# Patient Record
Sex: MALE | Race: WHITE | Employment: OTHER | URBAN - METROPOLITAN AREA
[De-identification: names, ages, dates, MRNs, and addresses within clinical notes are randomized per-mention and may not be internally consistent; named-entity substitution may affect disease eponyms.]

---

## 2017-10-12 ENCOUNTER — OP HISTORICAL/CONVERTED ENCOUNTER (OUTPATIENT)
Dept: OTHER | Age: 82
End: 2017-10-12

## 2017-10-19 ENCOUNTER — OP HISTORICAL/CONVERTED ENCOUNTER (OUTPATIENT)
Dept: OTHER | Age: 82
End: 2017-10-19

## 2017-11-27 ENCOUNTER — IP HISTORICAL/CONVERTED ENCOUNTER (OUTPATIENT)
Dept: OTHER | Age: 82
End: 2017-11-27

## 2018-07-10 ENCOUNTER — OFFICE VISIT (OUTPATIENT)
Dept: RHEUMATOLOGY | Age: 83
End: 2018-07-10

## 2018-07-10 VITALS
TEMPERATURE: 97.9 F | DIASTOLIC BLOOD PRESSURE: 58 MMHG | BODY MASS INDEX: 26.77 KG/M2 | RESPIRATION RATE: 18 BRPM | HEART RATE: 67 BPM | HEIGHT: 70 IN | WEIGHT: 187 LBS | SYSTOLIC BLOOD PRESSURE: 121 MMHG

## 2018-07-10 DIAGNOSIS — M35.3 PMR (POLYMYALGIA RHEUMATICA) (HCC): ICD-10-CM

## 2018-07-10 DIAGNOSIS — M31.5 GIANT CELL ARTERITIS WITH POLYMYALGIA RHEUMATICA (HCC): Primary | ICD-10-CM

## 2018-07-10 RX ORDER — PRAVASTATIN SODIUM 40 MG/1
40 TABLET ORAL
COMMUNITY

## 2018-07-10 RX ORDER — TAMSULOSIN HYDROCHLORIDE 0.4 MG/1
0.4 CAPSULE ORAL DAILY
COMMUNITY

## 2018-07-10 RX ORDER — LEVOTHYROXINE SODIUM 100 UG/1
100 TABLET ORAL
COMMUNITY
End: 2021-04-22

## 2018-07-10 RX ORDER — GABAPENTIN 300 MG/1
300 CAPSULE ORAL 3 TIMES DAILY
COMMUNITY
End: 2018-08-14

## 2018-07-10 RX ORDER — ROPINIROLE 0.25 MG/1
0.25 TABLET, FILM COATED ORAL
COMMUNITY

## 2018-07-10 RX ORDER — BISMUTH SUBSALICYLATE 262 MG
1 TABLET,CHEWABLE ORAL DAILY
COMMUNITY

## 2018-07-10 RX ORDER — FERROUS SULFATE 325(65) MG
325 TABLET, DELAYED RELEASE (ENTERIC COATED) ORAL DAILY
COMMUNITY
End: 2019-10-04

## 2018-07-10 RX ORDER — ASPIRIN 81 MG/1
TABLET ORAL DAILY
COMMUNITY
End: 2019-10-04

## 2018-07-10 RX ORDER — FISH OIL/DHA/EPA 1200-144MG
1 CAPSULE ORAL DAILY
Status: ON HOLD | COMMUNITY
End: 2020-12-01

## 2018-07-10 RX ORDER — PREDNISONE 20 MG/1
40 TABLET ORAL
Qty: 60 TAB | Refills: 0 | Status: SHIPPED | OUTPATIENT
Start: 2018-07-10 | End: 2018-08-14 | Stop reason: DRUGHIGH

## 2018-07-10 RX ORDER — LISINOPRIL 10 MG/1
10 TABLET ORAL DAILY
COMMUNITY
End: 2020-11-19

## 2018-07-10 RX ORDER — METOPROLOL TARTRATE 25 MG/1
12.5 TABLET, FILM COATED ORAL DAILY
Status: ON HOLD | COMMUNITY
End: 2020-12-02

## 2018-07-10 NOTE — PROGRESS NOTES
REASON FOR VISIT    This is the initial evaluation for Mr. Creola Ormond a 80 y.o.  male for question of an inflammatory arthritis. The patient is referred to the [facility] and [address] [place] [address] at the request of Dr. Dg Oropeza. HISTORY OF PRESENT ILLNESS      I have reviewed and summarized old records from none. In 3/01/2018, he developed poserior neck pain, tightness, and stiffness. He also had pain shoulder pain. He also developed head throbbing headaches with tenderness and had unintentional weight loss. He denies diplopia or jaw claudication. He saw neck surgeon who injected him with some relief. She was then tried on prednisone with some relief. Labs not available to me showed elevated ESR, per Dr. Dg Oropeza who discussed the case with me last week. Today, he feels better than March. His headaches are intermittent, which are random. He has neck pain and stiffness lasting hours but denies shoulder pain. He needs to hold his head when he tried to lay down. Therapy History includes:    Current DMARD therapy includes: none  Prior DMARD therapy includes: none  The following DMARDs have been ineffective: none  The following DMARDs were stopped because of side effects: none    REVIEW OF SYSTEMS    A 15 point review of systems was performed and summarized below. The questionnaire was reviewed with the patient and scanned into the patient's medical record.     General: endorses recent 15 lb weight loss, fatigue, weakness, denies recent weight gain, fever, night sweats  Musculoskeletal: denies joint pain, joint swelling, morning stiffness, muscle weakness  Ears: endorses loss of hearing, denies ringing in ears, deafness  Eyes: denies pain, redness, loss of vision, double vision, blurred vision, dryness, foreign body sensation  Mouth: denies sore tongue, oral ulcers, bleeding gums, loss of taste, dryness, increased dental caries  Nose: denies nosebleeds, loss of smell, nasal ulcers  Throat: denies frequent sore throats, hoarseness, difficulty in swallowing, pain in jaw while chewing  Neck: denies swollen glands, tender glands  Cardiopulmonary: denies pain in chest, irregular heart beat, sudden changes in heart beat, shortness of breath, difficulty breathing at night, swollen legs or feet, cough, coughing of blood, wheezing  Gastrointestinal: denies nausea, heartburn, stomach pain relieved by food, vomiting of blood/\"coffee grounds\", jaundice, increasing constipation, persistent diarrhea, blood in stools, black stools  Genitourinary: denies nocturia, difficult urination, pain or burning on urination, blood in urine, cloudy urine, pus in urine, genital discharge, frequent urination, vaginal dryness, rash/ulcers, sexual difficulties   Hematologic: endorses anemia, denies bleeding tendency, blood clots  Skin: endorses easy bruising, rash, denies redness, hives, sun sensitive, skin tightness, nodules/bumps, hair loss, color changes of hands or feet in the cold (Raynaud's)  Neurologic: endorses headaches, dizziness, numbness or tingling in hands/feet, memory loss, muscle weakness, denies fainting or loss of consciousness  Psychiatric: denies depression, excessive worries  Sleep: endorses poor sleep (6 hours), daytime somnolence, denies snoring, apnea, difficulty falling asleep, difficulty staying asleep     PAST MEDICAL HISTORY    He has a past medical history of Hard of hearing; Headache; PMR (polymyalgia rheumatica) (HCC); and Skin cancer. FAMILY HISTORY    His family history includes Arthritis-rheumatoid in his mother; Gout in his son; No Known Problems in his father. SOCIAL HISTORY    He reports that he has never smoked. He has never used smokeless tobacco. He reports that he does not drink alcohol or use illicit drugs. HEALTH MAINTENANCE    Immunizations    There is no immunization history on file for this patient.     Age Appropriate Cancer Screening    Colonoscopy: 2013    MEDICATIONS    Current Outpatient Prescriptions   Medication Sig Dispense Refill    rOPINIRole (REQUIP) 0.25 mg tablet Take  by mouth three (3) times daily.  tamsulosin (FLOMAX) 0.4 mg capsule Take 0.4 mg by mouth daily.  pravastatin (PRAVACHOL) 40 mg tablet Take 40 mg by mouth nightly.  levothyroxine (SYNTHROID) 100 mcg tablet Take  by mouth Daily (before breakfast).  ferrous sulfate (IRON) 325 mg (65 mg iron) EC tablet Take 325 mg by mouth two (2) times a day.  gabapentin (NEURONTIN) 300 mg capsule Take 300 mg by mouth three (3) times daily.  metoprolol tartrate (LOPRESSOR) 25 mg tablet Take 12.5 mg by mouth daily.  lisinopril (PRINIVIL, ZESTRIL) 10 mg tablet Take  by mouth daily.  fish oil-dha-epa 1,200-144-216 mg cap Take  by mouth.  multivitamin (ONE A DAY) tablet Take 1 Tab by mouth daily.  aspirin delayed-release 81 mg tablet Take  by mouth daily.  tocilizumab (ACTEMRA) 162 mg/0.9 mL syrg 162 mg by SubCUTAneous route every seven (7) days. Indications: GIANT CELL ARTERITIS 12 Syringe 11    predniSONE (DELTASONE) 20 mg tablet Take 2 Tabs by mouth daily (with breakfast) for 40 days. 60 Tab 0      ALLERGIES    Allergies   Allergen Reactions    Pcn [Penicillins] Swelling       PHYSICAL EXAMINATION    Visit Vitals    /58    Pulse 67    Temp 97.9 °F (36.6 °C)    Resp 18    Ht 5' 10\" (1.778 m)    Wt 187 lb (84.8 kg)    BMI 26.83 kg/m2     Body mass index is 26.83 kg/(m^2). General: Patient is alert, oriented x 3, not in acute distress, son at bedside    HEENT:   Conjunctiva are not injected and appear moist, oral mucous membranes are moist, there are no ulcers present, there is no alopecia, neck is supple, there is no lymphadenopathy. Salivary glands are normal    Cardiovascular:  Heart is regular rate and rhythm, no murmurs. Temporal arteries are not tortuous   Temporal scalp tenderness    Chest:  Lungs are clear to auscultation bilaterally.     Extremities:  Free of clubbing, cyanosis, edema, extremities well perfused. Neurological exam:  Muscle strength is full in upper and lower extremities. Skin exam:  There are no rashes, no tophi, no psoriasis, no active Raynaud's, no livedo reticularis, no periungual erythema. Musculoskeletal exam:  A comprehensive musculoskeletal exam was performed for all joints of each upper and lower extremity and assessed for swelling, tenderness and range of motion. Pertinent results are documented as below:    Decreased ROM of neck  Decreased ROM of shoulders (right worse than left - rotator cuff)  No synovitis    DATA REVIEW    Prior medical records were reviewed and are summarized as below:    Laboratory data: none    Imaging: none     ASSESSMENT AND PLAN    1) Giant Cell Arteritis. (temporal scalp tenderness, new headaches, elevated ESR, polymyalgia rheumatica, unintentional weight loss) I referred him to vascular surgery for bilateral temporal artery biopsies. I ordered CTA chest and neck to evaluate for large vessel vasculitis. I ordered labs. I started prednisone 20 mg twice daily. I discussed start Actemra. He denies a history of diverticulitis. An order was submitted to [facility] for 162 mg weekly. Follow up in 4 weeks    2) Polymyalgia Rheumatica. See #1. Today, I personally spent 30 minutes in direct face to face time with the patient, of which greater than 50% of the time was spent in patient education, counseling, and coordination of care as described above. All questions asked and answered. The patient voiced understanding of the aforementioned assessment and plan. Summary of plan was provided in the After Visit Summary patient instructions. I also provided education about MyChart setup and utility.     TODAY'S ORDERS    Orders Placed This Encounter    QUANTIFERON TB GOLD    CTA CHEST W OR W WO CONT    CTA NECK    CYCLIC CITRUL PEPTIDE AB, IGG    CBC WITH AUTOMATED DIFF    CHRONIC HEPATITIS PANEL    METABOLIC PANEL, COMPREHENSIVE    C REACTIVE PROTEIN, QT    SED RATE (ESR)    RHEUMATOID FACTOR, QL    PROTEIN ELECTROPHORESIS W/ REFLX ADRIA    URIC ACID    VITAMIN D, 25 HYDROXY    ANTI-NEUTROPHIL CYTOPLASMIC AB    [facility]    REFERRAL TO VASCULAR SURGERY    tocilizumab (ACTEMRA) 162 mg/0.9 mL syrg    predniSONE (DELTASONE) 20 mg tablet     Future Appointments  Date Time Provider Len Brittany   8/14/2018 3:40 PM MD Brendon Jackman MD, [address] [place] [address]    Adult Rheumatology   Musculoskeletal Ultrasound Certified  [address] [place] [address], [address] [place] [address] Road   Phone [phone]  Fax [phone]

## 2018-07-10 NOTE — PATIENT INSTRUCTIONS
Please call the Patient Care Scheduling Team [phone] to schedule your test (CTA neck and chest). Call and schedule with either Dr. Chilo German or Dr. Volodymyr Estrada (whom ever can see you sooner) for temporal artery biopsies of both sides    Take prednisone 20 mg in the AM and 20 mg in the PM      Tocilizumab (By injection)   Tocilizumab (toe-si-LUCÍA-[id number]-mab)  Treats rheumatoid arthritis, giant cell arteritis, polyarticular juvenile idiopathic arthritis, systemic juvenile idiopathic arthritis, and cytokine release syndrome. Brand Name(s): Actemra   There may be other brand names for this medicine. When This Medicine Should Not Be Used: This medicine is not right for everyone. Do not use it if you had an allergic reaction to tocilizumab. How to Use This Medicine:   Injectable  · Your doctor will prescribe your dose and schedule. This medicine is given through a needle placed into a vein or as a shot under your skin. · If tocilizumab is injected into a vein, it must be given slowly, so the needle will have to stay in place for at least 1 hour. · A nurse or other health provider will give you this medicine. · You may be taught how to give your medicine at home. Make sure you understand all instructions before giving yourself an injection. Do not use more medicine or use it more often than your doctor tells you to. · Allow the medicine to warm to room temperature for 30 minutes before using it. · Check the liquid in the prefilled syringe. It should be clear and colorless or slightly yellow. Do not use this medicine if it is cloudy, discolored, or if you see particles in it. · You will be shown the body areas where this shot can be given. Use a different body area each time you give yourself a shot. Keep track of where you give each shot to make sure you rotate body areas. Do not inject into skin areas that are red, bruised, tender, hard, or not intact.   · Use a new needle and syringe each time you inject your medicine. · This medicine should come with a Medication Guide. Ask your pharmacist for a copy if you do not have one. · Missed dose: Take a dose as soon as you remember. If it is almost time for your next dose, wait until then and take a regular dose. Do not take extra medicine to make up for a missed dose. · If you store this medicine at home, keep it in the refrigerator. Do not freeze. Protect the medicine from direct light. Keep it in its original package until you are ready to use it. Drugs and Foods to Avoid:   Ask your doctor or pharmacist before using any other medicine, including over-the-counter medicines, vitamins, and herbal products. · Some medicines can affect how tocilizumab works. Tell your doctor if you are using any of the following:  ¨ Abatacept, adalimumab, anakinra, certolizumab, cyclosporine, etanercept, golimumab, infliximab, methotrexate, omeprazole, rituximab, theophylline  ¨ Birth control pills  ¨ Blood thinner (including warfarin)  ¨ Medicine that weakens the immune system (including a cancer or steroid medicine)  ¨ Medicine to lower cholesterol (including atorvastatin, lovastatin, simvastatin)  ¨ NSAID pain or arthritis medicine (including aspirin, diclofenac, ibuprofen, naproxen)  · This medicine may interfere with vaccines. Ask your doctor before you get a flu shot or any other vaccines. Warnings While Using This Medicine:   · Tell your doctor if you are pregnant or breastfeeding, or if you have liver disease, diabetes, high cholesterol, multiple sclerosis, stomach or bowel disease (including diverticulitis, ulcers), or a weak immune system (including HIV, cancer). Tell your doctor if you have any type of infection (including hepatitis B or tuberculosis) or an infection that keeps coming back.   · This medicine may cause the following problems:  ¨ Increased risk for serious infections  ¨ Stomach or bowel problems  ¨ Increased risk of cancer  ¨ Serious skin reactions  ¨ High cholesterol in the blood  · You will need to have a skin test for tuberculosis (TB) before you start using this medicine. Tell your doctor if you or anyone in your home has ever had a positive TB skin test or has been exposed to TB. · This medicine may make you bleed, bruise, or get infections more easily. Take precautions to prevent illness and injury. Wash your hands often. · Your doctor will do lab tests at regular visits to check on the effects of this medicine. Keep all appointments. · Throw away used needles in a hard, closed container that the needles cannot poke through. Keep this container away from children and pets. · Keep all medicine out of the reach of children. Never share your medicine with anyone. Possible Side Effects While Using This Medicine:   Call your doctor right away if you notice any of these side effects:  · Allergic reaction: Itching or hives, swelling in your face or hands, swelling or tingling in your mouth or throat, chest tightness, trouble breathing  · Blistering, peeling, red skin rash  · Bloody, black, or tarry stools, severe stomach pain, diarrhea  · Change in how much or how often you urinate  · Chest pain, trouble breathing  · Dark urine or pale stools, nausea, vomiting, loss of appetite, stomach pain, yellow skin or eyes  · Fast, slow, or pounding heartbeat  · Fever, chills, cough, stuffy or runny nose, sore throat, body aches  · Unusual bleeding, bruising, weakness  · Vomiting of material that looks like coffee grounds  If you notice these less serious side effects, talk with your doctor:   · Headache  · Pain, itching, burning, swelling, bleeding, or a lump under your skin where the needle was placed or the shot was given  If you notice other side effects that you think are caused by this medicine, tell your doctor. Call your doctor for medical advice about side effects.  You may report side effects to FDA at 5-617-DII-1521  © 2017 [employer] is for End User's use only and may not be sold, redistributed or otherwise used for commercial purposes. The above information is an  only. It is not intended as medical advice for individual conditions or treatments. Talk to your doctor, nurse or pharmacist before following any medical regimen to see if it is safe and effective for you.

## 2018-07-10 NOTE — MR AVS SNAPSHOT
[address] [facility] [address] [place] [address] 
[phone] Patient: Cristobal Cruz MRN: [id number] UVF:[birth year] Visit Information Date & Time Provider Department Dept. Phone Encounter #  
 7/10/2018  1:00 PM Genesis DeanDeb [id number] Follow-up Instructions Return in about 4 weeks (around 8/7/2018). Upcoming Health Maintenance Date Due DTaP/Tdap/Td series (1 - Tdap) 2/18/1950 ZOSTER VACCINE AGE 60> 12/18/1988 GLAUCOMA SCREENING Q2Y 2/18/1994 Pneumococcal 65+ Low/Medium Risk (1 of 2 - PCV13) 2/18/1994 MEDICARE YEARLY EXAM 7/9/2018 Influenza Age 5 to Adult 8/1/2018 Allergies as of 7/10/2018  Review Complete On: 7/10/2018 By: Genesis Dean MD  
  
 Severity Noted Reaction Type Reactions Pcn [Penicillins]  07/10/2018    Swelling Current Immunizations  Never Reviewed No immunizations on file. Not reviewed this visit You Were Diagnosed With   
  
 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  
 26.83 kg/m 2 2.05 m 2 Preferred Pharmacy Pharmacy Name Phone [address] 7177 [facility], [address] [facility] [address] [phone] Your Updated Medication List  
  
   
This list is accurate as of 7/10/18  1:47 PM.  Always use your most recent med list.  
  
  
  
  
 aspirin delayed-release 81 mg tablet Take  by mouth daily. ferrous sulfate 325 mg (65 mg iron) EC tablet Commonly known as:  IRON Take 325 mg by mouth two (2) times a day. fish oil-dha-epa 1,200-144-216 mg Cap Take  by mouth.  
  
 gabapentin 300 mg capsule Commonly known as:  NEURONTIN Take 300 mg by mouth three (3) times daily. levothyroxine 100 mcg tablet Commonly known as:  SYNTHROID Take  by mouth Daily (before breakfast). lisinopril 10 mg tablet Commonly known as:  Martha Pares Take  by mouth daily. metoprolol tartrate 25 mg tablet Commonly known as:  LOPRESSOR Take 12.5 mg by mouth daily. multivitamin tablet Commonly known as:  ONE A DAY Take 1 Tab by mouth daily. pravastatin 40 mg tablet Commonly known as:  PRAVACHOL Take 40 mg by mouth nightly. predniSONE 20 mg tablet Commonly known as:  Conner Ford Take 2 Tabs by mouth daily (with breakfast) for 40 days. rOPINIRole 0.25 mg tablet Commonly known as:  Heidy Marisol Take  by mouth three (3) times daily. tamsulosin 0.4 mg capsule Commonly known as:  FLOMAX Take 0.4 mg by mouth daily. tocilizumab 162 mg/0.9 mL Syrg Commonly known as:  ACTEMRA 162 mg by SubCUTAneous route every seven (7) days. Indications: GIANT CELL ARTERITIS Prescriptions Sent to Pharmacy Refills  
 tocilizumab (ACTEMRA) 162 mg/0.9 mL syrg 11 Si mg by SubCUTAneous route every seven (7) days. Indications: GIANT CELL ARTERITIS Class: Normal  
 Pharmacy: [address] [place] [address], [address] [place] [address] Ph #: [phone] Route: SubCUTAneous  
 predniSONE (DELTASONE) 20 mg tablet 0 Sig: Take 2 Tabs by mouth daily (with breakfast) for 40 days. Class: Normal  
 Pharmacy: [facility] DR TERESA GALARZA [address] [facility], [address] Ph #: [phone] Route: Oral  
  
We Performed the Following ANTI-NEUTROPHIL CYTOPLASMIC AB E825138 CPT(R)] C REACTIVE PROTEIN, QT [28410 CPT(R)] CBC WITH AUTOMATED DIFF [46904 CPT(R)] CHRONIC HEPATITIS PANEL [[id number] Custom] [address], IGG [id number] CPT(R)] METABOLIC PANEL, COMPREHENSIVE [04071 CPT(R)] PROTEIN ELECTROPHORESIS W/ REFLX ADRIA [[id number] Custom] QUANTIFERON TB GOLD [[id number] Custom] REFERRAL TO VASCULAR SURGERY [ATQ555 Custom] Comments:  
 Bilateral temporal artery biopsies REFERRAL TO VASCULAR SURGERY [[id number] Custom] Comments:  
 Bilateral temporal artery biopsies RHEUMATOID FACTOR, QL [id number] CPT(R)] SED RATE (ESR) V7029836 CPT(R)] URIC ACID [id number] CPT(R)] VITAMIN D, 25 HYDROXY [id number] CPT(R)] Follow-up Instructions Return in about 4 weeks (around 8/7/2018). To-Do List   
 07/10/2018 Imaging:  CTA CHEST W OR W WO CONT   
  
 07/10/2018 Imaging:  CTA NECK Referral Information Referral ID Referred By Referred To  
  
 [id number] Pearl Bush MD   
   [address] [place] [address] Phone: [phone] Fax: [phone] Visits Status Start Date End Date 1 New Request 7/10/18 7/10/19 If your referral has a status of pending review or denied, additional information will be sent to support the outcome of this decision. Referral ID Referred By Referred To  
 [id number] [facility] Merissa Carpenter[address], [address] Visits Status Start Date End Date 1 New Request 7/10/18 7/10/19 If your referral has a status of pending review or denied, additional information will be sent to support the outcome of this decision. Patient Instructions Please call the Patient Care Scheduling Team [phone] to schedule your test (CTA neck and chest). Call and schedule with either Dr. Eva Cedillo or Dr. Sudarshan Hatch (whom ever can see you sooner) for temporal artery biopsies of both sides Take prednisone 20 mg in the AM and 20 mg in the PM 
 
 
 Tocilizumab (By injection) Tocilizumab (toe-si-LUCÍA-oo-mab) Treats rheumatoid arthritis, giant cell arteritis, polyarticular juvenile idiopathic arthritis, systemic juvenile idiopathic arthritis, and cytokine release syndrome. Brand Name(s): Actemra There may be other brand names for this medicine. When This Medicine Should Not Be Used: This medicine is not right for everyone. Do not use it if you had an allergic reaction to tocilizumab. How to Use This Medicine:  
Injectable · Your doctor will prescribe your dose and schedule. This medicine is given through a needle placed into a vein or as a shot under your skin. · If tocilizumab is injected into a vein, it must be given slowly, so the needle will have to stay in place for at least 1 hour. · A nurse or other health provider will give you this medicine. · You may be taught how to give your medicine at home. Make sure you understand all instructions before giving yourself an injection. Do not use more medicine or use it more often than your doctor tells you to. · Allow the medicine to warm to room temperature for 30 minutes before using it. · Check the liquid in the prefilled syringe. It should be clear and colorless or slightly yellow. Do not use this medicine if it is cloudy, discolored, or if you see particles in it. · You will be shown the body areas where this shot can be given. Use a different body area each time you give yourself a shot. Keep track of where you give each shot to make sure you rotate body areas. Do not inject into skin areas that are red, bruised, tender, hard, or not intact. · Use a new needle and syringe each time you inject your medicine. · This medicine should come with a Medication Guide. Ask your pharmacist for a copy if you do not have one. · Missed dose: Take a dose as soon as you remember. If it is almost time for your next dose, wait until then and take a regular dose.  Do not take extra medicine to make up for a missed dose. · If you store this medicine at home, keep it in the refrigerator. Do not freeze. Protect the medicine from direct light. Keep it in its original package until you are ready to use it. Drugs and Foods to Avoid: Ask your doctor or pharmacist before using any other medicine, including over-the-counter medicines, vitamins, and herbal products. · Some medicines can affect how tocilizumab works. Tell your doctor if you are using any of the following: ¨ Abatacept, adalimumab, anakinra, certolizumab, cyclosporine, etanercept, golimumab, infliximab, methotrexate, omeprazole, rituximab, theophylline ¨ Birth control pills ¨ Blood thinner (including warfarin) ¨ Medicine that weakens the immune system (including a cancer or steroid medicine) ¨ Medicine to lower cholesterol (including atorvastatin, lovastatin, simvastatin) ¨ NSAID pain or arthritis medicine (including aspirin, diclofenac, ibuprofen, naproxen) · This medicine may interfere with vaccines. Ask your doctor before you get a flu shot or any other vaccines. Warnings While Using This Medicine: · Tell your doctor if you are pregnant or breastfeeding, or if you have liver disease, diabetes, high cholesterol, multiple sclerosis, stomach or bowel disease (including diverticulitis, ulcers), or a weak immune system (including HIV, cancer). Tell your doctor if you have any type of infection (including hepatitis B or tuberculosis) or an infection that keeps coming back. · This medicine may cause the following problems: 
¨ Increased risk for serious infections ¨ Stomach or bowel problems ¨ Increased risk of cancer ¨ Serious skin reactions ¨ High cholesterol in the blood · You will need to have a skin test for tuberculosis (TB) before you start using this medicine. Tell your doctor if you or anyone in your home has ever had a positive TB skin test or has been exposed to TB. · This medicine may make you bleed, bruise, or get infections more easily. Take precautions to prevent illness and injury. Wash your hands often. · Your doctor will do lab tests at regular visits to check on the effects of this medicine. Keep all appointments. · Throw away used needles in a hard, closed container that the needles cannot poke through. Keep this container away from children and pets. · Keep all medicine out of the reach of children. Never share your medicine with anyone. Possible Side Effects While Using This Medicine:  
Call your doctor right away if you notice any of these side effects: · Allergic reaction: Itching or hives, swelling in your face or hands, swelling or tingling in your mouth or throat, chest tightness, trouble breathing · Blistering, peeling, red skin rash · Bloody, black, or tarry stools, severe stomach pain, diarrhea · Change in how much or how often you urinate · Chest pain, trouble breathing · Dark urine or pale stools, nausea, vomiting, loss of appetite, stomach pain, yellow skin or eyes · Fast, slow, or pounding heartbeat · Fever, chills, cough, stuffy or runny nose, sore throat, body aches · Unusual bleeding, bruising, weakness · Vomiting of material that looks like coffee grounds If you notice these less serious side effects, talk with your doctor:  
· Headache · Pain, itching, burning, swelling, bleeding, or a lump under your skin where the needle was placed or the shot was given If you notice other side effects that you think are caused by this medicine, tell your doctor. Call your doctor for medical advice about side effects. You may report side effects to FDA at 9-378-FDA-8732 © 2017 [facility] Information is for End User's use only and may not be sold, redistributed or otherwise used for commercial purposes. The above information is an  only.  It is not intended as medical advice for individual conditions or treatments. Talk to your doctor, nurse or pharmacist before following any medical regimen to see if it is safe and effective for you. Introducing [facility]! [facility] introduces LIA patient portal. Now you can access parts of your medical record, email your doctor's office, and request medication refills online. 1. In your internet browser, go to [email or web address] 2. Click on the First Time User? Click Here link in the Sign In box. You will see the New Member Sign Up page. 3. Enter your LIA Access Code exactly as it appears below. You will not need to use this code after youve completed the sign-up process. If you do not sign up before the expiration date, you must request a new code. · LIA Access Code: [email or web address] Expires: 10/8/2018 12:49 PM 
 
4. Enter the last four digits of your Social Security Number (xxxx) and Date of Birth (mm/dd/yyyy) as indicated and click Submit. You will be taken to the next sign-up page. 5. Create a LIA ID. This will be your LIA login ID and cannot be changed, so think of one that is secure and easy to remember. 6. Create a LIA password. You can change your password at any time. 7. Enter your Password Reset Question and Answer. This can be used at a later time if you forget your password. 8. Enter your e-mail address. You will receive e-mail notification when new information is available in [address]. 9. Click Sign Up. You can now view and download portions of your medical record. 10. Click the Download Summary menu link to download a portable copy of your medical information. If you have questions, please visit the Frequently Asked Questions section of the LIA website. Remember, LIA is NOT to be used for urgent needs. For medical emergencies, dial 911. Now available from your iPhone and Android! Please provide this summary of care documentation to your next provider. Your primary care clinician is listed as Fernando Padron. If you have any questions after today's visit, please call [phone].

## 2018-07-11 ENCOUNTER — TELEPHONE (OUTPATIENT)
Dept: RHEUMATOLOGY | Age: 83
End: 2018-07-11

## 2018-07-11 LAB
25(OH)D3+25(OH)D2 SERPL-MCNC: 48.7 NG/ML (ref 30–100)
ALBUMIN SERPL ELPH-MCNC: 2.7 G/DL (ref 2.9–4.4)
ALBUMIN SERPL-MCNC: 3.4 G/DL (ref 3.5–4.7)
ALBUMIN/GLOB SERPL: 0.6 {RATIO} (ref 0.7–1.7)
ALBUMIN/GLOB SERPL: 0.9 {RATIO} (ref 1.2–2.2)
ALP SERPL-CCNC: 61 IU/L (ref 39–117)
ALPHA1 GLOB SERPL ELPH-MCNC: 0.5 G/DL (ref 0–0.4)
ALPHA2 GLOB SERPL ELPH-MCNC: 1.3 G/DL (ref 0.4–1)
ALT SERPL-CCNC: 16 IU/L (ref 0–44)
AST SERPL-CCNC: 18 IU/L (ref 0–40)
B-GLOBULIN SERPL ELPH-MCNC: 1.1 G/DL (ref 0.7–1.3)
BASOPHILS # BLD AUTO: 0 X10E3/UL (ref 0–0.2)
BASOPHILS NFR BLD AUTO: 0 %
BILIRUB SERPL-MCNC: 0.2 MG/DL (ref 0–1.2)
BUN SERPL-MCNC: 26 MG/DL (ref 8–27)
BUN/CREAT SERPL: 31 (ref 10–24)
C-ANCA TITR SER IF: NORMAL TITER
CALCIUM SERPL-MCNC: 9.4 MG/DL (ref 8.6–10.2)
CCP IGA+IGG SERPL IA-ACNC: 12 UNITS (ref 0–19)
CHLORIDE SERPL-SCNC: 96 MMOL/L (ref 96–106)
CO2 SERPL-SCNC: 23 MMOL/L (ref 20–29)
COMMENT, 144067: NORMAL
CREAT SERPL-MCNC: 0.84 MG/DL (ref 0.76–1.27)
CRP SERPL-MCNC: 107.6 MG/L (ref 0–4.9)
EOSINOPHIL # BLD AUTO: 0.3 X10E3/UL (ref 0–0.4)
EOSINOPHIL NFR BLD AUTO: 3 %
ERYTHROCYTE [DISTWIDTH] IN BLOOD BY AUTOMATED COUNT: 15.6 % (ref 12.3–15.4)
ERYTHROCYTE [SEDIMENTATION RATE] IN BLOOD BY WESTERGREN METHOD: 42 MM/HR (ref 0–30)
GAMMA GLOB SERPL ELPH-MCNC: 1.7 G/DL (ref 0.4–1.8)
GLOBULIN SER CALC-MCNC: 3.9 G/DL (ref 1.5–4.5)
GLOBULIN SER CALC-MCNC: 4.6 G/DL (ref 2.2–3.9)
GLUCOSE SERPL-MCNC: 96 MG/DL (ref 65–99)
HBV CORE AB SERPL QL IA: NEGATIVE
HBV CORE IGM SERPL QL IA: NEGATIVE
HBV E AB SERPL QL IA: NEGATIVE
HBV E AG SERPL QL IA: NEGATIVE
HBV SURFACE AB SER QL: NON REACTIVE
HBV SURFACE AG SERPL QL IA: NEGATIVE
HCT VFR BLD AUTO: 30.1 % (ref 37.5–51)
HCV AB S/CO SERPL IA: <0.1 S/CO RATIO (ref 0–0.9)
HGB BLD-MCNC: 9.3 G/DL (ref 13–17.7)
IMM GRANULOCYTES # BLD: 0 X10E3/UL (ref 0–0.1)
IMM GRANULOCYTES NFR BLD: 0 %
LYMPHOCYTES # BLD AUTO: 1.4 X10E3/UL (ref 0.7–3.1)
LYMPHOCYTES NFR BLD AUTO: 14 %
M PROTEIN SERPL ELPH-MCNC: ABNORMAL G/DL
MCH RBC QN AUTO: 25.9 PG (ref 26.6–33)
MCHC RBC AUTO-ENTMCNC: 30.9 G/DL (ref 31.5–35.7)
MCV RBC AUTO: 84 FL (ref 79–97)
MONOCYTES # BLD AUTO: 0.9 X10E3/UL (ref 0.1–0.9)
MONOCYTES NFR BLD AUTO: 9 %
NEUTROPHILS # BLD AUTO: 7.9 X10E3/UL (ref 1.4–7)
NEUTROPHILS NFR BLD AUTO: 74 %
P-ANCA ATYPICAL TITR SER IF: NORMAL TITER
P-ANCA TITR SER IF: NORMAL TITER
PLATELET # BLD AUTO: 506 X10E3/UL (ref 150–379)
PLEASE NOTE, 011150: ABNORMAL
POTASSIUM SERPL-SCNC: 5.3 MMOL/L (ref 3.5–5.2)
PROT PATTERN SERPL ELPH-IMP: ABNORMAL
PROT SERPL-MCNC: 7.3 G/DL (ref 6–8.5)
RBC # BLD AUTO: 3.59 X10E6/UL (ref 4.14–5.8)
RHEUMATOID FACT SERPL-ACNC: 15.2 IU/ML (ref 0–13.9)
SODIUM SERPL-SCNC: 136 MMOL/L (ref 134–144)
URATE SERPL-MCNC: 5.9 MG/DL (ref 3.7–8.6)
WBC # BLD AUTO: 10.5 X10E3/UL (ref 3.4–10.8)

## 2018-07-11 NOTE — TELEPHONE ENCOUNTER
Spoke with pts insurance company and they stated that no authorization was needed for CTA of chest or neck. I called Lisa Bergeron back at [employer] and let her know that no auths were needed. She stated an understanding.

## 2018-07-11 NOTE — TELEPHONE ENCOUNTER
[facility] (need prior auth) Please call Glenna Cortez pt came in for a procedure (cta?)  Only one can be done today.

## 2018-07-13 ENCOUNTER — TELEPHONE (OUTPATIENT)
Dept: RHEUMATOLOGY | Age: 83
End: 2018-07-13

## 2018-07-13 LAB
ANNOTATION COMMENT IMP: NORMAL
GAMMA INTERFERON BACKGROUND BLD IA-ACNC: 0.08 IU/ML
M TB IFN-G BLD-IMP: NEGATIVE
M TB IFN-G CD4+ BCKGRND COR BLD-ACNC: 0 IU/ML
M TB IFN-G CD4+ T-CELLS BLD-ACNC: 0.08 IU/ML
MITOGEN IGNF BLD-ACNC: 6.96 IU/ML
QUANTIFERON INCUBATION: NORMAL
SERVICE CMNT-IMP: NORMAL

## 2018-07-13 NOTE — TELEPHONE ENCOUNTER
Martha from [facility] [address] [place] [address] called because she needs a prior authorization for Actemra for the patient. The call back number for the prior auth department is [phone] and the case number is [id number].

## 2018-07-13 NOTE — TELEPHONE ENCOUNTER
Received faxed prior auth form for Actemra syringe from [employer]. Form filled out, and faxed back to [employer]. Also received prior auth request for Prednisone. Went on Cover My Meds for Prednisone request for prior auth. Both medications are pending.

## 2018-07-15 NOTE — PROGRESS NOTES
The results were reviewed and a letter was sent. Labs show anemia and elevated inflammatory markers (ESR, CRP, platelets, SPEP/ADRIA). Low positive rheumatoid factor. All remaining labs are normal/negative.

## 2018-07-16 ENCOUNTER — DOCUMENTATION ONLY (OUTPATIENT)
Dept: RHEUMATOLOGY | Age: 83
End: 2018-07-16

## 2018-07-16 ENCOUNTER — HOSPITAL ENCOUNTER (OUTPATIENT)
Dept: NON INVASIVE DIAGNOSTICS | Age: 83
Discharge: HOME | End: 2018-07-16
Payer: MEDICARE

## 2018-07-16 ENCOUNTER — OFFICE VISIT (OUTPATIENT)
Dept: SURGERY | Age: 83
End: 2018-07-16

## 2018-07-16 VITALS
OXYGEN SATURATION: 98 % | DIASTOLIC BLOOD PRESSURE: 67 MMHG | HEART RATE: 53 BPM | SYSTOLIC BLOOD PRESSURE: 143 MMHG | WEIGHT: 192.6 LBS | TEMPERATURE: 97.4 F | RESPIRATION RATE: 20 BRPM | HEIGHT: 70 IN | BODY MASS INDEX: 27.57 KG/M2

## 2018-07-16 DIAGNOSIS — M31.6 GCA (GIANT CELL ARTERITIS) (HCC): ICD-10-CM

## 2018-07-16 DIAGNOSIS — Z01.818 PREOP TESTING: Primary | ICD-10-CM

## 2018-07-16 DIAGNOSIS — Z01.818 PREOP TESTING: ICD-10-CM

## 2018-07-16 LAB
ATRIAL RATE: 46 BPM
CALCULATED P AXIS, ECG09: 47 DEGREES
CALCULATED R AXIS, ECG10: -3 DEGREES
CALCULATED T AXIS, ECG11: 59 DEGREES
DIAGNOSIS, 93000: NORMAL
P-R INTERVAL, ECG05: 248 MS
Q-T INTERVAL, ECG07: 464 MS
QRS DURATION, ECG06: 78 MS
QTC CALCULATION (BEZET), ECG08: 406 MS
VENTRICULAR RATE, ECG03: 46 BPM

## 2018-07-16 PROCEDURE — 93005 ELECTROCARDIOGRAM TRACING: CPT

## 2018-07-16 NOTE — PROGRESS NOTES
Chief Complaint   Patient presents with   [address]     new patient evaluation bilateral temporal artery     1. Have you been to the ER, urgent care clinic since your last visit? No   Hospitalized since your last visit? No     2. Have you seen or consulted any other health care providers outside of the [address] [place] [address] since your last visit?    Dr. Chencho [facility]

## 2018-07-16 NOTE — MR AVS SNAPSHOT
[address], [address] 
[phone] Patient: Lulu Camara MRN: [id number] HZD:[birth year] Visit Information Date & Time Provider Department Dept. Phone Encounter #  
 7/16/2018 10:00 AM Jj Cifuentes MD [facility] [id number] Your Appointments 8/14/2018  3:40 PM  
ESTABLISHED PATIENT with Hayley Wong MD  
[address] ([address]) Appt Note: 4 week f/up  
 Yosvany Moise [facility] [address]  
[phone]  
  
   
 [facility] [address] Upcoming Health Maintenance Date Due DTaP/Tdap/Td series (1 - Tdap) 2/18/1950 ZOSTER VACCINE AGE 60> 12/18/1988 GLAUCOMA SCREENING Q2Y 2/18/1994 Pneumococcal 65+ Low/Medium Risk (1 of 2 - PCV13) 2/18/1994 MEDICARE YEARLY EXAM 7/9/2018 Influenza Age 5 to Adult 8/1/2018 Allergies as of 7/16/2018  Review Complete On: 7/16/2018 By: Mendel Scotland, LPN Severity Noted Reaction Type Reactions Pcn [Penicillins]  07/10/2018    Swelling Current Immunizations  Never Reviewed No immunizations on file. Not reviewed this visit You Were Diagnosed With   
  
 Codes Comments Preop testing    -  Primary ICD-10-CM: T22.133 ICD-9-CM: V72.84   
 GCA (giant cell arteritis) (HCC)     ICD-10-CM: M31.6 ICD-9-CM: 446. 5 Vitals BP Pulse Temp Resp Height(growth percentile) Weight(growth percentile) 143/67 (BP 1 Location: Right arm, BP Patient Position: Sitting) (!) 53 97.4 °F (36.3 °C) (Oral) 20 5' 10\" (1.778 m) 192 lb 9.6 oz (87.4 kg) SpO2 BMI Smoking Status 98% 27.64 kg/m2 Never Smoker BMI and BSA Data Body Mass Index Body Surface Area  
 27.64 kg/m 2 2.08 m 2 Preferred Pharmacy Pharmacy Name Phone [address] [facility], [address] [phone] Your Updated Medication List  
  
   
This list is accurate as of 7/16/18 10:37 AM.  Always use your most recent med list.  
  
  
  
  
 aspirin delayed-release 81 mg tablet Take  by mouth daily. ferrous sulfate 325 mg (65 mg iron) EC tablet Commonly known as:  IRON Take 325 mg by mouth two (2) times a day. fish oil-dha-epa 1,200-144-216 mg Cap Take  by mouth.  
  
 gabapentin 300 mg capsule Commonly known as:  NEURONTIN Take 300 mg by mouth three (3) times daily. levothyroxine 100 mcg tablet Commonly known as:  SYNTHROID Take  by mouth Daily (before breakfast). lisinopril 10 mg tablet Commonly known as:  Michaelyn [place] Take  by mouth daily. metoprolol tartrate 25 mg tablet Commonly known as:  LOPRESSOR Take 12.5 mg by mouth daily. multivitamin tablet Commonly known as:  ONE A DAY Take 1 Tab by mouth daily. pravastatin 40 mg tablet Commonly known as:  PRAVACHOL Take 40 mg by mouth nightly. predniSONE 20 mg tablet Commonly known as:  Kirsten Aver Take 2 Tabs by mouth daily (with breakfast) for 40 days. rOPINIRole 0.25 mg tablet Commonly known as:  Kimberlyuellen Mould Take  by mouth three (3) times daily. tamsulosin 0.4 mg capsule Commonly known as:  FLOMAX Take 0.4 mg by mouth daily. tocilizumab 162 mg/0.9 mL Syrg Commonly known as:  ACTEMRA 162 mg by SubCUTAneous route every seven (7) days. Indications: GIANT CELL ARTERITIS To-Do List   
 07/16/2018 ECG:  EKG, 12 LEAD, INITIAL Introducing [facility]! Genna Bledsoe introduces [employer] patient portal. Now you can access parts of your medical record, email your doctor's office, and request medication refills online. 1. In your internet browser, go to [email or web address] 2. Click on the First Time User? Click Here link in the Sign In box.  You will see the New Member Sign Up page. 3. Enter your [employer] Access Code exactly as it appears below. You will not need to use this code after youve completed the sign-up process. If you do not sign up before the expiration date, you must request a new code. · [employer] Access Code: [email or web address] Expires: 10/8/2018 12:49 PM 
 
4. Enter the last four digits of your Social Security Number (xxxx) and Date of Birth (mm/dd/yyyy) as indicated and click Submit. You will be taken to the next sign-up page. 5. Create a [employer] ID. This will be your [employer] login ID and cannot be changed, so think of one that is secure and easy to remember. 6. Create a [employer] password. You can change your password at any time. 7. Enter your Password Reset Question and Answer. This can be used at a later time if you forget your password. 8. Enter your e-mail address. You will receive e-mail notification when new information is available in [address] 9. Click Sign Up. You can now view and download portions of your medical record. 10. Click the Download Summary menu link to download a portable copy of your medical information. If you have questions, please visit the Frequently Asked Questions section of the [employer] website. Remember, [employer] is NOT to be used for urgent needs. For medical emergencies, dial 911. Now available from your iPhone and Android! Please provide this summary of care documentation to your next provider. Your primary care clinician is listed as Otis Wolff. If you have any questions after today's visit, please call [phone].

## 2018-07-16 NOTE — PROGRESS NOTES
CT Imaging    CTA Neck with contrast 07/12/20: A standard arch is present. No significant stenosis of the origin of the great vessels evident. Right carotid: No stenoses of the right common carotid artery evident. On the previous examination, there was a high-grade cyst of the right internal carotid artery just above the bifurcation. On this current examination, there is no significant stenosis of the right carotid artery. Left carotid artery: No stenoses of the left common carotid artery evident with calcified and noncalcified plaque at the bifurcation. There is approximately 50% stenosis. This is unchanged from previous examination. Vertebral artery are codominant without focal stenosis.

## 2018-07-17 ENCOUNTER — DOCUMENTATION ONLY (OUTPATIENT)
Dept: RHEUMATOLOGY | Age: 83
End: 2018-07-17

## 2018-07-17 NOTE — PROGRESS NOTES
CT Imaging    CTA Chest 7/13/2018: There is mild aneurysmal dilatation of the stent aorta, measures 3.7 x 3.7 cm in greatest diameter with no significant ankle arthrosis plaque within the lumen of the ascending thoracic aorta. There is mild aneurysmal dilatation of the descending aorta which measures 3.5 x 3.2 cm maximum diameter and there is a mild diffuse calcified and noncalcified luminal plaque throughout the descending thoracic aorta. There is no evidence of aortic dissection or Bandar. There is mild calcific atherosclerotic disease and mild luminal narrowing of the origin of the left subclavian artery by NASCET criteria. The left common carotid artery and right brachiocephalic artery are patent without focal stenosis or luminal plaque. There is moderate calcific and noncalcific atherosclerotic plaque infrarenal portion of the abdominal aorta mild luminal stenosis NASCET criteria. The suprarenal portion of the abdominal aorta is normal in caliber with mild atherosclerotic disease. No acute airspace consolidation or pneumonic infiltrate or suspicious pulmonary nodules or pulmonary infarctions. No sizable pleural effusions. No pneumothorax. No mediastinal or hilar adenopathy by size criteria. There are multiple small subcentimeter calcified and noncalcified shotty lymph nodes in the mediastinum. The airways patent. No subtle pericardial effusions. There is mild cardiomegaly with no signs of acute left heart failure. No acute displaced fractures or suspicious lytic or blastic lesions of the bony thorax. Pulmonary arteries are normal in caliber and grossly patent with no gross pulmonary emboli proximally. There is mild diffuse dilatation of esophagus. There is mild focal calcifications of the visualized proximal branches of the left coronary artery. There is a small hiatal hernia. There is a 2.5 cm cyst in the left lobe of liver. .  There is diverticular disease throughout the visualized colon acute diverticulitis.

## 2018-07-18 ENCOUNTER — TELEPHONE (OUTPATIENT)
Dept: RHEUMATOLOGY | Age: 83
End: 2018-07-18

## 2018-07-18 NOTE — TELEPHONE ENCOUNTER
Spoke with patient's son Gerardo Steinberg on HIPPA, and informed him the Taryn Hare has been approved by [employer], Case # [id number], good 6/13/2018-7/13/2019.

## 2018-07-18 NOTE — PERIOP NOTES
[facility]  Preoperative Instructions        Surgery Date 07/20/2018          Time of Arrival 0915 AM Contact # coy Burns (NO [phone]    1. On the day of your surgery, please report to the Surgical Services Registration Desk and sign in at your designated time. The Surgery Center is located to the right of the Emergency Room. 2. You must have someone with you to drive you home. You should not drive a car for 24 hours following surgery. Please make arrangements for a friend or family member to stay with you for the first 24 hours after your surgery. 3. Do not have anything to eat or drink (including water, gum, mints, coffee, juice) after midnight ?? .? This may not apply to medications prescribed by your physician. ?(Please note below the special instructions with medications to take the morning of your procedure.)    4. We recommend you do not drink any alcoholic beverages for 24 hours before and after your surgery. 5. Contact your surgeons office for instructions on the following medications: non-steroidal anti-inflammatory drugs (i.e. Advil, Aleve), vitamins, and supplements. (Some surgeons will want you to stop these medications prior to surgery and others may allow you to take them)  **If you are currently taking Plavix, Coumadin, Aspirin and/or other blood-thinning agents, contact your surgeon for instructions. ** Your surgeon will partner with the physician prescribing these medications to determine if it is safe to stop or if you need to continue taking. Please do not stop taking these medications without instructions from your surgeon    6. Wear comfortable clothes. Wear glasses instead of contacts. Do not bring any money or jewelry. Please bring picture ID, insurance card, and any prearranged co-payment or hospital payment. Do not wear make-up, particularly mascara the morning of your surgery.   Do not wear nail polish, particularly if you are having foot /hand surgery. Wear your hair loose or down, no ponytails, buns, dina pins or clips. All body piercings must be removed. Please shower with antibacterial soap for three consecutive days before and on the morning of surgery, but do not apply any lotions, powders or deodorants after the shower on the day of surgery. Please use a fresh towels after each shower. Please sleep in clean clothes and change bed linens the night before surgery. Please do not shave for 48 hours prior to surgery. Shaving of the face is acceptable. 7. You should understand that if you do not follow these instructions your surgery may be cancelled. If your physical condition changes (I.e. fever, cold or flu) please contact your surgeon as soon as possible. 8. It is important that you be on time. If a situation occurs where you may be late, please call [phone] (OR Holding Area). 9. If you have any questions and or problems, please call [phone] (Pre-admission Testing). 10. Your surgery time may be subject to change. You will receive a phone call the evening prior if your time changes. 11.  If having outpatient surgery, you must have someone to drive you here, stay with you during the duration of your stay, and to drive you home at time of discharge. 12.   In an effort to improve the efficiency, privacy, and safety for all of our Pre-op patients visitors are not allowed in the Holding area. Once you arrive and are registered your family/visitors will be asked to remain in the waiting room. The Pre-op staff will get you from the Surgical Waiting Area and will explain to you and your family/visitors that the Pre-op phase is beginning. The staff will answer any questions and provide instructions for tracking of the patient, by use of the existing tracking number and color-coded status board in the waiting room.   At this time the staff will also ask for your designated spokesperson information in the event that the physician or staff need to provide an update or obtain any pertinent information. The designated spokesperson will be notified if the physician needs to speak to family during the pre-operative phase. If at any time your family/visitors has questions or concerns they may approach the volunteer desk in the waiting area for assistance. Special Instructions:    MEDICATIONS TO TAKE THE MORNING OF SURGERY WITH A SIP OF WATER:gabapentin, synthroid, metoprolol, prednisone, flomax      I understand a pre-operative phone call will be made to verify my surgery time. In the event that I am not available, I give permission for a message to be left on my answering service and/or with another person?   yes         ___________________      __________   _________    (Signature of Patient)             (Witness)                (Date and Time)

## 2018-07-20 ENCOUNTER — ANESTHESIA EVENT (OUTPATIENT)
Dept: SURGERY | Age: 83
End: 2018-07-20
Payer: MEDICARE

## 2018-07-20 ENCOUNTER — ANESTHESIA (OUTPATIENT)
Dept: SURGERY | Age: 83
End: 2018-07-20
Payer: MEDICARE

## 2018-07-20 ENCOUNTER — HOSPITAL ENCOUNTER (OUTPATIENT)
Age: 83
Setting detail: OUTPATIENT SURGERY
Discharge: HOME | End: 2018-07-20
Attending: SURGERY | Admitting: SURGERY
Payer: MEDICARE

## 2018-07-20 VITALS
TEMPERATURE: 98.2 F | HEART RATE: 60 BPM | BODY MASS INDEX: 27.93 KG/M2 | DIASTOLIC BLOOD PRESSURE: 53 MMHG | WEIGHT: 195.11 LBS | SYSTOLIC BLOOD PRESSURE: 157 MMHG | OXYGEN SATURATION: 96 % | RESPIRATION RATE: 18 BRPM | HEIGHT: 70 IN

## 2018-07-20 DIAGNOSIS — Z41.9 SURGERY, ELECTIVE: Primary | ICD-10-CM

## 2018-07-20 PROBLEM — G89.29 CHRONIC HEADACHES: Status: ACTIVE | Noted: 2018-07-20

## 2018-07-20 PROBLEM — R51.9 CHRONIC HEADACHES: Status: ACTIVE | Noted: 2018-07-20

## 2018-07-20 PROCEDURE — 74011250636 HC RX REV CODE- 250/636

## 2018-07-20 PROCEDURE — 77030010938 HC CLP LIG TELE -A: Performed by: SURGERY

## 2018-07-20 PROCEDURE — 77030003028 HC SUT VCRL J&J -A: Performed by: SURGERY

## 2018-07-20 PROCEDURE — 77030020782 HC GWN BAIR PAWS FLX 3M -B

## 2018-07-20 PROCEDURE — 74011000250 HC RX REV CODE- 250: Performed by: SURGERY

## 2018-07-20 PROCEDURE — 76210000020 HC REC RM PH II FIRST 0.5 HR: Performed by: SURGERY

## 2018-07-20 PROCEDURE — 74011250636 HC RX REV CODE- 250/636: Performed by: SURGERY

## 2018-07-20 PROCEDURE — 74011250636 HC RX REV CODE- 250/636: Performed by: ANESTHESIOLOGY

## 2018-07-20 PROCEDURE — 77030010509 HC AIRWY LMA MSK TELE -A: Performed by: ANESTHESIOLOGY

## 2018-07-20 PROCEDURE — 88305 TISSUE EXAM BY PATHOLOGIST: CPT | Performed by: SURGERY

## 2018-07-20 PROCEDURE — 76010000153 HC OR TIME 1.5 TO 2 HR: Performed by: SURGERY

## 2018-07-20 PROCEDURE — 76060000034 HC ANESTHESIA 1.5 TO 2 HR: Performed by: SURGERY

## 2018-07-20 PROCEDURE — 76210000016 HC OR PH I REC 1 TO 1.5 HR: Performed by: SURGERY

## 2018-07-20 PROCEDURE — 77030011267 HC ELECTRD BLD COVD -A: Performed by: SURGERY

## 2018-07-20 PROCEDURE — 77030011640 HC PAD GRND REM COVD -A: Performed by: SURGERY

## 2018-07-20 PROCEDURE — 77030031139 HC SUT VCRL2 J&J -A: Performed by: SURGERY

## 2018-07-20 PROCEDURE — 77010033678 HC OXYGEN DAILY

## 2018-07-20 PROCEDURE — 77030039266 HC ADH SKN EXOFIN S2SG -A: Performed by: SURGERY

## 2018-07-20 PROCEDURE — 77030020153 HC PRB DOPLR DISP MIZU -C: Performed by: SURGERY

## 2018-07-20 RX ORDER — LIDOCAINE HYDROCHLORIDE 10 MG/ML
0.1 INJECTION, SOLUTION EPIDURAL; INFILTRATION; INTRACAUDAL; PERINEURAL AS NEEDED
Status: DISCONTINUED | OUTPATIENT
Start: 2018-07-20 | End: 2018-07-20 | Stop reason: HOSPADM

## 2018-07-20 RX ORDER — ONDANSETRON 2 MG/ML
4 INJECTION INTRAMUSCULAR; INTRAVENOUS AS NEEDED
Status: DISCONTINUED | OUTPATIENT
Start: 2018-07-20 | End: 2018-07-20 | Stop reason: HOSPADM

## 2018-07-20 RX ORDER — SODIUM CHLORIDE 0.9 % (FLUSH) 0.9 %
5-10 SYRINGE (ML) INJECTION AS NEEDED
Status: DISCONTINUED | OUTPATIENT
Start: 2018-07-20 | End: 2018-07-20 | Stop reason: HOSPADM

## 2018-07-20 RX ORDER — FENTANYL CITRATE 50 UG/ML
50 INJECTION, SOLUTION INTRAMUSCULAR; INTRAVENOUS AS NEEDED
Status: DISCONTINUED | OUTPATIENT
Start: 2018-07-20 | End: 2018-07-20 | Stop reason: HOSPADM

## 2018-07-20 RX ORDER — VANCOMYCIN/0.9 % SOD CHLORIDE 1.5G/250ML
1500 PLASTIC BAG, INJECTION (ML) INTRAVENOUS ONCE
Status: COMPLETED | OUTPATIENT
Start: 2018-07-20 | End: 2018-07-20

## 2018-07-20 RX ORDER — MORPHINE SULFATE 10 MG/ML
2 INJECTION, SOLUTION INTRAMUSCULAR; INTRAVENOUS
Status: DISCONTINUED | OUTPATIENT
Start: 2018-07-20 | End: 2018-07-20 | Stop reason: HOSPADM

## 2018-07-20 RX ORDER — DEXAMETHASONE SODIUM PHOSPHATE 4 MG/ML
INJECTION, SOLUTION INTRA-ARTICULAR; INTRALESIONAL; INTRAMUSCULAR; INTRAVENOUS; SOFT TISSUE AS NEEDED
Status: DISCONTINUED | OUTPATIENT
Start: 2018-07-20 | End: 2018-07-20 | Stop reason: HOSPADM

## 2018-07-20 RX ORDER — PROPOFOL 10 MG/ML
INJECTION, EMULSION INTRAVENOUS AS NEEDED
Status: DISCONTINUED | OUTPATIENT
Start: 2018-07-20 | End: 2018-07-20 | Stop reason: HOSPADM

## 2018-07-20 RX ORDER — SODIUM CHLORIDE, SODIUM LACTATE, POTASSIUM CHLORIDE, CALCIUM CHLORIDE 600; 310; 30; 20 MG/100ML; MG/100ML; MG/100ML; MG/100ML
25 INJECTION, SOLUTION INTRAVENOUS CONTINUOUS
Status: DISCONTINUED | OUTPATIENT
Start: 2018-07-20 | End: 2018-07-20 | Stop reason: HOSPADM

## 2018-07-20 RX ORDER — ONDANSETRON 2 MG/ML
INJECTION INTRAMUSCULAR; INTRAVENOUS AS NEEDED
Status: DISCONTINUED | OUTPATIENT
Start: 2018-07-20 | End: 2018-07-20 | Stop reason: HOSPADM

## 2018-07-20 RX ORDER — MIDAZOLAM HYDROCHLORIDE 1 MG/ML
0.5 INJECTION, SOLUTION INTRAMUSCULAR; INTRAVENOUS
Status: DISCONTINUED | OUTPATIENT
Start: 2018-07-20 | End: 2018-07-20 | Stop reason: HOSPADM

## 2018-07-20 RX ORDER — HYDROMORPHONE HYDROCHLORIDE 1 MG/ML
.2-.5 INJECTION, SOLUTION INTRAMUSCULAR; INTRAVENOUS; SUBCUTANEOUS
Status: DISCONTINUED | OUTPATIENT
Start: 2018-07-20 | End: 2018-07-20 | Stop reason: HOSPADM

## 2018-07-20 RX ORDER — FENTANYL CITRATE 50 UG/ML
25 INJECTION, SOLUTION INTRAMUSCULAR; INTRAVENOUS
Status: DISCONTINUED | OUTPATIENT
Start: 2018-07-20 | End: 2018-07-20 | Stop reason: HOSPADM

## 2018-07-20 RX ORDER — HYDROCODONE BITARTRATE AND ACETAMINOPHEN 5; 325 MG/1; MG/1
1-2 TABLET ORAL
Qty: 25 TAB | Refills: 0 | Status: SHIPPED | OUTPATIENT
Start: 2018-07-20 | End: 2018-08-14

## 2018-07-20 RX ORDER — DIPHENHYDRAMINE HYDROCHLORIDE 50 MG/ML
12.5 INJECTION, SOLUTION INTRAMUSCULAR; INTRAVENOUS AS NEEDED
Status: DISCONTINUED | OUTPATIENT
Start: 2018-07-20 | End: 2018-07-20 | Stop reason: HOSPADM

## 2018-07-20 RX ORDER — FENTANYL CITRATE 50 UG/ML
INJECTION, SOLUTION INTRAMUSCULAR; INTRAVENOUS AS NEEDED
Status: DISCONTINUED | OUTPATIENT
Start: 2018-07-20 | End: 2018-07-20 | Stop reason: HOSPADM

## 2018-07-20 RX ADMIN — FENTANYL CITRATE 50 MCG: 50 INJECTION, SOLUTION INTRAMUSCULAR; INTRAVENOUS at 11:32

## 2018-07-20 RX ADMIN — ONDANSETRON 4 MG: 2 INJECTION INTRAMUSCULAR; INTRAVENOUS at 12:00

## 2018-07-20 RX ADMIN — FENTANYL CITRATE 25 MCG: 50 INJECTION, SOLUTION INTRAMUSCULAR; INTRAVENOUS at 11:54

## 2018-07-20 RX ADMIN — PROPOFOL 160 MG: 10 INJECTION, EMULSION INTRAVENOUS at 11:32

## 2018-07-20 RX ADMIN — FENTANYL CITRATE 25 MCG: 50 INJECTION, SOLUTION INTRAMUSCULAR; INTRAVENOUS at 12:15

## 2018-07-20 RX ADMIN — DEXAMETHASONE SODIUM PHOSPHATE 8 MG: 4 INJECTION, SOLUTION INTRA-ARTICULAR; INTRALESIONAL; INTRAMUSCULAR; INTRAVENOUS; SOFT TISSUE at 12:00

## 2018-07-20 RX ADMIN — VANCOMYCIN HYDROCHLORIDE 1500 MG: 10 INJECTION, POWDER, LYOPHILIZED, FOR SOLUTION INTRAVENOUS at 10:00

## 2018-07-20 RX ADMIN — SODIUM CHLORIDE, SODIUM LACTATE, POTASSIUM CHLORIDE, AND CALCIUM CHLORIDE 25 ML/HR: 600; 310; 30; 20 INJECTION, SOLUTION INTRAVENOUS at 09:57

## 2018-07-20 NOTE — PERIOP NOTES
Case running late, notified patient Dr Angelia Verma for son to come back to room to visit with pt, son in room now

## 2018-07-20 NOTE — PERIOP NOTES
Handoff Report from Operating Room to PACU    Report received from Krishna Serrano RN and Juan Jose Taylor CRNA regarding Jelly Colin. Surgeon(s):  Magali Jarvis MD  And Procedure(s) (LRB):  BILATERAL TEMPORAL ARTERY BIOPSY (Bilateral)  confirmed   With allergies and dressings discussed. Anesthesia type, drugs, patient history, complications, estimated blood loss, vital signs, intake and output, and last pain medication, lines, reversal medications and temperature were reviewed.

## 2018-07-20 NOTE — ANESTHESIA POSTPROCEDURE EVALUATION
Post-Anesthesia Evaluation and Assessment    Patient: Kinsey Knight MRN: [id number]  SSN: [id number]    YOB: 1929  Age: 80 y.o. Sex: male       Cardiovascular Function/Vital Signs  Visit Vitals    /60 (BP 1 Location: Left arm, BP Patient Position: At rest)    Pulse 61    Temp 36.7 °C (98.1 °F)    Resp 15    Ht 5' 10\" (1.778 m)    Wt 88.5 kg (195 lb 1.7 oz)    SpO2 98%    BMI 27.99 kg/m2       Patient is status post MAC anesthesia for Procedure(s):  BILATERAL TEMPORAL ARTERY BIOPSY. Nausea/Vomiting: None    Postoperative hydration reviewed and adequate. Pain:  Pain Scale 1: Numeric (0 - 10) (07/20/18 1340)  Pain Intensity 1: 0 (07/20/18 1340)   Managed    Neurological Status:   Neuro (WDL): Within Defined Limits (07/20/18 1257)  Neuro  Neurologic State: Alert;Drowsy (07/20/18 1257)  Orientation Level: Oriented to person;Oriented to place;Oriented to situation (07/20/18 1257)  Cognition: Follows commands (07/20/18 1257)  Speech: Clear (07/20/18 1257)  LUE Motor Response: Purposeful (07/20/18 1257)  LLE Motor Response: Purposeful (07/20/18 1257)  RUE Motor Response: Purposeful (07/20/18 1257)  RLE Motor Response: Purposeful (07/20/18 1257)   At baseline    Mental Status and Level of Consciousness: Arousable    Pulmonary Status:   O2 Device: Nasal cannula (07/20/18 1340)   Adequate oxygenation and airway patent    Complications related to anesthesia: None    Post-anesthesia assessment completed.  No concerns    Signed By: Harley Torres MD     July 20, 2018

## 2018-07-20 NOTE — ANESTHESIA PREPROCEDURE EVALUATION
Anesthetic History   No history of anesthetic complications            Review of Systems / Medical History  Patient summary reviewed, nursing notes reviewed and pertinent labs reviewed    Pulmonary        Sleep apnea: No treatment           Neuro/Psych         Headaches     Cardiovascular              CAD    Exercise tolerance: <4 METS  Comments: EKG: Marked sinus bradycardia with 1st degree AV block   GI/Hepatic/Renal  Within defined limits              Endo/Other        Arthritis and anemia     Other Findings   Comments: GIANT CELL ARTERITIS   polymyalgia rheumatica  Hard of hearing         Physical Exam    Airway  Mallampati: III    Neck ROM: normal range of motion   Mouth opening: Normal     Cardiovascular  Regular rate and rhythm,  S1 and S2 normal,  no murmur, click, rub, or gallop             Dental    Dentition: Bridges     Pulmonary  Breath sounds clear to auscultation               Abdominal  GI exam deferred       Other Findings            Anesthetic Plan    ASA: 3  Anesthesia type: general    Monitoring Plan: BIS      Induction: Intravenous  Anesthetic plan and risks discussed with: Patient

## 2018-07-20 NOTE — PERIOP NOTES
Pt. For d/c today. Vss,pt. Denies pain and nausea. Incisions on both temples D/C/I, covered with derma bond. Ice pack x2 to temples. Reviewed d/c instructions, Rx., & F/U care with pt. . Pt. Ambulated to bathroom x 2 and voided. Tolerated activity well. IV d/c'd and pt. Discharged home with his son.

## 2018-07-20 NOTE — DISCHARGE INSTRUCTIONS
Discharge Instructions:  Dr. Jay Halon    Call on next business day to arrange appointment for follow up in 2 week(s) -- [phone]. Activity:  Walk regularly starting immediately. No lifting more than 10 pounds for 2 week(s). Diet:  You may resume normal diet. Wound Care:  Dermabond glue dressing will fall off on its own. If you experience a lot of drainage, develop redness around the wound, or a fever over 101 F occurs please call the office. You may shower. No baths or swimming for 2 weeks. Medications:  Resume home medications as indicated on the Medical Reconciliation form. Do not use blood thinners (such as Aspirin, Coumadin, or Plavix) until 3 days after surgery. Pain medications:  Non steroidal antiinflammatories (ibuprofen -- Advil or Motrin) seem to work best for post surgical pain. Try these first as prescribed. A narcotic prescription will also be given for breakthrough pain. PUT ICE ON YOUR INCISIONS 20 MINUTES EVERY HOUR WHILE THEY REMAIN SORE. PLACE A CLOTH BETWEEN YOUR SKIN THE THE ICE BAG. Colace or Miralax should be used twice daily to prevent constipation while on narcotics. If you are still having trouble having a BM after 1-2 days, try milk of magnesia. If this does not work within 24 hours, try a bottle of magnesium citrate. Narcotics and anesthesia sometimes cause nausea and vomiting. If persistent please call the office. Do not hesitate to call with questions or concerns.     Nuris Birch MD  Tel [phone]  Fax [phone]  DISCHARGE SUMMARY from Nurse    PATIENT INSTRUCTIONS:    After general anesthesia or intravenous sedation, for 24 hours or while taking prescription Narcotics:  · Limit your activities  · Do not drive and operate hazardous machinery  · Do not make important personal or business decisions  · Do  not drink alcoholic beverages  · If you have not urinated within 8 hours after discharge, please contact your surgeon on call.    Report the following to your surgeon:  · Excessive pain, swelling, redness or odor of or around the surgical area  · Temperature over 100.5  · Nausea and vomiting lasting longer than 4 hours or if unable to take medications  · Any signs of decreased circulation or nerve impairment to extremity: change in color, persistent  numbness, tingling, coldness or increase pain  · Any questions    What to do at Home:            How to Care for Your Wound After Its Treated With  DERMABOND* Topical Skin Adhesive  DERMABOND* Topical Skin Adhesive (2-octyl cyanoacrylate) is a sterile, liquid skin adhesive  that holds wound edges together. The film will usually remain in place for 5 to 10 days, then  naturally fall off your skin. The following will answer some of your questions and provide instructions for proper care for your  wound while it is healing:    CHECK WOUND APPEARANCE   Some swelling, redness, and pain are common with all wounds and normally will go away as the  wound heals. If swelling, redness, or pain increases or if the wound feels warm to the touch,  contact a doctor. Also contact a doctor if the wound edges reopen or separate. REPLACE BANDAGES   If your wound is bandaged, keep the bandage dry.  Replace the dressing daily until the adhesive film has fallen off or if the  bandage should become wet, unless otherwise instructed by your  physician.  When changing the dressing, do not place tape directly over the  DERMABOND adhesive film, because removing the tape later may also  remove the film. AVOID TOPICAL MEDICATIONS   Do not apply liquid or ointment medications or any other product to your wound while the  DERMABOND adhesive film is in place. These may loosen the film before your wound is healed. KEEP WOUND DRY AND PROTECTED   You may occasionally and briefly wet your wound in the shower or bath.  Do not soak or scrub  your wound, do not swim, and avoid periods of heavy perspiration until the DERMABOND  adhesive has naturally fallen off. After showering or bathing, gently blot your wound dry with a  soft towel. If a protective dressing is being used, apply a fresh, dry bandage, being sure to keep  the tape off the DERMABOND adhesive film.  Apply a clean, dry bandage over the wound if necessary to protect it.  Protect your wound from injury until the skin has had sufficient time to heal.   Do not scratch, rub, or pick at the DERMABOND adhesive film. This may loosen the film before  your wound is healed.  Protect the wound from prolonged exposure to sunlight or tanning lamps while the film is in  place. If you have any questions or concerns about this product, please consult your doctor. *Trademark ©[employer], [employer]an Syed common side effect of anesthesia following surgery is nausea and/or vomiting. In order to decrease symptoms, it is wise to avoid foods that are high in fat, greasy foods, milk products, and spicy foods for the first 24 hours. Acceptable foods for the first 24 hours following surgery include but are not limited to:     soup   broth    toast    crackers    applesauce    bananas    mashed potatoes,   soft or scrambled eggs   oatmeal    jello    It is important to eat when taking your pain medication. This will help to prevent nausea. If possible, please try to time your meals with your medications. It is very important to stay hydrated following surgery. Sip fluids frequently while awake. Avoid acidic drinks such as citrus juices and soda for 24 hours. Carbonated beverages may cause bloating and gas. Acceptable fluids include:    - water (flavor packets may add variety)  - coffee or tea (in moderation)  - Gatorade  - Luis-aid  - apple juice  - cranberry juice    You are encouraged to cough and deep breathe every hour when awake. This will help to prevent respiratory complications following anesthesia.  You may want to hug a pillow when coughing and sneezing to add additional support to the surgical area and to decrease discomfort if you had abdominal or chest surgery. If you are discharged home with support stockings, you may remove them after 24 hours. Support stockings are used to help prevent blood clots in the legs following surgery. Please take time to review all of your Home Care Instructions and Medication Information sheets provided in your discharge packet. If you have any questions, please contact your surgeons office. Thank you. Narcotic-Analgesic/Acetaminophen (Percocet, Norco, Lorcet HD, Lortab 10/325) - (By mouth)   Why this medicine is used:   Relieves pain. Contact a nurse or doctor right away if you have:  · Extreme weakness, shallow breathing, slow heartbeat  · Severe confusion, lightheadedness, dizziness, fainting  · Yellow skin or eyes, dark urine or pale stools  · Severe constipation, severe stomach pain, nausea, vomiting, loss of appetite  · Sweating or cold, clammy skin     Common side effects:  · Mild constipation, nausea, vomiting  · Sleepiness, tiredness  · Itching, rash  © 2017 [facility] Information is for End User's use only and may not be sold, redistributed or otherwise used for commercial purposes. Please carry medication information at all times in case of emergency situations. These are general instructions for a healthy lifestyle:    No smoking/ No tobacco products/ Avoid exposure to second hand smoke  Surgeon General's Warning:  Quitting smoking now greatly reduces serious risk to your health.     Obesity, smoking, and sedentary lifestyle greatly increases your risk for illness    A healthy diet, regular physical exercise & weight monitoring are important for maintaining a healthy lifestyle    You may be retaining fluid if you have a history of heart failure or if you experience any of the following symptoms:  Weight gain of 3 pounds or more overnight or 5 pounds in a week, increased swelling in our hands or feet or shortness of breath while lying flat in bed. Please call your doctor as soon as you notice any of these symptoms; do not wait until your next office visit. Recognize signs and symptoms of STROKE:    F-face looks uneven    A-arms unable to move or move unevenly    S-speech slurred or non-existent    T-time-call 911 as soon as signs and symptoms begin-DO NOT go       Back to bed or wait to see if you get better-TIME IS BRAIN. Warning Signs of HEART ATTACK     Call 911 if you have these symptoms:   Chest discomfort. Most heart attacks involve discomfort in the center of the chest that lasts more than a few minutes, or that goes away and comes back. It can feel like uncomfortable pressure, squeezing, fullness, or pain.  Discomfort in other areas of the upper body. Symptoms can include pain or discomfort in one or both arms, the back, neck, jaw, or stomach.  Shortness of breath with or without chest discomfort.  Other signs may include breaking out in a cold sweat, nausea, or lightheadedness. Don't wait more than five minutes to call 911 - MINUTES MATTER! Fast action can save your life. Calling 911 is almost always the fastest way to get lifesaving treatment. Emergency Medical Services staff can begin treatment when they arrive -- up to an hour sooner than if someone gets to the hospital by car. The discharge information has been reviewed with the patient and caregiver. The patient and caregiver verbalized understanding. Discharge medications reviewed with the patient and caregiver and appropriate educational materials and side effects teaching were provided.   ___________________________________________________________________________________________________________________________________

## 2018-07-20 NOTE — H&P
Day of Surgery H&P    Assessment:   Possible GIANT CELL ARTERITIS     Body mass index is 27.99 kg/(m^2). Plan:   BILATERAL TEMPORAL ARTERY BIOPSY (Bilateral ) Discussed the risk of surgery including bleeding, infection, injury to adjacent structures, and the risks of general anesthetic. The patient understands the risks; any and all questions were answered to the patient's satisfaction. Subjective:      Salma Bill is a 80 y.o. male who presents for above procedure. Objective:   Blood pressure 155/59, pulse 61, temperature 97.5 °F (36.4 °C), resp. rate 18, height 5' 10\" (1.778 m), weight 88.5 kg (195 lb 1.7 oz), SpO2 98 %.   Temp (24hrs), Av.5 °F (36.4 °C), Min:97.5 °F (36.4 °C), Max:97.5 °F (36.4 °C)      Physical Exam:  PHYSICAL EXAM:    Chest:   [x]   CTA bialterally, no wheezing/rhonchi/rales/crackles    []   wheezing     []   rhonchi     []   crackles     []   use of accessory muscles    Heart:  [x]   regular rate and rhythm     [x]   No murmurs/rubs/gallops    []   irregular rhythm     []   Murmur     []   Rubs     []   Gallops         Past Medical History:   Diagnosis Date    Arthritis     CAD (coronary artery disease)     Hard of hearing     Headache     PMR (polymyalgia rheumatica) (HCC)     Skin cancer     Sleep apnea     no longer uses cpap     Past Surgical History:   Procedure Laterality Date    CARDIAC SURG PROCEDURE UNLIST      cardiac stent    HX CAROTID STENT      VASCULAR SURGERY PROCEDURE UNLIST      carotid endartectomy      Family History   Problem Relation Age of Onset   Jc Parminder Arthritis-rheumatoid Mother     No Known Problems Father     Gout Son      Social History     Social History    Marital status:      Spouse name: N/A    Number of children: N/A    Years of education: N/A     Social History Main Topics    Smoking status: Never Smoker    Smokeless tobacco: Never Used    Alcohol use No    Drug use: No    Sexual activity: Not Asked     Other Topics Concern    None     Social History Narrative      Prior to Admission medications    Medication Sig Start Date End Date Taking? Authorizing Provider   rOPINIRole (REQUIP) 0.25 mg tablet Take 0.25 mg by mouth nightly. Yes Historical Provider   tamsulosin (FLOMAX) 0.4 mg capsule Take 0.4 mg by mouth daily. Yes Historical Provider   pravastatin (PRAVACHOL) 40 mg tablet Take 40 mg by mouth nightly. Yes Historical Provider   levothyroxine (SYNTHROID) 100 mcg tablet Take 100 mcg by mouth Daily (before breakfast). Yes Historical Provider   ferrous sulfate (IRON) 325 mg (65 mg iron) EC tablet Take 325 mg by mouth two (2) times a day. Yes Historical Provider   gabapentin (NEURONTIN) 300 mg capsule Take 300 mg by mouth three (3) times daily. Yes Historical Provider   metoprolol tartrate (LOPRESSOR) 25 mg tablet Take 12.5 mg by mouth daily. Yes Historical Provider   lisinopril (PRINIVIL, ZESTRIL) 10 mg tablet Take 10 mg by mouth daily. Yes Historical Provider   fish oil-dha-epa 1,200-144-216 mg cap Take 1 Tab by mouth daily. Yes Historical Provider   multivitamin (ONE A DAY) tablet Take 1 Tab by mouth daily. Yes Historical Provider   aspirin delayed-release 81 mg tablet Take  by mouth daily. Yes Historical Provider   predniSONE (DELTASONE) 20 mg tablet Take 2 Tabs by mouth daily (with breakfast) for 40 days. 7/10/18 8/19/18 Yes Ray Ron MD   tocilizumab (ACTEMRA) 162 mg/0.9 mL syrg 162 mg by SubCUTAneous route every seven (7) days. Indications: GIANT CELL ARTERITIS 7/10/18   Ray Ron MD     ALLERGIES:    Allergies   Allergen Reactions    Pcn [Penicillins] Swelling       Review of Systems:    See prior consult, office note or scanned list in media section. 10 systems negative except as specified.                 Signed By: Mary Mcclendon MD     July 20, 2018

## 2018-07-20 NOTE — BRIEF OP NOTE
BRIEF OPERATIVE NOTE    Date of Procedure: 7/20/2018   Preoperative Diagnosis: possible GIANT CELL ARTERITIS   Postoperative Diagnosis: same    Procedure(s):  BILATERAL TEMPORAL ARTERY BIOPSY  Surgeon(s) and Role:     * Malcom Guillen MD - Primary         Surgical Assistant: none    Surgical Staff:  Circ-1: Bonifacio Kapadia  Circ-Relief: Rufino Woo  Scrub Tech-1: Vania Lapping  Scrub RN-Relief: Anahi Crews RN  Event Time In   Incision Start 1145   Incision Close 1249     Anesthesia: MAC   Estimated Blood Loss: 25mL  Specimens:   ID Type Source Tests Collected by Time Destination   1 : RIGHT TEMPORAL ARTERY BIOPSY Preservative Artery  Malcom Guillen MD 7/20/2018 1204 Pathology   2 : LEFT TEMPORAL ARTERY BIOPSY Preservative Artery  Malcom Guillen MD 7/20/2018 1205 Pathology      Findings: tortuous arteries.    Complications: none immediate  Implants: * No implants in log *    [id number]

## 2018-07-20 NOTE — IP AVS SNAPSHOT
[address] [facility] 
[phone] Patient: Willy Dumont MRN: [id number] About your hospitalization You were admitted on:  July 20, 2018 You last received care in the:  [facility] PACU You were discharged on:  July 20, 2018 Why you were hospitalized Your primary diagnosis was:  Chronic Headaches Follow-up Information Follow up With Details Comments Contact Info Gretchen Glaser MD   [address] [place] [address] 
[phone] Michelle Draper MD Follow up in 2 week(s)  [address] [facility] [address] [facility] 
[phone] Your Scheduled Appointments Tuesday August 14, 2018  3:40 PM EDT  
ESTABLISHED PATIENT with Lee Hernandez MD  
[address] ([facility]) [address]ada[address]  
[phone] Discharge Orders None A check jamila indicates which time of day the medication should be taken. My Medications START taking these medications Instructions Each Dose to Equal  
 Morning Noon Evening Bedtime HYDROcodone-acetaminophen 5-325 mg per tablet Commonly known as:  Renée Wong Your last dose was: Your next dose is: Take 1-2 Tabs by mouth every six (6) hours as needed for Pain. Max Daily Amount: 8 Tabs. 1-2 Tab CONTINUE taking these medications Instructions Each Dose to Equal  
 Morning Noon Evening Bedtime  
 aspirin delayed-release 81 mg tablet Your last dose was: Your next dose is: Take  by mouth daily. ferrous sulfate 325 mg (65 mg iron) EC tablet Commonly known as:  IRON Your last dose was: Your next dose is: Take 325 mg by mouth two (2) times a day.   
 325 mg  
    
   
   
   
  
 fish oil-dha-epa 1,200-144-216 mg Cap Your last dose was: Your next dose is: Take 1 Tab by mouth daily. 1 Tab  
    
   
   
   
  
 gabapentin 300 mg capsule Commonly known as:  NEURONTIN Your last dose was: Your next dose is: Take 300 mg by mouth three (3) times daily. 300 mg  
    
   
   
   
  
 levothyroxine 100 mcg tablet Commonly known as:  SYNTHROID Your last dose was: Your next dose is: Take 100 mcg by mouth Daily (before breakfast). 100 mcg  
    
   
   
   
  
 lisinopril 10 mg tablet Commonly known as:  Robertha Roup Your last dose was: Your next dose is: Take 10 mg by mouth daily. 10 mg  
    
   
   
   
  
 metoprolol tartrate 25 mg tablet Commonly known as:  LOPRESSOR Your last dose was: Your next dose is: Take 12.5 mg by mouth daily. 12.5 mg  
    
   
   
   
  
 multivitamin tablet Commonly known as:  ONE A DAY Your last dose was: Your next dose is: Take 1 Tab by mouth daily. 1 Tab  
    
   
   
   
  
 pravastatin 40 mg tablet Commonly known as:  PRAVACHOL Your last dose was: Your next dose is: Take 40 mg by mouth nightly. 40 mg  
    
   
   
   
  
 predniSONE 20 mg tablet Commonly known as:  Therman Rail Your last dose was: Your next dose is: Take 2 Tabs by mouth daily (with breakfast) for 40 days. 40 mg  
    
   
   
   
  
 rOPINIRole 0.25 mg tablet Commonly known as:  Isabel Middleton Your last dose was: Your next dose is: Take 0.25 mg by mouth nightly. 0.25 mg  
    
   
   
   
  
 tamsulosin 0.4 mg capsule Commonly known as:  FLOMAX Your last dose was: Your next dose is: Take 0.4 mg by mouth daily. 0.4 mg  
    
   
   
   
  
 tocilizumab 162 mg/0.9 mL Syrg Commonly known as:  ACTEMRA Your last dose was: Your next dose is:    
   
   
 162 mg by SubCUTAneous route every seven (7) days. Indications: GIANT CELL ARTERITIS  
 162 mg Where to Get Your Medications Information on where to get these meds will be given to you by the nurse or doctor. ! Ask your nurse or doctor about these medications HYDROcodone-acetaminophen 5-325 mg per tablet Opioid Education Prescription Opioids: What You Need to Know: 
 
Prescription opioids can be used to help relieve moderate-to-severe pain and are often prescribed following a surgery or injury, or for certain health conditions. These medications can be an important part of treatment but also come with serious risks. Opioids are strong pain medicines. Examples include hydrocodone, oxycodone, fentanyl, and morphine. Heroin is an example of an illegal opioid. It is important to work with your health care provider to make sure you are getting the safest, most effective care. WHAT ARE THE RISKS AND SIDE EFFECTS OF OPIOID USE? Prescription opioids carry serious risks of addiction and overdose, especially with prolonged use. An opioid overdose, often marked by slow breathing, can cause sudden death. The use of prescription opioids can have a number of side effects as well, even when taken as directed. · Tolerance-meaning you might need to take more of a medication for the same pain relief · Physical dependence-meaning you have symptoms of withdrawal when the medication is stopped. Withdrawal symptoms can include nausea, sweating, chills, diarrhea, stomach cramps, and muscle aches. Withdrawal can last up to several weeks, depending on which drug you took and how long you took it. · Increased sensitivity to pain · Constipation · Nausea, vomiting, and dry mouth · Sleepiness and dizziness · Confusion · Depression · Low levels of testosterone that can result in lower sex drive, energy, and strength · Itching and sweating RISKS ARE GREATER WITH:      
· History of drug misuse, substance use disorder, or overdose · Mental health conditions (such as depression or anxiety) · Sleep apnea · Older age (72 years or older) · Pregnancy Avoid alcohol while taking prescription opioids. Also, unless specifically advised by your health care provider, medications to avoid include: · Benzodiazepines (such as Xanax or Valium) · Muscle relaxants (such as Soma or Flexeril) · Hypnotics (such as Ambien or Lunesta) · Other prescription opioids KNOW YOUR OPTIONS Talk to your health care provider about ways to manage your pain that don't involve prescription opioids. Some of these options may actually work better and have fewer risks and side effects. Options may include: 
· Pain relievers such as acetaminophen, ibuprofen, and naproxen · Some medications that are also used for depression or seizures · Physical therapy and exercise · Counseling to help patients learn how to cope better with triggers of pain and stress. · Application of heat or cold compress · Massage therapy · Relaxation techniques Be Informed Make sure you know the name of your medication, how much and how often to take it, and its potential risks & side effects. IF YOU ARE PRESCRIBED OPIOIDS FOR PAIN: 
· Never take opioids in greater amounts or more often than prescribed. Remember the goal is not to be pain-free but to manage your pain at a tolerable level. · Follow up with your primary care provider to: · Work together to create a plan on how to manage your pain. · Talk about ways to help manage your pain that don't involve prescription opioids. · Talk about any and all concerns and side effects. · Help prevent misuse and abuse. · Never sell or share prescription opioids · Help prevent misuse and abuse. · Store prescription opioids in a secure place and out of reach of others (this may include visitors, children, friends, and family). · Safely dispose of unused/unwanted prescription opioids: Find your community drug take-back program or your pharmacy mail-back program, or flush them down the toilet, following guidance from the Food and Drug Administration (www.fda.gov/Drugs/ResourcesForYou). · Visit www.cdc.gov/drugoverdose to learn about the risks of opioid abuse and overdose. · If you believe you may be struggling with addiction, tell your health care provider and ask for guidance or call [employer] at 3-822-578-RSRM. Discharge Instructions Discharge Instructions:  Dr. SHIRLEY GUZMAN [facility] Call on next business day to arrange appointment for follow up in 2 week(s) -- [phone]. Activity: 
Walk regularly starting immediately. No lifting more than 10 pounds for 2 week(s). Diet: 
You may resume normal diet. Wound Care: 
Dermabond glue dressing will fall off on its own. If you experience a lot of drainage, develop redness around the wound, or a fever over 101 F occurs please call the office. You may shower. No baths or swimming for 2 weeks. Medications: 
Resume home medications as indicated on the Medical Reconciliation form. Do not use blood thinners (such as Aspirin, Coumadin, or Plavix) until 3 days after surgery. Pain medications:  Non steroidal antiinflammatories (ibuprofen -- Advil or Motrin) seem to work best for post surgical pain. Try these first as prescribed. A narcotic prescription will also be given for breakthrough pain. PUT ICE ON YOUR INCISIONS 20 MINUTES EVERY HOUR WHILE THEY REMAIN SORE. PLACE A CLOTH BETWEEN YOUR SKIN THE THE ICE BAG.  
 
Colace or Miralax should be used twice daily to prevent constipation while on narcotics. If you are still having trouble having a BM after 1-2 days, try milk of magnesia. If this does not work within 24 hours, try a bottle of magnesium citrate. Narcotics and anesthesia sometimes cause nausea and vomiting. If persistent please call the office. Do not hesitate to call with questions or concerns. Mary Mcclendon MD 
Tel [phone] Fax [phone] DISCHARGE SUMMARY from Nurse PATIENT INSTRUCTIONS: 
 
After general anesthesia or intravenous sedation, for 24 hours or while taking prescription Narcotics: · Limit your activities · Do not drive and operate hazardous machinery · Do not make important personal or business decisions · Do  not drink alcoholic beverages · If you have not urinated within 8 hours after discharge, please contact your surgeon on call. Report the following to your surgeon: 
· Excessive pain, swelling, redness or odor of or around the surgical area · Temperature over 100.5 · Nausea and vomiting lasting longer than 4 hours or if unable to take medications · Any signs of decreased circulation or nerve impairment to extremity: change in color, persistent  numbness, tingling, coldness or increase pain · Any questions What to do at Home: How to Care for Your Wound After Its Treated With DERMABOND* Topical Skin Adhesive DERMABOND* Topical Skin Adhesive (2-octyl cyanoacrylate) is a sterile, liquid skin adhesive 
that holds wound edges together. The film will usually remain in place for 5 to 10 days, then 
naturally fall off your skin. The following will answer some of your questions and provide instructions for proper care for your 
wound while it is healing: CHECK WOUND APPEARANCE 
 Some swelling, redness, and pain are common with all wounds and normally will go away as the 
wound heals.  If swelling, redness, or pain increases or if the wound feels warm to the touch, 
 contact a doctor. Also contact a doctor if the wound edges reopen or separate. REPLACE BANDAGES 
 If your wound is bandaged, keep the bandage dry.  Replace the dressing daily until the adhesive film has fallen off or if the 
bandage should become wet, unless otherwise instructed by your 
physician.  When changing the dressing, do not place tape directly over the DERMABOND adhesive film, because removing the tape later may also 
remove the film. AVOID TOPICAL MEDICATIONS  Do not apply liquid or ointment medications or any other product to your wound while the DERMABOND adhesive film is in place. These may loosen the film before your wound is healed. KEEP WOUND DRY AND PROTECTED  You may occasionally and briefly wet your wound in the shower or bath. Do not soak or scrub 
your wound, do not swim, and avoid periods of heavy perspiration until the DERMABOND 
adhesive has naturally fallen off. After showering or bathing, gently blot your wound dry with a 
soft towel. If a protective dressing is being used, apply a fresh, dry bandage, being sure to keep 
the tape off the DERMABOND adhesive film.  Apply a clean, dry bandage over the wound if necessary to protect it.  Protect your wound from injury until the skin has had sufficient time to heal. 
 Do not scratch, rub, or pick at the DERMABOND adhesive film. This may loosen the film before 
your wound is healed.  Protect the wound from prolonged exposure to sunlight or tanning lamps while the film is in 
place. If you have any questions or concerns about this product, please consult your doctor. *Trademark ©[employer]. Praveena Johnston common side effect of anesthesia following surgery is nausea and/or vomiting. In order to decrease symptoms, it is wise to avoid foods that are high in fat, greasy foods, milk products, and spicy foods for the first 24 hours. Acceptable foods for the first 24 hours following surgery include but are not limited to: 
 
? soup ? broth 
?  toast  
? crackers ? applesauce 
? bananas  
? mashed potatoes, 
? soft or scrambled eggs 
? oatmeal 
?  jello It is important to eat when taking your pain medication. This will help to prevent nausea. If possible, please try to time your meals with your medications. It is very important to stay hydrated following surgery. Sip fluids frequently while awake. Avoid acidic drinks such as citrus juices and soda for 24 hours. Carbonated beverages may cause bloating and gas. Acceptable fluids include: 
 
? water (flavor packets may add variety) ? coffee or tea (in moderation) ? Gatorade ? Tremayne Listen ? apple juice 
? cranberry juice You are encouraged to cough and deep breathe every hour when awake. This will help to prevent respiratory complications following anesthesia. You may want to hug a pillow when coughing and sneezing to add additional support to the surgical area and to decrease discomfort if you had abdominal or chest surgery. If you are discharged home with support stockings, you may remove them after 24 hours. Support stockings are used to help prevent blood clots in the legs following surgery. Please take time to review all of your Home Care Instructions and Medication Information sheets provided in your discharge packet. If you have any questions, please contact your surgeons office. Thank you. Narcotic-Analgesic/Acetaminophen (Percocet, Norco, Lorcet HD, Lortab 10/325) - (By mouth) Why this medicine is used:  
Relieves pain. Contact a nurse or doctor right away if you have: 
· Extreme weakness, shallow breathing, slow heartbeat · Severe confusion, lightheadedness, dizziness, fainting · Yellow skin or eyes, dark urine or pale stools · Severe constipation, severe stomach pain, nausea, vomiting, loss of appetite · Sweating or cold, clammy skin Common side effects: · Mild constipation, nausea, vomiting · Sleepiness, tiredness · Itching, rash © 2017 [facility] Information is for End User's use only and may not be sold, redistributed or otherwise used for commercial purposes. Please carry medication information at all times in case of emergency situations. These are general instructions for a healthy lifestyle: No smoking/ No tobacco products/ Avoid exposure to second hand smoke Surgeon General's Warning:  Quitting smoking now greatly reduces serious risk to your health. Obesity, smoking, and sedentary lifestyle greatly increases your risk for illness A healthy diet, regular physical exercise & weight monitoring are important for maintaining a healthy lifestyle You may be retaining fluid if you have a history of heart failure or if you experience any of the following symptoms:  Weight gain of 3 pounds or more overnight or 5 pounds in a week, increased swelling in our hands or feet or shortness of breath while lying flat in bed. Please call your doctor as soon as you notice any of these symptoms; do not wait until your next office visit. Recognize signs and symptoms of STROKE: 
 
F-face looks uneven A-arms unable to move or move unevenly S-speech slurred or non-existent T-time-call 911 as soon as signs and symptoms begin-DO NOT go Back to bed or wait to see if you get better-TIME IS BRAIN. Warning Signs of HEART ATTACK Call 911 if you have these symptoms: 
? Chest discomfort. Most heart attacks involve discomfort in the center of the chest that lasts more than a few minutes, or that goes away and comes back. It can feel like uncomfortable pressure, squeezing, fullness, or pain. ? Discomfort in other areas of the upper body. Symptoms can include pain or discomfort in one or both arms, the back, neck, jaw, or stomach. ? Shortness of breath with or without chest discomfort. ? Other signs may include breaking out in a cold sweat, nausea, or lightheadedness. Don't wait more than five minutes to call [address]! Fast action can save your life. Calling 911 is almost always the fastest way to get lifesaving treatment. Emergency Medical Services staff can begin treatment when they arrive  up to an hour sooner than if someone gets to the hospital by car. The discharge information has been reviewed with the patient and caregiver. The patient and caregiver verbalized understanding. Discharge medications reviewed with the patient and caregiver and appropriate educational materials and side effects teaching were provided. ___________________________________________________________________________________________________________________________________ Introducing [facility]! [employer] introduces Empower2adapt patient portal. Now you can access parts of your medical record, email your doctor's office, and request medication refills online. 1. In your internet browser, go to [email or web address] 2. Click on the First Time User? Click Here link in the Sign In box. You will see the New Member Sign Up page. 3. Enter your Empower2adapt Access Code exactly as it appears below. You will not need to use this code after youve completed the sign-up process. If you do not sign up before the expiration date, you must request a new code. · Empower2adapt Access Code: [email or web address] Expires: 10/8/2018 12:49 PM 
 
4. Enter the last four digits of your Social Security Number (xxxx) and Date of Birth (mm/dd/yyyy) as indicated and click Submit. You will be taken to the next sign-up page. 5. Create a Fermentalgt ID. This will be your Empower2adapt login ID and cannot be changed, so think of one that is secure and easy to remember. 6. Create a MyChart password. You can change your password at any time. 7. Enter your Password Reset Question and Answer. This can be used at a later time if you forget your password. 8. Enter your e-mail address. You will receive e-mail notification when new information is available in [address] 9. Click Sign Up. You can now view and download portions of your medical record. 10. Click the Download Summary menu link to download a portable copy of your medical information. If you have questions, please visit the Frequently Asked Questions section of the [employer] website. Remember, ClarityAd is NOT to be used for urgent needs. For medical emergencies, dial 911. Now available from your iPhone and Android! Introducing Andrez Venegas As a [facility] patient, I wanted to make you aware of our electronic visit tool called Andrez Venegas. [employer] 24/Wochit allows you to connect within minutes with a medical provider 24 hours a day, seven days a week via a mobile device or tablet or logging into a secure website from your computer. You can access Andrez Venegas from anywhere in the [place]. A virtual visit might be right for you when you have a simple condition and feel like you just dont want to get out of bed, or cant get away from work for an appointment, when your regular [facility] provider is not available (evenings, weekends or holidays), or when youre out of town and need minor care. Electronic visits cost only $49 and if the [employer]/Wochit provider determines a prescription is needed to treat your condition, one can be electronically transmitted to a nearby pharmacy*. Please take a moment to enroll today if you have not already done so. The enrollment process is free and takes just a few minutes. To enroll, please download the Syntarga/Wochit wilberto to your tablet or phone, or visit [email or web address] to enroll on your computer.    
And, as an [address] [place] [address] patient with a [facility] account, the results of your visits will be scanned into your electronic medical record and your primary care provider will be able to view the scanned results. We urge you to continue to see your regular [facility] provider for your ongoing medical care. And while your primary care provider may not be the one available when you seek a [facility] virtual visit, the peace of mind you get from getting a real diagnosis real time can be priceless. For more information on [employer], view our Frequently Asked Questions (FAQs) at [email or web address]. Sincerely, 
 
Venus Rosenthal MD 
Chief Medical Officer [address] *:  certain medications cannot be prescribed via [facility] Providers Seen During Your Hospitalization Provider Specialty Primary office phone Lisa Gonzalez MD General Surgery [phone] Your Primary Care Physician (PCP) Primary Care Physician Office Phone Office Fax [place] [phone] You are allergic to the following Allergen Reactions Pcn (Penicillins) Swelling Recent Documentation Height Weight BMI Smoking Status 1.778 m 88.5 kg 27.99 kg/m2 Never Smoker Emergency Contacts Name Discharge Info Relation Home Work Mobile Harjinder Cortes DISCHARGE CAREGIVER [3] Child [2] [phone] Patient Belongings The following personal items are in your possession at time of discharge: 
  Dental Appliances: None  Visual Aid: Glasses   Hearing Aids/Status: Does not own  Home Medications: None   Jewelry: None  Clothing: Other (comment), Belt, Socks, Undergarments (street clotehs and shoes, suspenders)    Other Valuables: None  Personal Items Sent to Safe: declined Please provide this summary of care documentation to your next provider. Signatures-by signing, you are acknowledging that this After Visit Summary has been reviewed with you and you have received a copy. Patient Signature:  ____________________________________________________________ Date:  ____________________________________________________________  
  
Lisa  Provider Signature:  ____________________________________________________________ Date:  ____________________________________________________________

## 2018-07-20 NOTE — PERIOP NOTES
Called and updated son in waiting room. Allowed time for questions and answers    1400 TRANSFER - OUT REPORT:    Verbal report given to Coleman Graves RN(name) on Claudia Watson  being transferred to Phase II(unit) for routine post - op       Report consisted of patients Situation, Background, Assessment and   Recommendations(SBAR). Information from the following report(s) SBAR, Kardex, ED Summary, OR Summary, Procedure Summary, Intake/Output, MAR, Accordion, Recent Results, Med Rec Status, Cardiac Rhythm NSR/Sinus Arrythmia and Alarm Parameters  was reviewed with the receiving nurse. Opportunity for questions and clarification was provided.       Patient transported with:   Registered Nurse

## 2018-07-21 NOTE — OP NOTES
[facility]  OPERATIVE REPORT    Mychal Ruiz  MR#: [id number]  : 1929  ACCOUNT #: [de-identified]   DATE OF SERVICE: 2018    PREOPERATIVE DIAGNOSIS:  Possible giant cell arteritis. POSTOPERATIVE DIAGNOSIS:  Possible giant cell arteritis. OPERATIVE PROCEDURE:  Bilateral temporal artery biopsies. SURGEON:  Clark Gomes MD    ASSISTANT:  None. ANESTHESIA:  LMA. ESTIMATED BLOOD LOSS:  25 mL. SPECIMENS REMOVED:  Bilateral temporal artery segments. FINDINGS:  Tortuous arteries bilaterally. COMPLICATIONS:  None immediate. IMPLANTS:  None. INDICATIONS:  The patient is an 70-year-old male who has been having severe headaches. He was referred by his rheumatologist, Dr. Abdirizak Zurita. DESCRIPTION OF THE PROCEDURE:  After obtaining informed consent, the patient was taken to the operating room and placed supine on the operating table. An operative timeout was performed and general anesthesia with LMA was achieved. The area was prepped and draped bilaterally and we addressed the left side first.  An incision was made over the palpable artery and this was carried down with electrocautery and the artery was identified. It was dissected inferiorly and then branches extending medially and cephalad were dissected out. The artery was divided between clips with 3 clips on the proximal side. The specimen was passed off. The right side was then addressed in exactly the same manner. Both incisions were closed with 3-0 Vicryl and then 4-0 Vicryl. The incisions were dressed with Dermabond and the patient was recovered and taken to the recovery area in satisfactory condition. All instrument, sponge, and needle counts were reported as correct.       MD BRONSON Rush / Marlyn Avila  D: 2018 16:10     T: 2018 18:27  JOB #: [id number]  CC: Maximiliano Rajput MD

## 2018-08-14 ENCOUNTER — OFFICE VISIT (OUTPATIENT)
Dept: RHEUMATOLOGY | Age: 83
End: 2018-08-14

## 2018-08-14 VITALS
BODY MASS INDEX: 28.77 KG/M2 | DIASTOLIC BLOOD PRESSURE: 69 MMHG | HEIGHT: 70 IN | RESPIRATION RATE: 18 BRPM | SYSTOLIC BLOOD PRESSURE: 118 MMHG | HEART RATE: 71 BPM | TEMPERATURE: 97.7 F | WEIGHT: 201 LBS

## 2018-08-14 DIAGNOSIS — M31.6 GIANT CELL ARTERITIS (HCC): Primary | ICD-10-CM

## 2018-08-14 DIAGNOSIS — Z79.60 LONG-TERM USE OF IMMUNOSUPPRESSANT MEDICATION: ICD-10-CM

## 2018-08-14 DIAGNOSIS — Z79.52 LONG TERM (CURRENT) USE OF SYSTEMIC STEROIDS: ICD-10-CM

## 2018-08-14 DIAGNOSIS — M35.3 PMR (POLYMYALGIA RHEUMATICA) (HCC): ICD-10-CM

## 2018-08-14 RX ORDER — PREDNISONE 10 MG/1
TABLET ORAL
Qty: 80 TAB | Refills: 0 | Status: SHIPPED | OUTPATIENT
Start: 2018-08-14 | End: 2019-10-04

## 2018-08-14 NOTE — MR AVS SNAPSHOT
[address] 
[phone] Patient: Jay Kaur MRN: [id number] GIE:[birth year] Visit Information Date & Time Provider Department Dept. Phone Encounter #  
 8/14/2018  3:40 PM [facility], [address] [place] [address] [id number] Follow-up Instructions Return in about 4 weeks (around 9/11/2018). Upcoming Health Maintenance Date Due DTaP/Tdap/Td series (1 - Tdap) 2/18/1950 ZOSTER VACCINE AGE 60> 12/18/1988 GLAUCOMA SCREENING Q2Y 2/18/1994 Pneumococcal 65+ Low/Medium Risk (1 of 2 - PCV13) 2/18/1994 MEDICARE YEARLY EXAM 7/9/2018 Influenza Age 5 to Adult 8/1/2018 Allergies as of 8/14/2018  Review Complete On: 8/14/2018 By: Ziggy Armijo RN Severity Noted Reaction Type Reactions Pcn [Penicillins]  07/10/2018    Swelling Current Immunizations  Never Reviewed No immunizations on file. Not reviewed this visit You Were Diagnosed With   
  
 Codes Comments Giant cell arteritis (HCC)    -  Primary ICD-10-CM: M31.6 ICD-9-CM: 446.5 Long term (current) use of systemic steroids     ICD-10-CM: Z79.52 
ICD-9-CM: V58.65 Long-term use of immunosuppressant medication     ICD-10-CM: Z79.899 ICD-9-CM: V58.69 Vitals BP Pulse Temp Resp Height(growth percentile) Weight(growth percentile)  
 118/69 71 97.7 °F (36.5 °C) 18 5' 10\" (1.778 m) 201 lb (91.2 kg) BMI Smoking Status 28.84 kg/m2 Never Smoker BMI and BSA Data Body Mass Index Body Surface Area  
 28.84 kg/m 2 2.12 m 2 Preferred Pharmacy Pharmacy Name Phone [address] [facility], [address] [phone] Your Updated Medication List  
  
   
This list is accurate as of 8/14/18  3:52 PM.  Always use your most recent med list.  
  
  
  
  
 aspirin delayed-release 81 mg tablet Take  by mouth daily. ferrous sulfate 325 mg (65 mg iron) EC tablet Commonly known as:  IRON Take 325 mg by mouth daily. fish oil-dha-epa 1,200-144-216 mg Cap Take 1 Tab by mouth daily. levothyroxine 100 mcg tablet Commonly known as:  SYNTHROID Take 100 mcg by mouth Daily (before breakfast). lisinopril 10 mg tablet Commonly known as:  Steinberg Divya Take 10 mg by mouth daily. metoprolol tartrate 25 mg tablet Commonly known as:  LOPRESSOR Take 12.5 mg by mouth daily. multivitamin tablet Commonly known as:  ONE A DAY Take 1 Tab by mouth daily. pravastatin 40 mg tablet Commonly known as:  PRAVACHOL Take 40 mg by mouth nightly. predniSONE 10 mg tablet Commonly known as:  DELTASONE  
2 tabs daily for 3 weeks, 1.5 tabs daily for 4 weeks, 1 tab daily for 4 weeks  
  
 rOPINIRole 0.25 mg tablet Commonly known as:  Willena Wolf Take 0.25 mg by mouth nightly. tamsulosin 0.4 mg capsule Commonly known as:  FLOMAX Take 0.4 mg by mouth daily. tocilizumab 162 mg/0.9 mL Syrg Commonly known as:  ACTEMRA 162 mg by SubCUTAneous route every seven (7) days. Indications: GIANT CELL ARTERITIS Prescriptions Sent to Pharmacy Refills  
 predniSONE (DELTASONE) 10 mg tablet 0 Si tabs daily for 3 weeks, 1.5 tabs daily for 4 weeks, 1 tab daily for 4 weeks Class: Normal  
 Pharmacy: [facility] DR TEREAS GALARZA [address] [facility], [address] Ph #: [phone] Follow-up Instructions Return in about 4 weeks (around 2018). Patient Instructions I prescribed you prednisone 10 mg tablets. Continue prednisone 20 mg (two tablets of 10 mg) daily for 3 weeks and then 15 mg (1.5 tablets of 10 mg) daily for 30 days and then 10 mg daily for 30 days and then 7.5 mg daily for 30 days and then 5 mg daily for 30 days and then Introducing [facility]! [employer] introduces [employer] patient portal. Now you can access parts of your medical record, email your doctor's office, and request medication refills online. 1. In your internet browser, go to [email or web address] 2. Click on the First Time User? Click Here link in the Sign In box. You will see the New Member Sign Up page. 3. Enter your [employer]oad Access Code exactly as it appears below. You will not need to use this code after youve completed the sign-up process. If you do not sign up before the expiration date, you must request a new code. · [employer] Access Code: [email or web address] Expires: 10/8/2018 12:49 PM 
 
4. Enter the last four digits of your Social Security Number (xxxx) and Date of Birth (mm/dd/yyyy) as indicated and click Submit. You will be taken to the next sign-up page. 5. Create a Haload ID. This will be your Haload login ID and cannot be changed, so think of one that is secure and easy to remember. 6. Create a Haload password. You can change your password at any time. 7. Enter your Password Reset Question and Answer. This can be used at a later time if you forget your password. 8. Enter your e-mail address. You will receive e-mail notification when new information is available in [address] 9. Click Sign Up. You can now view and download portions of your medical record. 10. Click the Download Summary menu link to download a portable copy of your medical information. If you have questions, please visit the Frequently Asked Questions section of the Haload website. Remember, Haload is NOT to be used for urgent needs. For medical emergencies, dial 911. Now available from your iPhone and Android! Please provide this summary of care documentation to your next provider. Your primary care clinician is listed as Irma Riley. If you have any questions after today's visit, please call [phone].

## 2018-08-14 NOTE — PROGRESS NOTES
REASON FOR VISIT    This is a follow-up visit for Mr. Georgina Carlisle for Giant Cell Arteritis. Immunosuppression Screening (7/10/2018): Quantiferon TB: negative  PPD:  Not performed  Hepatitis B: negative  Hepatitis C: negative    Therapy History includes:    Current DMARD therapy include: Actemra 162 mg subcutaneously weekly (7/2018 to present)  Prior DMARD therapy include: none  Discontinued DMARDs because of inefficacy: None  Discontinued DMARDs because of side effects: None    Active problems include:    Patient Active Problem List   Diagnosis Code    Chronic headaches R51    Giant cell arteritis ([facility] [address] [address] M31.6    Long term (current) use of systemic steroids Z79.52    Long-term use of immunosuppressant medication Z79.899    PMR (polymyalgia rheumatica) ([facility] [address] M35.3       HISTORY OF PRESENT ILLNESS    Mr. Georgina Carlisle returns for a follow-up visit. On his last visit, I started prednisone 20 mg twice daily and Actemra 162 mg weekly, which he started in 7/2018. I also ordered a CTA Neck and Chest which did not reveal arteritis. Bilateral temporal artery biopsies showed marked fibrohyaline intimal thickening. He is on prednisone 20 mg daily decreased by his son due to lack of supply without recurrence. He reports after starting prednisone, he had resolution of all his symptoms (temporal scalp tenderness, new headaches, elevated ESR, polymyalgia rheumatica). He has received 3 doses of Actemra 162 mg with good tolerance. Most recent inflammatory markers from 7/10/2018 revealed a ESR 42 mm/hr (previously N/A mm/hr) and .6 mg/L (previously N/A mg/L). Last toxicity monitoring by blood work was done on 7/10/2018 and did not reveal any significant adverse effects. The patient has not had any interval hospital admissions, infections, or surgeries.     REVIEW OF SYSTEMS    A comprehensive review of systems was performed and pertinent results are documented in the HPI, review of systems is otherwise non-contributory. PAST MEDICAL HISTORY    He has a past medical history of Arthritis; CAD (coronary artery disease); Hard of hearing; Headache; PMR (polymyalgia rheumatica) ([address] Skin cancer; and Sleep apnea. FAMILY HISTORY    His family history includes Arthritis-rheumatoid in his mother; Gout in his son; No Known Problems in his father. SOCIAL HISTORY    He reports that he has never smoked. He has never used smokeless tobacco. He reports that he does not drink alcohol or use illicit drugs. IMMUNIZATIONS    There is no immunization history on file for this patient. MEDICATIONS    Current Outpatient Prescriptions   Medication Sig Dispense Refill    predniSONE (DELTASONE) 10 mg tablet 2 tabs daily for 3 weeks, 1.5 tabs daily for 4 weeks, 1 tab daily for 4 weeks 80 Tab 0    rOPINIRole (REQUIP) 0.25 mg tablet Take 0.25 mg by mouth nightly.  tamsulosin (FLOMAX) 0.4 mg capsule Take 0.4 mg by mouth daily.  pravastatin (PRAVACHOL) 40 mg tablet Take 40 mg by mouth nightly.  levothyroxine (SYNTHROID) 100 mcg tablet Take 100 mcg by mouth Daily (before breakfast).  ferrous sulfate (IRON) 325 mg (65 mg iron) EC tablet Take 325 mg by mouth daily.  metoprolol tartrate (LOPRESSOR) 25 mg tablet Take 12.5 mg by mouth daily.  lisinopril (PRINIVIL, ZESTRIL) 10 mg tablet Take 10 mg by mouth daily.  fish oil-dha-epa 1,200-144-216 mg cap Take 1 Tab by mouth daily.  multivitamin (ONE A DAY) tablet Take 1 Tab by mouth daily.  aspirin delayed-release 81 mg tablet Take  by mouth daily.  tocilizumab (ACTEMRA) 162 mg/0.9 mL syrg 162 mg by SubCUTAneous route every seven (7) days.  Indications: GIANT CELL ARTERITIS 12 Syringe 11        ALLERGIES    Allergies   Allergen Reactions    Pcn [Penicillins] Swelling       PHYSICAL EXAMINATION    Visit Vitals    /69    Pulse 71    Temp 97.7 °F (36.5 °C)    Resp 18    Ht 5' 10\" (1.778 m)    Wt 201 lb (91.2 kg)    BMI 28.84 Diabetes    HTN (hypertension) kg/m2     Body mass index is 28.84 kg/(m^2). General: Patient is alert, oriented x 3, not in acute distress, son at bedside    HEENT:   Sclerae are not injected and appear moist.  Oral mucous membranes are moist, there are no ulcers present. There is no alopecia. Neck is supple, there is no lymphadenopathy. Cardiovascular:  Heart is regular rate and rhythm, no murmurs. Bilateral temporal artery incisions    Chest:  Lungs are clear to auscultation bilaterally. No rhonchi, wheezes, or crackles. Extremities:  Free of clubbing, cyanosis, edema    Neurological exam:  No focal sensory deficits, muscle strength is full in upper and lower extremities     Skin exam:  There are no rashes, no alopecia, no discoid lesions, no active Raynaud's, no livedo reticularis, no periungual erythema, no scalp tenderness. Musculoskeletal exam:  A comprehensive musculoskeletal exam was performed for all joints of each upper and lower extremity and assessed for swelling, tenderness and range of motion. Positive results are documented as below:     Decreased ROM of neck  Decreased ROM of shoulders (right worse than left - rotator cuff)  No synovitis    DATA REVIEW    Laboratory     Recent laboratory results were reviewed, summarized, and discussed with the patient. Imaging    Musculoskeletal Ultrasound    None    Radiographs    None    CT Imaging    CTA Chest 7/13/2018: There is mild aneurysmal dilatation of the stent aorta, measures 3.7 x 3.7 cm in greatest diameter with no significant ankle arthrosis plaque within the lumen of the ascending thoracic aorta. There is mild aneurysmal dilatation of the descending aorta which measures 3.5 x 3.2 cm maximum diameter and there is a mild diffuse calcified and noncalcified luminal plaque throughout the descending thoracic aorta. There is no evidence of aortic dissection or Bandar.   There is mild calcific atherosclerotic disease and mild luminal narrowing of the origin of the left subclavian artery by NASCET criteria. The left common carotid artery and right brachiocephalic artery are patent without focal stenosis or luminal plaque. There is moderate calcific and noncalcific atherosclerotic plaque infrarenal portion of the abdominal aorta mild luminal stenosis NASCET criteria. The suprarenal portion of the abdominal aorta is normal in caliber with mild atherosclerotic disease. No acute airspace consolidation or pneumonic infiltrate or suspicious pulmonary nodules or pulmonary infarctions. No sizable pleural effusions. No pneumothorax. No mediastinal or hilar adenopathy by size criteria. There are multiple small subcentimeter calcified and noncalcified shotty lymph nodes in the mediastinum. The airways patent. No subtle pericardial effusions. There is mild cardiomegaly with no signs of acute left heart failure. No acute displaced fractures or suspicious lytic or blastic lesions of the bony thorax. Pulmonary arteries are normal in caliber and grossly patent with no gross pulmonary emboli proximally. There is mild diffuse dilatation of esophagus. There is mild focal calcifications of the visualized proximal branches of the left coronary artery. There is a small hiatal hernia. There is a 2.5 cm cyst in the left lobe of liver. . There is diverticular disease throughout the visualized colon acute diverticulitis. CTA Neck with contrast 07/12/20: A standard arch is present. No significant stenosis of the origin of the great vessels evident. Right carotid: No stenoses of the right common carotid artery evident. On the previous examination, there was a high-grade cyst of the right internal carotid artery just above the bifurcation. On this current examination, there is no significant stenosis of the right carotid artery. Left carotid artery: No stenoses of the left common carotid artery evident with calcified and noncalcified plaque at the bifurcation.   There is approximately 50% stenosis. This is unchanged from previous examination. Vertebral artery are codominant without focal stenosis. MR Imaging    None    DXA     None    PATHOLOGY    Right temporal artery, biopsy 7/20/2018: No evidence of arteritis. Marked fibrohyaline intimal thickening. Right temporal artery biopsy, consists of a 2.6 x 0.2 cm bifurcated tan smooth tubular segment of vascular tissue with adherent clotted blood     Left temporal artery, biopsy 7/20/2018:  No evidence of arteritis Marked fibrohyaline intimal thickening. Left temporal artery biopsy, consists of a 3.0 x 0.2 cm bifurcated tan smooth tubular segment of vascular tissue with adherent clotted blood     ASSESSMENT AND PLAN    This is a follow-up visit for Mr. Kirk Garvin. 1) Giant Cell Arteritis. (temporal scalp tenderness, new headaches, elevated ESR, polymyalgia rheumatica, unintentional weight loss) temporal artery showed intimal thickening, suggestive of GCA. He has rapid improvement with prednisone 40 mg daily and is tolerating Actemra 162 mg weekly. He has had 3 doses to date. His prednisone dose is 20 mg dropped by his son due lack of supply. He has not had recurrence of his symptoms. I will continue treatment and will taper his prednisone by 5 mg increments every 4 weeks until he is at 10 mg and then by 2.5 mg increments. 2) Polymyalgia Rheumatica. See #1. 3) Long Term Use of Immunosuppressants. The patient remains on immunomodulatory medications (Actemra) and requires frequent toxicity monitoring by blood work. 4) Long Term Use of Systemic Steroids (Prednisone). He has been on prednisone 40 mg since his last visit and is now on 20 mg daily. See #1. The patient voiced understanding of the aforementioned assessment and plan. Summary of plan was provided in the After Visit Summary patient instructions.      TODAY'S ORDERS    Orders Placed This Encounter    predniSONE (DELTASONE) 10 mg tablet     Future Appointments  Date Time Provider Department Center   9/11/2018 4:20 PM MD Humberto Camacho MD, [address] [facility]    Adult Rheumatology   Rheumatology Ultrasound Certified  [facility], [address] [facility], [address]   Phone [phone]  Fax [phone]

## 2018-08-14 NOTE — PATIENT INSTRUCTIONS
I prescribed you prednisone 10 mg tablets.     Continue prednisone 20 mg (two tablets of 10 mg) daily for 3 weeks and then 15 mg (1.5 tablets of 10 mg) daily for 30 days and then 10 mg daily for 30 days and then 7.5 mg daily for 30 days and then 5 mg daily for 30 days and then

## 2018-08-15 ENCOUNTER — TELEPHONE (OUTPATIENT)
Dept: RHEUMATOLOGY | Age: 83
End: 2018-08-15

## 2018-08-15 NOTE — TELEPHONE ENCOUNTER
Spoke with pharmacist at [facility] and clarified the quantity of the prednisone to be 112 tabs and not 80. She stated an understanding.

## 2018-08-15 NOTE — TELEPHONE ENCOUNTER
A pharmacist from [address] [place] [address] called because they need a new prescription for Prednisone, the dosage is incorrect. Call back number is [phone].

## 2018-08-16 ENCOUNTER — DOCUMENTATION ONLY (OUTPATIENT)
Dept: RHEUMATOLOGY | Age: 83
End: 2018-08-16

## 2018-08-20 ENCOUNTER — TELEPHONE (OUTPATIENT)
Dept: RHEUMATOLOGY | Age: 83
End: 2018-08-20

## 2018-08-20 NOTE — TELEPHONE ENCOUNTER
----- Message from Suhail Boyd sent at 8/18/2018  7:00 AM EDT -----  Regarding:  / Phong Crew from [employer] was calling to confirm that the appeal for Mr. Gaurav Henao is correct and needs to be withdraw because they do not cover him for the medication requested. [facility] would [facility] the correct [facility]. Best contact number for [phone].

## 2018-08-22 ENCOUNTER — TELEPHONE (OUTPATIENT)
Dept: RHEUMATOLOGY | Age: 83
End: 2018-08-22

## 2018-08-22 NOTE — TELEPHONE ENCOUNTER
Attempted to call pharmacy several times and line was busy. Call place to son, states his father was unable to get deltamethasone medication. Inform son that we had tried to contact pharmacy several times and line had been busy. Son states he will go by the pharmacy and have them to call our office.

## 2018-08-22 NOTE — TELEPHONE ENCOUNTER
----- Message from Janine Almaguer sent at 8/21/2018  2:52 PM EDT -----  Regarding: Dr. Gutierrez Mac / Telephone  Contact: [phone]  Pt's son, Rachel Mclean stated pharmacy is requesting a call  regarding Rx Deltasone 10 mgs. [facility] in [place] number is  [phone].

## 2018-08-22 NOTE — TELEPHONE ENCOUNTER
Spoke to Sylwia at [employer], and Prednisone has been approved, Case # [id number], good 7/23/2018- 8/21/2021. Called [facility], and spoke with Terri Spurling, and she ran the claim for Prednisone, and it is covered with $6.00 copay. Left message for patient's son (on HIPPA) regarding approval, and medication at [facility].

## 2018-09-11 ENCOUNTER — OFFICE VISIT (OUTPATIENT)
Dept: RHEUMATOLOGY | Age: 83
End: 2018-09-11

## 2018-09-11 VITALS
TEMPERATURE: 97.5 F | WEIGHT: 205 LBS | HEIGHT: 70 IN | DIASTOLIC BLOOD PRESSURE: 68 MMHG | SYSTOLIC BLOOD PRESSURE: 131 MMHG | RESPIRATION RATE: 18 BRPM | HEART RATE: 61 BPM | BODY MASS INDEX: 29.35 KG/M2

## 2018-09-11 DIAGNOSIS — Z79.60 LONG-TERM USE OF IMMUNOSUPPRESSANT MEDICATION: ICD-10-CM

## 2018-09-11 DIAGNOSIS — Z79.52 LONG TERM (CURRENT) USE OF SYSTEMIC STEROIDS: ICD-10-CM

## 2018-09-11 DIAGNOSIS — M35.3 PMR (POLYMYALGIA RHEUMATICA) (HCC): ICD-10-CM

## 2018-09-11 DIAGNOSIS — M31.6 GIANT CELL ARTERITIS (HCC): Primary | ICD-10-CM

## 2018-09-11 RX ORDER — PREDNISONE 5 MG/1
TABLET ORAL
Qty: 100 TAB | Refills: 0 | Status: SHIPPED | OUTPATIENT
Start: 2018-09-11 | End: 2019-10-04

## 2018-09-11 NOTE — PROGRESS NOTES
REASON FOR VISIT    This is a follow-up visit for Mr. Ying Bowling for Giant Cell Arteritis. Immunosuppression Screening (7/10/2018): Quantiferon TB: negative  PPD:  Not performed  Hepatitis B: negative  Hepatitis C: negative    Therapy History includes:    Current DMARD therapy include: Actemra 162 mg subcutaneously every Monday (7/2018 to present)  Prior DMARD therapy include: none  Discontinued DMARDs because of inefficacy: None  Discontinued DMARDs because of side effects: None    Active problems include:    Patient Active Problem List   Diagnosis Code    Chronic headaches R51    Giant cell arteritis ([facility] M31.6    Long term (current) use of systemic steroids Z79.52    Long-term use of immunosuppressant medication Z79.899    PMR (polymyalgia rheumatica) ([facility] M35.3       HISTORY OF PRESENT ILLNESS    Mr. Ying Bowling returns for a follow-up visit. On his last visit, I continued Actemra 162 mg every Monday, and taper prednisone from 20 mg by 5 mg every 4 weeks. He is on prednisone 15 mg. He denies temporal scalp tenderness, new headaches, polymyalgia rheumatica, unintentional weight loss, jaw claudication, diplopia, since his last visit. He denies fever, weight loss, blurred vision, vision loss, oral ulcers, ankle swelling, dry cough, dyspnea, nausea, vomiting, dysphagia, abdominal pain, black or bloody stool, fall since last visit, rash, easy bruising and increased thirst.    Most recent inflammatory markers from 7/10/2018 revealed a ESR 42 mm/hr (previously N/A mm/hr) and .6 mg/L (previously N/A mg/L). Last toxicity monitoring by blood work was done on 7/10/2018 and did not reveal any significant adverse effects. The patient has not had any interval hospital admissions, infections, or surgeries. REVIEW OF SYSTEMS    A comprehensive review of systems was performed and pertinent results are documented in the HPI, review of systems is otherwise non-contributory.     PAST MEDICAL HISTORY    He has a past medical history of Arthritis; CAD (coronary artery disease); Hard of hearing; Headache; PMR (polymyalgia rheumatica) ([address] Skin cancer; and Sleep apnea. FAMILY HISTORY    His family history includes Arthritis-rheumatoid in his mother; Gout in his son; No Known Problems in his father. SOCIAL HISTORY    He reports that he has never smoked. He has never used smokeless tobacco. He reports that he does not drink alcohol or use illicit drugs. IMMUNIZATIONS    There is no immunization history on file for this patient. MEDICATIONS    Current Outpatient Prescriptions   Medication Sig Dispense Refill    predniSONE (DELTASONE) 5 mg tablet Take 1.5 daily for 30 days then 1 daily for 30 days then 2.5 mg daily for 30 days and then stop 100 Tab 0    predniSONE (DELTASONE) 10 mg tablet 2 tabs daily for 3 weeks, 1.5 tabs daily for 4 weeks, 1 tab daily for 4 weeks 80 Tab 0    rOPINIRole (REQUIP) 0.25 mg tablet Take 0.25 mg by mouth nightly.  tamsulosin (FLOMAX) 0.4 mg capsule Take 0.4 mg by mouth daily.  pravastatin (PRAVACHOL) 40 mg tablet Take 40 mg by mouth nightly.  levothyroxine (SYNTHROID) 100 mcg tablet Take 100 mcg by mouth Daily (before breakfast).  ferrous sulfate (IRON) 325 mg (65 mg iron) EC tablet Take 325 mg by mouth daily.  metoprolol tartrate (LOPRESSOR) 25 mg tablet Take 12.5 mg by mouth daily.  lisinopril (PRINIVIL, ZESTRIL) 10 mg tablet Take 10 mg by mouth daily.  fish oil-dha-epa 1,200-144-216 mg cap Take 1 Tab by mouth daily.  multivitamin (ONE A DAY) tablet Take 1 Tab by mouth daily.  aspirin delayed-release 81 mg tablet Take  by mouth daily.  tocilizumab (ACTEMRA) 162 mg/0.9 mL syrg 162 mg by SubCUTAneous route every seven (7) days.  Indications: GIANT CELL ARTERITIS 12 Syringe 11        ALLERGIES    Allergies   Allergen Reactions    Pcn [Penicillins] Swelling       PHYSICAL EXAMINATION    Visit Vitals    /68    Pulse 61    Temp 97.5 °F (36.4 °C)    Resp 18    Ht 5' 10\" (1.778 m)    Wt 205 lb (93 kg)    BMI 29.41 kg/m2     Body mass index is 29.41 kg/(m^2). General: Patient is alert, oriented x 3, not in acute distress, son at bedside    HEENT:   Sclerae are not injected and appear moist.  Oral mucous membranes are moist, there are no ulcers present. There is no alopecia. Neck is supple. Cardiovascular:  Heart is regular rate and rhythm, no murmurs. No temporal tenderness    Chest:  Lungs are clear to auscultation bilaterally. No rhonchi, wheezes, or crackles. Extremities:  Free of clubbing, cyanosis, edema    Neurological exam:  No focal sensory deficits, muscle strength is full in upper and lower extremities     Skin exam:  There are no rashes, no alopecia, no discoid lesions, no active Raynaud's, no livedo reticularis, no periungual erythema, no scalp tenderness. Bilateral temporal artery incision scars    Musculoskeletal exam:  A comprehensive musculoskeletal exam was performed for all joints of each upper and lower extremity and assessed for swelling, tenderness and range of motion. Positive results are documented as below:     Decreased ROM of neck  Decreased ROM of shoulders (right worse than left - rotator cuff)  No synovitis    DATA REVIEW    Laboratory     Recent laboratory results were reviewed, summarized, and discussed with the patient. Imaging    Musculoskeletal Ultrasound    None    Radiographs    None    CT Imaging    CTA Chest 7/13/2018: There is mild aneurysmal dilatation of the stent aorta, measures 3.7 x 3.7 cm in greatest diameter with no significant ankle arthrosis plaque within the lumen of the ascending thoracic aorta. There is mild aneurysmal dilatation of the descending aorta which measures 3.5 x 3.2 cm maximum diameter and there is a mild diffuse calcified and noncalcified luminal plaque throughout the descending thoracic aorta.   There is no evidence of aortic dissection or Bandar. There is mild calcific atherosclerotic disease and mild luminal narrowing of the origin of the left subclavian artery by NASCET criteria. The left common carotid artery and right brachiocephalic artery are patent without focal stenosis or luminal plaque. There is moderate calcific and noncalcific atherosclerotic plaque infrarenal portion of the abdominal aorta mild luminal stenosis NASCET criteria. The suprarenal portion of the abdominal aorta is normal in caliber with mild atherosclerotic disease. No acute airspace consolidation or pneumonic infiltrate or suspicious pulmonary nodules or pulmonary infarctions. No sizable pleural effusions. No pneumothorax. No mediastinal or hilar adenopathy by size criteria. There are multiple small subcentimeter calcified and noncalcified shotty lymph nodes in the mediastinum. The airways patent. No subtle pericardial effusions. There is mild cardiomegaly with no signs of acute left heart failure. No acute displaced fractures or suspicious lytic or blastic lesions of the bony thorax. Pulmonary arteries are normal in caliber and grossly patent with no gross pulmonary emboli proximally. There is mild diffuse dilatation of esophagus. There is mild focal calcifications of the visualized proximal branches of the left coronary artery. There is a small hiatal hernia. There is a 2.5 cm cyst in the left lobe of liver. . There is diverticular disease throughout the visualized colon acute diverticulitis. CTA Neck with contrast 07/12/20: A standard arch is present. No significant stenosis of the origin of the great vessels evident. Right carotid: No stenoses of the right common carotid artery evident. On the previous examination, there was a high-grade cyst of the right internal carotid artery just above the bifurcation. On this current examination, there is no significant stenosis of the right carotid artery.   Left carotid artery: No stenoses of the left common carotid artery evident with calcified and noncalcified plaque at the bifurcation. There is approximately 50% stenosis. This is unchanged from previous examination. Vertebral artery are codominant without focal stenosis. MR Imaging    None    DXA     None    PATHOLOGY    Right temporal artery, biopsy 7/20/2018: No evidence of arteritis. Marked fibrohyaline intimal thickening. Right temporal artery biopsy, consists of a 2.6 x 0.2 cm bifurcated tan smooth tubular segment of vascular tissue with adherent clotted blood     Left temporal artery, biopsy 7/20/2018:  No evidence of arteritis Marked fibrohyaline intimal thickening. Left temporal artery biopsy, consists of a 3.0 x 0.2 cm bifurcated tan smooth tubular segment of vascular tissue with adherent clotted blood     ASSESSMENT AND PLAN    This is a follow-up visit for Mr. Marleni Prescott. 1) Giant Cell Arteritis. (temporal scalp tenderness, new headaches, elevated ESR, polymyalgia rheumatica, unintentional weight loss) Temporal artery showed intimal thickening, suggestive of GCA. He has rapid improvement with prednisone 40 mg daily and is tolerating Actemra 162 mg weekly. He is currently on 15 mg daily of prednisone and will taper down to 10 mg daily for 30 days, 7.5 mg daily for 30 days, 5 mg daily for 30 days and then I may discontinue or decrease to 2.5 mg daily for 30 days. 2) Polymyalgia Rheumatica. See #1. 3) Long Term Use of Immunosuppressants. The patient remains on immunomodulatory medications (Actemra) and requires frequent toxicity monitoring by blood work. 4) Long Term Use of Systemic Steroids (Prednisone). He is on prednisone 15 mg and tapering. See #1. The patient voiced understanding of the aforementioned assessment and plan. Summary of plan was provided in the After Visit Summary patient instructions.      TODAY'S ORDERS    Orders Placed This Encounter    predniSONE (DELTASONE) 5 mg tablet     Future Appointments  Date Time Provider Department Center   11/12/2018 8:40 AM MD Humberto Hickman MD, [address] [facility]    Adult Rheumatology   Rheumatology Ultrasound Certified  [facility]  A Part of [facility], [address]   Phone [phone]  Fax [phone]

## 2018-09-11 NOTE — MR AVS SNAPSHOT
[address] [facility] [id number] [phone] Patient: Aminata Blevins MRN: [id number] ZOH:[birth year] Visit Information Date & Time Provider Department Dept. Phone Encounter #  
 9/11/2018  4:20 PM Art Lai, [address] [id number] Follow-up Instructions Return in about 2 months (around 11/11/2018). Upcoming Health Maintenance Date Due DTaP/Tdap/Td series (1 - Tdap) 2/18/1950 ZOSTER VACCINE AGE 60> 12/18/1988 GLAUCOMA SCREENING Q2Y 2/18/1994 Pneumococcal 65+ Low/Medium Risk (1 of 2 - PCV13) 2/18/1994 MEDICARE YEARLY EXAM 7/9/2018 Influenza Age 5 to Adult 8/1/2018 Allergies as of 9/11/2018  Review Complete On: 9/11/2018 By: Brian Brown RN Severity Noted Reaction Type Reactions Pcn [Penicillins]  07/10/2018    Swelling Current Immunizations  Never Reviewed No immunizations on file. Not reviewed this visit Vitals BP Pulse Temp Resp Height(growth percentile) Weight(growth percentile) 131/68 61 97.5 °F (36.4 °C) 18 5' 10\" (1.778 m) 205 lb (93 kg) BMI Smoking Status 29.41 kg/m2 Never Smoker BMI and BSA Data Body Mass Index Body Surface Area  
 29.41 kg/m 2 2.14 m 2 Preferred Pharmacy Pharmacy Name Phone [address] [facility] [address] [facility], [address] [facility] [phone] Your Updated Medication List  
  
   
This list is accurate as of 9/11/18  4:47 PM.  Always use your most recent med list.  
  
  
  
  
 aspirin delayed-release 81 mg tablet Take  by mouth daily. ferrous sulfate 325 mg (65 mg iron) EC tablet Commonly known as:  IRON Take 325 mg by mouth daily. fish oil-dha-epa 1,200-144-216 mg Cap Take 1 Tab by mouth daily. levothyroxine 100 mcg tablet Commonly known as:  SYNTHROID Take 100 mcg by mouth Daily (before breakfast). lisinopril 10 mg tablet Commonly known as:  [place] Shows Take 10 mg by mouth daily. metoprolol tartrate 25 mg tablet Commonly known as:  LOPRESSOR Take 12.5 mg by mouth daily. multivitamin tablet Commonly known as:  ONE A DAY Take 1 Tab by mouth daily. pravastatin 40 mg tablet Commonly known as:  PRAVACHOL Take 40 mg by mouth nightly. * predniSONE 10 mg tablet Commonly known as:  DELTASONE  
2 tabs daily for 3 weeks, 1.5 tabs daily for 4 weeks, 1 tab daily for 4 weeks * predniSONE 5 mg tablet Commonly known as:  Hollace Payam Take 1.5 daily for 30 days then 1 daily for 30 days then 2.5 mg daily for 30 days and then stop  
  
 rOPINIRole 0.25 mg tablet Commonly known as:  Alpa El Take 0.25 mg by mouth nightly. tamsulosin 0.4 mg capsule Commonly known as:  FLOMAX Take 0.4 mg by mouth daily. tocilizumab 162 mg/0.9 mL Syrg Commonly known as:  ACTEMRA 162 mg by SubCUTAneous route every seven (7) days. Indications: GIANT CELL ARTERITIS * Notice: This list has 2 medication(s) that are the same as other medications prescribed for you. Read the directions carefully, and ask your doctor or other care provider to review them with you. Prescriptions Sent to Pharmacy Refills  
 predniSONE (DELTASONE) 5 mg tablet 0 Sig: Take 1.5 daily for 30 days then 1 daily for 30 days then 2.5 mg daily for 30 days and then stop Class: Normal  
 Pharmacy: [address] [facility], [address] Ph #: [phone] Follow-up Instructions Return in about 2 months (around 11/11/2018). Patient Instructions Decrease prednisone to 10 mg daily for 30 days and then with 5 mg tablets I just sent, decrease to 7.5 mg daily for 30 days and then 5 mg daily for 30 days and then 2.5 mg daily for 30 days and then stop Introducing [facility]!    
 [facility] introduces [employer] patient portal. Now you can access parts of your medical record, email your doctor's office, and request medication refills online. 1. In your internet browser, go to [email or web address] 2. Click on the First Time User? Click Here link in the Sign In box. You will see the New Member Sign Up page. 3. Enter your [employer] Access Code exactly as it appears below. You will not need to use this code after youve completed the sign-up process. If you do not sign up before the expiration date, you must request a new code. · [employer] Access Code: [email or web address] Expires: 10/8/2018 12:49 PM 
 
4. Enter the last four digits of your Social Security Number (xxxx) and Date of Birth (mm/dd/yyyy) as indicated and click Submit. You will be taken to the next sign-up page. 5. Create a [facility] [employer] ID. This will be your [employer] login ID and cannot be changed, so think of one that is secure and easy to remember. 6. Create a [employer] password. You can change your password at any time. 7. Enter your Password Reset Question and Answer. This can be used at a later time if you forget your password. 8. Enter your e-mail address. You will receive e-mail notification when new information is available in [address] 9. Click Sign Up. You can now view and download portions of your medical record. 10. Click the Download Summary menu link to download a portable copy of your medical information. If you have questions, please visit the Frequently Asked Questions section of the [employer] website. Remember, [facility] [employer] is NOT to be used for urgent needs. For medical emergencies, dial 911. Now available from your iPhone and Android! Please provide this summary of care documentation to your next provider. Your primary care clinician is listed as Lashawn Putnam. If you have any questions after today's visit, please call [phone].

## 2018-09-11 NOTE — PATIENT INSTRUCTIONS
Decrease prednisone to 10 mg daily for 30 days and then with 5 mg tablets I just sent, decrease to 7.5 mg daily for 30 days and then 5 mg daily for 30 days and then 2.5 mg daily for 30 days and then stop

## 2018-09-20 DIAGNOSIS — M31.5 GIANT CELL ARTERITIS WITH POLYMYALGIA RHEUMATICA (HCC): ICD-10-CM

## 2019-03-26 ENCOUNTER — OP HISTORICAL/CONVERTED ENCOUNTER (OUTPATIENT)
Dept: OTHER | Age: 84
End: 2019-03-26

## 2019-09-24 DIAGNOSIS — M31.5 GIANT CELL ARTERITIS WITH POLYMYALGIA RHEUMATICA (HCC): ICD-10-CM

## 2019-10-01 DIAGNOSIS — M31.5 GIANT CELL ARTERITIS WITH POLYMYALGIA RHEUMATICA (HCC): ICD-10-CM

## 2019-10-04 ENCOUNTER — OFFICE VISIT (OUTPATIENT)
Dept: RHEUMATOLOGY | Age: 84
End: 2019-10-04

## 2019-10-04 VITALS
RESPIRATION RATE: 18 BRPM | WEIGHT: 210 LBS | HEIGHT: 70 IN | HEART RATE: 63 BPM | DIASTOLIC BLOOD PRESSURE: 68 MMHG | BODY MASS INDEX: 30.06 KG/M2 | SYSTOLIC BLOOD PRESSURE: 138 MMHG | TEMPERATURE: 97.2 F

## 2019-10-04 DIAGNOSIS — Z79.60 LONG-TERM USE OF IMMUNOSUPPRESSANT MEDICATION: ICD-10-CM

## 2019-10-04 DIAGNOSIS — M31.5 GIANT CELL ARTERITIS WITH POLYMYALGIA RHEUMATICA (HCC): Primary | ICD-10-CM

## 2019-10-04 NOTE — PROGRESS NOTES
Chief Complaint   Patient presents with    Other     GCA     1. Have you been to the ER, urgent care clinic since your last visit? Hospitalized since your last visit? No    2. Have you seen or consulted any other health care providers outside of the [address] [place] [address] since your last visit? Include any pap smears or colon screening. Yes Reason for visit: Dr. Gianni Ridley for check up .

## 2019-10-04 NOTE — LETTER
10/4/19 Patient: Aysha Calvo YOB: 1929 Date of Visit: 10/4/2019 Josep Osborn MD 
[address] [place] [address],[address] [id number] VIA Facsimile: [phone] Dear Josep Osborn MD, Thank you for referring Mr. Dominique Corrales to [facility] for evaluation. My notes for this consultation are attached. If you have questions, please do not hesitate to call me. I look forward to following your patient along with you.  
 
 
Sincerely, 
 
Lin Ye MD

## 2019-10-04 NOTE — PROGRESS NOTES
REASON FOR VISIT    This is a follow-up visit for Mr. Ambar Mahan for     ICD-10-CM   1. Giant cell arteritis with polymyalgia rheumatica ([facility] M31.5     Immunosuppression Screening (7/10/2018): Quantiferon TB: negative  PPD:  Not performed  Hepatitis B: negative  Hepatitis C: negative    Therapy History includes:  Current DMARD therapy include: Actemra 162 mg subcutaneously every Monday (7/2018 to 9/23/2019)  Prior DMARD therapy include: none  Discontinued DMARDs because of inefficacy: None  Discontinued DMARDs because of side effects: None    Active problems include:    Patient Active Problem List   Diagnosis Code    Chronic headaches R51    Giant cell arteritis with polymyalgia rheumatica (HCC) M31.5    Long term (current) use of systemic steroids Z79.52    Long-term use of immunosuppressant medication Z79.899    PMR (polymyalgia rheumatica) (HCC) M35.3     HISTORY OF PRESENT ILLNESS    Mr. Ambar Mahan returns for a follow-up visit. On his last visit on 9/11/2018, I continued Actemra 162 mg every Monday, and tapered his prednisone to 10 mg daily for 30 days, 7.5 mg daily for 30 days, 5 mg daily for 30 days and then I may discontinue or decrease to 2.5 mg daily for 30 days. Today, he feels well. He is no longer on prednisone. He missed his most recent dose on Monday, and has not had recurrence. He denies temporal scalp tenderness, new headaches, polymyalgia rheumatica, unintentional weight loss, jaw claudication, diplopia, since his last visit. He denies fever, weight loss, blurred vision, vision loss, oral ulcers, ankle swelling, dry cough, dyspnea, nausea, vomiting, dysphagia, abdominal pain, black or bloody stool, fall since last visit, rash, easy bruising and increased thirst.    Most recent inflammatory markers from 7/10/2018 revealed a ESR 42 mm/hr (previously N/A mm/hr) and .6 mg/L (previously N/A mg/L).     Last toxicity monitoring by blood work was done on 7/10/2018 and did not reveal any significant adverse effects. The patient has not had any interval hospital admissions, infections, or surgeries. REVIEW OF SYSTEMS    A comprehensive review of systems was performed and pertinent results are documented in the HPI, review of systems is otherwise non-contributory. PAST MEDICAL HISTORY    He has a past medical history of Arthritis, CAD (coronary artery disease), Hard of hearing, Headache, PMR (polymyalgia rheumatica) ([address] Skin cancer, and Sleep apnea. FAMILY HISTORY    His family history includes Arthritis-rheumatoid in his mother; Gout in his son; No Known Problems in his father. SOCIAL HISTORY    He reports that he has never smoked. He has never used smokeless tobacco. He reports that he does not drink alcohol or use drugs. IMMUNIZATIONS    There is no immunization history on file for this patient. MEDICATIONS    Current Outpatient Medications   Medication Sig Dispense Refill    [START ON 10/7/2019] tocilizumab (ACTEMRA) 162 mg/0.9 mL syringe 0.9 mL by SubCUTAneous route every Monday. Indications: Inflammation of the Artery in the [facility] 4 Syringe 11    rOPINIRole (REQUIP) 0.25 mg tablet Take 0.25 mg by mouth nightly.  tamsulosin (FLOMAX) 0.4 mg capsule Take 0.4 mg by mouth daily.  pravastatin (PRAVACHOL) 40 mg tablet Take 40 mg by mouth nightly.  levothyroxine (SYNTHROID) 100 mcg tablet Take 100 mcg by mouth Daily (before breakfast).  metoprolol tartrate (LOPRESSOR) 25 mg tablet Take 12.5 mg by mouth daily.  lisinopril (PRINIVIL, ZESTRIL) 10 mg tablet Take 10 mg by mouth daily.  fish oil-dha-epa 1,200-144-216 mg cap Take 1 Tab by mouth daily.  multivitamin (ONE A DAY) tablet Take 1 Tab by mouth daily.           ALLERGIES    Allergies   Allergen Reactions    Pcn [Penicillins] Swelling       PHYSICAL EXAMINATION    Visit Vitals  /68   Pulse 63   Temp 97.2 °F (36.2 °C)   Resp 18   Ht 5' 10\" (1.778 m)   Wt 210 lb (95.3 kg)   BMI 30.13 kg/m²     Body mass index is 30.13 kg/m². General: Patient is alert, oriented x 3, not in acute distress, son at bedside    HEENT:   Sclerae are not injected and appear moist.  There is no alopecia. Neck is supple. Cardiovascular:  Heart is regular rate and rhythm, no murmurs. No temporal tenderness    Chest:  Lungs are clear to auscultation bilaterally. No rhonchi, wheezes, or crackles. Extremities:  Free of clubbing, cyanosis, edema    Neurological exam:  No focal sensory deficits, muscle strength is full in upper and lower extremities     Skin exam:  There are no rashes, no alopecia, no discoid lesions, no active Raynaud's, no livedo reticularis, no periungual erythema, no scalp tenderness. Bilateral temporal artery incision scars    Musculoskeletal exam:  A comprehensive musculoskeletal exam was performed for all joints of each upper and lower extremity and assessed for swelling, tenderness and range of motion. Positive results are documented as below:     Decreased ROM of neck  Decreased ROM of shoulders (right worse than left - rotator cuff)  No synovitis    DATA REVIEW    Laboratory     Recent laboratory results were reviewed, summarized, and discussed with the patient. Imaging    Musculoskeletal Ultrasound    None    Radiographs    None    CT Imaging    CTA Chest 7/13/2018: There is mild aneurysmal dilatation of the stent aorta, measures 3.7 x 3.7 cm in greatest diameter with no significant ankle arthrosis plaque within the lumen of the ascending thoracic aorta. There is mild aneurysmal dilatation of the descending aorta which measures 3.5 x 3.2 cm maximum diameter and there is a mild diffuse calcified and noncalcified luminal plaque throughout the descending thoracic aorta. There is no evidence of aortic dissection or Bandar. There is mild calcific atherosclerotic disease and mild luminal narrowing of the origin of the left subclavian artery by NASCET criteria.   The left common carotid artery and right brachiocephalic artery are patent without focal stenosis or luminal plaque. There is moderate calcific and noncalcific atherosclerotic plaque infrarenal portion of the abdominal aorta mild luminal stenosis NASCET criteria. The suprarenal portion of the abdominal aorta is normal in caliber with mild atherosclerotic disease. No acute airspace consolidation or pneumonic infiltrate or suspicious pulmonary nodules or pulmonary infarctions. No sizable pleural effusions. No pneumothorax. No mediastinal or hilar adenopathy by size criteria. There are multiple small subcentimeter calcified and noncalcified shotty lymph nodes in the mediastinum. The airways patent. No subtle pericardial effusions. There is mild cardiomegaly with no signs of acute left heart failure. No acute displaced fractures or suspicious lytic or blastic lesions of the bony thorax. Pulmonary arteries are normal in caliber and grossly patent with no gross pulmonary emboli proximally. There is mild diffuse dilatation of esophagus. There is mild focal calcifications of the visualized proximal branches of the left coronary artery. There is a small hiatal hernia. There is a 2.5 cm cyst in the left lobe of liver. . There is diverticular disease throughout the visualized colon acute diverticulitis. CTA Neck with contrast 07/12/20: A standard arch is present. No significant stenosis of the origin of the great vessels evident. Right carotid: No stenoses of the right common carotid artery evident. On the previous examination, there was a high-grade cyst of the right internal carotid artery just above the bifurcation. On this current examination, there is no significant stenosis of the right carotid artery. Left carotid artery: No stenoses of the left common carotid artery evident with calcified and noncalcified plaque at the bifurcation. There is approximately 50% stenosis. This is unchanged from previous examination. Vertebral artery are codominant without focal stenosis. MR Imaging    None    DXA     None    PATHOLOGY    Right temporal artery, biopsy 7/20/2018: No evidence of arteritis. Marked fibrohyaline intimal thickening. Right temporal artery biopsy, consists of a 2.6 x 0.2 cm bifurcated tan smooth tubular segment of vascular tissue with adherent clotted blood     Left temporal artery, biopsy 7/20/2018:  No evidence of arteritis Marked fibrohyaline intimal thickening. Left temporal artery biopsy, consists of a 3.0 x 0.2 cm bifurcated tan smooth tubular segment of vascular tissue with adherent clotted blood     ASSESSMENT AND PLAN    This is a follow-up visit for Mr. Sylwia Kang. 1) Giant Cell Arteritis. (temporal scalp tenderness, new headaches, elevated ESR, polymyalgia rheumatica, unintentional weight loss) Temporal artery showed intimal thickening, suggestive of GCA. He has been on Actemra 162 mg weekly since 7/2018 with good tolerance and resolution of his symptoms. He is no longer on prednisone. His last does was 9/23/2019 without recurrence. I discussed spacing out his dosing to every 14 days for 3 months and then reducing his dose to once monthly as long as he does not have recurrence. He and his son understood. 2) Polymyalgia Rheumatica. See #1. 3) Long Term Use of Immunosuppressants. The patient remains on immunomodulatory medications (Actemra) and requires frequent toxicity monitoring by blood work. Respective labs ordered today. 4) Long Term Use of Systemic Steroids (Prednisone). He is no longer on prednisone. The patient voiced understanding of the aforementioned assessment and plan. Summary of plan was provided in the After Visit Summary patient instructions.      TODAY'S ORDERS    Orders Placed This Encounter    QUANTIFERON-TB GOLD PLUS    CHRONIC HEPATITIS PANEL    CBC WITH AUTOMATED DIFF    METABOLIC PANEL, COMPREHENSIVE    C REACTIVE PROTEIN, QT    SED RATE (ESR)    tocilizumab (ACTEMRA) 162 mg/0.9 mL syringe     Future Appointments   Date Time Provider Department Center   4/6/2020 10:20 AM MD Humberto Ingram MD, [address] [place] [address]    Adult Rheumatology   Rheumatology Ultrasound Certified  [facility]  A Part of [address]   Phone [phone]  Fax [phone]

## 2019-10-04 NOTE — PATIENT INSTRUCTIONS
Take one injection of Actemra every 14 days for 3 months and then take one injection of Actemra every 4 weeks for 3 months and then follow up with me in 6 months

## 2019-10-08 LAB
ALBUMIN SERPL-MCNC: 4.3 G/DL (ref 3.2–4.6)
ALBUMIN/GLOB SERPL: 2 {RATIO} (ref 1.2–2.2)
ALP SERPL-CCNC: 53 IU/L (ref 39–117)
ALT SERPL-CCNC: 36 IU/L (ref 0–44)
AST SERPL-CCNC: 27 IU/L (ref 0–40)
BASOPHILS # BLD AUTO: 0.1 X10E3/UL (ref 0–0.2)
BASOPHILS NFR BLD AUTO: 1 %
BILIRUB SERPL-MCNC: 0.7 MG/DL (ref 0–1.2)
BUN SERPL-MCNC: 16 MG/DL (ref 10–36)
BUN/CREAT SERPL: 15 (ref 10–24)
CALCIUM SERPL-MCNC: 9.1 MG/DL (ref 8.6–10.2)
CHLORIDE SERPL-SCNC: 105 MMOL/L (ref 96–106)
CO2 SERPL-SCNC: 26 MMOL/L (ref 20–29)
COMMENT, 144067: NORMAL
CREAT SERPL-MCNC: 1.08 MG/DL (ref 0.76–1.27)
CRP SERPL-MCNC: <1 MG/L (ref 0–10)
EOSINOPHIL # BLD AUTO: 0.4 X10E3/UL (ref 0–0.4)
EOSINOPHIL NFR BLD AUTO: 5 %
ERYTHROCYTE [DISTWIDTH] IN BLOOD BY AUTOMATED COUNT: 12.4 % (ref 12.3–15.4)
ERYTHROCYTE [SEDIMENTATION RATE] IN BLOOD BY WESTERGREN METHOD: 2 MM/HR (ref 0–30)
GAMMA INTERFERON BACKGROUND BLD IA-ACNC: 0.03 IU/ML
GLOBULIN SER CALC-MCNC: 2.1 G/DL (ref 1.5–4.5)
GLUCOSE SERPL-MCNC: 73 MG/DL (ref 65–99)
HBV CORE AB SERPL QL IA: NEGATIVE
HBV CORE IGM SERPL QL IA: NEGATIVE
HBV E AB SERPL QL IA: NEGATIVE
HBV E AG SERPL QL IA: NEGATIVE
HBV SURFACE AB SER QL: NON REACTIVE
HBV SURFACE AG SERPL QL IA: NEGATIVE
HCT VFR BLD AUTO: 47.3 % (ref 37.5–51)
HCV AB S/CO SERPL IA: <0.1 S/CO RATIO (ref 0–0.9)
HGB BLD-MCNC: 16.4 G/DL (ref 13–17.7)
IMM GRANULOCYTES # BLD AUTO: 0 X10E3/UL (ref 0–0.1)
IMM GRANULOCYTES NFR BLD AUTO: 1 %
LYMPHOCYTES # BLD AUTO: 1.7 X10E3/UL (ref 0.7–3.1)
LYMPHOCYTES NFR BLD AUTO: 23 %
M TB IFN-G BLD-IMP: NEGATIVE
M TB IFN-G CD4+ BCKGRND COR BLD-ACNC: 0.05 IU/ML
MCH RBC QN AUTO: 32.2 PG (ref 26.6–33)
MCHC RBC AUTO-ENTMCNC: 34.7 G/DL (ref 31.5–35.7)
MCV RBC AUTO: 93 FL (ref 79–97)
MITOGEN IGNF BLD-ACNC: >10 IU/ML
MONOCYTES # BLD AUTO: 0.7 X10E3/UL (ref 0.1–0.9)
MONOCYTES NFR BLD AUTO: 10 %
NEUTROPHILS # BLD AUTO: 4.4 X10E3/UL (ref 1.4–7)
NEUTROPHILS NFR BLD AUTO: 60 %
PLATELET # BLD AUTO: 173 X10E3/UL (ref 150–450)
POTASSIUM SERPL-SCNC: 4.6 MMOL/L (ref 3.5–5.2)
PROT SERPL-MCNC: 6.4 G/DL (ref 6–8.5)
QUANTIFERON INCUBATION, QF1T: NORMAL
QUANTIFERON TB2 AG: 0.06 IU/ML
RBC # BLD AUTO: 5.09 X10E6/UL (ref 4.14–5.8)
SERVICE CMNT-IMP: NORMAL
SODIUM SERPL-SCNC: 145 MMOL/L (ref 134–144)
WBC # BLD AUTO: 7.2 X10E3/UL (ref 3.4–10.8)

## 2019-10-11 ENCOUNTER — TELEPHONE (OUTPATIENT)
Dept: RHEUMATOLOGY | Age: 84
End: 2019-10-11

## 2019-10-11 NOTE — TELEPHONE ENCOUNTER
Patient's son is calling to check on the status of his Dad's Actemra through [employer]. His phone is [phone].

## 2019-10-14 NOTE — TELEPHONE ENCOUNTER
Spoke with pt's son regarding Actemra and let him know that it was sent to [email or web address] and an auth prob needs to be completed before it gets sent to [employer]. He wants to check on the status of the PA since I did not see any communication regarding that in the chart. Please call pt's son to update.

## 2019-10-17 NOTE — TELEPHONE ENCOUNTER
Returned call to patient's Cristian Mccullough who is on the emergency contact, and informed [facility].

## 2019-10-18 ENCOUNTER — TELEPHONE (OUTPATIENT)
Dept: RHEUMATOLOGY | Age: 84
End: 2019-10-18

## 2020-11-14 ENCOUNTER — APPOINTMENT (OUTPATIENT)
Dept: GENERAL RADIOLOGY | Age: 85
End: 2020-11-14
Attending: EMERGENCY MEDICINE
Payer: MEDICARE

## 2020-11-14 ENCOUNTER — HOSPITAL ENCOUNTER (EMERGENCY)
Dept: CT IMAGING | Age: 85
Discharge: HOME | DRG: 066 | End: 2020-11-14
Attending: EMERGENCY MEDICINE
Payer: MEDICARE

## 2020-11-14 ENCOUNTER — HOSPITAL ENCOUNTER (EMERGENCY)
Age: 85
Discharge: ANOTHER HOSPITAL | End: 2020-11-14
Attending: EMERGENCY MEDICINE
Payer: MEDICARE

## 2020-11-14 ENCOUNTER — APPOINTMENT (OUTPATIENT)
Dept: CT IMAGING | Age: 85
DRG: 066 | End: 2020-11-14
Attending: EMERGENCY MEDICINE
Payer: MEDICARE

## 2020-11-14 ENCOUNTER — HOSPITAL ENCOUNTER (INPATIENT)
Age: 85
LOS: 4 days | Discharge: NURSING, REHAB OR LONG-TERM CARE | DRG: 066 | End: 2020-11-19
Attending: EMERGENCY MEDICINE | Admitting: INTERNAL MEDICINE
Payer: MEDICARE

## 2020-11-14 ENCOUNTER — APPOINTMENT (OUTPATIENT)
Dept: CT IMAGING | Age: 85
End: 2020-11-14
Attending: EMERGENCY MEDICINE
Payer: MEDICARE

## 2020-11-14 VITALS
WEIGHT: 212 LBS | HEIGHT: 70 IN | SYSTOLIC BLOOD PRESSURE: 176 MMHG | TEMPERATURE: 97.4 F | OXYGEN SATURATION: 97 % | BODY MASS INDEX: 30.35 KG/M2 | HEART RATE: 67 BPM | RESPIRATION RATE: 16 BRPM | DIASTOLIC BLOOD PRESSURE: 82 MMHG

## 2020-11-14 DIAGNOSIS — G45.9 TIA (TRANSIENT ISCHEMIC ATTACK): ICD-10-CM

## 2020-11-14 DIAGNOSIS — R29.6 FREQUENT FALLS: Primary | ICD-10-CM

## 2020-11-14 DIAGNOSIS — R41.0 MENTAL CONFUSION: ICD-10-CM

## 2020-11-14 DIAGNOSIS — R29.6 RECURRENT FALLS: Primary | ICD-10-CM

## 2020-11-14 DIAGNOSIS — R47.9 SPEECH DISTURBANCE, UNSPECIFIED TYPE: ICD-10-CM

## 2020-11-14 LAB
ALBUMIN SERPL-MCNC: 3.5 G/DL (ref 3.5–5)
ALBUMIN/GLOB SERPL: 0.9 {RATIO} (ref 1.1–2.2)
ALP SERPL-CCNC: 54 U/L (ref 45–117)
ALT SERPL-CCNC: 46 U/L (ref 12–78)
ANION GAP SERPL CALC-SCNC: 6 MMOL/L (ref 5–15)
APPEARANCE UR: CLEAR
AST SERPL W P-5'-P-CCNC: 45 U/L (ref 15–37)
BACTERIA URNS QL MICRO: NEGATIVE /HPF
BASOPHILS # BLD: 0 K/UL (ref 0–0.2)
BASOPHILS NFR BLD: 0 % (ref 0–2.5)
BILIRUB SERPL-MCNC: 0.8 MG/DL (ref 0.2–1)
BILIRUB UR QL: NEGATIVE
BUN SERPL-MCNC: 13 MG/DL (ref 6–20)
BUN/CREAT SERPL: 12 (ref 12–20)
CA-I BLD-MCNC: 9 MG/DL (ref 8.5–10.1)
CHLORIDE SERPL-SCNC: 100 MMOL/L (ref 97–108)
CO2 SERPL-SCNC: 30 MMOL/L (ref 21–32)
COLOR UR: ABNORMAL
COMMENT, HOLDF: NORMAL
CREAT SERPL-MCNC: 1.08 MG/DL (ref 0.7–1.3)
EOSINOPHIL # BLD: 0.2 K/UL (ref 0–0.7)
EOSINOPHIL NFR BLD: 3 % (ref 0.9–2.9)
EPITH CASTS URNS QL MICRO: ABNORMAL /LPF
ERYTHROCYTE [DISTWIDTH] IN BLOOD BY AUTOMATED COUNT: 13.8 % (ref 11.5–14.5)
GLOBULIN SER CALC-MCNC: 3.9 G/DL (ref 2–4)
GLUCOSE SERPL-MCNC: 95 MG/DL (ref 65–100)
GLUCOSE UR STRIP.AUTO-MCNC: NEGATIVE MG/DL
HCT VFR BLD AUTO: 43.1 % (ref 41–53)
HGB BLD-MCNC: 14.6 G/DL (ref 13.5–17.5)
HGB UR QL STRIP: ABNORMAL
HYALINE CASTS URNS QL MICRO: ABNORMAL /LPF (ref 0–5)
KETONES UR QL STRIP.AUTO: NEGATIVE MG/DL
LEUKOCYTE ESTERASE UR QL STRIP.AUTO: ABNORMAL
LYMPHOCYTES # BLD: 1.3 K/UL (ref 1–4.8)
LYMPHOCYTES NFR BLD: 15 % (ref 20.5–51.1)
MCH RBC QN AUTO: 30.9 PG (ref 31–34)
MCHC RBC AUTO-ENTMCNC: 33.8 G/DL (ref 31–36)
MCV RBC AUTO: 91.4 FL (ref 80–100)
MONOCYTES # BLD: 0.9 K/UL (ref 0.2–2.4)
MONOCYTES NFR BLD: 11 % (ref 1.7–9.3)
NEUTS SEG # BLD: 5.8 K/UL (ref 1.8–7.7)
NEUTS SEG NFR BLD: 71 % (ref 42–75)
NITRITE UR QL STRIP.AUTO: NEGATIVE
NRBC # BLD: 0 K/UL
NRBC BLD-RTO: 0 PER 100 WBC
PH UR STRIP: 7 [PH] (ref 5–8)
PLATELET # BLD AUTO: 223 K/UL
PMV BLD AUTO: 8.7 FL (ref 6.5–11.5)
POTASSIUM SERPL-SCNC: 4.6 MMOL/L (ref 3.5–5.1)
PROT SERPL-MCNC: 7.4 G/DL (ref 6.4–8.2)
PROT UR STRIP-MCNC: ABNORMAL MG/DL
RBC # BLD AUTO: 4.71 M/UL (ref 4.5–5.9)
RBC #/AREA URNS HPF: ABNORMAL /HPF (ref 0–5)
SAMPLES BEING HELD,HOLD: NORMAL
SODIUM SERPL-SCNC: 136 MMOL/L (ref 136–145)
SP GR UR REFRACTOMETRY: 1.02 (ref 1–1.03)
TROPONIN I SERPL-MCNC: <0.05 NG/ML
UR CULT HOLD, URHOLD: NORMAL
UROBILINOGEN UR QL STRIP.AUTO: 0.2 EU/DL (ref 0.2–1)
WBC # BLD AUTO: 8.3 K/UL (ref 4.4–11.3)
WBC URNS QL MICRO: ABNORMAL /HPF (ref 0–4)

## 2020-11-14 PROCEDURE — 93005 ELECTROCARDIOGRAM TRACING: CPT

## 2020-11-14 PROCEDURE — 70450 CT HEAD/BRAIN W/O DYE: CPT

## 2020-11-14 PROCEDURE — 74011250637 HC RX REV CODE- 250/637: Performed by: EMERGENCY MEDICINE

## 2020-11-14 PROCEDURE — 36415 COLL VENOUS BLD VENIPUNCTURE: CPT

## 2020-11-14 PROCEDURE — 70496 CT ANGIOGRAPHY HEAD: CPT

## 2020-11-14 PROCEDURE — 70498 CT ANGIOGRAPHY NECK: CPT

## 2020-11-14 PROCEDURE — 0042T CT PERF W CBF: CPT

## 2020-11-14 PROCEDURE — 74011000636 HC RX REV CODE- 636: Performed by: RADIOLOGY

## 2020-11-14 PROCEDURE — 74011000258 HC RX REV CODE- 258: Performed by: RADIOLOGY

## 2020-11-14 PROCEDURE — 81001 URINALYSIS AUTO W/SCOPE: CPT

## 2020-11-14 PROCEDURE — 85025 COMPLETE CBC W/AUTO DIFF WBC: CPT

## 2020-11-14 PROCEDURE — 71045 X-RAY EXAM CHEST 1 VIEW: CPT

## 2020-11-14 PROCEDURE — 99285 EMERGENCY DEPT VISIT HI MDM: CPT

## 2020-11-14 PROCEDURE — 84484 ASSAY OF TROPONIN QUANT: CPT

## 2020-11-14 PROCEDURE — 80053 COMPREHEN METABOLIC PANEL: CPT

## 2020-11-14 RX ORDER — SODIUM CHLORIDE 0.9 % (FLUSH) 0.9 %
10 SYRINGE (ML) INJECTION
Status: COMPLETED | OUTPATIENT
Start: 2020-11-14 | End: 2020-11-14

## 2020-11-14 RX ORDER — GUAIFENESIN 100 MG/5ML
162 LIQUID (ML) ORAL
Status: COMPLETED | OUTPATIENT
Start: 2020-11-14 | End: 2020-11-14

## 2020-11-14 RX ADMIN — SODIUM CHLORIDE 100 ML: 900 INJECTION, SOLUTION INTRAVENOUS at 22:56

## 2020-11-14 RX ADMIN — ASPIRIN 162 MG: 81 TABLET, CHEWABLE ORAL at 17:13

## 2020-11-14 RX ADMIN — IOPAMIDOL 20 ML: 755 INJECTION, SOLUTION INTRAVENOUS at 22:57

## 2020-11-14 RX ADMIN — IOPAMIDOL 100 ML: 755 INJECTION, SOLUTION INTRAVENOUS at 22:57

## 2020-11-14 RX ADMIN — Medication 10 ML: at 22:57

## 2020-11-14 NOTE — ED PROVIDER NOTES
EMERGENCY DEPARTMENT HISTORY AND PHYSICAL EXAM      Date: 11/14/2020  Patient Name: Enoc Edwards Sr.      History of Presenting Illness     Chief Complaint   Patient presents with    Fall     pt c/o multiple falls recently; pt reports dizziness before fall last night; pt denies injury; AOx4; pt reports pain 3/10 to R shoulder    Dizziness       History Provided By: Patient    HPI: Enoc Edwards Sr., 80 y.o. male with a past medical history significant Coronary artery disease status post cardiac stents and polymyalgia rheumatica presents to the ED with cc of spells of unsteadiness and falls over the last 2 days. Patient also notes that episode of confusion occurring last night lasting between 1 to 2 hours when he imagined someone else in the house it was not there was not sure where he was. Mental confusion clear gradually over proximately an hour. Patient denies chest pain focal neurologic symptoms vertigo nausea vomiting fever cough or recent illness. There are no other complaints, changes, or physical findings at this time. PCP: Nisa Craven MD    Current Facility-Administered Medications   Medication Dose Route Frequency Provider Last Rate Last Dose    aspirin chewable tablet 162 mg  162 mg Oral NOW Alf Devine MD         Current Outpatient Medications   Medication Sig Dispense Refill    tocilizumab (ACTEMRA) 162 mg/0.9 mL syringe 0.9 mL by SubCUTAneous route every Monday. Indications: Inflammation of the Artery in the [facility] 4 Syringe 11    rOPINIRole (REQUIP) 0.25 mg tablet Take 0.25 mg by mouth nightly.  tamsulosin (FLOMAX) 0.4 mg capsule Take 0.4 mg by mouth daily.  pravastatin (PRAVACHOL) 40 mg tablet Take 40 mg by mouth nightly.  levothyroxine (SYNTHROID) 100 mcg tablet Take 100 mcg by mouth Daily (before breakfast).  metoprolol tartrate (LOPRESSOR) 25 mg tablet Take 12.5 mg by mouth daily.       lisinopril (PRINIVIL, ZESTRIL) 10 mg tablet Take 10 mg by mouth daily.  fish oil-dha-epa 1,200-144-216 mg cap Take 1 Tab by mouth daily.  multivitamin (ONE A DAY) tablet Take 1 Tab by mouth daily. Past History     Past Medical History:  Past Medical History:   Diagnosis Date    Arthritis     CAD (coronary artery disease)     Hard of hearing     Headache     [facility]     Skin cancer     Sleep apnea     no longer uses cpap       Past Surgical History:  Past Surgical History:   Procedure Laterality Date    CARDIAC SURG PROCEDURE UNLIST      cardiac stent    HX CAROTID STENT      VASCULAR SURGERY PROCEDURE UNLIST      carotid endartectomy       Family History:  Family History   Problem Relation Age of Onset   [address] Arthritis-rheumatoid Mother     No Known Problems Father     Gout Son        Social History:  Social History     Tobacco Use    Smoking status: Never Smoker    Smokeless tobacco: Never Used   Substance Use Topics    Alcohol use: No    Drug use: No       Allergies: Allergies   Allergen Reactions    Pcn [Penicillins] Swelling         Review of Systems   Review of all other systems is negative  Review of Systems    Physical Exam   Elderly white male no immediate distress well-hydrated nontoxic  Physical Exam    Lab and Diagnostic Study Results     Labs -     Recent Results (from the past 12 hour(s))   CBC WITH AUTOMATED DIFF    Collection Time: 11/14/20  2:28 PM   Result Value Ref Range    WBC 8.3 4.4 - 11.3 K/uL    RBC 4.71 4.50 - 5.90 M/uL    HGB 14.6 13.5 - 17.5 g/dL    HCT 43.1 41 - 53 %    MCV 91.4 80 - 100 FL    MCH 30.9 (L) 31 - 34 PG    MCHC 33.8 31.0 - 36.0 g/dL    RDW 13.8 11.5 - 14.5 %    PLATELET 608 K/uL    MPV 8.7 6.5 - 11.5 FL    NRBC 0.0  WBC    ABSOLUTE NRBC 0.00 K/uL    NEUTROPHILS 71 42 - 75 %    LYMPHOCYTES 15 (L) 20.5 - 51.1 %    MONOCYTES 11 (H) 1.7 - 9.3 %    EOSINOPHILS 3 (H) 0.9 - 2.9 %    BASOPHILS 0 0.0 - 2.5 %    ABS. NEUTROPHILS 5.8 1.8 - 7.7 K/UL    ABS.  LYMPHOCYTES 1.3 1.0 - 4.8 K/UL    ABS. MONOCYTES 0.9 0.2 - 2.4 K/UL    ABS. EOSINOPHILS 0.2 0.0 - 0.7 K/UL    ABS. BASOPHILS 0.0 0.0 - 0.2 K/UL   METABOLIC PANEL, COMPREHENSIVE    Collection Time: 11/14/20  2:28 PM   Result Value Ref Range    Sodium 136 136 - 145 mmol/L    Potassium 4.6 3.5 - 5.1 mmol/L    Chloride 100 97 - 108 mmol/L    CO2 30 21 - 32 mmol/L    Anion gap 6 5 - 15 mmol/L    Glucose 95 65 - 100 mg/dL    BUN 13 6 - 20 mg/dL    Creatinine 1.08 0.70 - 1.30 mg/dL    BUN/Creatinine ratio 12 12 - 20      GFR est AA >60 >60 ml/min/1.73m2    GFR est non-AA >60 >60 ml/min/1.73m2    Calcium 9.0 8.5 - 10.1 mg/dL    Bilirubin, total 0.8 0.2 - 1.0 mg/dL    AST (SGOT) 45 (H) 15 - 37 U/L    ALT (SGPT) 46 12 - 78 U/L    Alk. phosphatase 54 45 - 117 U/L    Protein, total 7.4 6.4 - 8.2 g/dL    Albumin 3.5 3.5 - 5.0 g/dL    Globulin 3.9 2.0 - 4.0 g/dL    A-G Ratio 0.9 (L) 1.1 - 2.2         Radiologic Studies -   [unfilled]  CT Results  (Last 48 hours)               11/14/20 1416  CT HEAD WO CONT Final result    Impression:  Impression:       1. No acute intracranial finding. 2. Remote lacunar infarct left caudate head. 3. Moderate changes of suspected small vessel ischemic disease with diffuse   cerebral atrophy. Narrative: Indication: Dizzy spells. Comparison: Head CT without contrast 2/20/2019. Technique: Contiguous axial images of the brain were obtained from the base of   skull to the vertex without intravenous contrast.       Dose Reduction:        All CT scans at this facility are performed using dose reduction optimization   techniques as appropriate to a performed exam including the following: Automated   exposure control, adjustments of the mA and/or kV according to patient size, or   use of iterative reconstruction technique. Findings: Remote lacunar infarct left caudate head.  There are moderate changes   of ill-defined periventricular low attenuation which are nonspecific but likely related to small vessel ischemic disease. There is no evidence of hemorrhage,   mass effect, or midline shift. Mild diffuse cerebral atrophy. The calvarium is   intact. Mastoid air cells and paranasal sinuses are clear. CXR Results  (Last 48 hours)               11/14/20 1417  XR CHEST PORT Final result    Impression:  IMPRESSION:  No evidence of an acute cardiopulmonary process. Narrative:  XR CHEST PORT       Comparison:  Noncontrast CT scan of the chest dated 2/20/2019. Single view: The lungs are clear. No pneumothorax or pleural effusion apparent. The heart size is magnified by AP technique. There is calcified plaque in the   thoracic aorta. There is no evidence of acute cardiac decompensation. Degenerative changes involve the right glenohumeral joint. Medical Decision Making and ED Course   - I am the first and primary provider for this patient. - I reviewed the vital signs, available nursing notes, past medical history, past surgical history, family history and social history. - Initial assessment performed. The patients presenting problems have been discussed, and the staff are in agreement with the care plan formulated and outlined with them. I have encouraged them to ask questions as they arise throughout their visit. Vital Signs-Reviewed the patient's vital signs. Patient Vitals for the past 12 hrs:   Temp Pulse Resp BP SpO2   11/14/20 1334 -- -- -- (!) 133/51 --   11/14/20 1331 97.4 °F (36.3 °C) (!) 57 16 -- 96 %       EKG interpretation: (Preliminary): Performed at 1352, and read at 1355  Rhythm: normal sinus rhythm and PVC's; and regular .  Rate (approx.): 55; Axis: normal; AZ interval: prolonged; QRS interval: prolonged; ST/T wave: normal; Other findings: LVH with repolarization single unifocal PVC prolonged AZ interval.      Records Reviewed: Nursing Notes and Old Medical Records    The patient presents with episodic unsteadiness and frequent falls with. Of mental confusion with a differential diagnosis of TIA CVA dementia progressive age decline. ED Course:              Provider Notes (Medical Decision Making):   28-year-old male who lives alone and cares for himself presents to the ED with 2 days of episodic periods of unsteadiness has fallen 2-3 times with no injury. Last night he experienced a period of mental confusion lasting 1 to 2 hours which cleared spontaneously. Symptoms were progressive concern for him today presents for evaluation. CT of the head shows an old lacunar infarct no acute findings there is age-related changes laboratory evaluation is unremarkable and EKG shows a bradycardia with a first-degree AV block Case discussed with hospitalist Yoli Perez who recommends transfer to facility with neurology and an MRI/CTA of the head neck capability. MDM           Consultations:       Consultations: -  Hospitalist Consultant: Dr. Yoli Perez: We have asked for emergent assistance with regard to this patient. We have discussed the patients HPI, ROS, PE and results this far. They will come and evaluate the patient for admission. Procedures and Critical Care       Performed by: Ankita Winkler MD  PROCEDURES: None  Procedures               CRITICAL CARE NOTE :  4:06 PM  Amount of Critical Care Time: 30(minutes)    IMPENDING DETERIORATION -neurologic  ASSOCIATED RISK FACTORS - Hypotension, Trauma and CNS Decompensation  MANAGEMENT- Bedside Assessment  INTERPRETATION -  CT Scan and ECG  INTERVENTIONS - vascular control and Neurologic interventions   CASE REVIEW - Hospitalist  TREATMENT RESPONSE -Stable  PERFORMED BY - Self    NOTES   :  I have spent critical care time involved in lab review, consultations with specialist, family decision- making, bedside attention and documentation. This time excludes time spent in any separate billed procedures. During this entire length of time I was immediately available to the patient .     Nereyda Pardo Ellen Head MD        Disposition     Disposition: Transferred to [facility] patient verbally agreed to transfer and understand the risks involved as outlined in the EMTALA form. Remove if not discharged  DISCHARGE PLAN:  1. Current Discharge Medication List      CONTINUE these medications which have NOT CHANGED    Details   tocilizumab (ACTEMRA) 162 mg/0.9 mL syringe 0.9 mL by SubCUTAneous route every Monday. Indications: Inflammation of the Artery in the [facility] SERVICES: 4 Syringe, Refills: 11    Associated Diagnoses: Giant cell arteritis with polymyalgia rheumatica (HCC)      rOPINIRole (REQUIP) 0.25 mg tablet Take 0.25 mg by mouth nightly. tamsulosin (FLOMAX) 0.4 mg capsule Take 0.4 mg by mouth daily. pravastatin (PRAVACHOL) 40 mg tablet Take 40 mg by mouth nightly. levothyroxine (SYNTHROID) 100 mcg tablet Take 100 mcg by mouth Daily (before breakfast). metoprolol tartrate (LOPRESSOR) 25 mg tablet Take 12.5 mg by mouth daily. lisinopril (PRINIVIL, ZESTRIL) 10 mg tablet Take 10 mg by mouth daily. fish oil-dha-epa 1,200-144-216 mg cap Take 1 Tab by mouth daily. multivitamin (ONE A DAY) tablet Take 1 Tab by mouth daily. 2.   Follow-up Information    None       3. Return to ED if worse   4. Current Discharge Medication List          Diagnosis     Clinical Impression: Episodic dizziness frequent falls. Of mental confusion . Probable TIAs attestations:    Dolores Freeman MD    Please note that this dictation was completed with [employer], the computer voice recognition software. Quite often unanticipated grammatical, syntax, homophones, and other interpretive errors are inadvertently transcribed by the computer software. Please disregard these errors. Please excuse any errors that have escaped final proofreading. Thank you.

## 2020-11-15 ENCOUNTER — APPOINTMENT (OUTPATIENT)
Dept: MRI IMAGING | Age: 85
DRG: 066 | End: 2020-11-15
Attending: INTERNAL MEDICINE
Payer: MEDICARE

## 2020-11-15 ENCOUNTER — APPOINTMENT (OUTPATIENT)
Dept: CT IMAGING | Age: 85
DRG: 066 | End: 2020-11-15
Attending: INTERNAL MEDICINE
Payer: MEDICARE

## 2020-11-15 ENCOUNTER — APPOINTMENT (OUTPATIENT)
Dept: GENERAL RADIOLOGY | Age: 85
DRG: 066 | End: 2020-11-15
Attending: INTERNAL MEDICINE
Payer: MEDICARE

## 2020-11-15 PROBLEM — I63.9 ACUTE CVA (CEREBROVASCULAR ACCIDENT) (HCC): Status: ACTIVE | Noted: 2020-11-15

## 2020-11-15 LAB
AMMONIA PLAS-SCNC: <10 UMOL/L
ASPIRIN TEST, ASPIRN: 394 ARU
ATRIAL RATE: 70 BPM
CALCULATED P AXIS, ECG09: 82 DEGREES
CALCULATED R AXIS, ECG10: -27 DEGREES
CALCULATED T AXIS, ECG11: 91 DEGREES
CHOLEST SERPL-MCNC: 142 MG/DL
CRP SERPL-MCNC: 3.78 MG/DL (ref 0–0.6)
DIAGNOSIS, 93000: NORMAL
EST. AVERAGE GLUCOSE BLD GHB EST-MCNC: 100 MG/DL
FOLATE SERPL-MCNC: 48 NG/ML (ref 5–21)
HBA1C MFR BLD: 5.1 % (ref 4–5.6)
HDLC SERPL-MCNC: 39 MG/DL
HDLC SERPL: 3.6 {RATIO} (ref 0–5)
LDLC SERPL CALC-MCNC: 82.6 MG/DL (ref 0–100)
LIPID PROFILE,FLP: NORMAL
MAGNESIUM SERPL-MCNC: 2.3 MG/DL (ref 1.6–2.4)
P-R INTERVAL, ECG05: 362 MS
PHOSPHATE SERPL-MCNC: 3.2 MG/DL (ref 2.6–4.7)
Q-T INTERVAL, ECG07: 456 MS
QRS DURATION, ECG06: 88 MS
QTC CALCULATION (BEZET), ECG08: 492 MS
TRIGL SERPL-MCNC: 102 MG/DL (ref ?–150)
TROPONIN I SERPL-MCNC: <0.05 NG/ML
TROPONIN I SERPL-MCNC: <0.05 NG/ML
TSH SERPL DL<=0.05 MIU/L-ACNC: 17 UIU/ML (ref 0.36–3.74)
VENTRICULAR RATE, ECG03: 70 BPM
VIT B12 SERPL-MCNC: 573 PG/ML (ref 193–986)
VLDLC SERPL CALC-MCNC: 20.4 MG/DL

## 2020-11-15 PROCEDURE — 36415 COLL VENOUS BLD VENIPUNCTURE: CPT

## 2020-11-15 PROCEDURE — 70551 MRI BRAIN STEM W/O DYE: CPT

## 2020-11-15 PROCEDURE — 82607 VITAMIN B-12: CPT

## 2020-11-15 PROCEDURE — 84443 ASSAY THYROID STIM HORMONE: CPT

## 2020-11-15 PROCEDURE — 82140 ASSAY OF AMMONIA: CPT

## 2020-11-15 PROCEDURE — 73030 X-RAY EXAM OF SHOULDER: CPT

## 2020-11-15 PROCEDURE — 80061 LIPID PANEL: CPT

## 2020-11-15 PROCEDURE — 74011250637 HC RX REV CODE- 250/637: Performed by: INTERNAL MEDICINE

## 2020-11-15 PROCEDURE — 85576 BLOOD PLATELET AGGREGATION: CPT

## 2020-11-15 PROCEDURE — 74011250636 HC RX REV CODE- 250/636

## 2020-11-15 PROCEDURE — 84484 ASSAY OF TROPONIN QUANT: CPT

## 2020-11-15 PROCEDURE — 83735 ASSAY OF MAGNESIUM: CPT

## 2020-11-15 PROCEDURE — 82746 ASSAY OF FOLIC ACID SERUM: CPT

## 2020-11-15 PROCEDURE — 99223 1ST HOSP IP/OBS HIGH 75: CPT | Performed by: PSYCHIATRY & NEUROLOGY

## 2020-11-15 PROCEDURE — 86140 C-REACTIVE PROTEIN: CPT

## 2020-11-15 PROCEDURE — 84100 ASSAY OF PHOSPHORUS: CPT

## 2020-11-15 PROCEDURE — 83036 HEMOGLOBIN GLYCOSYLATED A1C: CPT

## 2020-11-15 PROCEDURE — 74011250636 HC RX REV CODE- 250/636: Performed by: INTERNAL MEDICINE

## 2020-11-15 PROCEDURE — 72192 CT PELVIS W/O DYE: CPT

## 2020-11-15 PROCEDURE — 74011250636 HC RX REV CODE- 250/636: Performed by: HOSPITALIST

## 2020-11-15 PROCEDURE — 65660000000 HC RM CCU STEPDOWN

## 2020-11-15 PROCEDURE — 94760 N-INVAS EAR/PLS OXIMETRY 1: CPT

## 2020-11-15 PROCEDURE — 74011000250 HC RX REV CODE- 250: Performed by: HOSPITALIST

## 2020-11-15 RX ORDER — HALOPERIDOL 5 MG/ML
INJECTION INTRAMUSCULAR
Status: COMPLETED
Start: 2020-11-15 | End: 2020-11-15

## 2020-11-15 RX ORDER — BISACODYL 5 MG
5 TABLET, DELAYED RELEASE (ENTERIC COATED) ORAL DAILY PRN
Status: DISCONTINUED | OUTPATIENT
Start: 2020-11-15 | End: 2020-11-19 | Stop reason: HOSPADM

## 2020-11-15 RX ORDER — HALOPERIDOL 5 MG/ML
1 INJECTION INTRAMUSCULAR
Status: DISCONTINUED | OUTPATIENT
Start: 2020-11-15 | End: 2020-11-15

## 2020-11-15 RX ORDER — LEVOTHYROXINE SODIUM 100 UG/1
100 TABLET ORAL
Status: DISCONTINUED | OUTPATIENT
Start: 2020-11-15 | End: 2020-11-19 | Stop reason: HOSPADM

## 2020-11-15 RX ORDER — HALOPERIDOL 5 MG/ML
1 INJECTION INTRAMUSCULAR
Status: DISCONTINUED | OUTPATIENT
Start: 2020-11-15 | End: 2020-11-19 | Stop reason: HOSPADM

## 2020-11-15 RX ORDER — ROPINIROLE 0.25 MG/1
0.25 TABLET, FILM COATED ORAL
Status: DISCONTINUED | OUTPATIENT
Start: 2020-11-15 | End: 2020-11-19 | Stop reason: HOSPADM

## 2020-11-15 RX ORDER — TAMSULOSIN HYDROCHLORIDE 0.4 MG/1
0.4 CAPSULE ORAL DAILY
Status: DISCONTINUED | OUTPATIENT
Start: 2020-11-15 | End: 2020-11-19 | Stop reason: HOSPADM

## 2020-11-15 RX ORDER — METOPROLOL TARTRATE 25 MG/1
12.5 TABLET, FILM COATED ORAL DAILY
Status: DISCONTINUED | OUTPATIENT
Start: 2020-11-15 | End: 2020-11-19 | Stop reason: HOSPADM

## 2020-11-15 RX ORDER — ONDANSETRON 2 MG/ML
4 INJECTION INTRAMUSCULAR; INTRAVENOUS
Status: DISCONTINUED | OUTPATIENT
Start: 2020-11-15 | End: 2020-11-19 | Stop reason: HOSPADM

## 2020-11-15 RX ORDER — HEPARIN SODIUM 5000 [USP'U]/ML
5000 INJECTION, SOLUTION INTRAVENOUS; SUBCUTANEOUS EVERY 8 HOURS
Status: DISCONTINUED | OUTPATIENT
Start: 2020-11-15 | End: 2020-11-19 | Stop reason: HOSPADM

## 2020-11-15 RX ORDER — LORAZEPAM 2 MG/ML
1 INJECTION INTRAMUSCULAR ONCE
Status: COMPLETED | OUTPATIENT
Start: 2020-11-15 | End: 2020-11-15

## 2020-11-15 RX ORDER — ACETAMINOPHEN 325 MG/1
650 TABLET ORAL
Status: DISCONTINUED | OUTPATIENT
Start: 2020-11-15 | End: 2020-11-19 | Stop reason: HOSPADM

## 2020-11-15 RX ORDER — PRAVASTATIN SODIUM 40 MG/1
40 TABLET ORAL
Status: DISCONTINUED | OUTPATIENT
Start: 2020-11-15 | End: 2020-11-16

## 2020-11-15 RX ORDER — LIDOCAINE 4 G/100G
1 PATCH TOPICAL EVERY 24 HOURS
Status: DISCONTINUED | OUTPATIENT
Start: 2020-11-15 | End: 2020-11-19 | Stop reason: HOSPADM

## 2020-11-15 RX ORDER — ACETAMINOPHEN 650 MG/1
650 SUPPOSITORY RECTAL
Status: DISCONTINUED | OUTPATIENT
Start: 2020-11-15 | End: 2020-11-19 | Stop reason: HOSPADM

## 2020-11-15 RX ORDER — GUAIFENESIN 100 MG/5ML
81 LIQUID (ML) ORAL DAILY
Status: DISCONTINUED | OUTPATIENT
Start: 2020-11-15 | End: 2020-11-19 | Stop reason: HOSPADM

## 2020-11-15 RX ORDER — SODIUM CHLORIDE, SODIUM LACTATE, POTASSIUM CHLORIDE, CALCIUM CHLORIDE 600; 310; 30; 20 MG/100ML; MG/100ML; MG/100ML; MG/100ML
75 INJECTION, SOLUTION INTRAVENOUS CONTINUOUS
Status: DISCONTINUED | OUTPATIENT
Start: 2020-11-15 | End: 2020-11-19

## 2020-11-15 RX ORDER — LISINOPRIL 10 MG/1
10 TABLET ORAL DAILY
Status: DISCONTINUED | OUTPATIENT
Start: 2020-11-15 | End: 2020-11-19 | Stop reason: HOSPADM

## 2020-11-15 RX ADMIN — HEPARIN SODIUM 5000 UNITS: 5000 INJECTION INTRAVENOUS; SUBCUTANEOUS at 18:35

## 2020-11-15 RX ADMIN — METOPROLOL TARTRATE 12.5 MG: 25 TABLET, FILM COATED ORAL at 08:02

## 2020-11-15 RX ADMIN — LORAZEPAM 1 MG: 2 INJECTION INTRAMUSCULAR; INTRAVENOUS at 11:12

## 2020-11-15 RX ADMIN — TAMSULOSIN HYDROCHLORIDE 0.4 MG: 0.4 CAPSULE ORAL at 08:02

## 2020-11-15 RX ADMIN — Medication 1 CAPSULE: at 08:46

## 2020-11-15 RX ADMIN — ROPINIROLE HYDROCHLORIDE 0.25 MG: 0.25 TABLET, FILM COATED ORAL at 23:06

## 2020-11-15 RX ADMIN — HALOPERIDOL LACTATE 1 MG: 5 INJECTION, SOLUTION INTRAMUSCULAR at 10:40

## 2020-11-15 RX ADMIN — LISINOPRIL 10 MG: 10 TABLET ORAL at 08:45

## 2020-11-15 RX ADMIN — HEPARIN SODIUM 5000 UNITS: 5000 INJECTION INTRAVENOUS; SUBCUTANEOUS at 04:38

## 2020-11-15 RX ADMIN — HALOPERIDOL 1 MG: 5 INJECTION INTRAMUSCULAR at 10:40

## 2020-11-15 RX ADMIN — SODIUM CHLORIDE, SODIUM LACTATE, POTASSIUM CHLORIDE, AND CALCIUM CHLORIDE 75 ML/HR: 600; 310; 30; 20 INJECTION, SOLUTION INTRAVENOUS at 04:38

## 2020-11-15 RX ADMIN — HEPARIN SODIUM 5000 UNITS: 5000 INJECTION INTRAVENOUS; SUBCUTANEOUS at 13:00

## 2020-11-15 RX ADMIN — PRAVASTATIN SODIUM 40 MG: 40 TABLET ORAL at 23:06

## 2020-11-15 RX ADMIN — ASPIRIN 81 MG: 81 TABLET, CHEWABLE ORAL at 08:02

## 2020-11-15 RX ADMIN — LEVOTHYROXINE SODIUM 100 MCG: 0.1 TABLET ORAL at 08:02

## 2020-11-15 NOTE — ED NOTES
Patient extremely agitated. He pulled out his IV and keeps attempting to get out of bed. He is also yelling that we need to let him leave.

## 2020-11-15 NOTE — ED NOTES
Patient requiring constant supervision. He is confused to situation and very fidgety. Resettled in bed.

## 2020-11-15 NOTE — PROGRESS NOTES
11/15/20 1625   NIH Stroke Scale   Interval Other (comment)  (admission to [facility])   LOC 1   LOC Questions 1   LOC Commands 0   Best Gaze 0   Visual 0   Facial Palsy 0   Motor Right Arm 0   Motor Left Arm 0   Motor Right Leg 0   Motor Left Leg 0   Limb Ataxia 0   Sensory 0   Best Language 0   Dysarthria 1   Extinction and Inattention 0   Total 3   NIH difficult to assess as patient remains drowsy from medications given in ER PTA on [facility]. Patient able to move all extremities and is alert when spoken to. Speech is stuttered with periods of incomprehensible speech. Patient able to tell RN name correctly. Patient denies numbness/tingling when assessing sensation on all extremities.

## 2020-11-15 NOTE — CONSULTS
INPATIENT NEUROLOGY CONSULTATION  11/15/2020     Consulted by: Chase Watson MD        Patient ID:  Frieda Frazier  [id number]  80 y.o.  2/18/1929    CC: Slurred speech and hallucinations    HPI    Josette Shipley is a 80-year-old gentleman with a history of hypertension, PMR, CAD, etc. who is here in the hospital because in the past 48 hours he began to have more falls. He had slurred speech. His son is present who sees him every day and tells me his baseline is to be fully independent driving without difficulty. Speech is clear and he ambulates independently. At the moment he just had an MRI with sedation. He is confused. He cannot follow commands. Son tells me he has been off aspirin for about a year but that it was replaced with something else he is unsure of. MRI was done showing a right posterior limb internal capsule infarct.       Review of Systems   Unable to perform ROS: Language       Past Medical History:   Diagnosis Date    Arthritis     CAD (coronary artery disease)     Hard of hearing     Headache     PMR (polymyalgia rheumatica) (HCC)     Skin cancer     Sleep apnea     no longer uses cpap     Family History   Problem Relation Age of Onset   Dolores Exon Arthritis-rheumatoid Mother     No Known Problems Father     Gout Son      Social History     Socioeconomic History    Marital status:      Spouse name: Not on file    Number of children: Not on file    Years of education: Not on file    Highest education level: Not on file   Occupational History    Not on file   Social Needs    Financial resource strain: Not on file    Food insecurity     Worry: Not on file     Inability: Not on file    Transportation needs     Medical: Not on file     Non-medical: Not on file   Tobacco Use    Smoking status: Never Smoker    Smokeless tobacco: Never Used   Substance and Sexual Activity    Alcohol use: No    Drug use: No    Sexual activity: Not on file   Lifestyle    Physical activity Days per week: Not on file     Minutes per session: Not on file    Stress: Not on file   Relationships    Social connections     Talks on phone: Not on file     Gets together: Not on file     Attends Zoroastrian service: Not on file     Active member of club or organization: Not on file     Attends meetings of clubs or organizations: Not on file     Relationship status: Not on file    Intimate partner violence     Fear of current or ex partner: Not on file     Emotionally abused: Not on file     Physically abused: Not on file     Forced sexual activity: Not on file   Other Topics Concern    Not on file   Social History Narrative    Not on file     Current Facility-Administered Medications   Medication Dose Route Frequency    fish oil-omega-3 fatty acids 340-1,000 mg capsule 1 Cap  1 Cap Oral DAILY    levothyroxine (SYNTHROID) tablet 100 mcg  100 mcg Oral ACB    lisinopriL (PRINIVIL, ZESTRIL) tablet 10 mg  10 mg Oral DAILY    metoprolol tartrate (LOPRESSOR) tablet 12.5 mg  12.5 mg Oral DAILY    pravastatin (PRAVACHOL) tablet 40 mg  40 mg Oral QHS    tamsulosin (FLOMAX) capsule 0.4 mg  0.4 mg Oral DAILY    rOPINIRole (REQUIP) tablet 0.25 mg  0.25 mg Oral QHS    . PHARMACY TO SUBSTITUTE PER PROTOCOL (Reordered from: tocilizumab (ACTEMRA) 162 mg/0.9 mL syringe)    Per Protocol    acetaminophen (TYLENOL) tablet 650 mg  650 mg Oral Q4H PRN    Or    acetaminophen (TYLENOL) solution 650 mg  650 mg Per NG tube Q4H PRN    Or    acetaminophen (TYLENOL) suppository 650 mg  650 mg Rectal Q4H PRN    lactated Ringers infusion  75 mL/hr IntraVENous CONTINUOUS    ondansetron (ZOFRAN) injection 4 mg  4 mg IntraVENous Q6H PRN    aspirin chewable tablet 81 mg  81 mg Oral DAILY    bisacodyL (DULCOLAX) tablet 5 mg  5 mg Oral DAILY PRN    heparin (porcine) injection 5,000 Units  5,000 Units SubCUTAneous Q8H    lidocaine 4 % patch 1 Patch  1 Patch TransDERmal Q24H    haloperidol lactate (HALDOL) injection 1 mg  1 mg IntraMUSCular Q8H PRN     Current Outpatient Medications   Medication Sig    tocilizumab (ACTEMRA) 162 mg/0.9 mL syringe 0.9 mL by SubCUTAneous route every Monday. Indications: Inflammation of the Artery in the [place] Area    rOPINIRole (REQUIP) 0.25 mg tablet Take 0.25 mg by mouth nightly.  tamsulosin (FLOMAX) 0.4 mg capsule Take 0.4 mg by mouth daily.  pravastatin (PRAVACHOL) 40 mg tablet Take 40 mg by mouth nightly.  levothyroxine (SYNTHROID) 100 mcg tablet Take 100 mcg by mouth Daily (before breakfast).  metoprolol tartrate (LOPRESSOR) 25 mg tablet Take 12.5 mg by mouth daily.  lisinopril (PRINIVIL, ZESTRIL) 10 mg tablet Take 10 mg by mouth daily.  fish oil-dha-epa 1,200-144-216 mg cap Take 1 Tab by mouth daily.  multivitamin (ONE A DAY) tablet Take 1 Tab by mouth daily. Allergies   Allergen Reactions    Pcn [Penicillins] Swelling       Visit Vitals  /80   Pulse 62   Temp 98.6 °F (37 °C)   Resp 18   Ht 5' 10\" (1.778 m)   Wt 96.2 kg (212 lb)   SpO2 100%   BMI 30.42 kg/m²     Physical Exam   Constitutional: He appears well-developed and well-nourished. Cardiovascular: Normal rate. Pulmonary/Chest: Effort normal.   Skin: Skin is warm and dry. There is pallor. Vitals reviewed. Neurologic Exam     Mental Status   Elderly gentleman in bed awake but not following commands. He is disoriented and a little agitated. He is moving his arms and legs erratically. Disinhibited. Exam limited. Pupils appear equal, he does not engage or track the examiner. He attempts to smile and produce language but very slurred and incomprehensible  Moving his extremities spontaneously but ataxic appearing.   No abnormal movements  Sensory testing unreliable  Gait deferred            Lab Results   Component Value Date/Time    WBC 8.3 11/14/2020 02:28 PM    HGB 14.6 11/14/2020 02:28 PM    HCT 43.1 11/14/2020 02:28 PM    PLATELET 104 27/36/4848 02:28 PM    MCV 91.4 11/14/2020 02:28 PM     Lab Results   Component Value Date/Time    Hemoglobin A1c 5.1 11/15/2020 04:25 AM    Glucose 95 11/14/2020 02:28 PM    LDL, calculated 82.6 11/15/2020 04:25 AM    Creatinine 1.08 11/14/2020 02:28 PM      Lab Results   Component Value Date/Time    Cholesterol, total 142 11/15/2020 04:25 AM    HDL Cholesterol 39 11/15/2020 04:25 AM    LDL, calculated 82.6 11/15/2020 04:25 AM    Triglyceride 102 11/15/2020 04:25 AM    CHOL/HDL Ratio 3.6 11/15/2020 04:25 AM     Lab Results   Component Value Date/Time    ALT (SGPT) 46 11/14/2020 02:28 PM    Alk. phosphatase 54 11/14/2020 02:28 PM    Bilirubin, total 0.8 11/14/2020 02:28 PM    Albumin 3.5 11/14/2020 02:28 PM    Protein, total 7.4 11/14/2020 02:28 PM    Ammonia <10 11/15/2020 04:26 AM    PLATELET 241 00/27/8966 02:28 PM        CT Results (maximum last 3): Results from [place] encounter on 11/14/20   CT PELV WO CONT    Narrative EXAM: CT PELV WO CONT    INDICATION: Frequent falls recently. No additional history at the time of this  interpretation. COMPARISON: None    TECHNIQUE: Helical CT of the bony pelvis with coronal and sagittal reformats. Images reviewed in soft tissue and bone windows. CT dose reduction was achieved  through the use of a standardized protocol tailored for this examination and  automatic exposure control for dose modulation. CONTRAST: None. FINDINGS: Bones: Bones are osteopenic. No acute fracture. No aggressive bone  lesion. Joint fluid: None. Articulations: Minimal bilateral hip osteoarthritis. No sacroiliitis. Tendons: No full-thickness tendon tear. Muscles: Mild diffuse atrophy. Soft tissue mass: None. Urinary bladder diverticulum. Colonic diverticulosis. Mild inflammation at the junction of the descending colon with the sigmoid  colon. Left inguinal hernia contains fat. Impression IMPRESSION:   1. No fracture.   2. Incidentally imaged mild colonic diverticulitis at the junction of the  descending colon and sigmoid colon. CT PERF W CBF    Narrative *PRELIMINARY REPORT*    CT angiography head/neck: No flow-limiting stenosis or vascular occlusion. Atherosclerotic mild stenosis of the proximal left internal carotid artery. Chronic microvascular ischemic disease. No CT evidence of acute infarct. No  intracranial pathologic enhancement. CT PERFUSION: No perfusion mismatch. Preliminary report was provided by Dr. Natalia Forrest, the on-call radiologist, at 2324  hours    Final report to follow. *END PRELIMINARY REPORT*    FINAL REPORT:    CLINICAL HISTORY: Transient ischemic attack    EXAMINATION:  CT ANGIOGRAPHY HEAD AND NECK     COMPARISON: CT head 11/14/2020    TECHNIQUE:  Following the uneventful administration of iodinated contrast  material, axial CT angiography of the head and neck was performed. Delayed axial  images through the head were also obtained. Coronal and sagittal reconstructions  were obtained. Manual postprocessing of images was performed. 3-D  Sagittal  maximal intensity projection images were obtained. 3-D Coronal maximal  intensity projections were obtained. CT dose reduction was achieved through use  of a standardized protocol tailored for this examination and automatic exposure  control for dose modulation. CT perfusion analysis was performed using CT with  contrast administration, including postprocessing of parametric maps with the  determination of cerebral blood flow, cerebral blood volume, and mean transit  time. FINDINGS:    Delayed contrast-enhanced head CT:    The ventricles are midline without hydrocephalus. There is no acute intra or  extra-axial hemorrhage. Moderate bilateral subcortical and periventricular areas  of patchy low attenuation is nonspecific but likely related to changes of  chronic small vessel ischemic disease. Chronic appearing left caudate lacunar  infarction. The basal cisterns are clear. The paranasal sinuses are clear.     CTA NECK:    Great vessels: Patent    Right subclavian artery: Patent    Left subclavian artery: Patent    Right common carotid artery: Patent    Left common carotid artery: Patent    Cervical right internal carotid artery: Patent with mild proximal  atherosclerosis causing no significant stenosis by NASCET criteria. Cervical left internal carotid artery: Patent with proximal atherosclerosis  causing less than 50% stenosis by NASCET criteria. Right vertebral artery: Patent    Left vertebral artery: Patent with mild proximal atherosclerosis    Mild to moderate cervical spondylosis    CTA HEAD:    Right cavernous internal carotid artery: Patent with moderate atherosclerosis    Left cavernous internal carotid artery: Patent with mild atherosclerosis    Anterior cerebral arteries: Patent with mild atherosclerosis    Anterior communicating artery: Patent    Right middle cerebral artery: Patent with mild to moderate atherosclerosis    Left middle cerebral artery: Patent with mild to moderate atherosclerosis    Posterior communicating arteries: Hypoplastic    Posterior cerebral arteries: Patent with mild right and mild to moderate left  atherosclerosis    Basilar artery: Patent    Distal vertebral arteries: Patent    CT perfusion brain: No significant cerebral perfusion abnormality      Impression Impression:    No evidence for acute large vessel arterial occlusion. Intracranial and  extracranial atherosclerosis as described above. No significant cerebral perfusion abnormality or mismatch                        MRI Results (maximum last 3): Results from [place] encounter on 11/14/20   MRI BRAIN WO CONT    Narrative EXAM: MRI BRAIN WO CONT    INDICATION: CVA    COMPARISON: None. CONTRAST: None. TECHNIQUE:    Multiplanar multisequence acquisition without contrast of the brain. FINDINGS:  There is mild prominence of the ventricles and sulci. There are moderate changes  of small vessel disease periventricular white matter. There is an acute  infarction posterior limb of the right internal capsule. The paranasal sinuses, mastoid air cells, and middle ears are clear. The orbital  contents are within normal limits. No significant osseous or scalp lesions are  identified. Impression IMPRESSION:     1. There is an acute infarction in the posterior limb of the right internal  capsule. 2. There are moderate changes small vessel disease periventricular white matter. Results called to the nurse by the technologist.       VAS/US/Carotid Doppler Results (maximum last 3): No results found for this or any previous visit. PET Results (maximum last 3): No results found for this or any previous visit. Assessment and Plan        77-year-old gentleman who has suffered a right MCA infarct. He is back on aspirin. We will check ARU. If he cannot tolerate p.o. please do via rectal.  Unclear what antiplatelets he has been taking. CTA has mild atherosclerosis but no significant disease. Check lipid panel. Gentle IV fluids. Telemetry. Echo. Frequent gentle redirection. Optimize sleep if possible tonight. I spoke with the son personally. Following. During this evaluation, we also discussed stroke education to include signs and symptoms of stroke and TIA. This clinical note was dictated with an electronic dictation software that can make unintentional errors. If there are any questions, please contact me directly for clarification.       [address] [facility],   NEUROLOGIST  Diplomate [facility]  11/15/2020

## 2020-11-15 NOTE — ROUTINE PROCESS
TRANSFER - OUT REPORT:    Verbal report given to [address] RN (name) on Mely Michel Sr.  being transferred to (unit) for routine progression of care       Report consisted of patients Situation, Background, Assessment and   Recommendations(SBAR). Information from the following report(s) SBAR, ED Summary and MAR was reviewed with the receiving nurse. Lines:   Peripheral IV 11/15/20 Left Forearm (Active)   Site Assessment Clean, dry, & intact 11/15/20 1532   Phlebitis Assessment 0 11/15/20 1532   Infiltration Assessment 0 11/15/20 1532   Dressing Status Clean, dry, & intact 11/15/20 1532   Hub Color/Line Status Pink 11/15/20 1532        Opportunity for questions and clarification was provided.       Patient transported with:   Tech          \

## 2020-11-15 NOTE — ACP (ADVANCE CARE PLANNING)
[facility]  Hospitalist Group                                      Advance Care Planning Note    Name: Rajesh Mcmahan Sr. YOB: 1929  MRN: [id number]  Admission Date: 11/14/2020 10:06 PM    Date of discussion: 11/15/2020    Active Diagnoses:    Hospital Problems  Date Reviewed: 11/15/2020          Codes Class Noted POA    * (Principal) Acute CVA (cerebrovascular accident) [facility]) ICD-10-CM: I63.9  ICD-9-CM: 434.91  11/15/2020 Yes              These active diagnoses are of sufficient risk that focused discussion on advance care planning is indicated in order to allow the patient to thoughtfully consider personal goals of care, and if situations arise that prevent the ability to personally give input, to ensure appropriate representation of their personal desires for different levels and aggressiveness of care.          Persons present and participating in discussion: Hector Pope, Tanya Rendon MD, with younger son Hao Treadwell    Topics Discussed:  Patient's medical condition and diagnosis: [ x ] yes [  ] no   Surrogate decision maker: [ x ] yes [  ] no   Patient's current physical function/cognitive function/frailty: [x  ] yes [  ] no   Code Status: [ x ] yes [  ] no   Artificial Nutrition / Dialysis / Non-Invasive Ventilation / Blood Transfusion: [ x ] yes [  ] no  Potential Resources for home (durable medical equipment, home nursing, home O2): [x  ] yes [  ] no    Overview of Discussion: I discussed with patient son at bedside the diagnosis of acute MCA stroke and the prognosis also patient has dementia worsening with delirium staying in the ED requiring chemical restraint discussed with the patient son who who brought the papers of medical power of  and discussed about artificial breathing through a ventilator and CPR in case of respiratory failure and cardiac arrest and as per patient wishes placed an order for DO NOT RESUSCITATE        Total time spent face-to-face in education and discussion: 16  minutes.      Mildred Clancy MD  Date of Service:  11/15/2020  1:43 PM

## 2020-11-15 NOTE — PROGRESS NOTES
[address] [facility] Adult  Hospitalist Group                                                                                          Hospitalist Progress Note  Yoli Jones MD  Answering service: [phone] from in house phone        Date of Service:  11/15/2020  NAME:  Mario Espinoza Sr.  :  1929  MRN:  [id number]      Admission Summary: This is a 49-year-old man with a past medical history significant for hypertension; dyslipidemia; hypothyroidism; giant-cell arteritis with polymyalgia rheumatica; coronary artery disease, status post stent placement; obstructive sleep apnea; hearing impairment, was in his usual state of health until a couple of days ago when the patient developed an unsteady gait. As a result of the unsteady gait, the patient is falling at home multiple times. The patient also complained of dizziness. The patient described the dizziness as room spinning around him. The night before going to the emergency room, the patient fell. The fall was not witnessed because the patient lives alone, but he denies head injury. Following the fall, the patient started complaining of right shoulder pain. The unsteadiness became associated with confusion. The patient was taken to the local hospital at [place], [place]. When the patient arrived at the emergency room, there was a concern for acute stroke. The patient had a CT scan of the head done, which was negative. CTA of the head and neck was also performed, which did not show any significant abnormalities. The patient was then transferred to [facility] for further evaluation for acute stroke with an MRI of the brain which is not available at the hospital in [facility]. The patient rated the right shoulder pain as 4/10, with no radiation, worse with movement of the right shoulder, slight relief with no activity.   The patient has no record of prior admission to this hospital.  He is under the care of a rheumatologist for his giant-cell arteritis with polymyalgia rheumatica. No prior history of TIA or CVA. Interval history / Subjective:   Patient seen and examined in emergency room 15 discussed with RN patient was trying to come out of the bed he is awake and alert but not oriented to time place or person patient is pleasantly demented     Assessment & Plan:     1. Suspected acute CVA. -CT CTA of the head and neck negative CT perfusion study also negative will get MRI of the brain and echocardiogram.    -Continue aspirin and statin  - We will check lipid profile. We will check hemoglobin A1c level.    -Consulted neurologist     2. Hypertension.    - We will monitor the patient's blood pressure closely. On beta-blocker  - Keep blood pressure 222 systolic max use as needed hydralazine    3. Dyslipidemia. We will continue with Pravachol and fish oil. We will check lipid profile. 4.  Hypothyroidism. We will continue with Synthroid. At bedtime level is high need to increase Synthroid dose    5. Giant-cell arteritis with polymyalgia rheumatica. - ?  Given Actemra before    6. Hearing impairment. We will continue supportive treatment. 7.  Multiple falls at home. CT scan of the head is negative. CTA of the neck did not show any acute pathology. We will check CT scan of the pelvis also did not show any fracture  -PT OT evaluation    8. Coronary artery disease, status post stent placement. We will continue cardiac medications, aspirin, lisinopril, and Lopressor. We will check serial cardiac markers to rule out acute myocardial infarction. 9.  Obstructive sleep apnea. The patient is not on CPAP machine at home. We will continue supplemental oxygen as needed. 10.  Right shoulder pain. XRAY - No fracture is identified. .There are severe degenerative changes Right shoulder. If symptoms persist follow-up study is recommended. 11.   BPH continue Flomax     Code status: Full  DVT prophylaxis: scd    Care Plan discussed with: Patient/Family and Nurse  Anticipated Disposition: Home w/Family  Anticipated Discharge: Less than 24 hours     Hospital Problems  Date Reviewed: 11/15/2020          Codes Class Noted POA    * (Principal) Acute CVA (cerebrovascular accident) ([facility] [address] ICD-10-CM: I63.9  ICD-9-CM: 434.91  11/15/2020 Yes                Review of Systems:   A comprehensive review of systems was negative. Vital Signs:    Last 24hrs VS reviewed since prior progress note. Most recent are:  Visit Vitals  BP (!) 165/51   Pulse 79   Temp 98.6 °F (37 °C)   Resp 17   Ht 5' 10\" (1.778 m)   Wt 96.2 kg (212 lb)   SpO2 98%   BMI 30.42 kg/m²       No intake or output data in the 24 hours ending 11/15/20 1007     Physical Examination:     I had a face to face encounter with this patient and independently examined them on 11/15/2020 as outlined below:          Constitutional:  No acute distress, cooperative, coming out of the bed   ENT:  Oral mucosa moist, oropharynx benign. Resp:  CTA bilaterally. CV:  Regular rhythm, normal rate, no murmurs, gallops, rubs    GI:  Soft, non distended, non tender. normoactive bowel sounds, no hepatosplenomegaly     Musculoskeletal:  No edema, warm, 2+ pulses throughout    Neurologic:  Moves all extremities. AAOx0, dementia            Data Review:    I personally reviewed  Image and labs    Xr Shoulder Rt Ap/lat Min 2 V    Result Date: 11/15/2020  IMPRESSION: No fracture is identified. .There are severe degenerative changes right shoulder. If symptoms persist follow-up study is recommended. Ct Head Wo Cont    Result Date: 11/14/2020  Impression: 1. No acute intracranial finding. 2. Remote lacunar infarct left caudate head. 3. Moderate changes of suspected small vessel ischemic disease with diffuse cerebral atrophy. Cta Head    Result Date: 11/15/2020  Impression: No evidence for acute large vessel arterial occlusion.  Intracranial and extracranial atherosclerosis as described above. No significant cerebral perfusion abnormality or mismatch      Cta Neck    Result Date: 11/15/2020  Impression: No evidence for acute large vessel arterial occlusion. Intracranial and extracranial atherosclerosis as described above. No significant cerebral perfusion abnormality or mismatch      Ct Pelv Wo Cont    Result Date: 11/15/2020  IMPRESSION: 1. No fracture. 2. Incidentally imaged mild colonic diverticulitis at the junction of the descending colon and sigmoid colon. Ct Perf W Cbf    Result Date: 11/15/2020  Impression: No evidence for acute large vessel arterial occlusion. Intracranial and extracranial atherosclerosis as described above. No significant cerebral perfusion abnormality or mismatch      Xr Chest Port    Result Date: 11/14/2020  IMPRESSION:  No evidence of an acute cardiopulmonary process. Labs:     Recent Labs     11/14/20  1428   WBC 8.3   HGB 14.6   HCT 43.1        Recent Labs     11/15/20  0425 11/14/20  1428   NA  --  136   K  --  4.6   CL  --  100   CO2  --  30   BUN  --  13   CREA  --  1.08   GLU  --  95   CA  --  9.0   MG 2.3  --    PHOS 3.2  --      Recent Labs     11/14/20  1428   ALT 46   AP 54   TBILI 0.8   TP 7.4   ALB 3.5   GLOB 3.9     No results for input(s): INR, PTP, APTT, INREXT in the last 72 hours. No results for input(s): FE, TIBC, PSAT, FERR in the last 72 hours. Lab Results   Component Value Date/Time    Folate 48.0 (H) 11/15/2020 04:25 AM      No results for input(s): PH, PCO2, PO2 in the last 72 hours.   Recent Labs     11/15/20  0425 11/14/20  2243   TROIQ <0.05 <0.05     Lab Results   Component Value Date/Time    Cholesterol, total 142 11/15/2020 04:25 AM    HDL Cholesterol 39 11/15/2020 04:25 AM    LDL, calculated 82.6 11/15/2020 04:25 AM    Triglyceride 102 11/15/2020 04:25 AM    CHOL/HDL Ratio 3.6 11/15/2020 04:25 AM     No results found for: [facility]  Lab Results   Component Value Date/Time    Color YELLOW/STRAW 11/14/2020 11:23 PM    Appearance CLEAR 11/14/2020 11:23 PM    Specific gravity 1.021 11/14/2020 11:23 PM    pH (UA) 7.0 11/14/2020 11:23 PM    Protein TRACE (A) 11/14/2020 11:23 PM    Glucose Negative 11/14/2020 11:23 PM    Ketone Negative 11/14/2020 11:23 PM    Bilirubin Negative 11/14/2020 11:23 PM    Urobilinogen 0.2 11/14/2020 11:23 PM    Nitrites Negative 11/14/2020 11:23 PM    Leukocyte Esterase TRACE (A) 11/14/2020 11:23 PM    Epithelial cells FEW 11/14/2020 11:23 PM    Bacteria Negative 11/14/2020 11:23 PM    WBC 0-4 11/14/2020 11:23 PM    RBC 10-20 11/14/2020 11:23 PM         Medications Reviewed:     Current Facility-Administered Medications   Medication Dose Route Frequency    fish oil-omega-3 fatty acids 340-1,000 mg capsule 1 Cap  1 Cap Oral DAILY    levothyroxine (SYNTHROID) tablet 100 mcg  100 mcg Oral ACB    lisinopriL (PRINIVIL, ZESTRIL) tablet 10 mg  10 mg Oral DAILY    metoprolol tartrate (LOPRESSOR) tablet 12.5 mg  12.5 mg Oral DAILY    pravastatin (PRAVACHOL) tablet 40 mg  40 mg Oral QHS    tamsulosin (FLOMAX) capsule 0.4 mg  0.4 mg Oral DAILY    rOPINIRole (REQUIP) tablet 0.25 mg  0.25 mg Oral QHS    . PHARMACY TO SUBSTITUTE PER PROTOCOL (Reordered from: tocilizumab (ACTEMRA) 162 mg/0.9 mL syringe)    Per Protocol    acetaminophen (TYLENOL) tablet 650 mg  650 mg Oral Q4H PRN    Or    acetaminophen (TYLENOL) solution 650 mg  650 mg Per NG tube Q4H PRN    Or    acetaminophen (TYLENOL) suppository 650 mg  650 mg Rectal Q4H PRN    lactated Ringers infusion  75 mL/hr IntraVENous CONTINUOUS    ondansetron (ZOFRAN) injection 4 mg  4 mg IntraVENous Q6H PRN    aspirin chewable tablet 81 mg  81 mg Oral DAILY    bisacodyL (DULCOLAX) tablet 5 mg  5 mg Oral DAILY PRN    heparin (porcine) injection 5,000 Units  5,000 Units SubCUTAneous Q8H    lidocaine 4 % patch 1 Patch  1 Patch TransDERmal Q24H    haloperidol lactate (HALDOL) injection 1 mg  1 mg IntraMUSCular Q8H PRN    iopamidoL (ISOVUE-370) 76 % injection         Current Outpatient Medications   Medication Sig    tocilizumab (ACTEMRA) 162 mg/0.9 mL syringe 0.9 mL by SubCUTAneous route every Monday. Indications: Inflammation of the Artery in the [place] Area    rOPINIRole (REQUIP) 0.25 mg tablet Take 0.25 mg by mouth nightly.  tamsulosin (FLOMAX) 0.4 mg capsule Take 0.4 mg by mouth daily.  pravastatin (PRAVACHOL) 40 mg tablet Take 40 mg by mouth nightly.  levothyroxine (SYNTHROID) 100 mcg tablet Take 100 mcg by mouth Daily (before breakfast).  metoprolol tartrate (LOPRESSOR) 25 mg tablet Take 12.5 mg by mouth daily.  lisinopril (PRINIVIL, ZESTRIL) 10 mg tablet Take 10 mg by mouth daily.  fish oil-dha-epa 1,200-144-216 mg cap Take 1 Tab by mouth daily.  multivitamin (ONE A DAY) tablet Take 1 Tab by mouth daily.      ______________________________________________________________________  EXPECTED LENGTH OF STAY: - - -  ACTUAL LENGTH OF STAY:          0                 Savannah Guo MD

## 2020-11-15 NOTE — ED NOTES
Pt attempting to get out of bed. Placed on bed alarm. Alarm functioning and audible form nurses station.

## 2020-11-15 NOTE — ROUTINE PROCESS
TRANSFER - IN REPORT:    Verbal report received from [facility]) on Opal Desouza Sr.  being received from ED(unit) for routine progression of care      Report consisted of patients Situation, Background, Assessment and   Recommendations(SBAR). Information from the following report(s) SBAR, Kardex, ED Summary, Procedure Summary, Intake/Output, MAR, Recent Results, Cardiac Rhythm NSR w AVB, Quality Measures and Dual Neuro Assessment was reviewed with the receiving nurse. Opportunity for questions and clarification was provided. Assessment completed upon patients arrival to unit and care assumed.

## 2020-11-15 NOTE — PROGRESS NOTES
Problem: Falls - Risk of  Goal: *Absence of Falls  Description: Document Joselin Davenport Fall Risk and appropriate interventions in the flowsheet.   Outcome: Progressing Towards Goal  Note: Fall Risk Interventions:  Mobility Interventions: Bed/chair exit alarm, Communicate number of staff needed for ambulation/transfer, OT consult for ADLs, Patient to call before getting OOB, PT Consult for mobility concerns, Strengthening exercises (ROM-active/passive), PT Consult for assist device competence, Utilize walker, cane, or other assistive device    Mentation Interventions: Adequate sleep, hydration, pain control, Bed/chair exit alarm, Door open when patient unattended, Evaluate medications/consider consulting pharmacy, Eyeglasses and hearing aids, Increase mobility, More frequent rounding, Room close to nurse's station, Toileting rounds, Update white board    Medication Interventions: Bed/chair exit alarm, Evaluate medications/consider consulting pharmacy, Patient to call before getting OOB, Teach patient to arise slowly    Elimination Interventions: Bed/chair exit alarm, Call light in reach, Elevated toilet seat, Patient to call for help with toileting needs, Stay With Me (per policy), Toileting schedule/hourly rounds, Toilet paper/wipes in reach, Urinal in reach    History of Falls Interventions: Bed/chair exit alarm, Consult care management for discharge planning, Door open when patient unattended, Evaluate medications/consider consulting pharmacy, Investigate reason for fall         Problem: Patient Education: Go to Patient Education Activity  Goal: Patient/Family Education  Outcome: Progressing Towards Goal     Problem: Patient Education: Go to Patient Education Activity  Goal: Patient/Family Education  Outcome: Progressing Towards Goal     Problem: TIA/CVA Stroke: 0-24 hours  Goal: Off Pathway (Use only if patient is Off Pathway)  Outcome: Progressing Towards Goal  Goal: Activity/Safety  Outcome: Progressing Towards Goal  Goal: Consults, if ordered  Outcome: Progressing Towards Goal  Goal: Diagnostic Test/Procedures  Outcome: Progressing Towards Goal  Goal: Nutrition/Diet  Outcome: Progressing Towards Goal  Goal: Discharge Planning  Outcome: Progressing Towards Goal  Goal: Medications  Outcome: Progressing Towards Goal  Goal: Respiratory  Outcome: Progressing Towards Goal  Goal: Treatments/Interventions/Procedures  Outcome: Progressing Towards Goal  Goal: Minimize risk of bleeding post-thrombolytic infusion  Outcome: Progressing Towards Goal  Goal: Monitor for complications post-thrombolytic infusion  Outcome: Progressing Towards Goal  Goal: Psychosocial  Outcome: Progressing Towards Goal  Goal: *Hemodynamically stable  Outcome: Progressing Towards Goal  Goal: *Neurologically stable  Description: Absence of additional neurological deficits    Outcome: Progressing Towards Goal  Goal: *Verbalizes anxiety and depression are reduced or absent  Outcome: Progressing Towards Goal  Goal: *Absence of Signs of Aspiration on Current Diet  Outcome: Progressing Towards Goal  Goal: *Absence of deep venous thrombosis signs and symptoms(Stroke Metric)  Outcome: Progressing Towards Goal  Goal: *Ability to perform ADLs and demonstrates progressive mobility and function  Outcome: Progressing Towards Goal  Goal: *Stroke education started(Stroke Metric)  Outcome: Progressing Towards Goal  Goal: *Dysphagia screen performed(Stroke Metric)  Outcome: Progressing Towards Goal  Goal: *Rehab consulted(Stroke Metric)  Outcome: Progressing Towards Goal     Problem: TIA/CVA Stroke: Day 2 Until Discharge  Goal: Off Pathway (Use only if patient is Off Pathway)  Outcome: Progressing Towards Goal  Goal: Activity/Safety  Outcome: Progressing Towards Goal  Goal: Diagnostic Test/Procedures  Outcome: Progressing Towards Goal  Goal: Nutrition/Diet  Outcome: Progressing Towards Goal  Goal: Discharge Planning  Outcome: Progressing Towards Goal  Goal: Medications  Outcome: Progressing Towards Goal  Goal: Respiratory  Outcome: Progressing Towards Goal  Goal: Treatments/Interventions/Procedures  Outcome: Progressing Towards Goal  Goal: Psychosocial  Outcome: Progressing Towards Goal  Goal: *Verbalizes anxiety and depression are reduced or absent  Outcome: Progressing Towards Goal  Goal: *Absence of aspiration  Outcome: Progressing Towards Goal  Goal: *Absence of deep venous thrombosis signs and symptoms(Stroke Metric)  Outcome: Progressing Towards Goal  Goal: *Optimal pain control at patient's stated goal  Outcome: Progressing Towards Goal  Goal: *Tolerating diet  Outcome: Progressing Towards Goal  Goal: *Ability to perform ADLs and demonstrates progressive mobility and function  Outcome: Progressing Towards Goal  Goal: *Stroke education continued(Stroke Metric)  Outcome: Progressing Towards Goal     Problem: Ischemic Stroke: Discharge Outcomes  Goal: *Verbalizes anxiety and depression are reduced or absent  Outcome: Progressing Towards Goal  Goal: *Verbalize understanding of risk factor modification(Stroke Metric)  Outcome: Progressing Towards Goal  Goal: *Hemodynamically stable  Outcome: Progressing Towards Goal  Goal: *Absence of aspiration pneumonia  Outcome: Progressing Towards Goal  Goal: *Aware of needed dietary changes  Outcome: Progressing Towards Goal  Goal: *Verbalize understanding of prescribed medications including anti-coagulants, anti-lipid, and/or anti-platelets(Stroke Metric)  Outcome: Progressing Towards Goal  Goal: *Tolerating diet  Outcome: Progressing Towards Goal  Goal: *Aware of follow-up diagnostics related to anticoagulants  Outcome: Progressing Towards Goal  Goal: *Ability to perform ADLs and demonstrates progressive mobility and function  Outcome: Progressing Towards Goal  Goal: *Absence of DVT(Stroke Metric)  Outcome: Progressing Towards Goal  Goal: *Absence of aspiration  Outcome: Progressing Towards Goal  Goal: *Optimal pain control at patient's stated goal  Outcome: Progressing Towards Goal  Goal: *Home safety concerns addressed  Outcome: Progressing Towards Goal  Goal: *Describes available resources and support systems  Outcome: Progressing Towards Goal  Goal: *Verbalizes understanding of activation of EMS(911) for stroke symptoms(Stroke Metric)  Outcome: Progressing Towards Goal  Goal: *Understands and describes signs and symptoms to report to providers(Stroke Metric)  Outcome: Progressing Towards Goal  Goal: *Neurolgocially stable (absence of additional neurological deficits)  Outcome: Progressing Towards Goal  Goal: *Verbalizes importance of follow-up with primary care physician(Stroke Metric)  Outcome: Progressing Towards Goal  Goal: *Smoking cessation discussed,if applicable(Stroke Metric)  Outcome: Progressing Towards Goal  Goal: *Depression screening completed(Stroke Metric)  Outcome: Progressing Towards Goal     Problem: Pressure Injury - Risk of  Goal: *Prevention of pressure injury  Description: Document Wali Scale and appropriate interventions in the flowsheet.   Outcome: Progressing Towards Goal     Problem: Patient Education: Go to Patient Education Activity  Goal: Patient/Family Education  Outcome: Progressing Towards Goal

## 2020-11-15 NOTE — H&P
[address] [facility]  HISTORY AND PHYSICAL    Name:  Norman Cisneros  MR#:  [id number]  :  1929  ACCOUNT #:  [de-identified]  ADMIT DATE:  2020      The patient was seen, evaluated and admitted by me on 2020. PRIMARY CARE PHYSICIAN:  Monica Garcia MD    SOURCE OF INFORMATION:  The patient. CHIEF COMPLAINT:  Unsteady gait and confusion. HISTORY OF PRESENT ILLNESS:  This is a 66-year-old man with a past medical history significant for hypertension; dyslipidemia; hypothyroidism; giant-cell arteritis with polymyalgia rheumatica; coronary artery disease, status post stent placement; obstructive sleep apnea; hearing impairment, was in his usual state of health until a couple of days ago when the patient developed an unsteady gait. As a result of the unsteady gait, the patient is falling at home multiple times. The patient also complained of dizziness. The patient described the dizziness as room spinning around him. The night before going to the emergency room, the patient fell. The fall was not witnessed because the patient lives alone, but he denies head injury. Following the fall, the patient started complaining of right shoulder pain. The unsteadiness became associated with confusion. The patient was taken to the local hospital at [place], [place]. When the patient arrived at the emergency room, there was a concern for acute stroke. The patient had a CT scan of the head done, which was negative. CTA of the head and neck was also performed, which did not show any significant abnormalities. The patient was then transferred to [facility] for further evaluation for acute stroke with an MRI of the brain which is not available at the hospital in [facility]. The patient rated the right shoulder pain as 4/10, with no radiation, worse with movement of the right shoulder, slight relief with no activity.   The patient has no record of prior admission to this [facility].  He is under the care of a rheumatologist for his giant-cell arteritis with polymyalgia rheumatica. No prior history of TIA or CVA. PAST MEDICAL HISTORY:  Hypertension; dyslipidemia; hypothyroidism; coronary artery disease, status post stent placement; obstructive sleep apnea; giant-cell arteritis with polymyalgia rheumatica; hearing impairment. ALLERGIES:  THE PATIENT IS ALLERGIC TO PENICILLIN. MEDICATIONS:  Actemra 162 mg subcutaneously every week on Monday, Requip 0.25 mg daily at bedtime, Flomax 0.4 mg daily, Pravachol 40 mg daily at bedtime, Synthroid 100 mcg daily, Lopressor 12.5 mg daily, lisinopril 10 mg daily, fish oil 1 capsule daily, multivitamin 1 tablet daily. FAMILY HISTORY:  His mother had rheumatoid arthritis. PAST SURGICAL HISTORY:  This is significant for cardiac stent placement, carotid endarterectomy, and carotid stent placement. SOCIAL HISTORY:  No history of alcohol or tobacco abuse. REVIEW OF SYSTEMS:  HEAD, EYES, EARS, NOSE AND THROAT:  This is positive for headache, dizziness, confusion. No blurring of vision, no photophobia. RESPIRATORY SYSTEM:  No cough, no shortness of breath, no hemoptysis. CARDIOVASCULAR SYSTEM:  No chest pain, no orthopnea, no palpitation. GASTROINTESTINAL SYSTEM:  No nausea or vomiting, no diarrhea, no constipation. GENITOURINARY SYSTEM:  No dysuria, no urgency, and no frequency. All other systems are reviewed and they are negative. PHYSICAL EXAMINATION:  GENERAL APPEARANCE:  The patient appeared ill, in moderate distress. VITAL SIGNS:  On arrival at the emergency room, temperature 97.4, pulse 57, respiratory rate 16, blood pressure 133/51, oxygen saturation 96%. HEENT:  Head:  Normocephalic, atraumatic. Eyes:  Normal eye movements. No redness, no drainage, no discharge. Ears:  Normal external ears with no evidence of drainage. Nose:  No deformity, no drainage. Mouth and Throat:  No visible oral lesion.   NECK:  Neck is supple. No JVD, no thyromegaly. CHEST:  Clear breath sounds. No wheezing, no crackles. HEART:  Normal S1 and S2, regular. No clinically appreciable murmur. ABDOMEN:  Soft, nontender. Normal bowel sounds. CNS:  Alert, oriented to person and place. Hearing impairment noted. Period of confusion also noted. EXTREMITIES:  No edema. Pulses 2+ bilaterally. MUSCULOSKELETAL SYSTEM:  Right shoulder tenderness without obvious deformity or swelling. SKIN:  No active skin lesions seen in the exposed part of the body. PSYCHIATRY:  Unable to assess mood and affect. LYMPHATIC SYSTEM:  No cervical lymphadenopathy. DIAGNOSTIC DATA:  EKG shows sinus rhythm and nonspecific ST and T-wave abnormalities. CT scan of the head without contrast, no acute intracranial findings; remote lacunar infarct, left caudate head; moderate changes of suspected small-vessel ischemic disease with diffuse cerebral atrophy. Chest x-ray, no evidence of an acute cardiopulmonary process. CTA of the head and neck with perfusion study, no significant abnormalities. LABORATORY DATA:  Hematology:  WBC 8.3, hemoglobin at 14.6, hematocrit 43.1, platelets at 336. Chemistry:  Sodium 136, potassium 4.6, chloride 100, CO2 30, glucose 95, BUN 13, creatinine 1.08, calcium 9.0, total bilirubin 0.8, AST 45, ALT 46, alkaline phosphatase 54, total protein at 7.4, albumin level 3.5, globulin at 3.9. Cardiac profile:  Troponin less than 0.05. Urinalysis: This is significant for trace blood, negative nitrite, trace leukocyte esterase, negative bacteria. ASSESSMENT:  1. Suspected acute cerebrovascular accident. 2.  Hypertension. 3.  Dyslipidemia. 4.  Hypothyroidism. 5.  Giant cell arteritis with polymyalgia rheumatica. 6.  Hearing impairment. 7.  Multiple falls at home. 8.  Coronary artery disease, status post stent placement. 9.  Obstructive sleep apnea. 10.  Right shoulder pain. PLAN:  1. Suspected acute CVA.   We will admit the patient for further evaluation and treatment. We will obtain an MRI of the brain and echocardiogram.  We will check lipid profile. We will check hemoglobin A1c level. We will check folic acid and X54. We will also check C-reactive protein level to evaluate the patient for systemic inflammation. Inpatient neurology consult will be requested to assist in further evaluation and treatment. We will start the patient on aspirin while awaiting further recommendation from the neurologist.  2.  Hypertension. We will resume preadmission medication. We will monitor the patient's blood pressure closely. 3.  Dyslipidemia. We will continue with Pravachol and fish oil. We will check lipid profile. 4.  Hypothyroidism. We will continue with Synthroid. We will check TSH level. 5.  Giant-cell arteritis with polymyalgia rheumatica. We will resume preadmission medication. 6.  Hearing impairment. We will continue supportive treatment. 7.  Multiple falls at home. CT scan of the head is negative. CTA of the neck did not show any acute pathology. We will check CT scan of the pelvis to evaluate the patient for fracture. 8.  Coronary artery disease, status post stent placement. We will continue cardiac medications, aspirin, lisinopril, and Lopressor. We will check serial cardiac markers to rule out acute myocardial infarction. 9.  Obstructive sleep apnea. The patient is not on CPAP machine at home. We will continue supplemental oxygen as needed. 10.  Right shoulder pain. We will check x-ray of the right shoulder to evaluate the patient for fracture. We will carry out pain control. OTHER ISSUES:  Code status: The patient is a full code. We will place the patient on heparin for DVT prophylaxis. FUNCTIONAL STATUS PRIOR TO ADMISSION:  The patient came from home. The patient is ambulatory with a cane. COVID PRECAUTION:  The patient was wearing a face mask.   I was wearing a face mask and gloves for this patient's encounter.         MD JARED [email or web address]  D:  11/15/2020 4:59  T:  11/15/2020 8:31  JOB #:  [id number]

## 2020-11-15 NOTE — ED PROVIDER NOTES
This is a 28-year-old gentleman comes emergency room via EMS as a transfer from an outlying hospital.  Patient was sent for further evaluation and treatment for possible TIA. While at the other facility he had a negative CT head and basic labs. Patient has had frequent falls over the past 2 days. Patient is also had some visual hallucinations. This lasted for 1 to 2 hours. Patient currently denies any weakness, fevers, chest pain, or shortness of breath. Per nursing the patient had some difficulty finding his words and slightly dysarthric speech. However, this seems to have resolved. Altered mental status    This is a new problem. The current episode started 6 to 12 hours ago. The problem has been resolved. Associated symptoms include confusion and hallucinations. Pertinent negatives include no seizures, no weakness and no numbness. Mental status baseline is normal.  His past medical history is significant for hypertension.         Past Medical History:   Diagnosis Date    Arthritis     CAD (coronary artery disease)     Hard of hearing     Headache     PMR (polymyalgia rheumatica) (HCC)     Skin cancer     Sleep apnea     no longer uses cpap       Past Surgical History:   Procedure Laterality Date    CARDIAC SURG PROCEDURE UNLIST      cardiac stent    HX CAROTID STENT      VASCULAR SURGERY PROCEDURE UNLIST      carotid endartectomy         Family History:   Problem Relation Age of Onset   Jaclyn Arthritis-rheumatoid Mother     No Known Problems Father     Gout Son        Social History     Socioeconomic History    Marital status:      Spouse name: Not on file    Number of children: Not on file    Years of education: Not on file    Highest education level: Not on file   Occupational History    Not on file   Social Needs    Financial resource strain: Not on file    Food insecurity     Worry: Not on file     Inability: Not on file    Transportation needs     Medical: Not on file Non-medical: Not on file   Tobacco Use    Smoking status: Never Smoker    Smokeless tobacco: Never Used   Substance and Sexual Activity    Alcohol use: No    Drug use: No    Sexual activity: Not on file   Lifestyle    Physical activity     Days per week: Not on file     Minutes per session: Not on file    Stress: Not on file   Relationships    Social connections     Talks on phone: Not on file     Gets together: Not on file     Attends Quaker service: Not on file     Active member of club or organization: Not on file     Attends meetings of clubs or organizations: Not on file     Relationship status: Not on file    Intimate partner violence     Fear of current or ex partner: Not on file     Emotionally abused: Not on file     Physically abused: Not on file     Forced sexual activity: Not on file   Other Topics Concern    Not on file   Social History Narrative    Not on file     ALLERGIES: Pcn [penicillins]    Review of Systems   Constitutional: Negative for appetite change, chills, fever and unexpected weight change. HENT: Negative for ear pain, hearing loss, rhinorrhea and trouble swallowing. Eyes: Negative for pain and visual disturbance. Respiratory: Negative for cough, chest tightness and shortness of breath. Cardiovascular: Negative for chest pain and palpitations. Gastrointestinal: Negative for abdominal distention, abdominal pain, blood in stool and vomiting. Genitourinary: Negative for dysuria, hematuria and urgency. Musculoskeletal: Negative for back pain and myalgias. Skin: Negative for rash. Neurological: Negative for dizziness, seizures, syncope, weakness and numbness. Psychiatric/Behavioral: Positive for confusion and hallucinations. Negative for suicidal ideas. All other systems reviewed and are negative.       Vitals:    11/14/20 2212   BP: (!) 188/76   Pulse: 61   Resp: 18   Temp: 97.5 °F (36.4 °C)   SpO2: 99%   Weight: 96.2 kg (212 lb)   Height: 5' 10\" (1.778 m) Physical Exam  Vitals signs and nursing note reviewed. Constitutional:       General: He is not in acute distress. Appearance: Normal appearance. He is well-developed. He is not diaphoretic. HENT:      Head: Normocephalic and atraumatic. Right Ear: External ear normal.      Left Ear: External ear normal.   Eyes:      General: No scleral icterus. Right eye: No discharge. Left eye: No discharge. Extraocular Movements: Extraocular movements intact. Conjunctiva/sclera: Conjunctivae normal.      Pupils: Pupils are equal, round, and reactive to light. Neck:      Musculoskeletal: Normal range of motion and neck supple. Vascular: No JVD. Trachea: No tracheal deviation. Cardiovascular:      Rate and Rhythm: Normal rate and regular rhythm. Heart sounds: Normal heart sounds. No murmur. No friction rub. No gallop. Pulmonary:      Effort: Pulmonary effort is normal. No respiratory distress. Breath sounds: Normal breath sounds. No stridor. No decreased breath sounds, wheezing, rhonchi or rales. Chest:      Chest wall: No tenderness. Abdominal:      General: Bowel sounds are normal. There is no distension. Palpations: Abdomen is soft. Tenderness: There is no abdominal tenderness. There is no guarding or rebound. Musculoskeletal: Normal range of motion. General: No tenderness. Right lower leg: No edema. Left lower leg: No edema. Skin:     General: Skin is warm and dry. Capillary Refill: Capillary refill takes 2 to 3 seconds. Coloration: Skin is not pale. Findings: No erythema or rash. Neurological:      General: No focal deficit present. Mental Status: He is alert and oriented to person, place, and time. GCS: GCS eye subscore is 4. GCS verbal subscore is 5. GCS motor subscore is 6. Cranial Nerves: No cranial nerve deficit. Sensory: No sensory deficit.       Motor: No weakness or abnormal muscle tone. Coordination: Coordination normal.      Deep Tendon Reflexes: Reflexes are normal and symmetric. Reflexes normal.   Psychiatric:         Mood and Affect: Mood normal.         Behavior: Behavior normal.         Thought Content: Thought content normal.         Judgment: Judgment normal.        MDM  Number of Diagnoses or Management Options  Recurrent falls:   Speech disturbance, unspecified type:   TIA (transient ischemic attack):      Amount and/or Complexity of Data Reviewed  Clinical lab tests: ordered and reviewed  Tests in the radiology section of CPT®: ordered and reviewed  Tests in the medicine section of CPT®: ordered and reviewed  Review and summarize past medical records: yes (From outlying hospital.)  Discuss the patient with other providers: yes (Hospitalist)  Independent visualization of images, tracings, or specimens: yes (EKG)    Risk of Complications, Morbidity, and/or Mortality  Presenting problems: moderate  Diagnostic procedures: moderate  Management options: moderate    Patient Progress  Patient progress: stable       Procedures    Chief Complaint   Patient presents with    Transfer Of Care       The patient's presenting problems have been discussed, and they are in agreement with the care plan formulated and outlined with them. I have encouraged them to ask questions as they arise throughout their visit.     MEDICATIONS GIVEN:  Medications   iopamidoL (ISOVUE-370) 76 % injection (has no administration in time range)   sodium chloride 0.9 % bolus infusion 100 mL (100 mL IntraVENous New Bag 11/14/20 2256)   iopamidoL (ISOVUE-370) 76 % injection 100 mL (100 mL IntraVENous Given 11/14/20 2257)   sodium chloride (NS) flush 10 mL (10 mL IntraVENous Given 11/14/20 2257)   iopamidoL (ISOVUE-370) 76 % injection 50 mL (20 mL IntraVENous Given 11/14/20 2257)       LABS REVIEWED:  Recent Results (from the past 24 hour(s))   CBC WITH AUTOMATED DIFF    Collection Time: 11/14/20  2:28 PM   Result Value Ref Range    WBC 8.3 4.4 - 11.3 K/uL    RBC 4.71 4.50 - 5.90 M/uL    HGB 14.6 13.5 - 17.5 g/dL    HCT 43.1 41 - 53 %    MCV 91.4 80 - 100 FL    MCH 30.9 (L) 31 - 34 PG    MCHC 33.8 31.0 - 36.0 g/dL    RDW 13.8 11.5 - 14.5 %    PLATELET 593 K/uL    MPV 8.7 6.5 - 11.5 FL    NRBC 0.0  WBC    ABSOLUTE NRBC 0.00 K/uL    NEUTROPHILS 71 42 - 75 %    LYMPHOCYTES 15 (L) 20.5 - 51.1 %    MONOCYTES 11 (H) 1.7 - 9.3 %    EOSINOPHILS 3 (H) 0.9 - 2.9 %    BASOPHILS 0 0.0 - 2.5 %    ABS. NEUTROPHILS 5.8 1.8 - 7.7 K/UL    ABS. LYMPHOCYTES 1.3 1.0 - 4.8 K/UL    ABS. MONOCYTES 0.9 0.2 - 2.4 K/UL    ABS. EOSINOPHILS 0.2 0.0 - 0.7 K/UL    ABS. BASOPHILS 0.0 0.0 - 0.2 K/UL   METABOLIC PANEL, COMPREHENSIVE    Collection Time: 11/14/20  2:28 PM   Result Value Ref Range    Sodium 136 136 - 145 mmol/L    Potassium 4.6 3.5 - 5.1 mmol/L    Chloride 100 97 - 108 mmol/L    CO2 30 21 - 32 mmol/L    Anion gap 6 5 - 15 mmol/L    Glucose 95 65 - 100 mg/dL    BUN 13 6 - 20 mg/dL    Creatinine 1.08 0.70 - 1.30 mg/dL    BUN/Creatinine ratio 12 12 - 20      GFR est AA >60 >60 ml/min/1.73m2    GFR est non-AA >60 >60 ml/min/1.73m2    Calcium 9.0 8.5 - 10.1 mg/dL    Bilirubin, total 0.8 0.2 - 1.0 mg/dL    AST (SGOT) 45 (H) 15 - 37 U/L    ALT (SGPT) 46 12 - 78 U/L    Alk. phosphatase 54 45 - 117 U/L    Protein, total 7.4 6.4 - 8.2 g/dL    Albumin 3.5 3.5 - 5.0 g/dL    Globulin 3.9 2.0 - 4.0 g/dL    A-G Ratio 0.9 (L) 1.1 - 2.2     SAMPLES BEING HELD    Collection Time: 11/14/20 10:43 PM   Result Value Ref Range    SAMPLES BEING HELD 1red,1lav     COMMENT        Add-on orders for these samples will be processed based on acceptable specimen integrity and analyte stability, which may vary by analyte.    TROPONIN I    Collection Time: 11/14/20 10:43 PM   Result Value Ref Range    Troponin-I, Qt. <0.05 <0.05 ng/mL   EKG, 12 LEAD, INITIAL    Collection Time: 11/14/20 11:13 PM   Result Value Ref Range    Ventricular Rate 70 BPM    Atrial Rate 70 BPM    P-R Interval 362 ms    QRS Duration 88 ms    Q-T Interval 456 ms    QTC Calculation (Bezet) 492 ms    Calculated P Axis 82 degrees    Calculated R Axis -27 degrees    Calculated T Axis 91 degrees    Diagnosis       Sinus rhythm with 1st degree AV block with premature supraventricular   complexes and with frequent premature ventricular complexes  When compared with ECG of 16-JUL-2018 10:57,  premature ventricular complexes are now present  premature supraventricular complexes are now present  Vent. rate has increased BY  24 BPM  ST now depressed in Anterior leads  Nonspecific T wave abnormality, worse in Lateral leads  QT has lengthened         VITAL SIGNS:  Patient Vitals for the past 24 hrs:   Temp Pulse Resp BP SpO2   11/14/20 2212 97.5 °F (36.4 °C) 61 18 (!) 188/76 99 %       RADIOLOGY RESULTS:  The following have been ordered and reviewed:  Ct Head Wo Cont    Result Date: 11/14/2020  Indication: Dizzy spells. Comparison: Head CT without contrast 2/20/2019. Technique: Contiguous axial images of the brain were obtained from the base of skull to the vertex without intravenous contrast. Dose Reduction: All CT scans at this facility are performed using dose reduction optimization techniques as appropriate to a performed exam including the following: Automated exposure control, adjustments of the mA and/or kV according to patient size, or use of iterative reconstruction technique. Findings: Remote lacunar infarct left caudate head. There are moderate changes of ill-defined periventricular low attenuation which are nonspecific but likely related to small vessel ischemic disease. There is no evidence of hemorrhage, mass effect, or midline shift. Mild diffuse cerebral atrophy. The calvarium is intact. Mastoid air cells and paranasal sinuses are clear. Impression: 1. No acute intracranial finding. 2. Remote lacunar infarct left caudate head.  3. Moderate changes of suspected small vessel ischemic disease with diffuse cerebral atrophy. Cta Head    Result Date: 11/14/2020  *PRELIMINARY REPORT* CT angiography head/neck: No flow-limiting stenosis or vascular occlusion. Atherosclerotic mild stenosis of the proximal left internal carotid artery. Chronic microvascular ischemic disease. No CT evidence of acute infarct. No intracranial pathologic enhancement. CT PERFUSION: No perfusion mismatch. Preliminary report was provided by Dr. Tahira Lawson, the on-call radiologist, at 2324 hours Final report to follow. *END PRELIMINARY REPORT*      Cta Neck    Result Date: 11/14/2020  *PRELIMINARY REPORT* CT angiography head/neck: No flow-limiting stenosis or vascular occlusion. Atherosclerotic mild stenosis of the proximal left internal carotid artery. Chronic microvascular ischemic disease. No CT evidence of acute infarct. No intracranial pathologic enhancement. CT PERFUSION: No perfusion mismatch. Preliminary report was provided by Dr. Tahira Lawson, the on-call radiologist, at 2324 hours Final report to follow. *END PRELIMINARY REPORT*      Ct Perf W Cbf    Result Date: 11/14/2020  *PRELIMINARY REPORT* CT angiography head/neck: No flow-limiting stenosis or vascular occlusion. Atherosclerotic mild stenosis of the proximal left internal carotid artery. Chronic microvascular ischemic disease. No CT evidence of acute infarct. No intracranial pathologic enhancement. CT PERFUSION: No perfusion mismatch. Preliminary report was provided by Dr. Tahira Lawson, the on-call radiologist, at 2324 hours Final report to follow. *END PRELIMINARY REPORT*      Xr Chest Port    Result Date: 11/14/2020  XR CHEST PORT Comparison:  Noncontrast CT scan of the chest dated 2/20/2019. Single view: The lungs are clear. No pneumothorax or pleural effusion apparent. The heart size is magnified by AP technique. There is calcified plaque in the thoracic aorta. There is no evidence of acute cardiac decompensation. Degenerative changes involve the right glenohumeral joint. IMPRESSION:  No evidence of an acute cardiopulmonary process. ED EKG interpretation:  Rhythm: normal sinus rhythm, 1st degree block and PVC's; and regular . Rate (approx.): 70; Axis: normal; P wave: prolonged; QRS interval: normal ; ST/T wave: normal; Other findings: borderline ekg. This EKG was interpreted by Catia Chowdary DO, ED Provider. CONSULTATIONS:   CONSULT NOTE:  11:30 PM Love Esquivel DO spoke with Dr. Weston Evans, Consult for Hospitalist.  Discussed available diagnostic tests and clinical findings. He is in agreement with care plans as outlined. Dr. Weston Evans will see and admit pt for further evaluation and treatment. Perfect Serve Consult for Admission  11:30 PM    ED Room Number: ER15/15  Patient Name and age:  Lorri Flores. 80 y.o.  male  Working Diagnosis:   1. Recurrent falls    2. Speech disturbance, unspecified type    3. TIA (transient ischemic attack)        COVID-19 Suspicion:  no  Sepsis present:  no  Reassessment needed: no  Code Status:  Full Code  Readmission: no  Isolation Requirements:  no  Recommended Level of Care:  telemetry neuro  Department:[facility] ED - 21   Other:  Will need MRI tomorrow. PROGRESS NOTES:  Discussed results and plan with patient. Patient will be admitted/observed for further evaluation and treatment. DIAGNOSIS:    1. Recurrent falls    2. Speech disturbance, unspecified type    3. TIA (transient ischemic attack)        PLAN:    Admit/obs for further evaluation and treatment. ED COURSE: The patient's hospital course has been uncomplicated. Please note that this dictation was completed with [employer], the computer voice recognition software. Quite often unanticipated grammatical, syntax, homophones, and other interpretive errors are inadvertently transcribed by the computer software. Please disregard these errors. Please excuse any errors that have escaped final proofreading.

## 2020-11-15 NOTE — ED TRIAGE NOTES
Patient arrives from [facility] ED for treatment. VSS. Patient a&Ox4.  Ambulatory x1 assist

## 2020-11-15 NOTE — ED NOTES
Spoke with patient's son who will be arriving later on today. All questions and concerns answered. MRI form completed - faxed to MRI, scanned into [facility], and a hard copy is in the patient's room.

## 2020-11-16 ENCOUNTER — APPOINTMENT (OUTPATIENT)
Dept: NON INVASIVE DIAGNOSTICS | Age: 85
DRG: 066 | End: 2020-11-16
Attending: INTERNAL MEDICINE
Payer: MEDICARE

## 2020-11-16 LAB
ATRIAL RATE: 56 BPM
CALCULATED P AXIS, ECG09: 29 DEGREES
CALCULATED R AXIS, ECG10: -27 DEGREES
CALCULATED T AXIS, ECG11: 81 DEGREES
COMMENT, HOLDF: NORMAL
DIAGNOSIS, 93000: NORMAL
P-R INTERVAL, ECG05: 324 MS
Q-T INTERVAL, ECG07: 463 MS
QRS DURATION, ECG06: 103 MS
QTC CALCULATION (BEZET), ECG08: 443 MS
SAMPLES BEING HELD,HOLD: NORMAL
VENTRICULAR RATE, ECG03: 55 BPM

## 2020-11-16 PROCEDURE — 36415 COLL VENOUS BLD VENIPUNCTURE: CPT

## 2020-11-16 PROCEDURE — 99233 SBSQ HOSP IP/OBS HIGH 50: CPT | Performed by: NURSE PRACTITIONER

## 2020-11-16 PROCEDURE — 74011000250 HC RX REV CODE- 250: Performed by: HOSPITALIST

## 2020-11-16 PROCEDURE — 74011250636 HC RX REV CODE- 250/636: Performed by: INTERNAL MEDICINE

## 2020-11-16 PROCEDURE — 97161 PT EVAL LOW COMPLEX 20 MIN: CPT

## 2020-11-16 PROCEDURE — 97535 SELF CARE MNGMENT TRAINING: CPT

## 2020-11-16 PROCEDURE — 74011250637 HC RX REV CODE- 250/637: Performed by: INTERNAL MEDICINE

## 2020-11-16 PROCEDURE — 97530 THERAPEUTIC ACTIVITIES: CPT

## 2020-11-16 PROCEDURE — 97166 OT EVAL MOD COMPLEX 45 MIN: CPT

## 2020-11-16 PROCEDURE — 74011250637 HC RX REV CODE- 250/637: Performed by: NURSE PRACTITIONER

## 2020-11-16 PROCEDURE — 92610 EVALUATE SWALLOWING FUNCTION: CPT | Performed by: SPEECH-LANGUAGE PATHOLOGIST

## 2020-11-16 PROCEDURE — 97116 GAIT TRAINING THERAPY: CPT

## 2020-11-16 PROCEDURE — 65660000000 HC RM CCU STEPDOWN

## 2020-11-16 RX ORDER — ATORVASTATIN CALCIUM 40 MG/1
40 TABLET, FILM COATED ORAL
Status: DISCONTINUED | OUTPATIENT
Start: 2020-11-16 | End: 2020-11-19 | Stop reason: HOSPADM

## 2020-11-16 RX ADMIN — Medication 1 CAPSULE: at 09:31

## 2020-11-16 RX ADMIN — LEVOTHYROXINE SODIUM 100 MCG: 0.1 TABLET ORAL at 09:31

## 2020-11-16 RX ADMIN — TAMSULOSIN HYDROCHLORIDE 0.4 MG: 0.4 CAPSULE ORAL at 09:30

## 2020-11-16 RX ADMIN — METOPROLOL TARTRATE 12.5 MG: 25 TABLET, FILM COATED ORAL at 09:30

## 2020-11-16 RX ADMIN — ROPINIROLE HYDROCHLORIDE 0.25 MG: 0.25 TABLET, FILM COATED ORAL at 23:24

## 2020-11-16 RX ADMIN — HEPARIN SODIUM 5000 UNITS: 5000 INJECTION INTRAVENOUS; SUBCUTANEOUS at 18:50

## 2020-11-16 RX ADMIN — ATORVASTATIN CALCIUM 40 MG: 40 TABLET, FILM COATED ORAL at 23:24

## 2020-11-16 RX ADMIN — LISINOPRIL 10 MG: 10 TABLET ORAL at 09:31

## 2020-11-16 RX ADMIN — ASPIRIN 81 MG: 81 TABLET, CHEWABLE ORAL at 09:30

## 2020-11-16 RX ADMIN — HEPARIN SODIUM 5000 UNITS: 5000 INJECTION INTRAVENOUS; SUBCUTANEOUS at 11:54

## 2020-11-16 NOTE — PROGRESS NOTES
Problem: Self Care Deficits Care Plan (Adult)  Goal: *Acute Goals and Plan of Care (Insert Text)  Description: FUNCTIONAL STATUS PRIOR TO ADMISSION: Patient was independent and active without use of DME.    HOME SUPPORT: The patient lived alone with one son in the area. Occupational Therapy Goals  Initiated 11/16/2020   1. Patient will perform standing grooming with modified independence within 7 day(s). 2.  Patient will perform UB and LB bathing with modified independence within 7 day(s). 3.  Patient will perform lower body dressing with modified independence within 7 day(s). 4.  Patient will perform toilet transfers with modified independence within 7 day(s). 5.  Patient will perform all aspects of toileting with modified independence within 7 day(s). 6.  Patient will participate in upper extremity therapeutic exercise/activities with modified independence for 5 minutes within 7 day(s). 7.  Patient will utilize energy conservation techniques during functional activities with verbal cues within 7 day(s). 8.  Patient will improve their Fugl Gore score by 5 points in prep for ADLs within 7 days. Outcome: Progressing Towards Goal    OCCUPATIONAL THERAPY EVALUATION  Patient: Juan Carlos Lopez Sr. (80 y.o. male)  Date: 11/16/2020  Primary Diagnosis: Acute CVA (cerebrovascular accident) [facility]) [I63.9]        Precautions:      ASSESSMENT  Based on the objective data described below, the patient presents with generalized weakness, confusion, and impaired standing balance s/p admission for acute CVA R internal capsule. Pt received in chair and willing to work with therapy. Some perseveration noted in conversation, however, complied and participated in tasks when prompted. Pt with R shoulder pain (PTA), LUE ROM and strength WNL. Pt completed sit>stand with RW (Min A) and completed 2/2 standing grooming tasks at sink.  Completed toilet transfer (Min A) with verbal cues for hand placement on grab bar and RW. Pt completed functional mobility back to chair at end of session exhibiting two mild LOB. Recommend IPR at discharge to maximize functional recovery as pt lives alone and is functioning below baseline. Current Level of Function Impacting Discharge (ADLs/self-care): Min A for dressing, bathing, toileting. CGA for hygiene/grooming. Functional Outcome Measure: The patient scored Total A-D  Total A-D (Motor Function): 66/66 on the Fugl-Gore Assessment which is indicative of no impairment in upper extremity functional status (LUE only)     Other factors to consider for discharge: Lives alone      Patient will benefit from skilled therapy intervention to address the above noted impairments. PLAN :  Recommendations and Planned Interventions: self care training, functional mobility training, therapeutic exercise, balance training, therapeutic activities, cognitive retraining, endurance activities, neuromuscular re-education, patient education, home safety training, and family training/education    Frequency/Duration: Patient will be followed by occupational therapy 5 times a week to address goals. Recommendation for discharge: (in order for the patient to meet his/her long term goals)  Therapy 3 hours per day 5-7 days per week    This discharge recommendation:  Has not yet been discussed the attending provider and/or case management    IF patient discharges home will need the following DME: TBD at discharge        SUBJECTIVE:   Patient stated my right shoulder has been messed up for years.     OBJECTIVE DATA SUMMARY:   HISTORY:   Past Medical History:   Diagnosis Date    Arthritis     CAD (coronary artery disease)     Hard of hearing     Headache     PMR (polymyalgia rheumatica) ([facility])     Skin cancer     Sleep apnea     no longer uses cpap     Past Surgical History:   Procedure Laterality Date    CARDIAC SURG PROCEDURE UNLIST      cardiac stent    HX CAROTID STENT      VASCULAR SURGERY PROCEDURE UNLIST      carotid endartectomy     Expanded or extensive additional review of patient history:     Home Situation  Home Environment: Private residence  # Steps to Enter: 4  Rails to Enter: Yes  One/Two Story Residence: One story  Living Alone: Yes  Support Systems: Child(ja)  Patient Expects to be Discharged to[de-identified] Unknown  Current DME Used/Available at Home: Walker, rolling    Hand dominance: Right    EXAMINATION OF PERFORMANCE DEFICITS:  Cognitive/Behavioral Status:  Neurologic State: Alert  Orientation Level: Oriented X4  Cognition: Follows commands  Perception: ([facility])  Perseveration: No perseveration noted  Safety/Judgement: Awareness of environment;Decreased awareness of need for assistance    Skin: dry, intact     Edema: none noted     Hearing: Auditory  Auditory Impairment: Hard of hearing, bilateral    Vision/Perceptual:    Tracking: Able to track stimulus in all quadrants w/o difficulty                 Diplopia: No              Range of Motion:    AROM: Generally decreased, functional  PROM: Generally decreased, functional(limited R shoulder flexion)                      Strength:    Strength: Generally decreased, functional(R shoulder limited 2* pain)                Coordination:  Coordination: Generally decreased, functional(dysmetria with L FTN)  Fine Motor Skills-Upper: Left Intact; Right Intact    Gross Motor Skills-Upper: Left Intact; Right Impaired(due to shoulder pain PTA )    Tone & Sensation:    Tone: Normal  Sensation: Intact                      Balance:  Sitting: Intact  Standing: Impaired  Standing - Static: Fair  Standing - Dynamic : Fair    Functional Mobility and Transfers for ADLs:  Bed Mobility:  Pt received in chair     Transfers:  Sit to Stand: Minimum assistance  Stand to Sit: Minimum assistance  Bathroom Mobility: Minimum assistance  Toilet Transfer : Minimum assistance    ADL Assessment:  Feeding: Setup    Oral Facial Hygiene/Grooming: Contact guard assistance    Bathing: Minimum assistance    Upper Body Dressing: Minimum assistance    Lower Body Dressing: Minimum assistance    Toileting: Minimum assistance                ADL Intervention and task modifications:       Grooming  Grooming Assistance: Contact guard assistance  Position Performed: Standing  Brushing Teeth: Contact guard assistance  Brushing/Combing Hair: Contact guard assistance                   Lower Body Dressing Assistance  Socks: Supervision    Toileting  Toileting Assistance: Stand-by assistance  Bladder Hygiene: Stand-by assistance  Clothing Management: Stand-by assistance    Cognitive Retraining  Safety/Judgement: Awareness of environment;Decreased awareness of need for assistance      Functional Measure:  Fugl-Gore Assessment of Motor Recovery after Stroke:   Reflex Activity  Flexors/Biceps/Fingers: Can be elicited  Extensors/Triceps: Can be elicited  Reflex Subtotal: 4    Volitional Movement Within Synergies  Shoulder Retraction: Full  Shoulder Elevation: Full  Shoulder Abduction (90 degrees): Full  Shoulder External Rotation: Full  Elbow Flexion: Full  Forearm Supination: Full  Shoulder Adduction/Internal Rotation: Full  Elbow Extension: Full  Forearm Pronation: Full  Subtotal: 18    Volitional Movement Mixing Synergies  Hand to Lumbar Spine: Full  Shoulder Flexion (0-90 degrees): Full  Pronation-Supination: Full  Subtotal: 6    Volitional Movement With Little or No Synergy  Shoulder Abduction (0-90 degrees): Full  Shoulder Flexion ( degrees): Full  Pronation/Supination: Full  Subtotal : 6    Normal Reflex Activity  Biceps, Triceps, Finger Flexors:  Full  Subtotal : 2    Upper Extremity Total   Upper Extremity Total: 36    Wrist  Stability at 15 Degree Dorsiflexion: Full  Repeated Dorsiflexion/ Volar Flexion: Full  Stability at 15 Degree Dorsiflexion: Full  Repeated Dorsiflexion/ Volar Flexion: Full  Circumduction: Full  Wrist Total: 10    Hand  Mass Flexion: Full  Mass Extension: Full  Grasp A: Full  Grasp B: Full  Grasp C: Full  Grasp D: Full  Grasp E: Full  Hand Total: 14    Coordination/Speed  Tremor: None  Dysmetria: None  Time: <1s  Coordination/Speed Total : 6    Total A-D  Total A-D (Motor Function): 66/66     This is a reliable/valid measure of arm function after a neurological event. It has established value to characterize functional status and for measuring spontaneous and therapy-induced recovery; tests proximal and distal motor functions. Fugl-Gore Assessment - UE scores recorded between five and 30 days post neurologic event can be used to predict UE recovery at six months post neurologic event. Severe = 0-21 points   Moderately Severe = 22-33 points   Moderate = 34-47 points   Mild = 48-66 points  ROOSEVELT Batista, JESSICA Pagan, & JOSE CARLOS Allen (1992). Measurement of motor recovery after stroke: Outcome assessment and sample size requirements.  Stroke, 23, pp. 9112-1600.   ------------------------------------------------------------------------------------------------------------------------------------------------------------------  MCID:  Stroke:   Azam Huerta et al, 2001; n = 171; mean age 79 (6) years; assessed within 16 (12) days of stroke, Acute Stroke)  FMA Motor Scores from Admission to Discharge   10 point increase in FMA Upper Extremity = 1.5 change in discharge FIM   10 point increase in FMA Lower Extremity = 1.9 change in discharge FIM  MDC:   Stroke:   Max Whyte et al, 2008, n = 14, mean age = 59.9 (14.6) years, assessed on average 14 (6.5) months post stroke, Chronic Stroke)   FMA = 5.2 points for the Upper Extremity portion of the assessment     Occupational Therapy Evaluation Charge Determination   History Examination Decision-Making   LOW Complexity : Brief history review  LOW Complexity : 1-3 performance deficits relating to physical, cognitive , or psychosocial skils that result in activity limitations and / or participation restrictions  LOW Complexity : No comorbidities that affect functional and no verbal or physical assistance needed to complete eval tasks       Based on the above components, the patient evaluation is determined to be of the following complexity level: LOW   Pain Rating:  None reported     Activity Tolerance:   Good    After treatment patient left in no apparent distress:    Sitting in chair, Call bell within reach, and Bed / chair alarm activated    COMMUNICATION/EDUCATION:   The patients plan of care was discussed with: Physical therapist and Registered nurse. Patient was educated regarding his deficit(s) of weakness and impaired balance as this relates to his diagnosis of acute CVA. He demonstrated Fair understanding as evidenced by verbal acknowledgement. Home safety education was provided and the patient/caregiver indicated understanding. and Patient/family have participated as able in goal setting and plan of care. This patients plan of care is appropriate for delegation to [facility]. Thank you for this referral.  Riky Mckinney  Time Calculation: 35 mins    Regarding student involvement in patient care:  A student participated in this treatment session. Per CMS Medicare statements and AOTA guidelines I certify that the following was true:  1. I was present and directly observed the entire session. 2. I made all skilled judgments and clinical decisions regarding care. 3. I am the practitioner responsible for assessment, treatment, and documentation.

## 2020-11-16 NOTE — PROGRESS NOTES
Problem: Mobility Impaired (Adult and Pediatric)  Goal: *Acute Goals and Plan of Care (Insert Text)  Description:   FUNCTIONAL STATUS PRIOR TO ADMISSION: Patient was independent and active without use of DME.    HOME SUPPORT PRIOR TO ADMISSION: The patient lived alone. Family lives in the area. Physical Therapy Goals  Initiated 11/16/2020  1. Patient will move from supine to sit and sit to supine  and scoot up and down in bed with modified independence within 7 day(s). 2.  Patient will transfer from bed to chair and chair to bed with modified independence using the least restrictive device within 7 day(s). 3.  Patient will perform sit to stand with modified independence within 7 day(s). 4.  Patient will ambulate with modified independence for 200 feet with the least restrictive device within 7 day(s). 5.  Patient will ascend/descend 4 stairs with handrail(s) with modified independence within 7 day(s). 6.  Patient will improve Waite Balance score by 7 points within 7 days. Outcome: Progressing Towards Goal     PHYSICAL THERAPY EVALUATION- NEURO POPULATION  Patient: Echo Hancock  (80 y.o. male)  Date: 11/16/2020  Primary Diagnosis: Acute CVA (cerebrovascular accident) ([facility] [address] [I63.9]        Precautions:          ASSESSMENT  Based on the objective data described below, the patient presents with impaired functional mobility as compared to reported baseline level 2* generalized weakness, confusion, impaired L gross motor coordination, impaired balance, and impaired gait following admission for AMS and multiple GLFs at home. MRI was positive for acute R internal capsule infarct. At baseline, he reports that he lived at home alone and was essentially indep with ADLs and mobility. Today, he was able to complete bed mobility with CGA and cues for sequencing. Demos good/fair sitting balance once up.  Gait training initiated with min A without use of AD (per PLOF) - demos NBOS, increased postural sway, and consistent attempts to reach for any nearby object to stabilize. Several LOBs requiring assist to correct from. Elli fair standing balance while performing various multi level reaching self care tasks. Remained up in chair at end of session, NAD. At this time, he appears below his baseline level and an ongoing high falls risk - concerned about discharging home alone from a cognitive and mobility standpoint - may benefit from formal cognitive assessment. Recommend discharge to acute [facility] to maximize indep and neuro recovery. Will follow. Current Level of Function Impacting Discharge (mobility/balance): min A for mobility; very unsteady; impaired L coordination     Functional Outcome Measure: The patient scored Total: 7/56 on the [facility] Assessment which is indicative of high fall risk. Other factors to consider for discharge: lives alone; multiple falls at home;      Patient will benefit from skilled therapy intervention to address the above noted impairments. PLAN :  Recommendations and Planned Interventions: bed mobility training, transfer training, gait training, therapeutic exercises, neuromuscular re-education, patient and family training/education, and therapeutic activities      Frequency/Duration: Patient will be followed by physical therapy:  5 times a week to address goals.     Recommendation for discharge: (in order for the patient to meet his/her long term goals)  Therapy 3 hours per day 5-7 days per week    This discharge recommendation:  A follow-up discussion with the attending provider and/or case management is planned    IF patient discharges home will need the following DME: to be determined (TBD)         SUBJECTIVE:   Patient stated Do my kids know where I am?    OBJECTIVE DATA SUMMARY:   HISTORY:    Past Medical History:   Diagnosis Date    Arthritis     CAD (coronary artery disease)     Hard of hearing     Headache     PMR (polymyalgia rheumatica) ([facility] [address]     Skin cancer     Sleep apnea     no longer uses cpap     Past Surgical History:   Procedure Laterality Date    CARDIAC SURG PROCEDURE UNLIST      cardiac stent    HX CAROTID STENT      VASCULAR SURGERY PROCEDURE UNLIST      carotid endartectomy       Personal factors and/or comorbidities impacting plan of care: PMHx    Home Situation  Home Environment: Private residence  # Steps to Enter: 4  Rails to Enter: Yes  One/Two Story Residence: One story  Living Alone: Yes  Support Systems: Child(ja)  Patient Expects to be Discharged to[de-identified] Unknown  Current DME Used/Available at Home: Walker, rolling    EXAMINATION/PRESENTATION/DECISION MAKING:   Critical Behavior:  Neurologic State: Alert  Orientation Level: Oriented X4  Cognition: Follows commands  Safety/Judgement: Awareness of environment, Decreased awareness of need for assistance  Hearing:   Auditory  Auditory Impairment: Hard of hearing, bilateral  Skin:    Edema:   Range Of Motion:  AROM: Generally decreased, functional  PROM: Generally decreased, functional(limited R shoulder flexion)       Strength:    Strength: Generally decreased, functional(R shoulder limited 2* pain)     Tone & Sensation:   Tone: Normal  Sensation: Intact      Coordination:  Coordination: Generally decreased, functional(dysmetria with L FTN)  Vision:   Tracking: Able to track stimulus in all quadrants w/o difficulty  Diplopia: No  Functional Mobility:  Bed Mobility:  Supine to Sit: Contact guard assistance     Transfers:  Sit to Stand: Minimum assistance  Stand to Sit: Minimum assistance     Balance:   Sitting: Intact  Standing: Impaired  Standing - Static: Fair  Standing - Dynamic : Fair  Ambulation/Gait Training:  Distance (ft): 40 Feet (ft)  Assistive Device: Gait belt(HHA )  Ambulation - Level of Assistance: Minimal assistance  Gait Description (WDL): Exceptions to WDL  Gait Abnormalities: Decreased step clearance;Shuffling gait;Trunk sway increased  Base of Support: Narrowed  Speed/Shayla: Slow;Shuffled  Step Length: Right shortened;Left shortened    Functional Measure  Waite Balance Test:    Sitting to Standin  Standing Unsupported: 0  Sitting with Back Unsupported: 3  Standing to Sittin  Transfers: 1  Standing Unsupported with Eyes Closed: 0  Standing Unsupported with Feet Together: 0  Reach Forward with Outstretched Arm: 1   Object: 0  Turn to Look Over Shoulders: 0  Turn 360 Degrees: 0  Alternate Foot on Step/Stool: 0  Standing Unsupported One Foot in Front: 0  Stand on One Le  Total: 7/56         56=Maximum possible score;   0-20=High fall risk  21-40=Moderate fall risk   41-56=Low fall risk        Physical Therapy Evaluation Charge Determination   History Examination Presentation Decision-Making   HIGH Complexity :3+ comorbidities / personal factors will impact the outcome/ POC  MEDIUM Complexity : 3 Standardized tests and measures addressing body structure, function, activity limitation and / or participation in recreation  LOW Complexity : Stable, uncomplicated  HIGH Complexity : FOTO score of 1- 25       Based on the above components, the patient evaluation is determined to be of the following complexity level: LOW         Activity Tolerance:   Good and requires rest breaks      After treatment patient left in no apparent distress:   Sitting in chair, Call bell within reach, and Bed / chair alarm activated    COMMUNICATION/EDUCATION:   The patients plan of care was discussed with: Registered nurse. Patient was educated regarding his deficit(s) of impaired balance and L coordination as this relates to his diagnosis of CVA. He demonstrated Good understanding as evidenced by nodding. Patient and/or family was verbally educated on the BE FAST acronym for signs/symptoms of CVA and TIA. BE FAST was written on patient's communication board  for visual education and reinforcement. All questions answered with patient indicating good understanding.        Fall prevention education was provided and the patient/caregiver indicated understanding., Patient/family have participated as able in goal setting and plan of care. , and Patient/family agree to work toward stated goals and plan of care.     Thank you for this referral.  Jaime Holman, PT, DPT   Time Calculation: 34 mins

## 2020-11-16 NOTE — PROGRESS NOTES
Neurology Progress Note Patient ID: Izaiah Fink 
[id number] 
89 y.o. 
2/18/1929 Chief Complaint: 
 
Subjective: HPI Natasha Watts is a 25-year-old gentleman with a history of hypertension, PMR, CAD, etc. who is here in the hospital because in the past 48 hours he began to have more falls. He had slurred speech. His son is present who sees him every day and tells me his baseline is to be fully independent driving without difficulty. Speech is clear and he ambulates independently. At the moment he just had an MRI with sedation. He is confused. He cannot follow commands. Son tells me he has been off aspirin for about a year but that it was replaced with something else he is unsure of. MRI was done showing a right posterior limb internal capsule infarct. Interval 11/16/2020 He seems to be doing well this morning. Denies HA's, dizziness, weakness,vision changes. Objective: All records in [facility] reviewed and noted ROS: 
Per HPI All other 12 pt ROS negative Meds: 
Current Facility-Administered Medications Medication Dose Route Frequency  fish oil-omega-3 fatty acids 340-1,000 mg capsule 1 Cap  1 Cap Oral DAILY  levothyroxine (SYNTHROID) tablet 100 mcg  100 mcg Oral ACB  lisinopriL (PRINIVIL, ZESTRIL) tablet 10 mg  10 mg Oral DAILY  metoprolol tartrate (LOPRESSOR) tablet 12.5 mg  12.5 mg Oral DAILY  pravastatin (PRAVACHOL) tablet 40 mg  40 mg Oral QHS  tamsulosin (FLOMAX) capsule 0.4 mg  0.4 mg Oral DAILY  rOPINIRole (REQUIP) tablet 0.25 mg  0.25 mg Oral QHS  acetaminophen (TYLENOL) tablet 650 mg  650 mg Oral Q4H PRN Or  
 acetaminophen (TYLENOL) solution 650 mg  650 mg Per NG tube Q4H PRN Or  
 acetaminophen (TYLENOL) suppository 650 mg  650 mg Rectal Q4H PRN  
 lactated Ringers infusion  75 mL/hr IntraVENous CONTINUOUS  
 ondansetron (ZOFRAN) injection 4 mg  4 mg IntraVENous Q6H PRN  
 aspirin chewable tablet 81 mg  81 mg Oral DAILY  bisacodyL (DULCOLAX) tablet 5 mg  5 mg Oral DAILY PRN  
 heparin (porcine) injection 5,000 Units  5,000 Units SubCUTAneous Q8H  
 lidocaine 4 % patch 1 Patch  1 Patch TransDERmal Q24H  
 haloperidol lactate (HALDOL) injection 1 mg  1 mg IntraMUSCular Q8H PRN Imaging: MRI Results (most recent): 
Results from Hospital Encounter encounter on 11/14/20 MRI BRAIN WO CONT Narrative EXAM: MRI BRAIN WO CONT INDICATION: CVA COMPARISON: None. CONTRAST: None. TECHNIQUE:   
Multiplanar multisequence acquisition without contrast of the brain. FINDINGS: 
There is mild prominence of the ventricles and sulci. There are moderate changes 
of small vessel disease periventricular white matter. There is an acute 
infarction posterior limb of the right internal capsule. The paranasal sinuses, mastoid air cells, and middle ears are clear. The orbital 
contents are within normal limits. No significant osseous or scalp lesions are 
identified. Impression IMPRESSION:  
 
1. There is an acute infarction in the posterior limb of the right internal 
capsule. 2. There are moderate changes small vessel disease periventricular white matter. Results called to the nurse by the technologist.  
 
Candace Givens Results (most recent): 
Results from Hospital Encounter encounter on 11/14/20 CT PELV WO CONT Narrative EXAM: CT PELV WO CONT INDICATION: Frequent falls recently. No additional history at the time of this 
interpretation. COMPARISON: None TECHNIQUE: Helical CT of the bony pelvis with coronal and sagittal reformats. Images reviewed in soft tissue and bone windows. CT dose reduction was achieved 
through the use of a standardized protocol tailored for this examination and 
automatic exposure control for dose modulation. CONTRAST: None. FINDINGS: Bones: Bones are osteopenic. No acute fracture. No aggressive bone 
lesion. Joint fluid: None. Articulations: Minimal bilateral hip osteoarthritis. No sacroiliitis. Tendons: No full-thickness tendon tear. Muscles: Mild diffuse atrophy. Soft tissue mass: None. Urinary bladder diverticulum. Colonic diverticulosis. Mild inflammation at the junction of the descending colon with the sigmoid 
colon. Left inguinal hernia contains fat. Impression IMPRESSION:  
1. No fracture. 2. Incidentally imaged mild colonic diverticulitis at the junction of the 
descending colon and sigmoid colon. Lab Review Recent Results (from the past 24 hour(s)) TROPONIN I Collection Time: 11/15/20  2:51 PM  
Result Value Ref Range Troponin-I, Qt. <0.05 <0.05 ng/mL ASPIRIN TEST Collection Time: 11/15/20  2:51 PM  
Result Value Ref Range Aspirin test 394 ARU  
SAMPLES BEING HELD Collection Time: 11/16/20 11:30 AM  
Result Value Ref Range SAMPLES BEING HELD 1LAV   
 COMMENT Add-on orders for these samples will be processed based on acceptable specimen integrity and analyte stability, which may vary by analyte. Exam: 
Visit Vitals BP (!) 156/64 (BP 1 Location: Right arm, BP Patient Position: At rest) Pulse 65 Temp 97.5 °F (36.4 °C) Resp 15 Ht 5' 10\" (1.778 m) Wt 87.3 kg (192 lb 7.4 oz) SpO2 94% BMI 27.62 kg/m² Gen: Well developed CV: RRR Neuro: A&O x 3, no dysarthria or aphasia CN II-XII: PERRL, EOMI, left facial droop;  tongue/palate midline Motor: strength 5/5 all four ext Sensory: intact to LT Gait: deferred Assessment:  
 
Patient Active Problem List  
Diagnosis Code  Chronic headaches R51.9, G89.29  
 Giant cell arteritis with polymyalgia rheumatica ([facility]) M31.5  Long term (current) use of systemic steroids Z79.52  
 Long-term use of immunosuppressant medication Z79.899  PMR (polymyalgia rheumatica) ([facility]) M35.3  Acute CVA (cerebrovascular accident) ([facility] [address] I63.9 Plan:  
Right MCA infarct - Seen on MRI -CTA has mild atherosclerosis but no significant disease. 
 -Echo, pending  
 -ARU TEST 394, therapeutic continue aspirin 81 mg 
 - LDL 82.6, goal less than 70, Will switch pravastatin 40 mg to  Lipitor 40 mg  
 -A1c 5.1, at goal  
 - Stroke education 
 -PT/OT/ SLP Signed: 
Zeb Hall NP 
11/16/2020 
12:45 PM

## 2020-11-16 NOTE — PROGRESS NOTES
Bedside shift change report given to Orlin Castillo RN (oncoming nurse) by Rosario Matta RN (offgoing nurse). Report included the following information SBAR, Kardex, ED Summary, Procedure Summary, Intake/Output, MAR, Cardiac Rhythm NSR w AVB, Quality Measures and Dual Neuro Assessment.

## 2020-11-16 NOTE — PROGRESS NOTES
Problem: TIA/CVA Stroke: Day 2 Until Discharge  Goal: Nutrition/Diet  Outcome: Progressing Towards Goal  Goal: Discharge Planning  Outcome: Progressing Towards Goal  Goal: Medications  Outcome: Progressing Towards Goal  Goal: Respiratory  Outcome: Progressing Towards Goal

## 2020-11-16 NOTE — PROGRESS NOTES
[address] [facility] Adult  Hospitalist Group                                                                                          Hospitalist Progress Note  Delaney Hutchison MD  Answering service: [phone] from in house phone        Date of Service:  2020  NAME:  Volodymyr Duran  :  1929  MRN:  [id number]      Admission Summary: This is a 80-year-old man with a past medical history significant for hypertension; dyslipidemia; hypothyroidism; giant-cell arteritis with polymyalgia rheumatica; coronary artery disease, status post stent placement; obstructive sleep apnea; hearing impairment, was in his usual state of health until a couple of days ago when the patient developed an unsteady gait. As a result of the unsteady gait, the patient is falling at home multiple times. The patient also complained of dizziness. The patient described the dizziness as room spinning around him. The night before going to the emergency room, the patient fell. The fall was not witnessed because the patient lives alone, but he denies head injury. Following the fall, the patient started complaining of right shoulder pain. The unsteadiness became associated with confusion. The patient was taken to the local hospital at [place], [place]. When the patient arrived at the emergency room, there was a concern for acute stroke. The patient had a CT scan of the head done, which was negative. CTA of the head and neck was also performed, which did not show any significant abnormalities. The patient was then transferred to [facility] for further evaluation for acute stroke with an MRI of the brain which is not available at the hospital in [facility]. The patient rated the right shoulder pain as 4/10, with no radiation, worse with movement of the right shoulder, slight relief with no activity.   The patient has no record of prior admission to this hospital.  He is under the care of a rheumatologist for his giant-cell arteritis with polymyalgia rheumatica. No prior history of TIA or CVA. Interval history / Subjective:   Patient seen and examined bedside  discussed with RN  patient is pleasantly demented  MRI showed CVA      Assessment & Plan:     1. Suspected acute CVA. -CT CTA of the head and neck negative CT perfusion study also negative will get MRI of the brain and echocardiogram.    -Continue aspirin and statin  - MRI - There is an acute infarction in the posterior limb of the right internal  Capsule. There are moderate changes small vessel disease periventricular white matter. Results called to the nurse by the technologist.  - echo- pending   -Consulted neurologist     2. Hypertension.    - We will monitor the patient's blood pressure closely. On beta-blocker  - slightly high permissive HTN    3. Dyslipidemia. We will continue with Pravachol and fish oil. lipid profile at goal    4. Hypothyroidism. We will continue with Synthroid. level is high need to increase Synthroid dose may f/u with PCP vs he did not take his meds? 5.  Giant-cell arteritis with polymyalgia rheumatica. - ?  Given Actemra before    6. Hearing impairment. We will continue supportive treatment. 7.  Multiple falls at home. CT scan of the head is negative. CTA of the neck did not show any acute pathology. We will check CT scan of the pelvis also did not show any fracture  -PT OT evaluation MRI small CVA    8. Coronary artery disease, status post stent placement. We will continue cardiac medications, aspirin, lisinopril, and Lopressor. troponin neg x3    9. Obstructive sleep apnea. The patient is not on CPAP machine at home. We will continue supplemental oxygen as needed. 10.  Right shoulder pain. XRAY - No fracture is identified. .There are severe degenerative changes Right shoulder. If symptoms persist follow-up study is recommended. 11.   BPH continue Flomax     Code status: DNR DVT prophylaxis: scd    Care Plan discussed with: Patient/Family and Nurse  Anticipated Disposition: Home w/Family  Anticipated Discharge: Less than 24 hours   Son Mr Owusu Delay [phone] updated      Hospital Problems  Date Reviewed: 11/15/2020          Codes Class Noted POA    * (Principal) Acute CVA (cerebrovascular accident) [facility]) ICD-10-CM: I63.9  ICD-9-CM: 434.91  11/15/2020 Yes                Review of Systems:   A comprehensive review of systems was negative. Vital Signs:    Last 24hrs VS reviewed since prior progress note. Most recent are:  Visit Vitals  BP (!) 156/64 (BP 1 Location: Right arm, BP Patient Position: At rest)   Pulse 65   Temp 97.5 °F (36.4 °C)   Resp 15   Ht 5' 10\" (1.778 m)   Wt 87.3 kg (192 lb 7.4 oz)   SpO2 94%   BMI 27.62 kg/m²       No intake or output data in the 24 hours ending 11/16/20 1045     Physical Examination:     I had a face to face encounter with this patient and independently examined them on 11/16/2020 as outlined below:          Constitutional:  No acute distress, cooperative, coming out of the bed   ENT:  Oral mucosa moist, oropharynx benign. Resp:  CTA bilaterally. CV:  Regular rhythm, normal rate, no murmurs, gallops, rubs    GI:  Soft, non distended, non tender. normoactive bowel sounds, no hepatosplenomegaly     Musculoskeletal:  No edema, warm, 2+ pulses throughout    Neurologic:  Moves all extremities. AAOx0, dementia            Data Review:    I personally reviewed  Image and labs    Xr Shoulder Rt Ap/lat Min 2 V    Result Date: 11/15/2020  IMPRESSION: No fracture is identified. .There are severe degenerative changes right shoulder. If symptoms persist follow-up study is recommended. Mri Brain Wo Cont    Result Date: 11/15/2020  IMPRESSION: 1. There is an acute infarction in the posterior limb of the right internal capsule. 2. There are moderate changes small vessel disease periventricular white matter.  Results called to the nurse by the technologist.    Ct Head Wo Cont    Result Date: 11/14/2020  Impression: 1. No acute intracranial finding. 2. Remote lacunar infarct left caudate head. 3. Moderate changes of suspected small vessel ischemic disease with diffuse cerebral atrophy. Cta Head    Result Date: 11/15/2020  Impression: No evidence for acute large vessel arterial occlusion. Intracranial and extracranial atherosclerosis as described above. No significant cerebral perfusion abnormality or mismatch      Cta Neck    Result Date: 11/15/2020  Impression: No evidence for acute large vessel arterial occlusion. Intracranial and extracranial atherosclerosis as described above. No significant cerebral perfusion abnormality or mismatch      Ct Pelv Wo Cont    Result Date: 11/15/2020  IMPRESSION: 1. No fracture. 2. Incidentally imaged mild colonic diverticulitis at the junction of the descending colon and sigmoid colon. Ct Perf W Cbf    Result Date: 11/15/2020  Impression: No evidence for acute large vessel arterial occlusion. Intracranial and extracranial atherosclerosis as described above. No significant cerebral perfusion abnormality or mismatch      Xr Chest Port    Result Date: 11/14/2020  IMPRESSION:  No evidence of an acute cardiopulmonary process. Labs:     Recent Labs     11/14/20  1428   WBC 8.3   HGB 14.6   HCT 43.1        Recent Labs     11/15/20  0425 11/14/20  1428   NA  --  136   K  --  4.6   CL  --  100   CO2  --  30   BUN  --  13   CREA  --  1.08   GLU  --  95   CA  --  9.0   MG 2.3  --    PHOS 3.2  --      Recent Labs     11/14/20  1428   ALT 46   AP 54   TBILI 0.8   TP 7.4   ALB 3.5   GLOB 3.9     No results for input(s): INR, PTP, APTT, INREXT, INREXT in the last 72 hours. No results for input(s): FE, TIBC, PSAT, FERR in the last 72 hours. Lab Results   Component Value Date/Time    Folate 48.0 (H) 11/15/2020 04:25 AM      No results for input(s): PH, PCO2, PO2 in the last 72 hours.   Recent Labs     11/15/20  1451 11/15/20  0425 11/14/20  2243   TROIQ <0.05 <0.05 <0.05     Lab Results   Component Value Date/Time    Cholesterol, total 142 11/15/2020 04:25 AM    HDL Cholesterol 39 11/15/2020 04:25 AM    LDL, calculated 82.6 11/15/2020 04:25 AM    Triglyceride 102 11/15/2020 04:25 AM    CHOL/HDL Ratio 3.6 11/15/2020 04:25 AM     No results found for: [facility]  Lab Results   Component Value Date/Time    Color YELLOW/STRAW 11/14/2020 11:23 PM    Appearance CLEAR 11/14/2020 11:23 PM    Specific gravity 1.021 11/14/2020 11:23 PM    pH (UA) 7.0 11/14/2020 11:23 PM    Protein TRACE (A) 11/14/2020 11:23 PM    Glucose Negative 11/14/2020 11:23 PM    Ketone Negative 11/14/2020 11:23 PM    Bilirubin Negative 11/14/2020 11:23 PM    Urobilinogen 0.2 11/14/2020 11:23 PM    Nitrites Negative 11/14/2020 11:23 PM    Leukocyte Esterase TRACE (A) 11/14/2020 11:23 PM    Epithelial cells FEW 11/14/2020 11:23 PM    Bacteria Negative 11/14/2020 11:23 PM    WBC 0-4 11/14/2020 11:23 PM    RBC 10-20 11/14/2020 11:23 PM         Medications Reviewed:     Current Facility-Administered Medications   Medication Dose Route Frequency    fish oil-omega-3 fatty acids 340-1,000 mg capsule 1 Cap  1 Cap Oral DAILY    levothyroxine (SYNTHROID) tablet 100 mcg  100 mcg Oral ACB    lisinopriL (PRINIVIL, ZESTRIL) tablet 10 mg  10 mg Oral DAILY    metoprolol tartrate (LOPRESSOR) tablet 12.5 mg  12.5 mg Oral DAILY    pravastatin (PRAVACHOL) tablet 40 mg  40 mg Oral QHS    tamsulosin (FLOMAX) capsule 0.4 mg  0.4 mg Oral DAILY    rOPINIRole (REQUIP) tablet 0.25 mg  0.25 mg Oral QHS    . PHARMACY TO SUBSTITUTE PER PROTOCOL (Reordered from: tocilizumab (ACTEMRA) 162 mg/0.9 mL syringe)    Per Protocol    acetaminophen (TYLENOL) tablet 650 mg  650 mg Oral Q4H PRN    Or    acetaminophen (TYLENOL) solution 650 mg  650 mg Per NG tube Q4H PRN    Or    acetaminophen (TYLENOL) suppository 650 mg  650 mg Rectal Q4H PRN    lactated Ringers infusion  75 mL/hr IntraVENous CONTINUOUS    ondansetron (ZOFRAN) injection 4 mg  4 mg IntraVENous Q6H PRN    aspirin chewable tablet 81 mg  81 mg Oral DAILY    bisacodyL (DULCOLAX) tablet 5 mg  5 mg Oral DAILY PRN    heparin (porcine) injection 5,000 Units  5,000 Units SubCUTAneous Q8H    lidocaine 4 % patch 1 Patch  1 Patch TransDERmal Q24H    haloperidol lactate (HALDOL) injection 1 mg  1 mg IntraMUSCular Q8H PRN     ______________________________________________________________________  EXPECTED LENGTH OF STAY: - - -  ACTUAL LENGTH OF STAY:          1                 Mildred Clancy MD

## 2020-11-16 NOTE — PROGRESS NOTES
Orders received, chart reviewed and patient evaluated by physical therapy. Pending progression with skilled acute physical therapy, recommend:  Therapy 3 hours per day 5-7 days per week    Recommend with nursing patient to complete as able in order to maintain strength, endurance and independence: OOB to chair 3x/day with assist x1 and ambulating with assist x1. Thank you for your assistance. Full evaluation to follow.      Mere Joseph, PT, DPT

## 2020-11-16 NOTE — PROGRESS NOTES
SPEECH PATHOLOGY BEDSIDE SWALLOW EVALUATION/DISCHARGE  Patient: Adama Mensah Sr. (80 y.o. male)  Date: 11/16/2020  Primary Diagnosis: Acute CVA (cerebrovascular accident) [facility]) [I63.9]       Precautions: fall       ASSESSMENT :  Based on the objective data described below, the patient presents with functional oropharyngeal swallow. He is limited by [facility], but seems to communicate WNL otherwise. Skilled acute therapy provided by a speech-language pathologist is not indicated at this time. PLAN :  Recommendations:  Regular/thins, No SLP needs  Discharge Recommendations: None     SUBJECTIVE:   Patient stated I have hearing aids, I wear them all the time. OBJECTIVE:     Past Medical History:   Diagnosis Date    Arthritis     CAD (coronary artery disease)     Hard of hearing     Headache     PMR (polymyalgia rheumatica) (HCC)     Skin cancer     Sleep apnea     no longer uses cpap     Past Surgical History:   Procedure Laterality Date    CARDIAC SURG PROCEDURE UNLIST      cardiac stent    HX CAROTID STENT      VASCULAR SURGERY PROCEDURE UNLIST      carotid endartectomy     Prior Level of Function/Home Situation:    Home Situation  Home Environment: Private residence  # Steps to Enter: 4  Rails to Enter: Yes  One/Two Story Residence: One story  Living Alone: Yes  Support Systems: Child(ja)  Patient Expects to be Discharged to[de-identified] Unknown  Current DME Used/Available at Home: Walker, rolling  Diet prior to admission: regular/thins  Current Diet:  Regular/thins   Cognitive and Communication Status:  Neurologic State: Alert  Orientation Level: Oriented to person, Oriented to place, Oriented to time, Oriented to situation  Cognition: Follows commands  Perception: ([place])        Oral Assessment:  Oral Assessment  Labial: Left droop  Dentition: Natural  Oral Hygiene: dry  Lingual: No impairment  Mandible: No impairment  P.O.  Trials:  Patient Position: seated in chair  Vocal quality prior to P.O.: No impairment  Consistency Presented: Thin liquid; Solid;Puree  How Presented: Self-fed/presented;Cup/sip;Spoon;Cup/gulp; Successive swallows     Bolus Acceptance: No impairment  Bolus Formation/Control: No impairment     Propulsion: No impairment  Oral Residue: None        Aspiration Signs/Symptoms: None                Oral Phase Severity: No impairment  Pharyngeal Phase Severity : No impairment  NOMS:   The NOMS functional outcome measure was used to quantify this patient's level of swallowing impairment. Based on the NOMS, the patient was determined to be at level 7 for swallow function     NOMS Swallowing Levels:  Level 1 (CN): NPO  Level 2 (CM): NPO but takes consistency in therapy  Level 3 (CL): Takes less than 50% of nutrition p.o. and continues with nonoral feedings; and/or safe with mod cues; and/or max diet restriction  Level 4 (CK): Safe swallow but needs mod cues; and/or mod diet restriction; and/or still requires some nonoral feeding/supplements  Level 5 (CJ): Safe swallow with min diet restriction; and/or needs min cues  Level 6 (CI): Independent with p.o.; rare cues; usually self cues; may need to avoid some foods or needs extra time  Level 7 ([address] [place] [address]): Independent for all p.o.  KARINA. (2003). National Outcomes Measurement System (NOMS): Adult Speech-Language Pathology User's Guide. After treatment:   Patient left in no apparent distress sitting up in chair and Call bell within reach    COMMUNICATION/EDUCATION:   Patient was educated regarding purpose of SLP visit. He agreed to participate. The patient's plan of care including recommendations, planned interventions, and recommended diet changes were discussed with: Registered nurse.      Thank you for this referral.  SHAW Bui  Time Calculation: 14 mins

## 2020-11-16 NOTE — PROGRESS NOTES
NOEMI- To be determined. Reason for Admission:   Recurrent falls and dizziness                   RUR Score:    14%                 Plan for utilizing home health:   TBD       PCP: First and Last name:  Jaison Oro   Name of Practice:    Are you a current patient: Yes/No: Yes   Approximate date of last visit: 2 weeks ago   Can you participate in a virtual visit with your PCP: no                  Current Advanced Directive/Advance Care Plan: On file in chart. Transition of Care Plan:                    CM met with pt, but he is confused. CM called pt's son, Kash Hassan to introduce him to the role of CM and transition of care. He stated that this pt lives alone and has been independent with ADL's and IADL's, still active and driving. He stated that pt went to [facility] in the past and he would like a referral to be sent to [facility] if rehab is needed again. CM will [facility]. [address] Management Interventions  PCP Verified by CM: Yes  Palliative Care Criteria Met (RRAT>21 & CHF Dx)?: No  Transition of Care Consult (CM Consult): Discharge Planning  MyChart Signup: No  Discharge Durable Medical Equipment: No  Physical Therapy Consult: Yes  Occupational Therapy Consult: Yes  Speech Therapy Consult: Yes  Current Support Network: Family Lives Nearby, Lives Alone  Confirm Follow Up Transport: Family  The Plan for Transition of Care is Related to the Following Treatment Goals : safe d/c.   The Patient and/or Patient Representative was Provided with a Choice of Provider and Agrees with the Discharge Plan?: Yes  Freedom of Choice List was Provided with Basic Dialogue that Supports the Patient's Individualized Plan of Care/Goals, Treatment Preferences and Shares the Quality Data Associated with the Providers?: Yes  [employer] Information Provided?: No

## 2020-11-17 ENCOUNTER — APPOINTMENT (OUTPATIENT)
Dept: NON INVASIVE DIAGNOSTICS | Age: 85
DRG: 066 | End: 2020-11-17
Attending: INTERNAL MEDICINE
Payer: MEDICARE

## 2020-11-17 LAB
AV R PG: 72.74 MMHG
ECHO AR MAX VEL PISA: 426.43 CM/S
ECHO AV AREA PEAK VELOCITY: 2.33 CM2
ECHO AV AREA/BSA PEAK VELOCITY: 1.1 CM2/M2
ECHO AV CUSP MM: 0 CM
ECHO AV PEAK GRADIENT: 9.2 MMHG
ECHO AV PEAK VELOCITY: 151.69 CM/S
ECHO AV REGURGITANT PHT: 487.45 MS
ECHO LA AREA 4C: 24.25 CM2
ECHO LA MAJOR AXIS: 3.57 CM
ECHO LA MINOR AXIS: 1.73 CM
ECHO LA TO AORTIC ROOT RATIO: 0
ECHO LA VOL 4C: 77.3 ML (ref 18–58)
ECHO LA VOLUME INDEX A4C: 37.53 ML/M2 (ref 16–28)
ECHO LV INTERNAL DIMENSION DIASTOLIC: 5.22 CM (ref 4.2–5.9)
ECHO LV INTERNAL DIMENSION SYSTOLIC: 3.43 CM
ECHO LV IVSD: 0.73 CM (ref 0.6–1)
ECHO LV MASS 2D: 175 G (ref 88–224)
ECHO LV MASS INDEX 2D: 85 G/M2 (ref 49–115)
ECHO LV POSTERIOR WALL DIASTOLIC: 1.11 CM (ref 0.6–1)
ECHO LVOT DIAM: 2.02 CM
ECHO LVOT PEAK GRADIENT: 4.87 MMHG
ECHO LVOT PEAK VELOCITY: 110.3 CM/S
ECHO MV A VELOCITY: 92.62 CM/S
ECHO MV E VELOCITY: 76.25 CM/S
ECHO MV E/A RATIO: 0.82
ECHO MV REGURGITANT VTIA: 194.12 CM
ECHO RV INTERNAL DIMENSION: 4.14 CM
ECHO RV TAPSE: 2.6 CM (ref 1.5–2)
ECHO TV REGURGITANT MAX VELOCITY: 327.49 CM/S
ECHO TV REGURGITANT PEAK GRADIENT: 42.9 MMHG
MR PISA PV: 503.74 CM/S

## 2020-11-17 PROCEDURE — 74011000250 HC RX REV CODE- 250: Performed by: HOSPITALIST

## 2020-11-17 PROCEDURE — 97530 THERAPEUTIC ACTIVITIES: CPT

## 2020-11-17 PROCEDURE — 65660000000 HC RM CCU STEPDOWN

## 2020-11-17 PROCEDURE — 74011250636 HC RX REV CODE- 250/636: Performed by: INTERNAL MEDICINE

## 2020-11-17 PROCEDURE — 74011250637 HC RX REV CODE- 250/637: Performed by: INTERNAL MEDICINE

## 2020-11-17 PROCEDURE — 93306 TTE W/DOPPLER COMPLETE: CPT

## 2020-11-17 PROCEDURE — 87635 SARS-COV-2 COVID-19 AMP PRB: CPT

## 2020-11-17 PROCEDURE — 74011250637 HC RX REV CODE- 250/637: Performed by: NURSE PRACTITIONER

## 2020-11-17 RX ADMIN — HEPARIN SODIUM 5000 UNITS: 5000 INJECTION INTRAVENOUS; SUBCUTANEOUS at 12:30

## 2020-11-17 RX ADMIN — HEPARIN SODIUM 5000 UNITS: 5000 INJECTION INTRAVENOUS; SUBCUTANEOUS at 19:10

## 2020-11-17 RX ADMIN — HEPARIN SODIUM 5000 UNITS: 5000 INJECTION INTRAVENOUS; SUBCUTANEOUS at 02:08

## 2020-11-17 RX ADMIN — TAMSULOSIN HYDROCHLORIDE 0.4 MG: 0.4 CAPSULE ORAL at 08:23

## 2020-11-17 RX ADMIN — ATORVASTATIN CALCIUM 40 MG: 40 TABLET, FILM COATED ORAL at 21:59

## 2020-11-17 RX ADMIN — LISINOPRIL 10 MG: 10 TABLET ORAL at 08:22

## 2020-11-17 RX ADMIN — ROPINIROLE HYDROCHLORIDE 0.25 MG: 0.25 TABLET, FILM COATED ORAL at 21:59

## 2020-11-17 RX ADMIN — LEVOTHYROXINE SODIUM 100 MCG: 0.1 TABLET ORAL at 08:23

## 2020-11-17 RX ADMIN — METOPROLOL TARTRATE 12.5 MG: 25 TABLET, FILM COATED ORAL at 08:22

## 2020-11-17 RX ADMIN — ASPIRIN 81 MG: 81 TABLET, CHEWABLE ORAL at 08:22

## 2020-11-17 RX ADMIN — Medication 1 CAPSULE: at 08:22

## 2020-11-17 NOTE — PROGRESS NOTES
Problem: TIA/CVA Stroke: Day 2 Until Discharge  Goal: Activity/Safety  Outcome: Progressing Towards Goal  Goal: Diagnostic Test/Procedures  Outcome: Progressing Towards Goal  Goal: Nutrition/Diet  Outcome: Progressing Towards Goal  Goal: Discharge Planning  Outcome: Progressing Towards Goal  Goal: Medications  Outcome: Progressing Towards Goal  Goal: Respiratory  Outcome: Progressing Towards Goal

## 2020-11-17 NOTE — PROGRESS NOTES
[facility] Adult  Hospitalist Group                                                                                          Hospitalist Progress Note  Phoenix Tomas MD  Answering service: [phone] from in house phone        Date of Service:  2020  NAME:  Claudeen Freer Sr.  :  1929  MRN:  [id number]      Admission Summary: This is a 80-year-old man with a past medical history significant for hypertension; dyslipidemia; hypothyroidism; giant-cell arteritis with polymyalgia rheumatica; coronary artery disease, status post stent placement; obstructive sleep apnea; hearing impairment, was in his usual state of health until a couple of days ago when the patient developed an unsteady gait. As a result of the unsteady gait, the patient is falling at home multiple times. The patient also complained of dizziness. The patient described the dizziness as room spinning around him. The night before going to the emergency room, the patient fell. The fall was not witnessed because the patient lives alone, but he denies head injury. Following the fall, the patient started complaining of right shoulder pain. The unsteadiness became associated with confusion. The patient was taken to the local hospital at [place], [place]. When the patient arrived at the emergency room, there was a concern for acute stroke. The patient had a CT scan of the head done, which was negative. CTA of the head and neck was also performed, which did not show any significant abnormalities. The patient was then transferred to [facility] for further evaluation for acute stroke with an MRI of the brain which is not available at the hospital in [facility]. The patient rated the right shoulder pain as 4/10, with no radiation, worse with movement of the right shoulder, slight relief with no activity.   The patient has no record of prior admission to this hospital.  He is under the care of a rheumatologist for his giant-cell arteritis with polymyalgia rheumatica. No prior history of TIA or CVA. Interval history / Subjective:   Patient seen and examined bedside  discussed with RN  patient is pleasantly demented  MRI showed CVA  D/w son family want him to got to SNF   D/w CM      Assessment & Plan:     1. Suspected acute CVA. -CT CTA of the head and neck negative CT perfusion study also negative will get MRI of the brain    -Continue aspirin and statin  - MRI - There is an acute infarction in the posterior limb of the right internal  Capsule. There are moderate changes small vessel disease periventricular white matter. Results called to the nurse by the technologist.  - echo- pending   -Consulted neurologist     2. Hypertension.    - We will monitor the patient's blood pressure closely. Hold On beta-blocker  -  permissive HTN    3. Dyslipidemia. We will continue with Pravachol and fish oil. lipid profile at goal    4. Hypothyroidism. We will continue with Synthroid. level is high need to increase Synthroid dose may f/u with PCP vs he did not take his meds? 5.  Giant-cell arteritis with polymyalgia rheumatica. - ?  Given Actemra before no need for inpatient d/c actemra    6. Hearing impairment. We will continue supportive treatment. 7.  Multiple falls at home. CT scan of the head is negative. CTA of the neck did not show any acute pathology. We will check CT scan of the pelvis also did not show any fracture  -PT OT evaluation MRI small CVA need SNF rehab    8. Coronary artery disease, status post stent placement. We will continue cardiac medications, aspirin, lisinopril, and Lopressor. troponin neg x3    9. Obstructive sleep apnea. The patient is not on CPAP machine at home. We will continue supplemental oxygen as needed. 10.  Right shoulder pain. XRAY - No fracture is identified. .There are severe degenerative changes Right shoulder.  If symptoms persist follow-up study is recommended. 11. BPH continue Flomax     Code status: DNR   DVT prophylaxis: scd    Care Plan discussed with: Patient/Family and Nurse  Anticipated Disposition: Home w/Family  Anticipated Discharge: Less than 24 hours   Son Mr Gavino Ponce [phone] updated      Hospital Problems  Date Reviewed: 11/15/2020          Codes Class Noted POA    * (Principal) Acute CVA (cerebrovascular accident) [facility]) ICD-10-CM: I63.9  ICD-9-CM: 434.91  11/15/2020 Yes                Review of Systems:   A comprehensive review of systems was negative. Vital Signs:    Last 24hrs VS reviewed since prior progress note. Most recent are:  Visit Vitals  BP (!) 115/53   Pulse 67   Temp 98.1 °F (36.7 °C)   Resp 12   Ht 5' 10\" (1.778 m)   Wt 87.9 kg (193 lb 12.6 oz)   SpO2 95%   BMI 27.81 kg/m²         Intake/Output Summary (Last 24 hours) at 11/17/2020 0949  Last data filed at 11/17/2020 0002  Gross per 24 hour   Intake --   Output 800 ml   Net -800 ml        Physical Examination:     I had a face to face encounter with this patient and independently examined them on 11/17/2020 as outlined below:          Constitutional:  No acute distress, cooperative, coming out of the bed   ENT:  Oral mucosa moist, oropharynx benign. Resp:  CTA bilaterally. CV:  Regular rhythm, normal rate, no murmurs, gallops, rubs    GI:  Soft, non distended, non tender. normoactive bowel sounds, no hepatosplenomegaly     Musculoskeletal:  No edema, warm, 2+ pulses throughout    Neurologic:  Moves all extremities. AAOx0, dementia            Data Review:    I personally reviewed  Image and labs    Xr Shoulder Rt Ap/lat Min 2 V    Result Date: 11/15/2020  IMPRESSION: No fracture is identified. .There are severe degenerative changes right shoulder. If symptoms persist follow-up study is recommended. Mri Brain Wo Cont    Result Date: 11/15/2020  IMPRESSION: 1. There is an acute infarction in the posterior limb of the right internal capsule.  2. There are moderate changes small vessel disease periventricular white matter. Results called to the nurse by the technologist.    Ct Head Wo Cont    Result Date: 11/14/2020  Impression: 1. No acute intracranial finding. 2. Remote lacunar infarct left caudate head. 3. Moderate changes of suspected small vessel ischemic disease with diffuse cerebral atrophy. Cta Head    Result Date: 11/15/2020  Impression: No evidence for acute large vessel arterial occlusion. Intracranial and extracranial atherosclerosis as described above. No significant cerebral perfusion abnormality or mismatch      Cta Neck    Result Date: 11/15/2020  Impression: No evidence for acute large vessel arterial occlusion. Intracranial and extracranial atherosclerosis as described above. No significant cerebral perfusion abnormality or mismatch      Ct Pelv Wo Cont    Result Date: 11/15/2020  IMPRESSION: 1. No fracture. 2. Incidentally imaged mild colonic diverticulitis at the junction of the descending colon and sigmoid colon. Ct Perf W Cbf    Result Date: 11/15/2020  Impression: No evidence for acute large vessel arterial occlusion. Intracranial and extracranial atherosclerosis as described above. No significant cerebral perfusion abnormality or mismatch      Xr Chest Port    Result Date: 11/14/2020  IMPRESSION:  No evidence of an acute cardiopulmonary process. Labs:     Recent Labs     11/14/20  1428   WBC 8.3   HGB 14.6   HCT 43.1        Recent Labs     11/15/20  0425 11/14/20  1428   NA  --  136   K  --  4.6   CL  --  100   CO2  --  30   BUN  --  13   CREA  --  1.08   GLU  --  95   CA  --  9.0   MG 2.3  --    PHOS 3.2  --      Recent Labs     11/14/20  1428   ALT 46   AP 54   TBILI 0.8   TP 7.4   ALB 3.5   GLOB 3.9     No results for input(s): INR, PTP, APTT, INREXT, INREXT in the last 72 hours. No results for input(s): FE, TIBC, PSAT, FERR in the last 72 hours.    Lab Results   Component Value Date/Time    Folate 48.0 (H) 11/15/2020 04:25 AM      No results for input(s): PH, PCO2, PO2 in the last 72 hours.   Recent Labs     11/15/20  1451 11/15/20  0425 11/14/20  2243   TROIQ <0.05 <0.05 <0.05     Lab Results   Component Value Date/Time    Cholesterol, total 142 11/15/2020 04:25 AM    HDL Cholesterol 39 11/15/2020 04:25 AM    LDL, calculated 82.6 11/15/2020 04:25 AM    Triglyceride 102 11/15/2020 04:25 AM    CHOL/HDL Ratio 3.6 11/15/2020 04:25 AM     No results found for: [facility]  Lab Results   Component Value Date/Time    Color YELLOW/STRAW 11/14/2020 11:23 PM    Appearance CLEAR 11/14/2020 11:23 PM    Specific gravity 1.021 11/14/2020 11:23 PM    pH (UA) 7.0 11/14/2020 11:23 PM    Protein TRACE (A) 11/14/2020 11:23 PM    Glucose Negative 11/14/2020 11:23 PM    Ketone Negative 11/14/2020 11:23 PM    Bilirubin Negative 11/14/2020 11:23 PM    Urobilinogen 0.2 11/14/2020 11:23 PM    Nitrites Negative 11/14/2020 11:23 PM    Leukocyte Esterase TRACE (A) 11/14/2020 11:23 PM    Epithelial cells FEW 11/14/2020 11:23 PM    Bacteria Negative 11/14/2020 11:23 PM    WBC 0-4 11/14/2020 11:23 PM    RBC 10-20 11/14/2020 11:23 PM         Medications Reviewed:     Current Facility-Administered Medications   Medication Dose Route Frequency    atorvastatin (LIPITOR) tablet 40 mg  40 mg Oral QHS    fish oil-omega-3 fatty acids 340-1,000 mg capsule 1 Cap  1 Cap Oral DAILY    levothyroxine (SYNTHROID) tablet 100 mcg  100 mcg Oral ACB    lisinopriL (PRINIVIL, ZESTRIL) tablet 10 mg  10 mg Oral DAILY    metoprolol tartrate (LOPRESSOR) tablet 12.5 mg  12.5 mg Oral DAILY    tamsulosin (FLOMAX) capsule 0.4 mg  0.4 mg Oral DAILY    rOPINIRole (REQUIP) tablet 0.25 mg  0.25 mg Oral QHS    acetaminophen (TYLENOL) tablet 650 mg  650 mg Oral Q4H PRN    Or    acetaminophen (TYLENOL) solution 650 mg  650 mg Per NG tube Q4H PRN    Or    acetaminophen (TYLENOL) suppository 650 mg  650 mg Rectal Q4H PRN    lactated Ringers infusion  75 mL/hr IntraVENous CONTINUOUS    ondansetron (ZOFRAN) injection 4 mg  4 mg IntraVENous Q6H PRN    aspirin chewable tablet 81 mg  81 mg Oral DAILY    bisacodyL (DULCOLAX) tablet 5 mg  5 mg Oral DAILY PRN    heparin (porcine) injection 5,000 Units  5,000 Units SubCUTAneous Q8H    lidocaine 4 % patch 1 Patch  1 Patch TransDERmal Q24H    haloperidol lactate (HALDOL) injection 1 mg  1 mg IntraMUSCular Q8H PRN     ______________________________________________________________________  EXPECTED LENGTH OF STAY: 2d 0h  ACTUAL LENGTH OF STAY:          2                 Jed Syed MD

## 2020-11-17 NOTE — PROGRESS NOTES
NOEMI- Pending auth for [facility]  Pt will need BLS transport. CM spoke with Fabby in admissions at [facility] and [facility]. ([phone]). She stated that she has submitted to pt's insurance for auth. He will need a negative COVID test before discharge. Nursing was notified.  Karen Keys

## 2020-11-17 NOTE — ROUTINE PROCESS
Bedside shift change report given to Radu Peralta (oncoming nurse) by Genaro Bell RN (offgoing nurse). Report included the following information SBAR, Kardex, ED Summary, Intake/Output, MAR, Cardiac Rhythm NSR w/ 1st degree AVB and Dual Neuro Assessment.

## 2020-11-17 NOTE — PROGRESS NOTES
NOEMI- Pending referral to [facility]. CM faxed pt's clinical information to Kenneth Erickson in admissions at [facility]. Covid test ordered today. [facility] will need to obtain auth from [employer] prior to d/c. Pt will need \[facility]\"" ambulance for transport. DANA Armas,ACM-SW

## 2020-11-17 NOTE — PROGRESS NOTES
NOEMI-Pending referral to [facility]. CM received a call from Masha Candelario, pt's son stating that he now wants this pt to go to [facility]. CM faxed pt's clinical to [facility].  DANA Nunez,ACM-SW

## 2020-11-17 NOTE — PROGRESS NOTES
Problem: Self Care Deficits Care Plan (Adult)  Goal: *Acute Goals and Plan of Care (Insert Text)  Description: FUNCTIONAL STATUS PRIOR TO ADMISSION: Patient was independent and active without use of DME.    HOME SUPPORT: The patient lived alone with one son in the area. Occupational Therapy Goals  Initiated 11/16/2020   1. Patient will perform standing grooming with modified independence within 7 day(s). 2.  Patient will perform UB and LB bathing with modified independence within 7 day(s). 3.  Patient will perform lower body dressing with modified independence within 7 day(s). 4.  Patient will perform toilet transfers with modified independence within 7 day(s). 5.  Patient will perform all aspects of toileting with modified independence within 7 day(s). 6.  Patient will participate in upper extremity therapeutic exercise/activities with modified independence for 5 minutes within 7 day(s). 7.  Patient will utilize energy conservation techniques during functional activities with verbal cues within 7 day(s). 8.  Patient will improve their Fugl Gore score by 5 points in prep for ADLs within 7 days. Outcome: Progressing Towards Goal       OCCUPATIONAL THERAPY TREATMENT  Patient: Mercy Cox  (80 y.o. male)  Date: 11/17/2020  Diagnosis: Acute CVA (cerebrovascular accident) [facility]) [I63.9]   Acute CVA (cerebrovascular accident) [facility])       Precautions:  pt is very [place]  Chart, occupational therapy assessment, plan of care, and goals were reviewed. ASSESSMENT  Patient continues with skilled OT services and is progressing towards goals. Pt received in chair, able to stand without UE assist to complete dynamic reaching without support. Intact balance noted during simulated ADL/IADLs. Completed name writing and clock drawing without error with appropriate spacing of numbers, problem solving and spatial relations with use of R UE (dominant).  He remains a fall risk at this time, need for RW with decreased activity tolerance. Recommend SNF rehab prior to pt returning home given his acute CVA    Current Level of Function Impacting Discharge (ADLs): CGA sit to stand, decreased activity tolerance, acute CVA    Other factors to consider for discharge: lives home alone         PLAN :  Patient continues to benefit from skilled intervention to address the above impairments. Continue treatment per established plan of care. to address goals. Recommend with staff: Genaro Washburn to chair and bathroom with RW and A x 1    Recommend next OT session: medication management for home safety assessment if pt discharges home, MOCA? Recommendation for discharge: (in order for the patient to meet his/her long term goals)  Therapy up to 5 days/week in SNF setting    This discharge recommendation:  Has been made in collaboration with the attending provider and/or case management    IF patient discharges home will need the following DME: shower chair       SUBJECTIVE:   Patient stated Zain Lamp it's hard to hear you.     OBJECTIVE DATA SUMMARY:   Cognitive/Behavioral Status:  Neurologic State: Alert;Confused  Orientation Level: Oriented X4  Cognition: Follows commands             Functional Mobility and Transfers for ADLs:  Bed Mobility:   OOB in chair     Transfers:  Sit to Stand: Contact guard assistance;Assist x1;Additional time          Balance:  Sitting: Intact; Without support  Standing: Impaired; Without support  Standing - Static: Good  Standing - Dynamic : Fair    ADL Intervention:     Pt completed sit to stands both with and without UEs, able to stand with additional time and SBA/CGA only. Completed dynamic reaching above, cross body and below RINA without LOB          Wrote name on line, reported same signature and successfully alex a clock with appropriate spacing and placement of clock hands.                       Pain:  none    Activity Tolerance:   Good    After treatment patient left in no apparent distress:   Sitting in chair, Call bell within reach, and Bed / chair alarm activated    COMMUNICATION/COLLABORATION:   The patients plan of care was discussed with: Physical therapist and Registered nurse.      Glen Mendieta OT  Time Calculation: 20 mins

## 2020-11-17 NOTE — PROGRESS NOTES
Physical Therapy Note  11/17/2020    Chart reviewed and attempted to see pt - received pt supine in bed, soundly asleep. Barely wakes to voice and requesting to rest. Pt has been up in chair all day - will defer per request and follow back later this PM vs tomorrow as able/appropriate.     Vickie Woods, PT, DPT

## 2020-11-18 PROCEDURE — 99231 SBSQ HOSP IP/OBS SF/LOW 25: CPT | Performed by: NURSE PRACTITIONER

## 2020-11-18 PROCEDURE — 74011250636 HC RX REV CODE- 250/636: Performed by: INTERNAL MEDICINE

## 2020-11-18 PROCEDURE — 97535 SELF CARE MNGMENT TRAINING: CPT

## 2020-11-18 PROCEDURE — 74011250637 HC RX REV CODE- 250/637: Performed by: NURSE PRACTITIONER

## 2020-11-18 PROCEDURE — 74011000250 HC RX REV CODE- 250: Performed by: HOSPITALIST

## 2020-11-18 PROCEDURE — 97116 GAIT TRAINING THERAPY: CPT

## 2020-11-18 PROCEDURE — 74011250637 HC RX REV CODE- 250/637: Performed by: INTERNAL MEDICINE

## 2020-11-18 PROCEDURE — 65660000000 HC RM CCU STEPDOWN

## 2020-11-18 RX ORDER — GUAIFENESIN 100 MG/5ML
81 LIQUID (ML) ORAL DAILY
Qty: 30 TAB | Refills: 0 | Status: SHIPPED | OUTPATIENT
Start: 2020-11-18 | End: 2020-12-18

## 2020-11-18 RX ADMIN — ASPIRIN 81 MG: 81 TABLET, CHEWABLE ORAL at 10:13

## 2020-11-18 RX ADMIN — Medication 1 CAPSULE: at 10:14

## 2020-11-18 RX ADMIN — HEPARIN SODIUM 5000 UNITS: 5000 INJECTION INTRAVENOUS; SUBCUTANEOUS at 07:49

## 2020-11-18 RX ADMIN — LEVOTHYROXINE SODIUM 100 MCG: 0.1 TABLET ORAL at 07:49

## 2020-11-18 RX ADMIN — HEPARIN SODIUM 5000 UNITS: 5000 INJECTION INTRAVENOUS; SUBCUTANEOUS at 11:58

## 2020-11-18 RX ADMIN — TAMSULOSIN HYDROCHLORIDE 0.4 MG: 0.4 CAPSULE ORAL at 10:14

## 2020-11-18 RX ADMIN — ROPINIROLE HYDROCHLORIDE 0.25 MG: 0.25 TABLET, FILM COATED ORAL at 21:37

## 2020-11-18 RX ADMIN — HEPARIN SODIUM 5000 UNITS: 5000 INJECTION INTRAVENOUS; SUBCUTANEOUS at 18:50

## 2020-11-18 RX ADMIN — ACETAMINOPHEN 650 MG: 325 TABLET ORAL at 10:21

## 2020-11-18 RX ADMIN — ATORVASTATIN CALCIUM 40 MG: 40 TABLET, FILM COATED ORAL at 21:37

## 2020-11-18 NOTE — PROGRESS NOTES
Problem: Mobility Impaired (Adult and Pediatric)  Goal: *Acute Goals and Plan of Care (Insert Text)  Description:   FUNCTIONAL STATUS PRIOR TO ADMISSION: Patient was independent and active without use of DME.    HOME SUPPORT PRIOR TO ADMISSION: The patient lived alone. Family lives in the area. Physical Therapy Goals  Initiated 11/16/2020  1. Patient will move from supine to sit and sit to supine  and scoot up and down in bed with modified independence within 7 day(s). 2.  Patient will transfer from bed to chair and chair to bed with modified independence using the least restrictive device within 7 day(s). 3.  Patient will perform sit to stand with modified independence within 7 day(s). 4.  Patient will ambulate with modified independence for 200 feet with the least restrictive device within 7 day(s). 5.  Patient will ascend/descend 4 stairs with handrail(s) with modified independence within 7 day(s). 6.  Patient will improve Waite Balance score by 7 points within 7 days. Outcome: Progressing Towards Goal   PHYSICAL THERAPY TREATMENT  Patient: Adama Mensah Sr. (80 y.o. male)  Date: 11/18/2020  Diagnosis: Acute CVA (cerebrovascular accident) [facility]) [I63.9]   Acute CVA (cerebrovascular accident) ([facility] [address]       Precautions:    Chart, physical therapy assessment, plan of care and goals were reviewed. ASSESSMENT  Patient continues with skilled PT services and is progressing towards goals. Pt received seated in the chair with RN at bedside assessing BP. Pt requesting to use the bathroom. Opted to use [facility] for safety. Pt continues to be limited by generalized weakness, decreased functional mobility, impaired balance and gait, increased risk for falls and dependency from previous baseline. Pt tolerated transfers well with CGA, verbal cues for proper hand placement and RW. Pt tolerated short gait training x 6 ft x 2 with use of RW and min A.  Pt demonstrated decreased aixa, lateral trunk sway, but no overt LOB. Pt will continue to benefit from inpatient rehab upon discharge to continue therapy efforts. .     Vitals:    11/18/20 1017 11/18/20 1026 11/18/20 1037 11/18/20 1040   BP: (!) 89/44 (!) 92/38 (!) 101/54 (!) 98/43   BP 1 Location:   Right arm Right arm   BP Patient Position: Sitting Standing Sitting  Comment: on BSC Standing   Pulse:  70     Resp:       Temp:       SpO2:       Weight:       Height:             Current Level of Function Impacting Discharge (mobility/balance): min A gait training x 6 ft with RW    Other factors to consider for discharge: previously independent and ambulatory without AD         PLAN :  Patient continues to benefit from skilled intervention to address the above impairments. Continue treatment per established plan of care. to address goals. Recommendation for discharge: (in order for the patient to meet his/her long term goals)  Therapy 3 hours per day 5-7 days per week    This discharge recommendation:  Has been made in collaboration with the attending provider and/or case management    IF patient discharges home will need the following DME: to be determined (TBD)       SUBJECTIVE:   Patient stated Kell Dings you very much.     OBJECTIVE DATA SUMMARY:   Critical Behavior:  Neurologic State: Alert, Appropriate for age  Orientation Level: Oriented X4(intermittent confusion)  Cognition: Appropriate for age attention/concentration, Follows commands, Memory loss, Poor safety awareness  Safety/Judgement: Awareness of environment, Decreased awareness of need for safety, Decreased insight into deficits  Functional Mobility Training:  Bed Mobility:     Supine to Sit: Other (comment)(NT--pt received and returned seated in the chair)              Transfers:  Sit to Stand: Contact guard assistance  Stand to Sit: Contact guard assistance        Bed to Chair: Minimum assistance                    Balance:  Sitting: Intact; Without support  Standing: Impaired; Without support  Standing - Static: Good  Standing - Dynamic : Fair  Ambulation/Gait Training:  Distance (ft): 6 Feet (ft)  Assistive Device: Gait belt;Walker, rolling  Ambulation - Level of Assistance: Minimal assistance        Gait Abnormalities: Decreased step clearance;Trunk sway increased; Shuffling gait        Base of Support: Narrowed     Speed/Shayla: Pace decreased (<100 feet/min); Shuffled  Step Length: Right shortened;Left shortened        Pain Rating:  Pt denied pain    Activity Tolerance:   Fair and soft BP    After treatment patient left in no apparent distress:   Sitting in chair, Call bell within reach, Bed / chair alarm activated, and Side rails x 3    COMMUNICATION/COLLABORATION:   The patients plan of care was discussed with: Occupational therapist and Registered nurse.      Evelyne Moreno PT, DPT   Time Calculation: 16 mins

## 2020-11-18 NOTE — PROGRESS NOTES
Problem: Falls - Risk of  Goal: *Absence of Falls  Description: Document Jamar Smith Fall Risk and appropriate interventions in the flowsheet.   Outcome: Progressing Towards Goal  Note: Fall Risk Interventions:  Mobility Interventions: Communicate number of staff needed for ambulation/transfer, Bed/chair exit alarm, Patient to call before getting OOB    Mentation Interventions: Bed/chair exit alarm, Adequate sleep, hydration, pain control    Medication Interventions: Evaluate medications/consider consulting pharmacy, Bed/chair exit alarm    Elimination Interventions: Call light in reach, Bed/chair exit alarm    History of Falls Interventions: Bed/chair exit alarm         Problem: Patient Education: Go to Patient Education Activity  Goal: Patient/Family Education  Outcome: Progressing Towards Goal     Problem: TIA/CVA Stroke: 0-24 hours  Goal: Off Pathway (Use only if patient is Off Pathway)  Outcome: Progressing Towards Goal  Goal: Activity/Safety  Outcome: Progressing Towards Goal  Goal: Consults, if ordered  Outcome: Progressing Towards Goal  Goal: Diagnostic Test/Procedures  Outcome: Progressing Towards Goal  Goal: Nutrition/Diet  Outcome: Progressing Towards Goal  Goal: Discharge Planning  Outcome: Progressing Towards Goal  Goal: Medications  Outcome: Progressing Towards Goal  Goal: Respiratory  Outcome: Progressing Towards Goal  Goal: Treatments/Interventions/Procedures  Outcome: Progressing Towards Goal  Goal: Minimize risk of bleeding post-thrombolytic infusion  Outcome: Progressing Towards Goal  Goal: Monitor for complications post-thrombolytic infusion  Outcome: Progressing Towards Goal  Goal: Psychosocial  Outcome: Progressing Towards Goal  Goal: *Hemodynamically stable  Outcome: Progressing Towards Goal  Goal: *Neurologically stable  Description: Absence of additional neurological deficits    Outcome: Progressing Towards Goal  Goal: *Verbalizes anxiety and depression are reduced or absent  Outcome: Progressing Towards Goal  Goal: *Absence of Signs of Aspiration on Current Diet  Outcome: Progressing Towards Goal  Goal: *Absence of deep venous thrombosis signs and symptoms(Stroke Metric)  Outcome: Progressing Towards Goal  Goal: *Ability to perform ADLs and demonstrates progressive mobility and function  Outcome: Progressing Towards Goal  Goal: *Stroke education started(Stroke Metric)  Outcome: Progressing Towards Goal  Goal: *Dysphagia screen performed(Stroke Metric)  Outcome: Progressing Towards Goal  Goal: *Rehab consulted(Stroke Metric)  Outcome: Progressing Towards Goal     Problem: TIA/CVA Stroke: Day 2 Until Discharge  Goal: Off Pathway (Use only if patient is Off Pathway)  Outcome: Progressing Towards Goal  Goal: Activity/Safety  Outcome: Progressing Towards Goal  Goal: Diagnostic Test/Procedures  Outcome: Progressing Towards Goal  Goal: Nutrition/Diet  Outcome: Progressing Towards Goal  Goal: Discharge Planning  Outcome: Progressing Towards Goal  Goal: Medications  Outcome: Progressing Towards Goal  Goal: Respiratory  Outcome: Progressing Towards Goal  Goal: Treatments/Interventions/Procedures  Outcome: Progressing Towards Goal  Goal: Psychosocial  Outcome: Progressing Towards Goal  Goal: *Verbalizes anxiety and depression are reduced or absent  Outcome: Progressing Towards Goal  Goal: *Absence of aspiration  Outcome: Progressing Towards Goal  Goal: *Absence of deep venous thrombosis signs and symptoms(Stroke Metric)  Outcome: Progressing Towards Goal  Goal: *Optimal pain control at patient's stated goal  Outcome: Progressing Towards Goal  Goal: *Tolerating diet  Outcome: Progressing Towards Goal  Goal: *Ability to perform ADLs and demonstrates progressive mobility and function  Outcome: Progressing Towards Goal  Goal: *Stroke education continued(Stroke Metric)  Outcome: Progressing Towards Goal

## 2020-11-18 NOTE — PROGRESS NOTES
Bedside shift change report given to Leila Horne (oncoming nurse) by Lucinda Zhou (offgoing nurse). Report included the following information SBAR, Kardex, MAR, Recent Results, Cardiac Rhythm NSR W/ 1st degree, Alarm Parameters  and Dual Neuro Assessment.

## 2020-11-18 NOTE — PROGRESS NOTES
Neurology Progress Note    Patient ID:  Jeffrey Shepherd  [id number]  80 y.o.  2/18/1929    Chief Complaint:    Subjective:   HPI  Tone Small is a 77-year-old gentleman with a history of hypertension, PMR, CAD, etc. who is here in the hospital because in the past 48 hours he began to have more falls. He had slurred speech. His son is present who sees him every day and tells me his baseline is to be fully independent driving without difficulty. Speech is clear and he ambulates independently. At the moment he just had an MRI with sedation. He is confused. He cannot follow commands. Son tells me he has been off aspirin for about a year but that it was replaced with something else he is unsure of. MRI was done showing a right posterior limb internal capsule infarct. Interval 11/18/2020  He seems to be doing well this morning. Denies HA's, dizziness, weakness,vision changes. Objective:    All records in [facility] reviewed and noted    ROS:  Per HPI  All other 12 pt ROS negative    Meds:  Current Facility-Administered Medications   Medication Dose Route Frequency    atorvastatin (LIPITOR) tablet 40 mg  40 mg Oral QHS    fish oil-omega-3 fatty acids 340-1,000 mg capsule 1 Cap  1 Cap Oral DAILY    levothyroxine (SYNTHROID) tablet 100 mcg  100 mcg Oral ACB    lisinopriL (PRINIVIL, ZESTRIL) tablet 10 mg  10 mg Oral DAILY    metoprolol tartrate (LOPRESSOR) tablet 12.5 mg  12.5 mg Oral DAILY    tamsulosin (FLOMAX) capsule 0.4 mg  0.4 mg Oral DAILY    rOPINIRole (REQUIP) tablet 0.25 mg  0.25 mg Oral QHS    acetaminophen (TYLENOL) tablet 650 mg  650 mg Oral Q4H PRN    Or    acetaminophen (TYLENOL) solution 650 mg  650 mg Per NG tube Q4H PRN    Or    acetaminophen (TYLENOL) suppository 650 mg  650 mg Rectal Q4H PRN    lactated Ringers infusion  75 mL/hr IntraVENous CONTINUOUS    ondansetron (ZOFRAN) injection 4 mg  4 mg IntraVENous Q6H PRN    aspirin chewable tablet 81 mg  81 mg Oral DAILY    bisacodyL (DULCOLAX) tablet 5 mg  5 mg Oral DAILY PRN    heparin (porcine) injection 5,000 Units  5,000 Units SubCUTAneous Q8H    lidocaine 4 % patch 1 Patch  1 Patch TransDERmal Q24H    haloperidol lactate (HALDOL) injection 1 mg  1 mg IntraMUSCular Q8H PRN       Imaging:  MRI Results (most recent):  Results from Hospital Encounter encounter on 11/14/20   MRI BRAIN WO CONT    Narrative EXAM: MRI BRAIN WO CONT    INDICATION: CVA    COMPARISON: None. CONTRAST: None. TECHNIQUE:    Multiplanar multisequence acquisition without contrast of the brain. FINDINGS:  There is mild prominence of the ventricles and sulci. There are moderate changes  of small vessel disease periventricular white matter. There is an acute  infarction posterior limb of the right internal capsule. The paranasal sinuses, mastoid air cells, and middle ears are clear. The orbital  contents are within normal limits. No significant osseous or scalp lesions are  identified. Impression IMPRESSION:     1. There is an acute infarction in the posterior limb of the right internal  capsule. 2. There are moderate changes small vessel disease periventricular white matter. Results called to the nurse by the technologist.     [address] Results (most recent):  Results from [place] encounter on 11/14/20   CT PELV WO CONT    Narrative EXAM: CT PELV WO CONT    INDICATION: Frequent falls recently. No additional history at the time of this  interpretation. COMPARISON: None    TECHNIQUE: Helical CT of the bony pelvis with coronal and sagittal reformats. Images reviewed in soft tissue and bone windows. CT dose reduction was achieved  through the use of a standardized protocol tailored for this examination and  automatic exposure control for dose modulation. CONTRAST: None. FINDINGS: Bones: Bones are osteopenic. No acute fracture. No aggressive bone  lesion. Joint fluid: None. Articulations: Minimal bilateral hip osteoarthritis. No sacroiliitis. Tendons: No full-thickness tendon tear. Muscles: Mild diffuse atrophy. Soft tissue mass: None. Urinary bladder diverticulum. Colonic diverticulosis. Mild inflammation at the junction of the descending colon with the sigmoid  colon. Left inguinal hernia contains fat. Impression IMPRESSION:   1. No fracture. 2. Incidentally imaged mild colonic diverticulitis at the junction of the  descending colon and sigmoid colon.        Lab Review   Recent Results (from the past 24 hour(s))   SARS-COV-2    Collection Time: 11/17/20 10:59 AM   Result Value Ref Range    Specimen source Nasopharyngeal      SARS-CoV-2 Not detected NOTD      SARS-CoV-2 PENDING     Specimen source NP SWAB     COVID-19 rapid test PENDING     Specimen type NP Swab      Health status Symptomatic Testing      COVID-19 PENDING    ECHO ADULT COMPLETE    Collection Time: 11/17/20  2:14 PM   Result Value Ref Range    IVSd 0.73 0.6 - 1.0 cm    LVIDd 5.22 4.2 - 5.9 cm    LVIDs 3.43 cm    LVOT d 2.02 cm    LVPWd 1.11 (A) 0.6 - 1.0 cm    LVOT Peak Gradient 4.87 mmHg    LVOT Peak Velocity 110.30 cm/s    RVIDd 4.14 cm    Left Atrium Major Axis 3.57 cm    LA Area 4C 24.25 cm2    LA Vol 4C 77.30 (A) 18 - 58 mL    Left Atrium to Aortic Root Ratio 0.00     AV Cusp 0.00 cm    Aortic Valve Area by Continuity of Peak Velocity 2.33 cm2    AV R PG 72.74 mmHg    Aortic Regurgitant Pressure Half-time 487.45 ms    AR Max William 426.43 cm/s    AoV PG 9.20 mmHg    Aortic Valve Systolic Peak Velocity 843.13 cm/s    MV A William 92.62 cm/s    MV E William 76.25 cm/s    Mitral Regurgitant PISA Peak Velocity 503.74 cm/s    Mitral Regurgitant Velocity Time Integral 194.12 cm    Tapse 2.60 (A) 1.5 - 2.0 cm    Triscuspid Valve Regurgitation Peak Gradient 42.90 mmHg    TR Max Velocity 327.49 cm/s    MV E/A 0.82     LV Mass .0 88 - 224 g    LV Mass AL Index 85.0 49 - 115 g/m2    Left Atrium Minor Axis 1.73 cm    LA Vol Index 37.53 16 - 28 ml/m2    JOANIE/BSA Pk William 1.1 cm2/m2       Exam:  Visit Vitals  BP (!) 140/65   Pulse 60   Temp 97.7 °F (36.5 °C)   Resp 12   Ht 5' 10\" (1.778 m)   Wt 87.9 kg (193 lb 12.6 oz)   SpO2 93%   BMI 27.81 kg/m²     Gen: Well developed  CV: RRR  Neuro: A&O x 3, no dysarthria or aphasia  CN II-XII: PERRL, EOMI, left facial droop;  tongue/palate midline  Motor: strength 5/5 all four ext  Sensory: intact to LT  Gait: deferred     Assessment:     Patient Active Problem List   Diagnosis Code    Chronic headaches R51.9, G89.29    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 [address] I63.9       Plan:   Right MCA infarct    - Seen on MRI    -CTA has mild atherosclerosis but no significant disease.   -Echo, LVEF is 60 - 65%. Normal cavity size, wall thickness and systolic function (ejection fraction normal).  Moderate (grade 2) left ventricular diastolic dysfunction   -ARU TEST 394, therapeutic continue aspirin 81 mg at discharge   - LDL 82.6, goal less than 70, Will switch pravastatin 40 mg to  Lipitor 40 mg    -A1c 5.1, at goal    - Stroke education   -PT/OT/ SLP    - okay to discharge from neurology standpoint   Signed:  Juan Carlos Mena NP  11/18/2020  12:45 PM

## 2020-11-18 NOTE — ACP (ADVANCE CARE PLANNING)
NOEMI;  MAN called Fabby at [facility] and [address] [place] [address] in [place]. We are still awaiting patient's covid results and she will have a bed in am and will need results faxed along with discharge packet information. Her phone is [phone] and fax [phone]. I called patient's son Primo Dick ph [phone] and informed him of the plan. He is in agreement to have ambulance transport to this facility and was made aware that his dad may receive an additional bill from ambulance service as the mileage is over 25 miles from [facility] in [place] to [place].   Care management will follow in am and inform physician of discharge and send a covid result to facility when available

## 2020-11-18 NOTE — PROGRESS NOTES
Problem: Self Care Deficits Care Plan (Adult)  Goal: *Acute Goals and Plan of Care (Insert Text)  Description: FUNCTIONAL STATUS PRIOR TO ADMISSION: Patient was independent and active without use of DME.    HOME SUPPORT: The patient lived alone with one son in the area. Occupational Therapy Goals  Initiated 11/16/2020   1. Patient will perform standing grooming with modified independence within 7 day(s). 2.  Patient will perform UB and LB bathing with modified independence within 7 day(s). 3.  Patient will perform lower body dressing with modified independence within 7 day(s). 4.  Patient will perform toilet transfers with modified independence within 7 day(s). 5.  Patient will perform all aspects of toileting with modified independence within 7 day(s). 6.  Patient will participate in upper extremity therapeutic exercise/activities with modified independence for 5 minutes within 7 day(s). 7.  Patient will utilize energy conservation techniques during functional activities with verbal cues within 7 day(s). 8.  Patient will improve their Fugl Gore score by 5 points in prep for ADLs within 7 days. Outcome: Progressing Towards Goal     OCCUPATIONAL THERAPY TREATMENT  Patient: Chula Carreon  (80 y.o. male)  Date: 11/18/2020  Diagnosis: Acute CVA (cerebrovascular accident) [facility]) [I63.9]   Acute CVA (cerebrovascular accident) [facility])       Precautions:    Chart, occupational therapy assessment, plan of care, and goals were reviewed. ASSESSMENT  Patient continues with skilled OT services and is progressing towards goals however remains limited by decreased balance, activity tolerance, safety awareness, memory, insight, and strength, as well as intermittent confusion requiring redirection to task and hypotension noted with toileting and mobility tasks this session.  He continues to require up to Min A for standing ADLs and brief mobility with moderate cues for safety with transfers, RW management, and environmental access, fluctuating confusion noted throughout. He jimena greatly benefit from d/c to IPR as he lives alone and remains well below his IND PLOF. Current Level of Function Impacting Discharge (ADLs): Min A for ADLs and mobility, infer at least Min-Mod A for IADLs    Other factors to consider for discharge: fall risk, lives alone, acute CVA, intermittent confusion         PLAN :  Patient continues to benefit from skilled intervention to address the above impairments. Continue treatment per established plan of care. to address goals. Recommend with staff: Recommend with nursing patient to complete as able in order to maintain strength, endurance and independence: ADLs with assist, OOB to chair 3x/day and mobilizing to the bathroom for toileting with 1 assist & RW. Thank you for your assistance. Recommend next OT session: Standing ADLs, IADL task (medication management, simulated cooking/grocery shopping), MOCA    Recommendation for discharge: (in order for the patient to meet his/her long term goals)  Therapy 3 hours per day 5-7 days per week  If unable, recommend d/c to SNF    This discharge recommendation:  Has been made in collaboration with the attending provider and/or case management    IF patient discharges home will need the following DME: To be determined (TBD)         SUBJECTIVE:   Patient stated Well I think it was a false alarm.     OBJECTIVE DATA SUMMARY:   Cognitive/Behavioral Status:  Neurologic State: Alert; Appropriate for age  Orientation Level: Oriented X4(intermittent confusion)  Cognition: Appropriate for age attention/concentration; Follows commands;Memory loss;Poor safety awareness     Perseveration: No perseveration noted  Safety/Judgement: Awareness of environment;Decreased awareness of need for safety;Decreased insight into deficits    Functional Mobility and Transfers for ADLs:  Bed Mobility:  Supine to Sit: Other (comment)(NT--pt received and returned seated in the chair)  Scooting: Contact guard assistance    Transfers:  Sit to Stand: Contact guard assistance  Functional Transfers  Toilet Transfer : Contact guard assistance  Cues: Verbal cues provided;Visual cues provided; Tactile cues provided  Adaptive Equipment: Bedside commode;Walker (comment)  Bed to Chair: Minimum assistance    Balance:  Sitting: Intact; Without support  Standing: Impaired; Without support(RW)  Standing - Static: Good  Standing - Dynamic : Fair    ADL Intervention:       Grooming  Grooming Assistance: Set-up  Position Performed: Seated in chair  Washing Hands: Set-up               1    Lower Body Dressing Assistance  Dressing Assistance: Contact guard assistance  Underpants: Contact guard assistance  Socks: Supervision  Leg Crossed Method Used: Yes  Position Performed: Seated in chair;Standing  Cues: Verbal cues provided;Visual cues provided; Tactile cues provided    Toileting  Toileting Assistance: Contact guard assistance  Bladder Hygiene: Contact guard assistance(incontinent episode post voiding)  Clothing Management: Contact guard assistance  Cues: Verbal cues provided;Visual cues provided; Tactile cues provided    Cognitive Retraining  Safety/Judgement: Awareness of environment;Decreased awareness of need for safety;Decreased insight into deficits    Pain:  None    Activity Tolerance:   Good, however somewhat limited by labile BPs this session    After treatment patient left in no apparent distress:   Sitting in chair, Call bell within reach, and Bed / chair alarm activated    COMMUNICATION/COLLABORATION:   The patients plan of care was discussed with: Physical therapist and Registered nurse. Patient was educated regarding His deficit(s) of impaired balance, activity tolerance, cognition, and strength as this relates to His diagnosis of CVA. He demonstrated Fair understanding as evidenced by verbal acknowledgement & carryover with activity.     Patient and/or family was verbally educated on the BE FAST acronym for signs/symptoms of CVA and TIA. BE FAST was written on patient's communication board  for visual education and reinforcement. All questions answered with patient indicating good understanding.      ANDRE Roland, OTR/L  Time Calculation: 17 mins

## 2020-11-18 NOTE — ROUTINE PROCESS
Bedside shift change report given to Radu Peralta (oncoming nurse) by Gordy Laurent RN (offgoing nurse). Report included the following information SBAR, Kardex, ED Summary, Intake/Output, MAR, Cardiac Rhythm NSR/SB w/1st degree AVB and Dual Neuro Assessment.

## 2020-11-18 NOTE — PROGRESS NOTES
Problem: Falls - Risk of  Goal: *Absence of Falls  Description: Document Nancy Marrufoccasin Fall Risk and appropriate interventions in the flowsheet. Outcome: Progressing Towards Goal  Note: Fall Risk Interventions:  Mobility Interventions: Bed/chair exit alarm, OT consult for ADLs, Patient to call before getting OOB    Mentation Interventions: Bed/chair exit alarm, Evaluate medications/consider consulting pharmacy, Increase mobility    Medication Interventions: Bed/chair exit alarm, Evaluate medications/consider consulting pharmacy, Patient to call before getting OOB    Elimination Interventions: Bed/chair exit alarm    History of Falls Interventions: Bed/chair exit alarm, Door open when patient unattended, Room close to nurse's station         Problem: TIA/CVA Stroke: Day 2 Until Discharge  Goal: Activity/Safety  Outcome: Progressing Towards Goal  Goal: Diagnostic Test/Procedures  Outcome: Progressing Towards Goal  Goal: Nutrition/Diet  Outcome: Progressing Towards Goal  Goal: *Stroke education continued(Stroke Metric)  Outcome: Progressing Towards Goal     Problem: Pressure Injury - Risk of  Goal: *Prevention of pressure injury  Description: Document Wali Scale and appropriate interventions in the flowsheet.   Outcome: Progressing Towards Goal  Note: Pressure Injury Interventions:  Sensory Interventions: Assess changes in LOC, Avoid rigorous massage over bony prominences, Discuss PT/OT consult with provider    Moisture Interventions: Minimize layers, Moisture barrier    Activity Interventions: Increase time out of bed, Assess need for specialty bed, PT/OT evaluation    Mobility Interventions: Assess need for specialty bed, HOB 30 degrees or less, Float heels    Nutrition Interventions: Document food/fluid/supplement intake, Discuss nutritional consult with provider

## 2020-11-18 NOTE — DISCHARGE SUMMARY
Discharge Summary       PATIENT ID: Mateo Perla Sr. MRN: [id number]   YOB: 1929    DATE OF ADMISSION: 11/14/2020 10:06 PM    DATE OF DISCHARGE: 11/18/20   PRIMARY CARE PROVIDER: Rain Herman MD     ATTENDING PHYSICIAN: Juan Muhammad  DISCHARGING PROVIDER: Rita Sandoval MD    To contact this individual call [phone] and ask the  to page. If unavailable ask to be transferred the Adult Hospitalist Department. CONSULTATIONS: IP CONSULT TO NEUROLOGY    PROCEDURES/SURGERIES: * No surgery found *    [address] COURSE:   This is a 19-year-old man with a past medical history significant for hypertension; dyslipidemia; hypothyroidism; giant-cell arteritis with polymyalgia rheumatica; coronary artery disease, status post stent placement; obstructive sleep apnea; hearing impairment, was in his usual state of health until a couple of days ago when the patient developed an unsteady gait.  As a result of the unsteady gait, the patient is falling at home multiple times.  The patient also complained of dizziness.  The patient described the dizziness as room spinning around him.  The night before going to the emergency room, the patient fell.  The fall was not witnessed because the patient lives alone, but he denies head injury.  Following the fall, the patient started complaining of right shoulder pain.  The unsteadiness became associated with confusion.  The patient was taken to the local hospital at [facility]. [address] the patient arrived at the emergency room, there was a concern for acute stroke.  The patient had a CT scan of the head done, which was negative.   CTA of the head and neck was also performed, which did not show any significant abnormalities.  The patient was then transferred to [facility] for further evaluation for acute stroke with an MRI of the brain which is not available at the hospital in [address] [place] [address] patient rated the right shoulder pain as 4/10, with no radiation, worse with movement of the right shoulder, slight relief with no activity.  The patient has no record of prior admission to this hospital. [facility] is under the care of a rheumatologist for his giant-cell arteritis with polymyalgia rheumatica.  No prior history of TIA or CVA.         Assessment & Plan:      1.   CVA POA   -CT CTA of the head and neck negative CT perfusion study also negative will get MRI of the brain    - MRI - There is an acute infarction in the posterior limb of the right internal  Capsule. There are moderate changes small vessel disease periventricular white matter. Results called to the nurse by the technologist.  - ECHO  Estimated LVEF is 60 - 65%. Normal cavity size, wall thickness and systolic function (ejection fraction normal). Moderate (grade 2) left ventricular diastolic dysfunction. IAS: No atrial septal defect present. Agitated saline contrast study was performed.   -Consulted neurologist cont asa and statin     2.  Hypertension.    - cont BB      3.  Dyslipidemia.  We will continue with Pravachol and fish oil.  lipid profile at goal     4.  Hypothyroidism.  We will continue with Synthroid. level is high need to increase Synthroid dose may f/u with PCP vs he did not take his meds?      5.  Giant-cell arteritis with polymyalgia rheumatica.  - stableno acute issue     6.  Hearing impairment.  We will continue supportive treatment.     7.  Multiple falls at home.  CT scan of the head is negative.   CTA of the neck did not show any acute pathology.  We will check CT scan of the pelvis also did not show any fracture  -PT OT evaluation MRI small CVA need SNF rehab     8.  Coronary artery disease, status post stent placement.  We will continue cardiac medications, aspirin, lisinopril, and Lopressor.  troponin neg x3     9.  Obstructive sleep apnea.  The patient is not on CPAP machine at home.  We will continue supplemental oxygen as needed.     10.  Right shoulder pain.  XRAY - No fracture is identified. .There are severe degenerative changes Right shoulder. If symptoms persist follow-up study is recommended.     11. BPH continue Flomax     Code status: DNR              DISCHARGE DIAGNOSES / PLAN:      1. D/c to SNF      ADDITIONAL CARE RECOMMENDATIONS:        PENDING TEST RESULTS:   At the time of discharge the following test results are still pending:     FOLLOW UP APPOINTMENTS:    Follow-up Information     Follow up With Specialties Details Why Contact Hardeep Askew MD Family Medicine In 1 week  [address] [facility]  [phone]               DIET: Cardiac Diet    ACTIVITY: Activity as tolerated    WOUND CARE:     EQUIPMENT needed:       DISCHARGE MEDICATIONS:  Current Discharge Medication List      START taking these medications    Details   aspirin 81 mg chewable tablet Take 1 Tab by mouth daily for 30 days. Qty: 30 Tab, Refills: 0         CONTINUE these medications which have NOT CHANGED    Details   tocilizumab (ACTEMRA) 162 mg/0.9 mL syringe 0.9 mL by SubCUTAneous route every Monday. Indications: Inflammation of the Artery in the [facility] SERVICES: 4 Syringe, Refills: 11    Associated Diagnoses: Giant cell arteritis with polymyalgia rheumatica (HCC)      rOPINIRole (REQUIP) 0.25 mg tablet Take 0.25 mg by mouth nightly. tamsulosin (FLOMAX) 0.4 mg capsule Take 0.4 mg by mouth daily. pravastatin (PRAVACHOL) 40 mg tablet Take 40 mg by mouth nightly. levothyroxine (SYNTHROID) 100 mcg tablet Take 100 mcg by mouth Daily (before breakfast). metoprolol tartrate (LOPRESSOR) 25 mg tablet Take 12.5 mg by mouth daily. lisinopril (PRINIVIL, ZESTRIL) 10 mg tablet Take 10 mg by mouth daily. fish oil-dha-epa 1,200-144-216 mg cap Take 1 Tab by mouth daily. multivitamin (ONE A DAY) tablet Take 1 Tab by mouth daily. NOTIFY YOUR PHYSICIAN FOR ANY OF THE FOLLOWING:   Fever over 101 degrees for 24 hours. Chest pain, shortness of breath, fever, chills, nausea, vomiting, diarrhea, change in mentation, falling, weakness, bleeding. Severe pain or pain not relieved by medications. Or, any other signs or symptoms that you may have questions about. DISPOSITION:    Home With:   OT  PT  HH  RN      x Long term SNF/Inpatient Rehab    Independent/assisted living    Hospice    Other:       PATIENT CONDITION AT DISCHARGE:     Functional status    Poor    x Deconditioned     Independent      Cognition     Lucid    x Forgetful     Dementia      Catheters/lines (plus indication)    Gaspar     PICC     PEG    x None      Code status     Full code    x DNR      PHYSICAL EXAMINATION AT DISCHARGE:  General:          Alert, cooperative, no distress, appears stated age. HEENT:           Atraumatic, anicteric sclerae, pink conjunctivae                          No oral ulcers, mucosa moist, throat clear, dentition fair  Neck:               Supple, symmetrical  Lungs:             Clear to auscultation bilaterally. No Wheezing or Rhonchi. No rales. Chest wall:      No tenderness  No Accessory muscle use. Heart:              Regular  rhythm,  No  murmur   No edema  Abdomen:        Soft, non-tender. Not distended. Bowel sounds normal  Extremities:     No cyanosis. No clubbing,                            Skin turgor normal, Capillary refill normal  Skin:                Not pale. Not Jaundiced  No rashes   Psych:             Not anxious or agitated.   Neurologic:      Alert, moves all extremities, answers questions appropriately and responds to commands       CHRONIC MEDICAL DIAGNOSES:  Problem List as of 11/18/2020 Date Reviewed: 11/18/2020          Codes Class Noted - Resolved    * (Principal) Acute CVA (cerebrovascular accident) ([facility] ICD-10-CM: I63.9  ICD-9-CM: 434.91  11/15/2020 - Present        Giant cell arteritis with polymyalgia rheumatica ([facility] ICD-10-CM: M31.5  ICD-9-CM: 446.5, 725  8/14/2018 - Present        Long term (current) use of systemic steroids ICD-10-CM: Z79.52  ICD-9-CM: V58.65  8/14/2018 - Present        Long-term use of immunosuppressant medication ICD-10-CM: Z79.899  ICD-9-CM: V58.69  8/14/2018 - Present        PMR (polymyalgia rheumatica) ([facility] ICD-10-CM: M35.3  ICD-9-CM: 426  8/14/2018 - Present        Chronic headaches ICD-10-CM: R51.9, G89.29  ICD-9-CM: 784.0  7/20/2018 - Present              Greater than 30  minutes were spent with the patient on counseling and coordination of care    Signed:   Meg Villa MD  11/18/2020  9:09 AM

## 2020-11-19 VITALS
WEIGHT: 205.69 LBS | TEMPERATURE: 97.7 F | BODY MASS INDEX: 29.45 KG/M2 | DIASTOLIC BLOOD PRESSURE: 61 MMHG | RESPIRATION RATE: 18 BRPM | OXYGEN SATURATION: 96 % | HEIGHT: 70 IN | HEART RATE: 65 BPM | SYSTOLIC BLOOD PRESSURE: 120 MMHG

## 2020-11-19 LAB
HEALTH STATUS, XMCV2T: NORMAL
SARS-COV-2, COV2: NOT DETECTED
SOURCE, COVRS: NORMAL
SPECIMEN SOURCE, FCOV2M: NORMAL
SPECIMEN TYPE, XMCV1T: NORMAL

## 2020-11-19 PROCEDURE — 74011250637 HC RX REV CODE- 250/637: Performed by: INTERNAL MEDICINE

## 2020-11-19 PROCEDURE — 74011250636 HC RX REV CODE- 250/636: Performed by: INTERNAL MEDICINE

## 2020-11-19 PROCEDURE — 74011000250 HC RX REV CODE- 250: Performed by: HOSPITALIST

## 2020-11-19 PROCEDURE — 97530 THERAPEUTIC ACTIVITIES: CPT

## 2020-11-19 RX ADMIN — ASPIRIN 81 MG: 81 TABLET, CHEWABLE ORAL at 09:16

## 2020-11-19 RX ADMIN — Medication 1 CAPSULE: at 09:16

## 2020-11-19 RX ADMIN — HEPARIN SODIUM 5000 UNITS: 5000 INJECTION INTRAVENOUS; SUBCUTANEOUS at 03:15

## 2020-11-19 RX ADMIN — TAMSULOSIN HYDROCHLORIDE 0.4 MG: 0.4 CAPSULE ORAL at 09:16

## 2020-11-19 RX ADMIN — HEPARIN SODIUM 5000 UNITS: 5000 INJECTION INTRAVENOUS; SUBCUTANEOUS at 13:06

## 2020-11-19 RX ADMIN — LEVOTHYROXINE SODIUM 100 MCG: 0.1 TABLET ORAL at 07:36

## 2020-11-19 NOTE — PROGRESS NOTES
Bedside and Verbal shift change report given to Melissa Galvan RN (oncoming nurse) by Tyra Ramon RN (offgoing nurse). Report included the following information SBAR, Kardex, Intake/Output, MAR, Recent Results and Cardiac Rhythm NSR.

## 2020-11-19 NOTE — PROGRESS NOTES
Bedside RN performed patient education and medication education. Discharge concerns initiated and discussed with patient, including clarification on \"who\" assists the patient at their home and instructions for when the home going patient should call their provider after discharge. Opportunity for questions and clarification was provided. Patient receptive to education: YES  Patient stated: \"My son will be bringing my shoes. \"  Barriers to Education: None  Diagnosis Education given:  YES    Length of stay: 4  Expected Day of Discharge: 111/19/2020  Ask if they have \"Help at Home\" & add to white board? YES    Education Day #: 7    Medication Education Given:  YES  M in the box Medication name: aspirin    Pt aware of HCAHPS survey: YES        Stroke Education documented in Patient Education: YES  Core Measures Documented in Connect [facility]:  Risk Factors: YES  Warning signs of stroke: YES  When to Activate 911: YES  Medication Education for Risk Factors: YES  Smoking cessation if applicable: YES  Written Education Given:  YES    Discharge NIH Completed: YES  Score: 0    BRAINS: NO    Follow Up Appointment Made: NO  Date/Time if applicable: One week after rehab    I have reviewed discharge instructions with the patient. The patient verbalized understanding. Patient is being transported by ambulance to [facility]. IV removed, belongings with patient. Report called to Nella House at [facility].

## 2020-11-19 NOTE — PROGRESS NOTES
Spiritual Care Assessment/Progress Note  ST. [address]      NAME: Niecy Forde MRN: [id number]  AGE: 80 y.o.  SEX: male  Nondenominational Affiliation: Unknown   Language: English     11/19/2020     Total Time (in minutes): 11     Spiritual Assessment begun in [address] through conversation with:         [x]Patient        [] Family    [] Friend(s)        Reason for Consult: Initial/Spiritual assessment, patient floor     Spiritual beliefs: (Please include comment if needed)     [] Identifies with a franchesca tradition:         [] Supported by a franchesca community:            [] Claims no spiritual orientation:           [] Seeking spiritual identity:                [] Adheres to an individual form of spirituality:           [x] Not able to assess:                           Identified resources for coping:      [] Prayer                               [] Music                  [] Guided Imagery     [] Family/friends                 [] Pet visits     [] Devotional reading                         [x] Unknown     [] Other:                                              Interventions offered during this visit: (See comments for more details)    Patient Interventions: Catharsis/review of pertinent events in supportive environment, Life review/legacy           Plan of Care:     [] Support spiritual and/or cultural needs    [] Support AMD and/or advance care planning process      [] Support grieving process   [] Coordinate Rites and/or Rituals    [] Coordination with community clergy   [] No spiritual needs identified at this time   [] Detailed Plan of Care below (See Comments)  [] Make referral to Music Therapy  [] Make referral to Pet Therapy     [] Make referral to Addiction services  [] Make referral to [facility]  [] Make referral to Spiritual Care Partner  [] No future visits requested        [x] Follow up visits as needed     Comments:  visited Mr. Mace Aurelio for an initial spiritual assessment on [facility]. Mr. Ryan Jenkins was awake, alert with some confusion, and was sitting up in his recliner. He made good eye contact with the  and greeted the  warmly. He briefly spoke about being lost for the past two week and going back to [facility] where he is from.  affirmed that being lost must have been a very scary experience for him, but Mr. Ryan Jenkins did not engage in this conversation. He provided some life review, talking about his work at [employer] and how he spent his [facility] traveling. He asked that  if she knew his son, Radha Saini, and when  shared that she had not met his son, he shared he would let Radha Saini know the  stopped by.  disengaged in the conversation as he was getting ready to be discharged from the hospital. He thanked the  for stopping by.  was unable to assess any spiritual and/or Jainism needs at this time. 's will follow up as able and/or needed  "[employer]. [facility].      Paging Service: [phone])

## 2020-11-19 NOTE — PROGRESS NOTES
Bedside shift change report given to Dayton Goodpasture (oncoming nurse) by Dilan Virk (offgoing nurse). Report included the following information SBAR, Kardex, Intake/Output, MAR, Cardiac Rhythm NSR and Quality Measures.

## 2020-11-19 NOTE — DISCHARGE INSTRUCTIONS
Discharge SNF/Rehab Instructions/LTAC       PATIENT ID: Latricia Oakley MRN: [id number]   YOB: 1929    DATE OF ADMISSION: 11/14/2020 10:06 PM    DATE OF DISCHARGE: 11/19/2020    PRIMARY CARE PROVIDER: Rosalinda Hall MD       ATTENDING PHYSICIAN: Jose C Morrison MD  DISCHARGING PROVIDER: Sita Melvin MD     To contact this individual call [phone] and ask the  to page. If unavailable ask to be transferred the Adult Hospitalist Department. CONSULTATIONS: IP CONSULT TO NEUROLOGY    PROCEDURES/SURGERIES: * No surgery found *      DISCHARGE DIAGNOSES / PLAN:      DISCHARGE DIAGNOSES   # Acute ischemic stroke/ CVA (POA)  - Appreciate Neurology input- continue Aspirin and statin     # Hypertension.    - continue BB- Lopressor 12.5mg daily   - stopped Lisinopril as patient blood pressure on the lower side      # Dyslipidemia: continue with Pravachol and fish oil.  lipid profile at goal     # Hypothyroidism: on  Synthroid. TSH level is high need to increase Synthroid dose may f/u with PCP vs he did not take his meds?  - Repeat TSH with in 4-6 weeks while on current dose of synthroid, then re-adjust if needed      # Giant-cell arteritis with polymyalgia rheumatica.  - stableno acute issue  # Hearing impairment: continue supportive treatment.     # Multiple falls at home.    - PT/ OT     # CAD s/p stent placement : continue cardiac medications, aspirin and Lopressor     # Obstructive sleep apnea.  The patient is not on CPAP machine at home.       # Right shoulder pain.  X-RAY - No fracture is identified. There are severe degenerative changes Right shoulder.     # BPH continue Flomax         PENDING TEST RESULTS:   At the time of discharge the following test results are still pending: ***    FOLLOW UP APPOINTMENTS:    Follow-up Information     Follow up With Specialties Details Why Contact Info    Rosalinda Hall MD Family Medicine In 1 week  [place] [id number]  [phone]             ADDITIONAL CARE RECOMMENDATIONS:     DIET: Cardiac diet     TUBE FEEDING INSTRUCTIONS: None     OXYGEN / BiPAP SETTINGS: None     ACTIVITY: As tolerated     WOUND CARE: None     EQUIPMENT needed: None       DISCHARGE MEDICATIONS:   See Medication Reconciliation Form      NOTIFY YOUR PHYSICIAN FOR ANY OF THE FOLLOWING:   Fever over 101 degrees for 24 hours. Chest pain, shortness of breath, fever, chills, nausea, vomiting, diarrhea, change in mentation, falling, weakness, bleeding. Severe pain or pain not relieved by medications. Or, any other signs or symptoms that you may have questions about.     DISPOSITION:    Home With:   OT  PT  HH  RN      x SNF/Inpatient Rehab/LTAC    Independent/assisted living    Hospice    Other:       PATIENT CONDITION AT DISCHARGE:     Functional status    Poor     Deconditioned    x Independent      Cognition     Lucid     Forgetful     Dementia      Catheters/lines (plus indication)    Gaspar     PICC     PEG    x None      Code status     Full code    x DNR      PHYSICAL EXAMINATION AT DISCHARGE:   Refer to Progress Note***      CHRONIC MEDICAL DIAGNOSES:  Problem List as of 11/19/2020 Date Reviewed: 11/18/2020          Codes Class Noted - Resolved    * (Principal) Acute CVA (cerebrovascular accident) ([facility] ICD-10-CM: I63.9  ICD-9-CM: 434.91  11/15/2020 - Present        Giant cell arteritis with polymyalgia rheumatica ([facility] ICD-10-CM: M31.5  ICD-9-CM: 446.5, 725  8/14/2018 - Present        Long term (current) use of systemic steroids ICD-10-CM: Z79.52  ICD-9-CM: V58.65  8/14/2018 - Present        Long-term use of immunosuppressant medication ICD-10-CM: Z79.899  ICD-9-CM: V58.69  8/14/2018 - Present        PMR (polymyalgia rheumatica) ([facility] ICD-10-CM: M35.3  ICD-9-CM: 053  8/14/2018 - Present        Chronic headaches ICD-10-CM: R51.9, G89.29  ICD-9-CM: 784.0  7/20/2018 - Present                CDMP Checked:   Yes ***     PROBLEM LIST Updated:  Yes *** Signed:   Audrey Dave MD  11/19/2020  1:47 PM

## 2020-11-19 NOTE — PROGRESS NOTES
Problem: Falls - Risk of  Goal: *Absence of Falls  Description: Document Rosanna Diggs Fall Risk and appropriate interventions in the flowsheet.   11/19/2020 1534 by Conner Rodriguez RN  Outcome: Resolved/Met  11/19/2020 1000 by Conner Rodriguez RN  Outcome: Progressing Towards Goal  Note: Fall Risk Interventions:  Mobility Interventions: Communicate number of staff needed for ambulation/transfer, Bed/chair exit alarm, Patient to call before getting OOB, PT Consult for assist device competence, Utilize walker, cane, or other assistive device, PT Consult for mobility concerns    Mentation Interventions: Adequate sleep, hydration, pain control, Bed/chair exit alarm, Door open when patient unattended, Evaluate medications/consider consulting pharmacy, More frequent rounding, Reorient patient, Room close to nurse's station, Toileting rounds, Update white board, Gait belt with transfers/ambulation    Medication Interventions: Bed/chair exit alarm, Evaluate medications/consider consulting pharmacy, Patient to call before getting OOB, Assess postural VS orthostatic hypotension, Teach patient to arise slowly    Elimination Interventions: Bed/chair exit alarm, Call light in reach, Patient to call for help with toileting needs, Stay With Me (per policy), Urinal in reach, Toileting schedule/hourly rounds, Toilet paper/wipes in reach    History of Falls Interventions: Bed/chair exit alarm, Consult care management for discharge planning, Door open when patient unattended, Evaluate medications/consider consulting pharmacy, Investigate reason for fall, Room close to nurse's station         Problem: Patient Education: Go to Patient Education Activity  Goal: Patient/Family Education  Outcome: Resolved/Met     Problem: Patient Education: Go to Patient Education Activity  Goal: Patient/Family Education  Outcome: Resolved/Met     Problem: TIA/CVA Stroke: 0-24 hours  Goal: Off Pathway (Use only if patient is Off Pathway)  Outcome: Resolved/Met  Goal: Activity/Safety  Outcome: Resolved/Met  Goal: Consults, if ordered  Outcome: Resolved/Met  Goal: Diagnostic Test/Procedures  Outcome: Resolved/Met  Goal: Nutrition/Diet  Outcome: Resolved/Met  Goal: Discharge Planning  Outcome: Resolved/Met  Goal: Medications  Outcome: Resolved/Met  Goal: Respiratory  Outcome: Resolved/Met  Goal: Treatments/Interventions/Procedures  Outcome: Resolved/Met  Goal: Minimize risk of bleeding post-thrombolytic infusion  Outcome: Resolved/Met  Goal: Monitor for complications post-thrombolytic infusion  Outcome: Resolved/Met  Goal: Psychosocial  Outcome: Resolved/Met  Goal: *Hemodynamically stable  Outcome: Resolved/Met  Goal: *Neurologically stable  Description: Absence of additional neurological deficits    Outcome: Resolved/Met  Goal: *Verbalizes anxiety and depression are reduced or absent  Outcome: Resolved/Met  Goal: *Absence of Signs of Aspiration on Current Diet  Outcome: Resolved/Met  Goal: *Absence of deep venous thrombosis signs and symptoms(Stroke Metric)  Outcome: Resolved/Met  Goal: *Ability to perform ADLs and demonstrates progressive mobility and function  Outcome: Resolved/Met  Goal: *Stroke education started(Stroke Metric)  Outcome: Resolved/Met  Goal: *Dysphagia screen performed(Stroke Metric)  Outcome: Resolved/Met  Goal: *Rehab consulted(Stroke Metric)  Outcome: Resolved/Met     Problem: TIA/CVA Stroke: Day 2 Until Discharge  Goal: Off Pathway (Use only if patient is Off Pathway)  Outcome: Resolved/Met  Goal: Activity/Safety  11/19/2020 1534 by Cheko Faust RN  Outcome: Resolved/Met  11/19/2020 1000 by Cheko Faust RN  Outcome: Progressing Towards Goal  Goal: Diagnostic Test/Procedures  11/19/2020 1534 by Cheko Faust RN  Outcome: Resolved/Met  11/19/2020 1000 by Cheko Faust RN  Outcome: Progressing Towards Goal  Goal: Nutrition/Diet  Outcome: Resolved/Met  Goal: Discharge Planning  Outcome: Resolved/Met  Goal: Medications  11/19/2020 1534 by Lucia Plunkett RN  Outcome: Resolved/Met  11/19/2020 1000 by Lucia Plunkett RN  Outcome: Progressing Towards Goal  Goal: Respiratory  Outcome: Resolved/Met  Goal: Treatments/Interventions/Procedures  11/19/2020 1534 by Lucia Plunkett RN  Outcome: Resolved/Met  11/19/2020 1000 by Lucia Plunkett RN  Outcome: Progressing Towards Goal  Goal: Psychosocial  Outcome: Resolved/Met  Goal: *Verbalizes anxiety and depression are reduced or absent  Outcome: Resolved/Met  Goal: *Absence of aspiration  Outcome: Resolved/Met  Goal: *Absence of deep venous thrombosis signs and symptoms(Stroke Metric)  Outcome: Resolved/Met  Goal: *Optimal pain control at patient's stated goal  Outcome: Resolved/Met  Goal: *Tolerating diet  11/19/2020 1534 by Lucia Plunkett RN  Outcome: Resolved/Met  11/19/2020 1000 by Lucia Plunkett RN  Outcome: Progressing Towards Goal  Goal: *Ability to perform ADLs and demonstrates progressive mobility and function  Outcome: Resolved/Met  Goal: *Stroke education continued(Stroke Metric)  11/19/2020 1534 by Lucia Plunkett RN  Outcome: Resolved/Met  11/19/2020 1000 by Lucia Plunkett RN  Outcome: Progressing Towards Goal     Problem: Ischemic Stroke: Discharge Outcomes  Goal: *Verbalizes anxiety and depression are reduced or absent  Outcome: Resolved/Met  Goal: *Verbalize understanding of risk factor modification(Stroke Metric)  Outcome: Resolved/Met  Goal: *Hemodynamically stable  Outcome: Resolved/Met  Goal: *Absence of aspiration pneumonia  Outcome: Resolved/Met  Goal: *Aware of needed dietary changes  Outcome: Resolved/Met  Goal: *Verbalize understanding of prescribed medications including anti-coagulants, anti-lipid, and/or anti-platelets(Stroke Metric)  Outcome: Resolved/Met  Goal: *Tolerating diet  Outcome: Resolved/Met  Goal: *Aware of follow-up diagnostics related to anticoagulants  Outcome: Resolved/Met  Goal: *Ability to perform ADLs and demonstrates progressive mobility and function  Outcome: Resolved/Met  Goal: *Absence of DVT(Stroke Metric)  Outcome: Resolved/Met  Goal: *Absence of aspiration  Outcome: Resolved/Met  Goal: *Optimal pain control at patient's stated goal  Outcome: Resolved/Met  Goal: *Home safety concerns addressed  Outcome: Resolved/Met  Goal: *Describes available resources and support systems  Outcome: Resolved/Met  Goal: *Verbalizes understanding of activation of EMS(911) for stroke symptoms(Stroke Metric)  Outcome: Resolved/Met  Goal: *Understands and describes signs and symptoms to report to providers(Stroke Metric)  Outcome: Resolved/Met  Goal: *Neurolgocially stable (absence of additional neurological deficits)  Outcome: Resolved/Met  Goal: *Verbalizes importance of follow-up with primary care physician(Stroke Metric)  Outcome: Resolved/Met  Goal: *Smoking cessation discussed,if applicable(Stroke Metric)  Outcome: Resolved/Met  Goal: *Depression screening completed(Stroke Metric)  Outcome: Resolved/Met     Problem: Discharge Planning  Goal: *Discharge to safe environment  11/19/2020 1534 by Amada Bolanos RN  Outcome: Resolved/Met  11/19/2020 1000 by Amada Bolanos RN  Outcome: Progressing Towards Goal     Problem: Patient Education: Go to Patient Education Activity  Goal: Patient/Family Education  Outcome: Resolved/Met     Problem: Patient Education: Go to Patient Education Activity  Goal: Patient/Family Education  Outcome: Resolved/Met

## 2020-11-19 NOTE — PROGRESS NOTES
Problem: Falls - Risk of  Goal: *Absence of Falls  Description: Document Lawrence Hanna Fall Risk and appropriate interventions in the flowsheet.   Outcome: Progressing Towards Goal  Note: Fall Risk Interventions:  Mobility Interventions: Bed/chair exit alarm, Patient to call before getting OOB, Strengthening exercises (ROM-active/passive), Communicate number of staff needed for ambulation/transfer    Mentation Interventions: Adequate sleep, hydration, pain control, Bed/chair exit alarm, Door open when patient unattended, Toileting rounds, Increase mobility    Medication Interventions: Bed/chair exit alarm, Patient to call before getting OOB, Teach patient to arise slowly    Elimination Interventions: Bed/chair exit alarm, Call light in reach, Toileting schedule/hourly rounds    History of Falls Interventions: Bed/chair exit alarm, Investigate reason for fall, Room close to nurse's station         Problem: TIA/CVA Stroke: Day 2 Until Discharge  Goal: Off Pathway (Use only if patient is Off Pathway)  Outcome: Progressing Towards Goal  Goal: Activity/Safety  Outcome: Progressing Towards Goal  Goal: Diagnostic Test/Procedures  Outcome: Progressing Towards Goal  Goal: Nutrition/Diet  Outcome: Progressing Towards Goal  Goal: Discharge Planning  Outcome: Progressing Towards Goal  Goal: Medications  Outcome: Progressing Towards Goal  Goal: Respiratory  Outcome: Progressing Towards Goal  Goal: Treatments/Interventions/Procedures  Outcome: Progressing Towards Goal  Goal: *Verbalizes anxiety and depression are reduced or absent  Outcome: Progressing Towards Goal  Goal: *Absence of aspiration  Outcome: Progressing Towards Goal     Problem: Ischemic Stroke: Discharge Outcomes  Goal: *Verbalizes anxiety and depression are reduced or absent  Outcome: Progressing Towards Goal  Goal: *Verbalize understanding of risk factor modification(Stroke Metric)  Outcome: Progressing Towards Goal  Goal: *Hemodynamically stable  Outcome: Progressing Towards Goal  Goal: *Absence of aspiration pneumonia  Outcome: Progressing Towards Goal  Goal: *Aware of needed dietary changes  Outcome: Progressing Towards Goal  Goal: *Verbalize understanding of prescribed medications including anti-coagulants, anti-lipid, and/or anti-platelets(Stroke Metric)  Outcome: Progressing Towards Goal  Goal: *Tolerating diet  Outcome: Progressing Towards Goal  Goal: *Describes available resources and support systems  Outcome: Progressing Towards Goal  Goal: *Verbalizes understanding of activation of EMS(911) for stroke symptoms(Stroke Metric)  Outcome: Progressing Towards Goal  Goal: *Verbalizes importance of follow-up with primary care physician(Stroke Metric)  Outcome: Progressing Towards Goal  Goal: *Smoking cessation discussed,if applicable(Stroke Metric)  Outcome: Progressing Towards Goal     Problem: Pressure Injury - Risk of  Goal: *Prevention of pressure injury  Description: Document Wali Scale and appropriate interventions in the flowsheet. Outcome: Progressing Towards Goal  Note: Pressure Injury Interventions:  Sensory Interventions: Keep linens dry and wrinkle-free, Minimize linen layers, Maintain/enhance activity level    Moisture Interventions: Limit adult briefs, Offer toileting Q_hr, Absorbent underpads    Activity Interventions: Increase time out of bed, PT/OT evaluation    Mobility Interventions: PT/OT evaluation, Pressure redistribution bed/mattress (bed type), Turn and reposition approx.  every two hours(pillow and wedges)    Nutrition Interventions: Document food/fluid/supplement intake

## 2020-11-19 NOTE — DISCHARGE SUMMARY
Discharge Summary       PATIENT ID: Primo Flores Sr. MRN: [id number]   YOB: 1929    DATE OF ADMISSION: 11/14/2020 10:06 PM    DATE OF DISCHARGE: 11/19/2020   PRIMARY CARE PROVIDER: Nonda Bence, MD     ATTENDING PHYSICIAN: Sarai Oseguera  DISCHARGING PROVIDER: Carmela Byers MD    To contact this individual call [phone] and ask the  to page. If unavailable ask to be transferred the Adult Hospitalist Department. CONSULTATIONS: IP CONSULT TO NEUROLOGY    PROCEDURES/SURGERIES: * No surgery found *    [address] COURSE:   This is a 60-year-old man with a past medical history significant for hypertension; dyslipidemia; hypothyroidism; giant-cell arteritis with polymyalgia rheumatica; coronary artery disease, status post stent placement; obstructive sleep apnea; hearing impairment, was in his usual state of health until a couple of days ago when the patient developed an unsteady gait.  As a result of the unsteady gait, the patient is falling at home multiple times.  The patient also complained of dizziness.  The patient described the dizziness as room spinning around him.  The night before going to the emergency room, the patient fell.  The fall was not witnessed because the patient lives alone, but he denies head injury.  Following the fall, the patient started complaining of right shoulder pain.  The unsteadiness became associated with confusion.  The patient was taken to the local hospital at [facility]. [address] the patient arrived at the emergency room, there was a concern for acute stroke.  The patient had a CT scan of the head done, which was negative.   CTA of the head and neck was also performed, which did not show any significant abnormalities.  The patient was then transferred to [facility] for further evaluation for acute stroke with an MRI of the brain which is not available at the hospital in [address] [place] [address] patient rated the right shoulder pain as 4/10, with no radiation, worse with movement of the right shoulder, slight relief with no activity.  The patient has no record of prior admission to this hospital. Ronnie Mullins is under the care of a rheumatologist for his giant-cell arteritis with polymyalgia rheumatica.  No prior history of TIA or CVA.         Assessment & Plan:      # Acute ischemic stroke/ CVA (POA)  -CT CTA of the head and neck negative CT perfusion study also negative will get MRI of the brain    - MRI - There is an acute infarction in the posterior limb of the right internal  Capsule. There are moderate changes small vessel disease periventricular white matter. Results called to the nurse by the technologist.  - ECHO  Estimated LVEF is 60 - 65%. Normal cavity size, wall thickness and systolic function (ejection fraction normal). Moderate (grade 2) left ventricular diastolic dysfunction. IAS: No atrial septal defect present. Agitated saline contrast study was performed. - Appreciate Neurology input- continue Aspirin and statin     # Hypertension.    - continue BB- Lopressor 12.5mg daily   - stopped Lisinopril as patient blood pressure on the lower side      # Dyslipidemia: continue with Pravachol and fish oil.  lipid profile at goal     # Hypothyroidism: on  Synthroid. TSH level is high need to increase Synthroid dose may f/u with PCP vs he did not take his meds?  - Repeat TSH with in 4-6 weeks while on current dose of synthroid       # Giant-cell arteritis with polymyalgia rheumatica.  - stableno acute issue  # Hearing impairment: continue supportive treatment.     # Multiple falls at home.  CT scan of the head is negative.   CTA of the neck did not show any acute pathology.  We will check CT scan of the pelvis also did not show any fracture  - PT/ OT evaluation   - MRI small CVA need SNF rehab     # CAD s/p stent placement : continue cardiac medications, aspirin, lisinopril, and Lopressor.  troponin neg x3     # Obstructive sleep apnea.  The patient is not on CPAP machine at home.  We will continue supplemental oxygen as needed.     # Right shoulder pain.  X-RAY - No fracture is identified. There are severe degenerative changes Right shoulder. If symptoms persist follow-up study is recommended.     # BPH continue Flomax     Code status: DNR              DISCHARGE DIAGNOSES / PLAN:      1. D/c to SNF      ADDITIONAL CARE RECOMMENDATIONS:        PENDING TEST RESULTS:   At the time of discharge the following test results are still pending:     FOLLOW UP APPOINTMENTS:    Follow-up Information     Follow up With Specialties Details Why Contact Info    Sandra Gabriel MD Family Medicine In 1 week  [address] [facility]  [phone]               DIET: Cardiac Diet    ACTIVITY: Activity as tolerated    WOUND CARE: None     EQUIPMENT needed: None       DISCHARGE MEDICATIONS:  Current Discharge Medication List      START taking these medications    Details   aspirin 81 mg chewable tablet Take 1 Tab by mouth daily for 30 days. Qty: 30 Tab, Refills: 0         CONTINUE these medications which have NOT CHANGED    Details   rOPINIRole (REQUIP) 0.25 mg tablet Take 0.25 mg by mouth nightly. tamsulosin (FLOMAX) 0.4 mg capsule Take 0.4 mg by mouth daily. levothyroxine (SYNTHROID) 100 mcg tablet Take 100 mcg by mouth Daily (before breakfast). metoprolol tartrate (LOPRESSOR) 25 mg tablet Take 12.5 mg by mouth daily. lisinopril (PRINIVIL, ZESTRIL) 10 mg tablet Take 10 mg by mouth daily. fish oil-dha-epa 1,200-144-216 mg cap Take 1 Tab by mouth daily. multivitamin (ONE A DAY) tablet Take 1 Tab by mouth daily. pravastatin (PRAVACHOL) 40 mg tablet Take 40 mg by mouth nightly. STOP taking these medications       tocilizumab (ACTEMRA) 162 mg/0.9 mL syringe Comments:   Reason for Stopping:                 NOTIFY YOUR PHYSICIAN FOR ANY OF THE FOLLOWING:   Fever over 101 degrees for 24 hours.    Chest pain, shortness of breath, fever, chills, nausea, vomiting, diarrhea, change in mentation, falling, weakness, bleeding. Severe pain or pain not relieved by medications. Or, any other signs or symptoms that you may have questions about. DISPOSITION:    Home With:   OT  PT  HH  RN      x Long term SNF/Inpatient Rehab    Independent/assisted living    Hospice    Other:       PATIENT CONDITION AT DISCHARGE:     Functional status    Poor    x Deconditioned     Independent      Cognition     Lucid    x Forgetful     Dementia      Catheters/lines (plus indication)    Gaspar     PICC     PEG    x None      Code status     Full code    x DNR      PHYSICAL EXAMINATION AT DISCHARGE:  General:          Alert, cooperative, no distress, appears stated age. HEENT:           Atraumatic, anicteric sclerae, pink conjunctivae                          No oral ulcers, mucosa moist, throat clear, dentition fair  Neck:               Supple, symmetrical  Lungs:             Clear to auscultation bilaterally. No Wheezing or Rhonchi. No rales. Chest wall:      No tenderness  No Accessory muscle use. Heart:              Regular  rhythm,  No  murmur   No edema  Abdomen:        Soft, non-tender. Not distended. Bowel sounds normal  Extremities:     No cyanosis. No clubbing,                            Skin turgor normal, Capillary refill normal  Skin:                Not pale. Not Jaundiced  No rashes   Psych:             Not anxious or agitated.   Neurologic:      Alert, moves all extremities, answers questions appropriately and responds to commands       CHRONIC MEDICAL DIAGNOSES:  Problem List as of 11/19/2020 Date Reviewed: 11/18/2020          Codes Class Noted - Resolved    * (Principal) Acute CVA (cerebrovascular accident) ([facility] ICD-10-CM: I63.9  ICD-9-CM: 434.91  11/15/2020 - Present        Giant cell arteritis with polymyalgia rheumatica ([facility] ICD-10-CM: M31.5  ICD-9-CM: 446.5, 725  8/14/2018 - Present        Long term (current) use of systemic steroids ICD-10-CM: Z79.52  ICD-9-CM: V58.65  8/14/2018 - Present        Long-term use of immunosuppressant medication ICD-10-CM: Z79.899  ICD-9-CM: V58.69  8/14/2018 - Present        PMR (polymyalgia rheumatica) (HCC) ICD-10-CM: M35.3  ICD-9-CM: 981  8/14/2018 - Present        Chronic headaches ICD-10-CM: R51.9, G89.29  ICD-9-CM: 784.0  7/20/2018 - Present              Greater than 35  minutes were spent with the patient on counseling and coordination of care    Signed:   Madison Ramon MD  11/19/2020  9:09 AM

## 2020-11-19 NOTE — PROGRESS NOTES
TRANSITION OF CARE  RUR--13%  Disposition--To [facility] ([address] [facility],[address])  Transport--BLS stretcher with [facility]. CM follow up. CM spoke with [address] [facility], [facility] SNF Admissions, who confirmed that patient is accepted and that bed is available today . Nurse Report to be called to [phone]. CM faxed Covid Test Report, Discharge Summary and AVS to the facility. CM sent referral via [employer] to  [address] [facility]) (Phone [phone]) for BLS transport, and referral was accepted  for a  2:00PM . CM completed PCS and placed it on chart. CM gave verbal update to staff nurse. Transition of Care Plan to SNF/Rehab    SNF/Rehab Transition:  Patient has been accepted to [facility] SNF and meets criteria for admission. Patient will transported by [facility] and expected to leave at 2:00PM.    Communication to Patient/Family:  Met with patient and (identified care giver) and they are agreeable to the transition plan. Communication to SNF/Rehab:  Bedside RN, has been notified to update the transition plan to the facility and call report . Discharge information has been updated on the AVS.     Discharge instructions to be fax'd to facility. Please include all hard scripts for controlled substances, med rec and dc summary, and AVS in packet.      Reviewed and confirmed with facility can manage the patient care needs for the following:     SNF/Rehab Transition:    Reviewed and confirmed with facility, they can manage the patient care needs for the following:     Dev Dumont with (X) only those applicable:    Medication:  [x]  Medications will be available at the facility  []  IV Antibiotics   []  Controlled Substance - hard copy to be sent with patient   []  Weekly Labs   Documents:  [x] Hard RX  [x] MAR  [x] Kardex  [x] AVS  [x]Transfer Summary  [x]Discharge   Equipment:  []  CPAP/BiPAP  []  Wound Vacuum  []  Gaspar or Urinary Device  []  PICC/Central Line  [] Nebulizer  []  Ventilator   Treatment:  []Isolation (for MRSA, VRE, etc.)  []Surgical Drain Management  []Tracheostomy Care  []Dressing Changes  []Dialysis with transportation and chair time   []PEG Care  []Oxygen  []Daily Weights for Heart Failure   Dietary:  []Any diet limitations  []Tube Feedings   []Total Parenteral Management (TPN)   Eligible for Medicaid Long Term Services and Supports  Yes:  [] Eligible for medical assistance or will become eligible within 180 days and UAI completed. [] Provider/Patient and/or support system has requested screening. [] UAI copy provided to patient or responsible party,  [] UAI unavailable at discharge will send once processed to SNF provider. [] UAI unavailable at discharged mailed to patient  No:   [] Private pay and is not financially eligible for Medicaid within the next 180 days. [] Reside out-of-state.   [] A residents of a state owned/operated facility that is licensed  by [place] [address] [facility] or [facility]  [] Enrollment in [facility] hospice services  [] [address] [place] [address] [facility]  [] Patient /Family declines to have screening completed or provide financial information for screening     Financial Resources:  Medicaid    [] Initiated and application pending   [] Full coverage     Advanced Care Plan:  []Surrogate Decision Maker of Care  []POA  []Communicated Code Status (DDNR\", \"Full\")    Other     Financial Resources:  Medicaid    [] Initiated and application pending   [] Full coverage     Advanced Care Plan:  []Surrogate Decision Maker of Care  []POA  []Communicated Code Status (DDNR\", \"Full\")    Other

## 2020-11-19 NOTE — PROGRESS NOTES
Problem: Falls - Risk of  Goal: *Absence of Falls  Description: Document Willie Douglass Fall Risk and appropriate interventions in the flowsheet.   Outcome: Progressing Towards Goal  Note: Fall Risk Interventions:  Mobility Interventions: Communicate number of staff needed for ambulation/transfer, Bed/chair exit alarm, Patient to call before getting OOB, PT Consult for assist device competence, Utilize walker, cane, or other assistive device, PT Consult for mobility concerns    Mentation Interventions: Adequate sleep, hydration, pain control, Bed/chair exit alarm, Door open when patient unattended, Evaluate medications/consider consulting pharmacy, More frequent rounding, Reorient patient, Room close to nurse's station, Toileting rounds, Update white board, Gait belt with transfers/ambulation    Medication Interventions: Bed/chair exit alarm, Evaluate medications/consider consulting pharmacy, Patient to call before getting OOB, Assess postural VS orthostatic hypotension, Teach patient to arise slowly    Elimination Interventions: Bed/chair exit alarm, Call light in reach, Patient to call for help with toileting needs, Stay With Me (per policy), Urinal in reach, Toileting schedule/hourly rounds, Toilet paper/wipes in reach    History of Falls Interventions: Bed/chair exit alarm, Consult care management for discharge planning, Door open when patient unattended, Evaluate medications/consider consulting pharmacy, Investigate reason for fall, Room close to nurse's station         Problem: TIA/CVA Stroke: Day 2 Until Discharge  Goal: Activity/Safety  Outcome: Progressing Towards Goal  Goal: Diagnostic Test/Procedures  Outcome: Progressing Towards Goal  Goal: Medications  Outcome: Progressing Towards Goal  Goal: Treatments/Interventions/Procedures  Outcome: Progressing Towards Goal  Goal: *Tolerating diet  Outcome: Progressing Towards Goal  Goal: *Stroke education continued(Stroke Metric)  Outcome: Progressing Towards Goal Problem: Pressure Injury - Risk of  Goal: *Prevention of pressure injury  Description: Document Wali Scale and appropriate interventions in the flowsheet.   Outcome: Progressing Towards Goal  Note: Pressure Injury Interventions:  Sensory Interventions: Avoid rigorous massage over bony prominences, Assess changes in LOC, Check visual cues for pain, Discuss PT/OT consult with provider, Float heels, Keep linens dry and wrinkle-free, Maintain/enhance activity level, Minimize linen layers, Monitor skin under medical devices    Moisture Interventions: Apply protective barrier, creams and emollients, Limit adult briefs, Maintain skin hydration (lotion/cream), Minimize layers, Moisture barrier    Activity Interventions: Increase time out of bed, Pressure redistribution bed/mattress(bed type), PT/OT evaluation    Mobility Interventions: Float heels, HOB 30 degrees or less, Pressure redistribution bed/mattress (bed type), PT/OT evaluation    Nutrition Interventions: Document food/fluid/supplement intake                     Problem: Discharge Planning  Goal: *Discharge to safe environment  Outcome: Progressing Towards Goal

## 2020-11-30 ENCOUNTER — HOSPITAL ENCOUNTER (OUTPATIENT)
Age: 85
Setting detail: OBSERVATION
Discharge: HOME HEALTH | End: 2020-12-02
Attending: EMERGENCY MEDICINE | Admitting: HOSPITALIST
Payer: MEDICARE

## 2020-11-30 ENCOUNTER — APPOINTMENT (OUTPATIENT)
Dept: GENERAL RADIOLOGY | Age: 85
End: 2020-11-30
Attending: EMERGENCY MEDICINE
Payer: MEDICARE

## 2020-11-30 DIAGNOSIS — I49.8 VENTRICULAR BIGEMINY: ICD-10-CM

## 2020-11-30 DIAGNOSIS — I44.0 FIRST DEGREE HEART BLOCK: Primary | ICD-10-CM

## 2020-11-30 PROBLEM — R00.1 BRADYCARDIA: Status: ACTIVE | Noted: 2020-11-30

## 2020-11-30 LAB
ALBUMIN SERPL-MCNC: 3.2 G/DL (ref 3.5–5)
ALBUMIN/GLOB SERPL: 0.8 {RATIO} (ref 1.1–2.2)
ALP SERPL-CCNC: 53 U/L (ref 45–117)
ALT SERPL-CCNC: 18 U/L (ref 12–78)
ANION GAP SERPL CALC-SCNC: 6 MMOL/L (ref 5–15)
AST SERPL W P-5'-P-CCNC: 14 U/L (ref 15–37)
BASOPHILS # BLD: 0.1 K/UL (ref 0–0.2)
BASOPHILS NFR BLD: 1 % (ref 0–2.5)
BILIRUB SERPL-MCNC: 0.4 MG/DL (ref 0.2–1)
BUN SERPL-MCNC: 23 MG/DL (ref 6–20)
BUN/CREAT SERPL: 20 (ref 12–20)
CA-I BLD-MCNC: 9 MG/DL (ref 8.5–10.1)
CHLORIDE SERPL-SCNC: 104 MMOL/L (ref 97–108)
CO2 SERPL-SCNC: 29 MMOL/L (ref 21–32)
CREAT SERPL-MCNC: 1.16 MG/DL (ref 0.7–1.3)
EOSINOPHIL # BLD: 0.3 K/UL (ref 0–0.7)
EOSINOPHIL NFR BLD: 4 % (ref 0.9–2.9)
ERYTHROCYTE [DISTWIDTH] IN BLOOD BY AUTOMATED COUNT: 13.9 % (ref 11.5–14.5)
GLOBULIN SER CALC-MCNC: 3.8 G/DL (ref 2–4)
GLUCOSE SERPL-MCNC: 93 MG/DL (ref 65–100)
HCT VFR BLD AUTO: 40.7 % (ref 41–53)
HGB BLD-MCNC: 13.9 G/DL (ref 13.5–17.5)
INR PPP: 1.1 (ref 0.9–1.1)
LYMPHOCYTES # BLD: 1.6 K/UL (ref 1–4.8)
LYMPHOCYTES NFR BLD: 23 % (ref 20.5–51.1)
MAGNESIUM SERPL-MCNC: 2.4 MG/DL (ref 1.6–2.4)
MCH RBC QN AUTO: 31.1 PG (ref 31–34)
MCHC RBC AUTO-ENTMCNC: 34.1 G/DL (ref 31–36)
MCV RBC AUTO: 91.3 FL (ref 80–100)
MONOCYTES # BLD: 0.7 K/UL (ref 0.2–2.4)
MONOCYTES NFR BLD: 11 % (ref 1.7–9.3)
NEUTS SEG # BLD: 4.3 K/UL (ref 1.8–7.7)
NEUTS SEG NFR BLD: 61 % (ref 42–75)
NRBC # BLD: 0 K/UL
NRBC BLD-RTO: 10 PER 100 WBC
PLATELET # BLD AUTO: 265 K/UL
PMV BLD AUTO: 8.6 FL (ref 6.5–11.5)
POTASSIUM SERPL-SCNC: 4.5 MMOL/L (ref 3.5–5.1)
PROT SERPL-MCNC: 7 G/DL (ref 6.4–8.2)
PROTHROMBIN TIME: 10.8 SEC (ref 9–11.1)
RBC # BLD AUTO: 4.46 M/UL (ref 4.5–5.9)
SODIUM SERPL-SCNC: 139 MMOL/L (ref 136–145)
TROPONIN I SERPL-MCNC: <0.05 NG/ML
TROPONIN I SERPL-MCNC: <0.05 NG/ML
WBC # BLD AUTO: 6.9 K/UL (ref 4.4–11.3)

## 2020-11-30 PROCEDURE — 36415 COLL VENOUS BLD VENIPUNCTURE: CPT

## 2020-11-30 PROCEDURE — 83735 ASSAY OF MAGNESIUM: CPT

## 2020-11-30 PROCEDURE — 85610 PROTHROMBIN TIME: CPT

## 2020-11-30 PROCEDURE — 93005 ELECTROCARDIOGRAM TRACING: CPT

## 2020-11-30 PROCEDURE — 96372 THER/PROPH/DIAG INJ SC/IM: CPT

## 2020-11-30 PROCEDURE — 84484 ASSAY OF TROPONIN QUANT: CPT

## 2020-11-30 PROCEDURE — 85025 COMPLETE CBC W/AUTO DIFF WBC: CPT

## 2020-11-30 PROCEDURE — 71045 X-RAY EXAM CHEST 1 VIEW: CPT

## 2020-11-30 PROCEDURE — 99285 EMERGENCY DEPT VISIT HI MDM: CPT

## 2020-11-30 PROCEDURE — 99218 HC RM OBSERVATION: CPT

## 2020-11-30 PROCEDURE — 74011250636 HC RX REV CODE- 250/636: Performed by: NURSE PRACTITIONER

## 2020-11-30 PROCEDURE — 80053 COMPREHEN METABOLIC PANEL: CPT

## 2020-11-30 PROCEDURE — 99284 EMERGENCY DEPT VISIT MOD MDM: CPT

## 2020-11-30 PROCEDURE — 74011250637 HC RX REV CODE- 250/637: Performed by: NURSE PRACTITIONER

## 2020-11-30 RX ORDER — FUROSEMIDE 20 MG/1
20 TABLET ORAL DAILY
Status: DISCONTINUED | OUTPATIENT
Start: 2020-12-01 | End: 2020-12-02 | Stop reason: HOSPADM

## 2020-11-30 RX ORDER — ROPINIROLE 0.25 MG/1
0.25 TABLET, FILM COATED ORAL
Status: DISCONTINUED | OUTPATIENT
Start: 2020-11-30 | End: 2020-12-02 | Stop reason: HOSPADM

## 2020-11-30 RX ORDER — CHOLECALCIFEROL (VITAMIN D3) 50 MCG
CAPSULE ORAL
COMMUNITY

## 2020-11-30 RX ORDER — PRAVASTATIN SODIUM 40 MG/1
40 TABLET ORAL
Status: DISCONTINUED | OUTPATIENT
Start: 2020-11-30 | End: 2020-12-02 | Stop reason: HOSPADM

## 2020-11-30 RX ORDER — ONDANSETRON 2 MG/ML
4 INJECTION INTRAMUSCULAR; INTRAVENOUS
Status: DISCONTINUED | OUTPATIENT
Start: 2020-11-30 | End: 2020-12-02 | Stop reason: HOSPADM

## 2020-11-30 RX ORDER — SODIUM CHLORIDE 0.9 % (FLUSH) 0.9 %
5-40 SYRINGE (ML) INJECTION EVERY 8 HOURS
Status: DISCONTINUED | OUTPATIENT
Start: 2020-11-30 | End: 2020-12-02 | Stop reason: HOSPADM

## 2020-11-30 RX ORDER — TAMSULOSIN HYDROCHLORIDE 0.4 MG/1
0.4 CAPSULE ORAL DAILY
Status: DISCONTINUED | OUTPATIENT
Start: 2020-12-01 | End: 2020-12-02 | Stop reason: HOSPADM

## 2020-11-30 RX ORDER — GUAIFENESIN 100 MG/5ML
81 LIQUID (ML) ORAL DAILY
Status: DISCONTINUED | OUTPATIENT
Start: 2020-12-01 | End: 2020-12-02 | Stop reason: HOSPADM

## 2020-11-30 RX ORDER — ACETAMINOPHEN 325 MG/1
650 TABLET ORAL
Status: DISCONTINUED | OUTPATIENT
Start: 2020-11-30 | End: 2020-12-02 | Stop reason: HOSPADM

## 2020-11-30 RX ORDER — BISMUTH SUBSALICYLATE 262 MG
1 TABLET,CHEWABLE ORAL DAILY
Status: DISCONTINUED | OUTPATIENT
Start: 2020-12-01 | End: 2020-12-02 | Stop reason: HOSPADM

## 2020-11-30 RX ORDER — LEVOTHYROXINE SODIUM 100 UG/1
100 TABLET ORAL
Status: DISCONTINUED | OUTPATIENT
Start: 2020-12-01 | End: 2020-12-02 | Stop reason: HOSPADM

## 2020-11-30 RX ORDER — FUROSEMIDE 20 MG/1
TABLET ORAL
Status: ON HOLD | COMMUNITY
End: 2020-12-01

## 2020-11-30 RX ORDER — ATORVASTATIN CALCIUM 20 MG/1
TABLET, FILM COATED ORAL
Status: ON HOLD | COMMUNITY
End: 2020-12-01

## 2020-11-30 RX ORDER — HEPARIN SODIUM 5000 [USP'U]/ML
5000 INJECTION, SOLUTION INTRAVENOUS; SUBCUTANEOUS EVERY 12 HOURS
Status: DISCONTINUED | OUTPATIENT
Start: 2020-11-30 | End: 2020-12-02 | Stop reason: HOSPADM

## 2020-11-30 RX ORDER — METOPROLOL TARTRATE 25 MG/1
12.5 TABLET, FILM COATED ORAL DAILY
Status: CANCELLED | OUTPATIENT
Start: 2020-12-01

## 2020-11-30 RX ADMIN — HEPARIN SODIUM 5000 UNITS: 5000 INJECTION INTRAVENOUS; SUBCUTANEOUS at 20:05

## 2020-11-30 RX ADMIN — Medication 10 ML: at 22:05

## 2020-11-30 RX ADMIN — PSYLLIUM HUSK 1 PACKET: 3.4 POWDER ORAL at 22:58

## 2020-11-30 RX ADMIN — PRAVASTATIN SODIUM 40 MG: 40 TABLET ORAL at 22:57

## 2020-11-30 RX ADMIN — ROPINIROLE HYDROCHLORIDE 0.25 MG: 0.25 TABLET, FILM COATED ORAL at 22:57

## 2020-11-30 NOTE — ED PROVIDER NOTES
EMERGENCY DEPARTMENT HISTORY AND PHYSICAL EXAM      Date: 11/30/2020  Patient Name: Mario Espinoza Sr.      History of Presenting Illness     Chief Complaint   Patient presents with    Irregular Heart Beat     low       History Provided By: Annabelle Edmonds    HPI: Charli Joses., 80 y.o. male with a past medical history significant hypertension and stroke presents to the ED with cc of increased weakness and fatigue over the last 2 days patient has been home from physical rehab 4 days now for sustaining stroke 2 weeks ago    There are no other complaints, changes, or physical findings at this time. PCP: Regan Padron MD    Current Outpatient Medications   Medication Sig Dispense Refill    omega 3-dha-epa-fish oil (Fish Oil) 100-160-1,000 mg cap Fish Oil   2 daily      metoprolol succinate 100 mg CSpX metoprolol succinate   12.5mg daily.  atorvastatin (LIPITOR) 20 mg tablet atorvastatin 20 mg tablet      furosemide (LASIX) 20 mg tablet furosemide 20 mg tablet      aspirin 81 mg chewable tablet Take 1 Tab by mouth daily for 30 days. 30 Tab 0    rOPINIRole (REQUIP) 0.25 mg tablet Take 0.25 mg by mouth nightly.  tamsulosin (FLOMAX) 0.4 mg capsule Take 0.4 mg by mouth daily.  pravastatin (PRAVACHOL) 40 mg tablet Take 40 mg by mouth nightly.  levothyroxine (SYNTHROID) 100 mcg tablet Take 100 mcg by mouth Daily (before breakfast).  metoprolol tartrate (LOPRESSOR) 25 mg tablet Take 12.5 mg by mouth daily.  fish oil-dha-epa 1,200-144-216 mg cap Take 1 Tab by mouth daily.  multivitamin (ONE A DAY) tablet Take 1 Tab by mouth daily.          Past History     Past Medical History:  Past Medical History:   Diagnosis Date    Arthritis     CAD (coronary artery disease)     Hard of hearing     Headache     Hypertension     PMR (polymyalgia rheumatica) (HCC)     Skin cancer     Sleep apnea     no longer uses cpap       Past Surgical History:  Past Surgical History:   Procedure Laterality Date    CARDIAC SURG PROCEDURE UNLIST      cardiac stent    HX CAROTID STENT      VASCULAR SURGERY PROCEDURE UNLIST      carotid endartectomy       Family History:  Family History   Problem Relation Age of Onset   [address] [facility] Arthritis-rheumatoid Mother     No Known Problems Father     Gout Son        Social History:  Social History     Tobacco Use    Smoking status: Never Smoker    Smokeless tobacco: Never Used   Substance Use Topics    Alcohol use: No    Drug use: No       Allergies: Allergies   Allergen Reactions    Pcn [Penicillins] Swelling         Review of Systems     Review of Systems   Constitutional: Positive for activity change and fatigue. Negative for fever. HENT: Negative for rhinorrhea and sore throat. Eyes: Negative for discharge and visual disturbance. Respiratory: Negative for cough and shortness of breath. Cardiovascular: Negative for chest pain, palpitations and leg swelling. Gastrointestinal: Negative for abdominal pain and vomiting. Endocrine: Negative for polydipsia and polyuria. Genitourinary: Negative for dysuria and urgency. Musculoskeletal: Negative for back pain and neck pain. Skin: Negative for color change and pallor. Neurological: Negative for weakness and light-headedness. Physical Exam     Physical Exam  Vitals signs and nursing note reviewed. Constitutional:       Appearance: Normal appearance. HENT:      Head: Normocephalic and atraumatic. Mouth/Throat:      Mouth: Mucous membranes are moist.      Pharynx: Oropharynx is clear. Eyes:      Extraocular Movements: Extraocular movements intact. Conjunctiva/sclera: Conjunctivae normal.      Pupils: Pupils are equal, round, and reactive to light. Neck:      Musculoskeletal: Normal range of motion and neck supple. Cardiovascular:      Rate and Rhythm: Bradycardia present. Rhythm irregular. Heart sounds: Normal heart sounds. No murmur.    Pulmonary:      Effort: Pulmonary effort is normal.      Breath sounds: Normal breath sounds. Abdominal:      General: Bowel sounds are normal.      Palpations: Abdomen is soft. Musculoskeletal: Normal range of motion. General: No swelling or tenderness. Skin:     General: Skin is warm and dry. Capillary Refill: Capillary refill takes less than 2 seconds. Neurological:      General: No focal deficit present. Mental Status: He is alert and oriented to person, place, and time. Psychiatric:         Mood and Affect: Mood normal.         Behavior: Behavior normal.         Lab and Diagnostic Study Results     Labs -   No results found for this or any previous visit (from the past 12 hour(s)). Radiologic Studies -   [unfilled]  CT Results  (Last 48 hours)    None        CXR Results  (Last 48 hours)    None          Medical Decision Making and ED Course   - I am the first and primary provider for this patient. - I reviewed the vital signs, available nursing notes, past medical history, past surgical history, family history and social history. - Initial assessment performed. The patients presenting problems have been discussed, and the staff are in agreement with the care plan formulated and outlined with them. I have encouraged them to ask questions as they arise throughout their visit. Vital Signs-Reviewed the patient's vital signs.     Patient Vitals for the past 12 hrs:   Pulse Resp BP SpO2   11/30/20 1634 -- -- -- 97 %   11/30/20 1620 (!) 103 18 (!) 160/71 97 %         Records Reviewed: Nursing Notes    The patient presents with dizziness with a differential diagnosis of  cardiac dysrhythm, dizziness/vertigo and generalized weakness    ED Course:       ED Course as of Nov 30 1836   [facility] Nov 30, 2020   0717 Patient is EKG sinus rhythm rate of 78 FL interval 312 which is prolonged QT is 505 which is prolonged ventricular bigeminy the FL interval is constant it is neither prolonged nor decreased    [SB]   1814 [facility] cardiology called for consult waiting for callback    [SB]   Gwen Briellewendy 1 callback from Dr. Coronado Seen and he said patient can be a labs with IM every 6 troponins and an echo in the morning I will call the hospitalist and inform    [SB]      ED Course User Index  [SB] Sergey Baker MD         Provider Notes (Medical Decision Making): MDM           Consultations:       Consultations: - NONE        Procedures and Critical Care       Performed by: Ally Woodall MD  PROCEDURES:  Procedures             Ally Woodall MD        Disposition     Disposition: Condition stable and ongoing  Admitted to Observation Unit the case was discussed with the admitting physician nurse practitioner Curt        Remove if not discharged  DISCHARGE PLAN:  1. Current Discharge Medication List      CONTINUE these medications which have NOT CHANGED    Details   omega 3-dha-epa-fish oil (Fish Oil) 100-160-1,000 mg cap Fish Oil   2 daily      metoprolol succinate 100 mg CSpX metoprolol succinate   12.5mg daily. atorvastatin (LIPITOR) 20 mg tablet atorvastatin 20 mg tablet      furosemide (LASIX) 20 mg tablet furosemide 20 mg tablet      aspirin 81 mg chewable tablet Take 1 Tab by mouth daily for 30 days. Qty: 30 Tab, Refills: 0      rOPINIRole (REQUIP) 0.25 mg tablet Take 0.25 mg by mouth nightly. tamsulosin (FLOMAX) 0.4 mg capsule Take 0.4 mg by mouth daily. pravastatin (PRAVACHOL) 40 mg tablet Take 40 mg by mouth nightly. levothyroxine (SYNTHROID) 100 mcg tablet Take 100 mcg by mouth Daily (before breakfast). metoprolol tartrate (LOPRESSOR) 25 mg tablet Take 12.5 mg by mouth daily. fish oil-dha-epa 1,200-144-216 mg cap Take 1 Tab by mouth daily. multivitamin (ONE A DAY) tablet Take 1 Tab by mouth daily. 2.   Follow-up Information    None       3. Return to ED if worse   4.    Current Discharge Medication List          Diagnosis     Clinical Impression: No diagnosis found. Attestations:    Rio Lunsford MD    Please note that this dictation was completed with [facility], the computer voice recognition software. Quite often unanticipated grammatical, syntax, homophones, and other interpretive errors are inadvertently transcribed by the computer software. Please disregard these errors. Please excuse any errors that have escaped final proofreading. Thank you.

## 2020-11-30 NOTE — ED NOTES
Unable to successfully place IV on pt. Pt states he does not want to be stuck multiple times due to recent hospital stay, open to another attempt. Will ask another nurse for assistance.

## 2020-11-30 NOTE — ED TRIAGE NOTES
Was d/c'd from rehab on Saturday after having stroke, no residual deficits, ambulatory to room with cane, went to  [address] office this am for follow-up and was sent here to ER for low heart rate.

## 2020-12-01 LAB
ANION GAP SERPL CALC-SCNC: 6 MMOL/L (ref 5–15)
ATRIAL RATE: 61 BPM
BUN SERPL-MCNC: 20 MG/DL (ref 6–20)
BUN/CREAT SERPL: 19 (ref 12–20)
CA-I BLD-MCNC: 8.7 MG/DL (ref 8.5–10.1)
CALCULATED P AXIS, ECG09: 29 DEGREES
CALCULATED R AXIS, ECG10: -27 DEGREES
CALCULATED T AXIS, ECG11: 79 DEGREES
CHLORIDE SERPL-SCNC: 104 MMOL/L (ref 97–108)
CO2 SERPL-SCNC: 27 MMOL/L (ref 21–32)
CREAT SERPL-MCNC: 1.05 MG/DL (ref 0.7–1.3)
DIAGNOSIS, 93000: NORMAL
GLUCOSE SERPL-MCNC: 97 MG/DL (ref 65–100)
P-R INTERVAL, ECG05: 312 MS
POTASSIUM SERPL-SCNC: 4.2 MMOL/L (ref 3.5–5.1)
Q-T INTERVAL, ECG07: 443 MS
QRS DURATION, ECG06: 95 MS
QTC CALCULATION (BEZET), ECG08: 505 MS
SODIUM SERPL-SCNC: 137 MMOL/L (ref 136–145)
T4 FREE SERPL-MCNC: 1.3 NG/DL (ref 0.8–1.5)
TROPONIN I SERPL-MCNC: <0.05 NG/ML
TSH SERPL DL<=0.05 MIU/L-ACNC: 3.21 UIU/ML (ref 0.36–3.74)
VENTRICULAR RATE, ECG03: 78 BPM

## 2020-12-01 PROCEDURE — 99218 HC RM OBSERVATION: CPT

## 2020-12-01 PROCEDURE — 74011250636 HC RX REV CODE- 250/636: Performed by: NURSE PRACTITIONER

## 2020-12-01 PROCEDURE — 84443 ASSAY THYROID STIM HORMONE: CPT

## 2020-12-01 PROCEDURE — 96372 THER/PROPH/DIAG INJ SC/IM: CPT

## 2020-12-01 PROCEDURE — 74011250637 HC RX REV CODE- 250/637: Performed by: NURSE PRACTITIONER

## 2020-12-01 PROCEDURE — 84439 ASSAY OF FREE THYROXINE: CPT

## 2020-12-01 PROCEDURE — 80048 BASIC METABOLIC PNL TOTAL CA: CPT

## 2020-12-01 PROCEDURE — 36415 COLL VENOUS BLD VENIPUNCTURE: CPT

## 2020-12-01 PROCEDURE — 84484 ASSAY OF TROPONIN QUANT: CPT

## 2020-12-01 RX ADMIN — PSYLLIUM HUSK 1 PACKET: 3.4 POWDER ORAL at 18:07

## 2020-12-01 RX ADMIN — HEPARIN SODIUM 5000 UNITS: 5000 INJECTION INTRAVENOUS; SUBCUTANEOUS at 08:25

## 2020-12-01 RX ADMIN — MULTIVITAMIN TABLET 1 TABLET: TABLET at 08:25

## 2020-12-01 RX ADMIN — Medication 10 ML: at 05:40

## 2020-12-01 RX ADMIN — Medication 10 ML: at 14:00

## 2020-12-01 RX ADMIN — FUROSEMIDE 20 MG: 20 TABLET ORAL at 08:25

## 2020-12-01 RX ADMIN — PSYLLIUM HUSK 1 PACKET: 3.4 POWDER ORAL at 08:25

## 2020-12-01 RX ADMIN — ROPINIROLE HYDROCHLORIDE 0.25 MG: 0.25 TABLET, FILM COATED ORAL at 21:05

## 2020-12-01 RX ADMIN — ASPIRIN 81 MG: 81 TABLET, CHEWABLE ORAL at 08:25

## 2020-12-01 RX ADMIN — PRAVASTATIN SODIUM 40 MG: 40 TABLET ORAL at 21:06

## 2020-12-01 RX ADMIN — LEVOTHYROXINE SODIUM 100 MCG: 100 TABLET ORAL at 08:25

## 2020-12-01 RX ADMIN — TAMSULOSIN HYDROCHLORIDE 0.4 MG: 0.4 CAPSULE ORAL at 08:25

## 2020-12-01 RX ADMIN — Medication 10 ML: at 21:06

## 2020-12-01 RX ADMIN — HEPARIN SODIUM 5000 UNITS: 5000 INJECTION INTRAVENOUS; SUBCUTANEOUS at 20:57

## 2020-12-01 NOTE — CONSULTS
CONSULTATION    REASON FOR CONSULT:  Bradycardia    REQUESTING PROVIDER:  Gavino Eli NP [facility] HM Team)    CHIEF COMPLAINT:    Chief Complaint   Patient presents with    Irregular Heart Beat     low         HISTORY OF PRESENT ILLNESS:  Toribio Douglas is a 80year-old male with past medical history significant for HTN, HLD, Hypothyroidism, Giant Cell arteritis with Polymyalgia Rheumatic (followed by Rheumatology), CAD s/ PCI, Carotid disease s/p endarterectomy, YARELIS, hearing impairment, and recent internal capsule CVA (just discharged from [facility] 11/19) who presented to PCP office for follow-up from rehab discharge and found to be bradycardic. Patient asymptomatic to this and denies palpitations, chest pain, HO, SOB, weakness, syncope, dizziness. Workup in ED was unrevealing so he was referred for further monitoring. This am he has no complaints, though is poor historian and is resistant to plan of care and changes. Records from hospital admission course thus far reviewed. Telemetry Review: SB, rate as low as 30s. 1*AVB. Frequent Ventricular Ectopy including couplets, Bigeminy. PAST MEDICAL HISTORY:    Past Medical History:   Diagnosis Date    Arthritis     CAD (coronary artery disease)     Hard of hearing     Headache     Hypertension     PMR [facility]ymyalgia rheumatica) ([facility])     Skin cancer     Sleep apnea     no longer uses cpap         HOME MEDICATIONS:    Prior to Admission Medications   Prescriptions Last Dose Informant Patient Reported? Taking?   aspirin 81 mg chewable tablet Unknown at Unknown time  No No   Sig: Take 1 Tab by mouth daily for 30 days. atorvastatin (LIPITOR) 20 mg tablet Unknown at Unknown time  Yes No   Sig: atorvastatin 20 mg tablet   fish oil-dha-epa 1,200-144-216 mg cap Unknown at Unknown time  Yes No   Sig: Take 1 Tab by mouth daily.    furosemide (LASIX) 20 mg tablet Unknown at Unknown time  Yes No   Sig: furosemide 20 mg tablet   levothyroxine (SYNTHROID) 100 mcg tablet Unknown at Unknown time  Yes No   Sig: Take 100 mcg by mouth Daily (before breakfast). metoprolol succinate 100 mg CSpX Unknown at Unknown time  Yes No   Sig: metoprolol succinate   12.5mg daily. metoprolol tartrate (LOPRESSOR) 25 mg tablet Unknown at Unknown time  Yes No   Sig: Take 12.5 mg by mouth daily. multivitamin (ONE A DAY) tablet Unknown at Unknown time  Yes No   Sig: Take 1 Tab by mouth daily. omega 3-dha-epa-fish oil (Fish Oil) 100-160-1,000 mg cap Unknown at Unknown time  Yes No   Sig: Fish Oil   2 daily   pravastatin (PRAVACHOL) 40 mg tablet Unknown at Unknown time  Yes No   Sig: Take 40 mg by mouth nightly. rOPINIRole (REQUIP) 0.25 mg tablet Unknown at Unknown time  Yes No   Sig: Take 0.25 mg by mouth nightly. tamsulosin (FLOMAX) 0.4 mg capsule Unknown at Unknown time  Yes No   Sig: Take 0.4 mg by mouth daily. Facility-Administered Medications: None         ALLERGIES:    Allergies   Allergen Reactions    Pcn [Penicillins] Swelling         FAMILY HISTORY:    Family History   Problem Relation Age of Onset   Atha Dain Arthritis-rheumatoid Mother     No Known Problems Father     Gout Son          SOCIAL HISTORY:    Tobacco: denies  Drugs: denies  ETOH: denies      REVIEW OF SYSTEMS:  Complete review of systems performed, pertinents noted above, all other systems are negative.     Patient Vitals for the past 24 hrs:   Temp Pulse Resp BP SpO2   12/01/20 0800 97.4 °F (36.3 °C) 61 20 138/76 97 %   12/01/20 0400 -- 65 -- -- --   12/01/20 0338 96.9 °F (36.1 °C) 63 18 (!) 118/39 95 %   12/01/20 0027 (!) 96.6 °F (35.9 °C) 71 18 (!) 144/80 95 %   12/01/20 0000 -- 70 -- -- --   11/30/20 2119 97.7 °F (36.5 °C) (!) 36 18 (!) 146/89 97 %   11/30/20 1830 -- (!) 52 25 (!) 147/72 95 %   11/30/20 1800 -- (!) 54 19 (!) 157/70 96 %   11/30/20 1730 -- (!) 51 17 (!) 161/74 97 %   11/30/20 1700 -- (!) 52 18 (!) 155/77 95 %   11/30/20 1634 -- -- -- -- 97 %   11/30/20 1620 -- (!) 103 18 (!) 160/71 97 %   11/30/20 1600 -- (!) 39 20 (!) 160/71 95 %       PHYSICAL EXAMINATION:    General: Well nourished elderly male sitting on side of bed, NAD, A&O  HEENT: Normocephalic, PERRL, no drainage  Neck: Supple, Trachea midline, No JVD  RESP: CTA bilaterally with symmetrical chest movement. No SOB or distress. On RA  Cardiovascular: RRR though slow no MRG  PVS: No rubor, cyanosis, no edema, Radial, DP, PT pulses equal bilaterally  ABD: obese , soft NT, Normoactive BS  Derm: Warm/Dry/Intact with no lesions, normal turgor  Neuro: A&O PPTS, cranial nerves II- XII grossly intact via interaction with patient. No focal deficits  PSYCH: No anxiety or agitation    Recent labs results and imaging reviewed. Recent Results (from the past 24 hour(s))   EKG, 12 LEAD, INITIAL    Collection Time: 11/30/20  4:07 PM   Result Value Ref Range    Ventricular Rate 78 BPM    Atrial Rate 61 BPM    P-R Interval 312 ms    QRS Duration 95 ms    Q-T Interval 443 ms    QTC Calculation (Bezet) 505 ms    Calculated P Axis 29 degrees    Calculated R Axis -27 degrees    Calculated T Axis 79 degrees    Diagnosis       Sinus rhythm  Ventricular bigeminy  Prolonged WV interval  LVH with secondary repolarization abnormality     CBC WITH AUTOMATED DIFF    Collection Time: 11/30/20  4:50 PM   Result Value Ref Range    WBC 6.9 4.4 - 11.3 K/uL    RBC 4.46 (L) 4.50 - 5.90 M/uL    HGB 13.9 13.5 - 17.5 g/dL    HCT 40.7 (L) 41 - 53 %    MCV 91.3 80 - 100 FL    MCH 31.1 31 - 34 PG    MCHC 34.1 31.0 - 36.0 g/dL    RDW 13.9 11.5 - 14.5 %    PLATELET 653 K/uL    MPV 8.6 6.5 - 11.5 FL    NRBC 10.0  WBC    ABSOLUTE NRBC 0.00 K/uL    NEUTROPHILS 61 42 - 75 %    LYMPHOCYTES 23 20.5 - 51.1 %    MONOCYTES 11 (H) 1.7 - 9.3 %    EOSINOPHILS 4 (H) 0.9 - 2.9 %    BASOPHILS 1 0.0 - 2.5 %    ABS. NEUTROPHILS 4.3 1.8 - 7.7 K/UL    ABS. LYMPHOCYTES 1.6 1.0 - 4.8 K/UL    ABS. MONOCYTES 0.7 0.2 - 2.4 K/UL    ABS. EOSINOPHILS 0.3 0.0 - 0.7 K/UL    ABS.  BASOPHILS 0.1 0.0 - 0.2 K/UL   PROTHROMBIN TIME + INR    Collection Time: 11/30/20  4:50 PM   Result Value Ref Range    Prothrombin time 10.8 9.0 - 11.1 sec    INR 1.1 0.9 - 1.1     METABOLIC PANEL, COMPREHENSIVE    Collection Time: 11/30/20  4:50 PM   Result Value Ref Range    Sodium 139 136 - 145 mmol/L    Potassium 4.5 3.5 - 5.1 mmol/L    Chloride 104 97 - 108 mmol/L    CO2 29 21 - 32 mmol/L    Anion gap 6 5 - 15 mmol/L    Glucose 93 65 - 100 mg/dL    BUN 23 (H) 6 - 20 mg/dL    Creatinine 1.16 0.70 - 1.30 mg/dL    BUN/Creatinine ratio 20 12 - 20      GFR est AA >60 >60 ml/min/1.73m2    GFR est non-AA 59 (L) >60 ml/min/1.73m2    Calcium 9.0 8.5 - 10.1 mg/dL    Bilirubin, total 0.4 0.2 - 1.0 mg/dL    AST (SGOT) 14 (L) 15 - 37 U/L    ALT (SGPT) 18 12 - 78 U/L    Alk.  phosphatase 53 45 - 117 U/L    Protein, total 7.0 6.4 - 8.2 g/dL    Albumin 3.2 (L) 3.5 - 5.0 g/dL    Globulin 3.8 2.0 - 4.0 g/dL    A-G Ratio 0.8 (L) 1.1 - 2.2     TROPONIN I    Collection Time: 11/30/20  4:50 PM   Result Value Ref Range    Troponin-I, Qt. <0.05 <0.05 ng/mL   MAGNESIUM    Collection Time: 11/30/20  4:50 PM   Result Value Ref Range    Magnesium 2.4 1.6 - 2.4 mg/dL   TROPONIN I    Collection Time: 11/30/20  6:50 PM   Result Value Ref Range    Troponin-I, Qt. <0.05 <0.05 ng/mL   TROPONIN I    Collection Time: 12/01/20 12:10 AM   Result Value Ref Range    Troponin-I, Qt. <0.05 <0.05 ng/mL   TROPONIN I    Collection Time: 12/01/20  5:17 AM   Result Value Ref Range    Troponin-I, Qt. <0.05 <4.62 ng/mL   METABOLIC PANEL, BASIC    Collection Time: 12/01/20  5:17 AM   Result Value Ref Range    Sodium 137 136 - 145 mmol/L    Potassium 4.2 3.5 - 5.1 mmol/L    Chloride 104 97 - 108 mmol/L    CO2 27 21 - 32 mmol/L    Anion gap 6 5 - 15 mmol/L    Glucose 97 65 - 100 mg/dL    BUN 20 6 - 20 mg/dL    Creatinine 1.05 0.70 - 1.30 mg/dL    BUN/Creatinine ratio 19 12 - 20      GFR est AA >60 >60 ml/min/1.73m2    GFR est non-AA >60 >60 ml/min/1.73m2    Calcium 8.7 8.5 - 10.1 mg/dL   TSH 3RD GENERATION    Collection Time: 12/01/20  5:17 AM   Result Value Ref Range    TSH 3.21 0.36 - 3.74 uIU/mL       XR Results (maximum last 3): Results from [place] encounter on 11/30/20   XR CHEST PORT    Impression IMPRESSION:   1. Mild left retrocardiac atelectasis. 2. Mild unchanged enlargement of the cardiac silhouette. Results from [place] encounter on 11/14/20   XR SHOULDER RT AP/LAT MIN 2 V    Impression IMPRESSION: No fracture is identified. .There are severe degenerative changes  right shoulder. If symptoms persist follow-up study is recommended. Results from [place] encounter on 11/14/20   XR CHEST PORT    Impression IMPRESSION:  No evidence of an acute cardiopulmonary process. CT Results (maximum last 3): Results from [place] encounter on 11/14/20   CT PELV WO CONT    Impression IMPRESSION:   1. No fracture. 2. Incidentally imaged mild colonic diverticulitis at the junction of the  descending colon and sigmoid colon. CT PERF W CBF    Impression Impression:    No evidence for acute large vessel arterial occlusion. Intracranial and  extracranial atherosclerosis as described above. No significant cerebral perfusion abnormality or mismatch                    CTA NECK    Impression Impression:    No evidence for acute large vessel arterial occlusion. Intracranial and  extracranial atherosclerosis as described above.     No significant cerebral perfusion abnormality or mismatch                          Current Facility-Administered Medications:     sodium chloride (NS) flush 5-40 mL, 5-40 mL, IntraVENous, Q8H, Oba, Oluwabunmi S, NP, 10 mL at 12/01/20 0540    acetaminophen (TYLENOL) tablet 650 mg, 650 mg, Oral, Q4H PRN, Oba, Oluwabunmi S, NP    ondansetron (ZOFRAN) injection 4 mg, 4 mg, IntraVENous, Q8H PRN, Oba, Oluwabunmi S, NP    heparin (porcine) injection 5,000 Units, 5,000 Units, SubCUTAneous, Q12H, Oba, Oluwabunmi S, NP, 5,000 Units at 12/01/20 0825    aspirin chewable tablet 81 mg, 81 mg, Oral, DAILY, Oba, Oluwabunmi S, NP, 81 mg at 12/01/20 0825    furosemide (LASIX) tablet 20 mg, 20 mg, Oral, DAILY, Oba, Oluwabunmi S, NP, 20 mg at 12/01/20 0825    levothyroxine (SYNTHROID) tablet 100 mcg, 100 mcg, Oral, ACB, Oba, Oluwabunmi S, NP, 100 mcg at 12/01/20 0825    multivitamin (ONE A DAY) tablet 1 Tab, 1 Tab, Oral, DAILY, Oba, Oluwabunmi S, NP, 1 Tab at 12/01/20 0825    pravastatin (PRAVACHOL) tablet 40 mg, 40 mg, Oral, QHS, Oba, Oluwabunmi S, NP, 40 mg at 11/30/20 2257    rOPINIRole (REQUIP) tablet 0.25 mg, 0.25 mg, Oral, QHS, Oba, Oluwabunmi S, NP, 0.25 mg at 11/30/20 2257    tamsulosin (FLOMAX) capsule 0.4 mg, 0.4 mg, Oral, DAILY, Oba, Oluwabunmi S, NP, 0.4 mg at 12/01/20 0825    psyllium husk-aspartame (METAMUCIL FIBER) packet 1 Packet, 1 Packet, Oral, BID, Oba, Oluwabunmi S, NP, 1 Packet at 12/01/20 0825          Case discussed with collaborating physician Dr. Jose Antonio Steve and our impression and recommendations are as follows:   1. Bradycardia - Sinus with 1*AVB and significant ventricular ectopy. Patient is asymptomatic to this. Continue to hold home BB and monitor HR trends. Patient will need 96 hour holter at discharge. 2. Hypertension - BP is above goal. Consider adding ACE or ARB to regimen, low dose. 3. CAD - Denies chest pain or recent sx. Had PCI to LAD 2011 by Dr. Gita Mercado. Given significant ventricular ectopy patient would benefit from ischemic evaluation in OP setting. Patient should follow-up closely with Dr. Gita Mercado or myself. Continue ASA and Statin. 4. Ventricular Ectopy - patient should have 96 day holter monitor placed at  Discharge to get PVC count. He may need referral to EP. Avoid rate limiting medications. If wishes would benefit from ischemic evaluation as well. 5. Chronic HFpEF - Stage 2 Diastolic Dysfunction on Echo in November during CVA hospitalization.   currently appears well compensated and euvolemic. Continue current medication regiment as above. BB now contraindicated given bradycardia. Thank you for involving us in the care of this patient. Please do not hesitate to call if additional questions arise.  If after hours please call [phone]

## 2020-12-01 NOTE — ROUTINE PROCESS
79y/o male to room 205 from ED @ 2040 via w/c accompanied by Ed, RN. Alert and oriented. Hard of hearing. Oriented to room. Instructed to call for assistance before getting out of bed. No c/o pain. States I wasn't supposed to stay but they didn't want me to go home.

## 2020-12-01 NOTE — PROGRESS NOTES
Progress Note  Date:12/1/2020       Room:205/01  Patient Name:Sergio Cortes Sr. YOB: 1929     Age:89 y.o. Subjective    Jenkinjones Allan Johnston. is a 80 y.o. male with a past medical history significant for HTN, HLP, CAD, Diastolic CHF, Stroke, Hypothyroidism and BPH who presented to the ED as referred from Dr Charlie Viveros office for Bradycardia. Patient reported he had been recently diagnosed with a stroke approximately 2 weeks ago, was in rehab, and just d/cd from rehab 4 days ago. He reported he was following up with Dr Joan Husain today and he was told to come over. He denies any chest pain, sob, palpitations, fever, chills or general malalse. He denies any fatigue or weakness. No acute complaints reported. Upon ED presentation, his ECG shows sinus rhythm-rate 78, PVCs, with first degree AVB and prolonged QT. His troponin was negative x 2. ED provider discussed with Dr Gabe Billingsley, who recommends admission to observation telemetry, and trending of trop. Patient seen and evaluated resting comfortably in bed no acute distress hard of hearing but able to answer questions does get confused at times. Patient is status post recent CVA and recent discharge from rehab facility was noted at PCP office to have bradycardia and possibly complete heart block. Overnight patient did have bradycardic episodes 36 and 39. Medications were reconciled and patient was on metoprolol succinate 12.5 mg oral daily which has been held at this time. Discussed with patient's son who was kind enough to bring patient's home medications which nurse was able to reconcile     Review of Systems   Constitutional: Negative. HENT: Negative. Eyes: Negative. Respiratory: Negative. Cardiovascular: Negative. Gastrointestinal: Negative. Endocrine: Negative. Genitourinary: Negative. Musculoskeletal: Negative. Skin: Negative. Allergic/Immunologic: Negative. Neurological: Negative.     Hematological: Negative. Psychiatric/Behavioral: Negative. Objective           Vitals Last 24 Hours:  Patient Vitals for the past 24 hrs:   Temp Pulse Resp BP SpO2   12/01/20 1600 97.2 °F (36.2 °C) 62 16 131/65 94 %   12/01/20 1200 96.8 °F (36 °C) 86 20 130/77 98 %   12/01/20 0800 97.4 °F (36.3 °C) 61 20 138/76 97 %   12/01/20 0400 -- 65 -- -- --   12/01/20 0338 96.9 °F (36.1 °C) 63 18 (!) 118/39 95 %   12/01/20 0027 (!) 96.6 °F (35.9 °C) 71 18 (!) 144/80 95 %   12/01/20 0000 -- 70 -- -- --   11/30/20 2119 97.7 °F (36.5 °C) (!) 36 18 (!) 146/89 97 %   11/30/20 1830 -- (!) 52 25 (!) 147/72 95 %   11/30/20 1800 -- (!) 54 19 (!) 157/70 96 %   11/30/20 1730 -- (!) 51 17 (!) 161/74 97 %        I/O (24Hr): Intake/Output Summary (Last 24 hours) at 12/1/2020 1705  Last data filed at 12/1/2020 1245  Gross per 24 hour   Intake 480 ml   Output 1200 ml   Net -720 ml       Physical Exam:  General: Alert, cooperative, no distress, appears stated age. Head:  Normocephalic, without obvious abnormality, atraumatic. Eyes:  Conjunctivae/corneas clear. Pupils equal, round, reactive to light. Extraocular movements intact. Lungs:  Clear to auscultation bilaterally. no wheeze, rales, crackles, rhonchi   Chest wall: No tenderness or deformity. Heart:  Regular rate and rhythm, S1, S2 normal, no murmur, click, rub or gallop. Abdomen:  Soft, non-tender. Bowel sounds normal. No masses,  No organomegaly. Extremities: Extremities normal, atraumatic, no cyanosis or edema. Pulses: 2+ and symmetric all extremities. Skin: Skin color, texture, turgor normal. No rashes or lesions  Neurologic: Awake, Alert, oriented.  No obvious gross sensory or motor deficits      Medications           Current Facility-Administered Medications   Medication Dose Route Frequency    sodium chloride (NS) flush 5-40 mL  5-40 mL IntraVENous Q8H    acetaminophen (TYLENOL) tablet 650 mg  650 mg Oral Q4H PRN    ondansetron (ZOFRAN) injection 4 mg  4 mg IntraVENous Q8H PRN    heparin (porcine) injection 5,000 Units  5,000 Units SubCUTAneous Q12H    aspirin chewable tablet 81 mg  81 mg Oral DAILY    furosemide (LASIX) tablet 20 mg  20 mg Oral DAILY    levothyroxine (SYNTHROID) tablet 100 mcg  100 mcg Oral ACB    multivitamin (ONE A DAY) tablet 1 Tab  1 Tab Oral DAILY    pravastatin (PRAVACHOL) tablet 40 mg  40 mg Oral QHS    rOPINIRole (REQUIP) tablet 0.25 mg  0.25 mg Oral QHS    tamsulosin (FLOMAX) capsule 0.4 mg  0.4 mg Oral DAILY    psyllium husk-aspartame (METAMUCIL FIBER) packet 1 Packet  1 Packet Oral BID         Allergies         Pcn [penicillins]       Labs/Imaging/Diagnostics      Labs:  Recent Results (from the past 48 hour(s))   EKG, 12 LEAD, INITIAL    Collection Time: 11/30/20  4:07 PM   Result Value Ref Range    Ventricular Rate 78 BPM    Atrial Rate 61 BPM    P-R Interval 312 ms    QRS Duration 95 ms    Q-T Interval 443 ms    QTC Calculation (Bezet) 505 ms    Calculated P Axis 29 degrees    Calculated R Axis -27 degrees    Calculated T Axis 79 degrees    Diagnosis       Sinus rhythm  Ventricular bigeminy  Prolonged WA interval  LVH with secondary repolarization abnormality     CBC WITH AUTOMATED DIFF    Collection Time: 11/30/20  4:50 PM   Result Value Ref Range    WBC 6.9 4.4 - 11.3 K/uL    RBC 4.46 (L) 4.50 - 5.90 M/uL    HGB 13.9 13.5 - 17.5 g/dL    HCT 40.7 (L) 41 - 53 %    MCV 91.3 80 - 100 FL    MCH 31.1 31 - 34 PG    MCHC 34.1 31.0 - 36.0 g/dL    RDW 13.9 11.5 - 14.5 %    PLATELET 299 K/uL    MPV 8.6 6.5 - 11.5 FL    NRBC 10.0  WBC    ABSOLUTE NRBC 0.00 K/uL    NEUTROPHILS 61 42 - 75 %    LYMPHOCYTES 23 20.5 - 51.1 %    MONOCYTES 11 (H) 1.7 - 9.3 %    EOSINOPHILS 4 (H) 0.9 - 2.9 %    BASOPHILS 1 0.0 - 2.5 %    ABS. NEUTROPHILS 4.3 1.8 - 7.7 K/UL    ABS. LYMPHOCYTES 1.6 1.0 - 4.8 K/UL    ABS. MONOCYTES 0.7 0.2 - 2.4 K/UL    ABS. EOSINOPHILS 0.3 0.0 - 0.7 K/UL    ABS.  BASOPHILS 0.1 0.0 - 0.2 K/UL   PROTHROMBIN TIME + INR    Collection Time: 11/30/20  4:50 PM   Result Value Ref Range    Prothrombin time 10.8 9.0 - 11.1 sec    INR 1.1 0.9 - 1.1     METABOLIC PANEL, COMPREHENSIVE    Collection Time: 11/30/20  4:50 PM   Result Value Ref Range    Sodium 139 136 - 145 mmol/L    Potassium 4.5 3.5 - 5.1 mmol/L    Chloride 104 97 - 108 mmol/L    CO2 29 21 - 32 mmol/L    Anion gap 6 5 - 15 mmol/L    Glucose 93 65 - 100 mg/dL    BUN 23 (H) 6 - 20 mg/dL    Creatinine 1.16 0.70 - 1.30 mg/dL    BUN/Creatinine ratio 20 12 - 20      GFR est AA >60 >60 ml/min/1.73m2    GFR est non-AA 59 (L) >60 ml/min/1.73m2    Calcium 9.0 8.5 - 10.1 mg/dL    Bilirubin, total 0.4 0.2 - 1.0 mg/dL    AST (SGOT) 14 (L) 15 - 37 U/L    ALT (SGPT) 18 12 - 78 U/L    Alk.  phosphatase 53 45 - 117 U/L    Protein, total 7.0 6.4 - 8.2 g/dL    Albumin 3.2 (L) 3.5 - 5.0 g/dL    Globulin 3.8 2.0 - 4.0 g/dL    A-G Ratio 0.8 (L) 1.1 - 2.2     TROPONIN I    Collection Time: 11/30/20  4:50 PM   Result Value Ref Range    Troponin-I, Qt. <0.05 <0.05 ng/mL   MAGNESIUM    Collection Time: 11/30/20  4:50 PM   Result Value Ref Range    Magnesium 2.4 1.6 - 2.4 mg/dL   TROPONIN I    Collection Time: 11/30/20  6:50 PM   Result Value Ref Range    Troponin-I, Qt. <0.05 <0.05 ng/mL   TROPONIN I    Collection Time: 12/01/20 12:10 AM   Result Value Ref Range    Troponin-I, Qt. <0.05 <0.05 ng/mL   TROPONIN I    Collection Time: 12/01/20  5:17 AM   Result Value Ref Range    Troponin-I, Qt. <0.05 <8.99 ng/mL   METABOLIC PANEL, BASIC    Collection Time: 12/01/20  5:17 AM   Result Value Ref Range    Sodium 137 136 - 145 mmol/L    Potassium 4.2 3.5 - 5.1 mmol/L    Chloride 104 97 - 108 mmol/L    CO2 27 21 - 32 mmol/L    Anion gap 6 5 - 15 mmol/L    Glucose 97 65 - 100 mg/dL    BUN 20 6 - 20 mg/dL    Creatinine 1.05 0.70 - 1.30 mg/dL    BUN/Creatinine ratio 19 12 - 20      GFR est AA >60 >60 ml/min/1.73m2    GFR est non-AA >60 >60 ml/min/1.73m2    Calcium 8.7 8.5 - 10.1 mg/dL   TSH 3RD GENERATION    Collection Time: 12/01/20 5: 17 AM   Result Value Ref Range    TSH 3.21 0.36 - 3.74 uIU/mL   T4, FREE    Collection Time: 12/01/20  5:17 AM   Result Value Ref Range    T4, Free 1.3 0.8 - 1.5 ng/dL   TROPONIN I    Collection Time: 12/01/20 12:38 PM   Result Value Ref Range    Troponin-I, Qt. <0.05 <0.05 ng/mL        Imaging:  Xr Chest Port    Result Date: 11/30/2020  IMPRESSION: 1. Mild left retrocardiac atelectasis. 2. Mild unchanged enlargement of the cardiac silhouette. Assessment//Plan           Problem List:  Hospital Problems  Date Reviewed: 11/18/2020          Codes Class Noted POA    * (Principal) Bradycardia ICD-10-CM: R00.1  ICD-9-CM: 427.89  11/30/2020 Unknown            Bradycardia:  -Asymptomatic. Referred from Dr. Wade Link office for bradycardia.  -In the ED, ECG shows Sinus rhythm 78/1st degree AVB/prolonged QT.   -Serial troponins were negative. CXR shows stable cardiomegaly. Mg level is 2.4.  -Monitored overnight on telemetry noted to have 2 bradycardic episodes with low of 36  -Home medications reconciled and noted to be on metoprolol succinate 12.5 mg oral daily was started on discharge from recent CVA where it was noted that patient was having increasing ventricular ectopy. -Last Echo 11/17/20-EF 60-65%, with moderate diastolic dysfunction.  -Last TSH (11/15/20) was 17, and repeat is at 3.21 with a free T4 of 1.3  -Cardiology consultation initiated.      Hypertension:  -Restart patient's Lasix 20 mg but held metoprolol monitor vitals every 4 hours     Diastolic CHF:  -Not in acute exacerbation:  -continue lasix  -Beta-blocker at this time is contraindicated     Diabetes mellitus-Type II:  -Accucheck ACHS + SSI.  -A1c is only 6.2     CVA:  -Recent onset status post hospitalization and rehab with recent discharge home and son states overall he is doing well and no focal deficits  -Continue aspirin, statin.     Hypothyroidism:   -On Levothyroxine, continue. -TSH and free T4 normal range     BPH:   -Chronic condition. On Flomax.        VTE prophylaxis: Heparin  GI Prphylaxis: Omeprazole  Code Status: Full code. Full Code     Spent 30 minutes evaluting and coordinating patient care of which >50% was spent coordinating and counseling.      Electronically signed by Lamar Pascual MD on 12/1/2020 at 5:05 PM

## 2020-12-01 NOTE — PROGRESS NOTES
Spoke with patient and son regarding any needs at home once discharged; patient's son reports that he visits most every day and that he helps patient with personal care needs. Patient's son also states that he helps patient keep up with pill box. Patient's son has recently gotten safety devices for the home as well as a new walker. Additionally, patient's son reports that patient has recently been set up with [address] [place] [address] for rehab needs and has had one visit with the [address] [place] [address] agency, which they report being pleased with and wish to continue with. Asked patient and son if they felt they needed any additional help and/or services at this time but both denied needing additional help.

## 2020-12-01 NOTE — PROGRESS NOTES
pts son in to see patient. Bought in patients home med list. meds reviewed and updated in the system.  Dr Clement Rao aware

## 2020-12-01 NOTE — H&P
History and Physical    Subjective:     Lorri Ding is a 80 y.o. male with a past medical history significant for HTN, HLP, CAD, Diastolic CHF, Stroke, Hypothyroidism and BPH who presented to the ED as referred from Dr Gabriel Jordan office for Bradycardia. Patient reported he had been recently diagnosed with a stroke approximately 2 weeks ago, was in rehab, and just d/cd from rehab 4 days ago. He reported he was following up with Dr Kell Hamilton today and he was told to come over. He denies any chest pain, sob, palpitations, fever, chills or general malalse. He denies any fatigue or weakness. No acute complaints reported. Upon ED presentation, his ECG shows sinus rhythm-rate 78, PVCs, with first degree AVB and prolonged QT. His troponin was negative x 2. ED provider discussed with Dr Catalina Kidd, who recommends admission to observation telemetry, and trending of trop. Past Medical History:   Diagnosis Date    Arthritis     CAD (coronary artery disease)     Hard of hearing     Headache     Hypertension     PMR (polymyalgia rheumatica) (HCC)     Skin cancer     Sleep apnea     no longer uses cpap      Past Surgical History:   Procedure Laterality Date    CARDIAC SURG PROCEDURE UNLIST      cardiac stent    HX CAROTID STENT      VASCULAR SURGERY PROCEDURE UNLIST      carotid endartectomy     Family History   Problem Relation Age of Onset   [address] [facility] [address] Arthritis-rheumatoid Mother     No Known Problems Father     Gout Son       Social History     Tobacco Use    Smoking status: Never Smoker    Smokeless tobacco: Never Used   Substance Use Topics    Alcohol use: No       Prior to Admission medications    Medication Sig Start Date End Date Taking? Authorizing Provider   omega 3-dha-epa-fish oil (Fish Oil) 100-160-1,000 mg cap Fish Oil   2 daily    OtherElio MD   metoprolol succinate 100 mg CSpX metoprolol succinate   12.5mg daily.     Elio Sanchez MD   atorvastatin (LIPITOR) 20 mg tablet atorvastatin 20 mg tablet    Other, MD Elio   furosemide (LASIX) 20 mg tablet furosemide 20 mg tablet    Laura, MD Elio   aspirin 81 mg chewable tablet Take 1 Tab by mouth daily for 30 days. 11/18/20 12/18/20  Arleen Zaragoza MD   rOPINIRole (REQUIP) 0.25 mg tablet Take 0.25 mg by mouth nightly. Provider, Historical   tamsulosin (FLOMAX) 0.4 mg capsule Take 0.4 mg by mouth daily. Provider, Historical   pravastatin (PRAVACHOL) 40 mg tablet Take 40 mg by mouth nightly. Provider, Historical   levothyroxine (SYNTHROID) 100 mcg tablet Take 100 mcg by mouth Daily (before breakfast). Provider, Historical   metoprolol tartrate (LOPRESSOR) 25 mg tablet Take 12.5 mg by mouth daily. Provider, Historical   fish oil-dha-epa 1,200-144-216 mg cap Take 1 Tab by mouth daily. Provider, Historical   multivitamin (ONE A DAY) tablet Take 1 Tab by mouth daily. Provider, Historical     Allergies   Allergen Reactions    Pcn [Penicillins] Swelling        Review of Systems:  14 point ROS reviewed with patient and was reported negative except as mentioned in HPI. Objective:     Vitals:  Visit Vitals  BP (!) 147/72   Pulse (!) 52   Resp 25   Ht 5' 10\" (1.778 m)   Wt 95.3 kg (210 lb)   SpO2 95%   BMI 30.13 kg/m²       Physical Exam:  General: Pleasant elderly male, appears stated age, Awake, Alert, cooperative, no acute distress. Head:  Normocephalic, without obvious abnormality, atraumatic. Eyes:  Conjunctivae/corneas clear. Pupils equal, round, reactive to light. Extraocular movements intact. Lungs:  Clear to auscultation bilaterally, no wheezes, no crackles. Chest wall: Symmetrical chest wall expansion. No tenderness or deformity. Heart:  Regular rate and rhythm, S1, S2 normal, no murmur, click, rub, or gallop. Abdomen:  Soft, non-tender. Bowel sounds normal. No masses. No organomegaly. Back:  No spine tenderness to palpation  Extremities: Extremities normal, atraumatic, no cyanosis or peripheral edema.   Pulses: Symmetric all extremities. Skin: Skin color, texture, turgor normal.   Lymph nodes: Cervical nodes normal.  Neurologic: Awake and oriented, x 4, Difficulty hearing. Labs:  Recent Results (from the past 24 hour(s))   CBC WITH AUTOMATED DIFF    Collection Time: 11/30/20  4:50 PM   Result Value Ref Range    WBC 6.9 4.4 - 11.3 K/uL    RBC 4.46 (L) 4.50 - 5.90 M/uL    HGB 13.9 13.5 - 17.5 g/dL    HCT 40.7 (L) 41 - 53 %    MCV 91.3 80 - 100 FL    MCH 31.1 31 - 34 PG    MCHC 34.1 31.0 - 36.0 g/dL    RDW 13.9 11.5 - 14.5 %    PLATELET 964 K/uL    MPV 8.6 6.5 - 11.5 FL    NRBC 10.0  WBC    ABSOLUTE NRBC 0.00 K/uL    NEUTROPHILS 61 42 - 75 %    LYMPHOCYTES 23 20.5 - 51.1 %    MONOCYTES 11 (H) 1.7 - 9.3 %    EOSINOPHILS 4 (H) 0.9 - 2.9 %    BASOPHILS 1 0.0 - 2.5 %    ABS. NEUTROPHILS 4.3 1.8 - 7.7 K/UL    ABS. LYMPHOCYTES 1.6 1.0 - 4.8 K/UL    ABS. MONOCYTES 0.7 0.2 - 2.4 K/UL    ABS. EOSINOPHILS 0.3 0.0 - 0.7 K/UL    ABS. BASOPHILS 0.1 0.0 - 0.2 K/UL   PROTHROMBIN TIME + INR    Collection Time: 11/30/20  4:50 PM   Result Value Ref Range    Prothrombin time 10.8 9.0 - 11.1 sec    INR 1.1 0.9 - 1.1     METABOLIC PANEL, COMPREHENSIVE    Collection Time: 11/30/20  4:50 PM   Result Value Ref Range    Sodium 139 136 - 145 mmol/L    Potassium 4.5 3.5 - 5.1 mmol/L    Chloride 104 97 - 108 mmol/L    CO2 29 21 - 32 mmol/L    Anion gap 6 5 - 15 mmol/L    Glucose 93 65 - 100 mg/dL    BUN 23 (H) 6 - 20 mg/dL    Creatinine 1.16 0.70 - 1.30 mg/dL    BUN/Creatinine ratio 20 12 - 20      GFR est AA >60 >60 ml/min/1.73m2    GFR est non-AA 59 (L) >60 ml/min/1.73m2    Calcium 9.0 8.5 - 10.1 mg/dL    Bilirubin, total 0.4 0.2 - 1.0 mg/dL    AST (SGOT) 14 (L) 15 - 37 U/L    ALT (SGPT) 18 12 - 78 U/L    Alk.  phosphatase 53 45 - 117 U/L    Protein, total 7.0 6.4 - 8.2 g/dL    Albumin 3.2 (L) 3.5 - 5.0 g/dL    Globulin 3.8 2.0 - 4.0 g/dL    A-G Ratio 0.8 (L) 1.1 - 2.2     TROPONIN I    Collection Time: 11/30/20  4:50 PM   Result Value Ref Range    Troponin-I, Qt. <0.05 <0.05 ng/mL   MAGNESIUM    Collection Time: 11/30/20  4:50 PM   Result Value Ref Range    Magnesium 2.4 1.6 - 2.4 mg/dL   TROPONIN I    Collection Time: 11/30/20  6:50 PM   Result Value Ref Range    Troponin-I, Qt. <0.05 <0.05 ng/mL       Imaging:  Xr Chest Port    Result Date: 11/30/2020  EXAMINATION: XR CHEST PORT HISTORY: Bradycardia COMPARISON: 11/14/2020 FINDINGS: Single view. There is mild left retrocardiac atelectasis. There is no edema. There is calcified granuloma in the left lung base. There is no pneumothorax or pleural effusion. There is unchanged enlargement of the cardiac silhouette. Thoracic aorta is atherosclerotic and mildly tortuous. There is severe right glenohumeral joint osteoarthritis. IMPRESSION: 1. Mild left retrocardiac atelectasis. 2. Mild unchanged enlargement of the cardiac silhouette. Assessment & Plan:     #1: Bradycardia:  -Asymptomatic. Referred from Dr. Ellison Lundborg office for bradycardia.  -In the ED, ECG shows Sinus rhythm 78/1st degree AVB/prolonged QT.   -Trop <0.05 x 2. CXR shows stable cardiomegaly. Mg level is 2.4.  -Admit to observation, on telemetry.  -Trend troponin q 6 x 3.  -He is on conflicting doses of  metoprolol succinate and metoprolol tartrate at home, unsure if he double dosed, or mistook meds. Anyway will hold metoprolol for now.  -Last Echo 11/17/20-EF 60-65%, with moderate diastolic dysfunction.  -Last TSH (11/15/20) was 17, will repeat TSH in a.m.  -Cardiology consultation. #2: Hypertension:  -Chronic condition. BP Controlled. -Continue metoprolol. #3: Diastolic CHF:  -Not in acute exacerbation:  -continue lasix, and metoprolol    #4: Diabetes mellitus-Type II:  -Chronic condition.  -Accucheck ACHS + SSI. #5: CVA:  -Chronic condition. No focal neuro deficits observed. -Continue aspirin, statin. #6: Hypothyroidism:   -Chronic condiition  -On Levothyroxine, continue. -Repeat TSH, Free T4 in a.m.     #7: BPH: -Chronic condition. On Flomax. VTE prophylaxis: Heparin  GI Prphylaxis: Omeprazole  Code Status: Full code.

## 2020-12-02 ENCOUNTER — HOSPITAL ENCOUNTER (OUTPATIENT)
Dept: NON INVASIVE DIAGNOSTICS | Age: 85
Discharge: HOME | End: 2020-12-02
Attending: NURSE PRACTITIONER
Payer: MEDICARE

## 2020-12-02 ENCOUNTER — TRANSCRIBE ORDER (OUTPATIENT)
Dept: SCHEDULING | Age: 85
End: 2020-12-02

## 2020-12-02 VITALS
DIASTOLIC BLOOD PRESSURE: 53 MMHG | BODY MASS INDEX: 30.06 KG/M2 | WEIGHT: 210 LBS | HEIGHT: 70 IN | TEMPERATURE: 97.7 F | SYSTOLIC BLOOD PRESSURE: 140 MMHG | OXYGEN SATURATION: 93 % | HEART RATE: 66 BPM | RESPIRATION RATE: 18 BRPM

## 2020-12-02 DIAGNOSIS — R00.1 BRADYCARDIA: Primary | ICD-10-CM

## 2020-12-02 DIAGNOSIS — R00.1 BRADYCARDIA: ICD-10-CM

## 2020-12-02 LAB
ANION GAP SERPL CALC-SCNC: 6 MMOL/L (ref 5–15)
BUN SERPL-MCNC: 22 MG/DL (ref 6–20)
BUN/CREAT SERPL: 20 (ref 12–20)
CA-I BLD-MCNC: 8.5 MG/DL (ref 8.5–10.1)
CHLORIDE SERPL-SCNC: 104 MMOL/L (ref 97–108)
CO2 SERPL-SCNC: 27 MMOL/L (ref 21–32)
CREAT SERPL-MCNC: 1.1 MG/DL (ref 0.7–1.3)
GLUCOSE SERPL-MCNC: 104 MG/DL (ref 65–100)
POTASSIUM SERPL-SCNC: 4 MMOL/L (ref 3.5–5.1)
SODIUM SERPL-SCNC: 137 MMOL/L (ref 136–145)

## 2020-12-02 PROCEDURE — 99218 HC RM OBSERVATION: CPT

## 2020-12-02 PROCEDURE — 80048 BASIC METABOLIC PNL TOTAL CA: CPT

## 2020-12-02 PROCEDURE — 96372 THER/PROPH/DIAG INJ SC/IM: CPT

## 2020-12-02 PROCEDURE — 74011250637 HC RX REV CODE- 250/637: Performed by: NURSE PRACTITIONER

## 2020-12-02 PROCEDURE — 93225 XTRNL ECG REC<48 HRS REC: CPT

## 2020-12-02 PROCEDURE — 36415 COLL VENOUS BLD VENIPUNCTURE: CPT

## 2020-12-02 PROCEDURE — 74011250636 HC RX REV CODE- 250/636: Performed by: NURSE PRACTITIONER

## 2020-12-02 RX ORDER — FUROSEMIDE 20 MG/1
20 TABLET ORAL DAILY
Qty: 30 TAB | Refills: 0 | Status: SHIPPED | OUTPATIENT
Start: 2020-12-02 | End: 2021-04-22

## 2020-12-02 RX ADMIN — Medication 10 ML: at 05:59

## 2020-12-02 RX ADMIN — LEVOTHYROXINE SODIUM 100 MCG: 100 TABLET ORAL at 09:22

## 2020-12-02 RX ADMIN — TAMSULOSIN HYDROCHLORIDE 0.4 MG: 0.4 CAPSULE ORAL at 09:21

## 2020-12-02 RX ADMIN — ASPIRIN 81 MG: 81 TABLET, CHEWABLE ORAL at 09:21

## 2020-12-02 RX ADMIN — MULTIVITAMIN TABLET 1 TABLET: TABLET at 09:21

## 2020-12-02 RX ADMIN — HEPARIN SODIUM 5000 UNITS: 5000 INJECTION INTRAVENOUS; SUBCUTANEOUS at 09:22

## 2020-12-02 RX ADMIN — PSYLLIUM HUSK 1 PACKET: 3.4 POWDER ORAL at 09:21

## 2020-12-02 RX ADMIN — FUROSEMIDE 20 MG: 20 TABLET ORAL at 09:21

## 2020-12-02 NOTE — PROGRESS NOTES
Reviewed discharge instructions with patient and his son Bucky Jona. Aware of med change to lasix and to stop taking lopressor at this time. Patient and son aware of follow up appointments. Instructed son and patient about holter monitor. That monitor should be worn until Sunday at noon. After noon they can remove monitor and place in ziploc  Bag given to them. Told ok for patient to shower. Aware to drop monitor off at  Sunday or Monday. Verbalized understanding. Iv to left ac removed and covered with dr dressing.  Patient taken down via wheelchair at this time

## 2020-12-02 NOTE — PROGRESS NOTES
Care Management Interventions  Mode of Transport at Discharge: Self(patient's son anticipates transporting patient at time of discharge)  Transition of Care Consult (CM Consult): Home Health  Current Support Network: Family [facility] [place], Lives Alone(patient reports he has a son that checks in on him every day)  Confirm Follow Up Transport: Family(son reports he plans to take patient to follow up appointments)  Discharge Location  Discharge Placement: Home with home health

## 2020-12-02 NOTE — DISCHARGE SUMMARY
Physician Discharge Summary       Patient: Rajesh Mcmahan Sr. MRN: [id number]     YOB: 1929  Age: 80 y.o. Sex: male    PCP: Corina Eddy MD    Allergies: Pcn [penicillins]    Admit date: 11/30/2020  Admitting Provider: Adria West MD    Discharge date: 12/2/2020  Discharging Provider: Adria West MD    * Admission Diagnoses:   Bradycardia [R00.1]      * Discharge Diagnoses:    Hospital Problems as of 12/2/2020 Date Reviewed: 11/18/2020          Codes Class Noted - Resolved POA    * (Principal) Bradycardia ICD-10-CM: R00.1  ICD-9-CM: 427.89  11/30/2020 - Present Unknown                * Hospital Course:   Hector Pope is a 80 y.o. male with a past medical history significant for HTN, HLP, CAD, Diastolic CHF, Stroke, Hypothyroidism and BPH who presented to the ED as referred from Dr Mathew Ascencio office for Bradycardia. Patient reported he had been recently diagnosed with a stroke approximately 2 weeks ago, was in rehab, and just d/cd from rehab 4 days ago. He reported he was following up with Dr Armando Eckert today and he was told to come over. He denies any chest pain, sob, palpitations, fever, chills or general malalse. He denies any fatigue or weakness. No acute complaints reported. Upon ED presentation, his ECG shows sinus rhythm-rate 78, PVCs, with first degree AVB and prolonged QT. His troponin was negative x 2. ED provider discussed with Dr Lexie Guy, who recommends admission to observation telemetry, and trending of trop. Bradycardia:  -Asymptomatic. Referred from Dr. Mathew Ascencio office for bradycardia.  -In the ED, ECG shows Sinus rhythm 78/1st degree AVB/prolonged QT.   -Serial troponins were negative. CXR shows stable cardiomegaly.  Mg level is 2.4.  -Monitored overnight on telemetry noted to have 2 bradycardic episodes with low of 36  -Home medications reconciled and noted to be on metoprolol succinate 12.5 mg oral daily was started on discharge from recent CVA where it was noted that patient was having increasing ventricular ectopy. -Last Echo 11/17/20-EF 60-65%, with moderate diastolic dysfunction.  -Last TSH (11/15/20) was 17, and repeat is at 3.21 with a free T4 of 1.3  -Cardiology consultation noted and patient monitored and some bradycardia noted overnight but improved since being off beta blocker but continues to have ventricular ectopy . Patient was arranged by cardioology for 96 hour holter monitor and can be evaluated and possible referral to EP at that time.       Hypertension:  -Restart patient's Lasix 20 mg but held metoprolol  - home health to continue to monitor closely       chronic grade 2 Diastolic CHF:  -Not in acute exacerbation:  -continue lasix  -Beta-blocker at this time is contraindicated secondary to bradycardia     Diabetes mellitus-Type II:  -Accucheck ACHS + SSI.  -A1c is only 6.2     CVA:  -Recent onset status post hospitalization and rehab with recent discharge home and son states overall he is doing well and no focal deficits  -Continue aspirin, statin.     Hypothyroidism:   -On Levothyroxine, continue. -TSH and free T4 normal range     BPH:   -Chronic condition. On Flomax. * Procedures:   * No surgery found *        Consults:   Cardiology Consult 12/1/2020:  Case discussed with collaborating physician Dr. Jessica Ochoa and our impression and recommendations are as follows:   1. Bradycardia - Sinus with 1*AVB and significant ventricular ectopy. Patient is asymptomatic to this. Continue to hold home BB and monitor HR trends. Patient will need 96 hour holter at discharge. 2. Hypertension - BP is above goal. Consider adding ACE or ARB to regimen, low dose. 3. CAD - Denies chest pain or recent sx. Had PCI to LAD 2011 by Dr. Veronika Owens. Given significant ventricular ectopy patient would benefit from ischemic evaluation in OP setting. Patient should follow-up closely with Dr. Veronika Owens or myself. Continue ASA and Statin.    4. Ventricular Ectopy - patient should have 96 day holter monitor placed at  Discharge to get PVC count. He may need referral to EP. Avoid rate limiting medications. If wishes would benefit from ischemic evaluation as well. 5. Chronic HFpEF - Stage 2 Diastolic Dysfunction on Echo in November during CVA hospitalization. currently appears well compensated and euvolemic. Continue current medication regiment as above. BB now contraindicated given bradycardia. Thank you for involving us in the care of this patient. Please do not hesitate to call if additional questions arise. If after hours please call [phone]    Cardiology follow up 12/2/2020:  Plan:      Case discussed with Collaborating physician Dr. Deanna Majano and our recommendations are as follows:      1. Bradycardia - Sinus with 1*AVB and significant ventricular ectopy. Patient is asymptomatic to this. Continue to hold home BB and monitor HR trends. HR not below 50s overnight last night. Continue to hole BB at discharge and will need 96 hour holter at discharge. Close follow up with PCP and Cardiology. 2. Hypertension - BP is above goal. Consider adding ACE or ARB to regimen, low dose.   3. CAD - Denies chest pain or recent sx. Had PCI to LAD 2011 by Dr. Luis Mix significant ventricular ectopy patient would benefit from ischemic evaluation in OP setting. Patient should follow-up closely with Dr. Ying Rondon or myself. Continue ASA and Statin. 4. Ventricular Ectopy - patient should have 96 day holter monitor placed at [facility] to get PVC count. He may need referral to EP.  Avoid rate limiting medications.  If wishes would benefit from ischemic evaluation as well. 5. Chronic HFpEF - Stage 2 Diastolic Dysfunction on Echo in November during CVA hospitalization.  currently appears well compensated and euvolemic. Continue current medication regiment as above. BB now contraindicated given bradycardia.      Thank you for involving us in the care of this patient.   Please do not hesitate to call if additional questions arise. If after hours please call [phone]    Vitals Last 24 Hours:  Patient Vitals for the past 24 hrs:   Temp Pulse Resp BP SpO2   12/02/20 1145 97.7 °F (36.5 °C) 66 18 (!) 140/53 93 %   12/02/20 0737 98.2 °F (36.8 °C) (!) 57 18 (!) 146/54 95 %   12/02/20 0400 97.4 °F (36.3 °C) 60 20 (!) 143/73 95 %   12/02/20 0000 97.1 °F (36.2 °C) (!) 54 20 (!) 151/64 95 %   12/01/20 2000 -- 61 -- -- 95 %   12/01/20 1600 97.2 °F (36.2 °C) 62 16 131/65 94 %        Discharge Exam:  General: Alert, cooperative, no distress, appears stated age. Head:  Normocephalic, without obvious abnormality, atraumatic. Eyes:  Conjunctivae/corneas clear. Pupils equal, round, reactive to light. Extraocular movements intact. Lungs:  Clear to auscultation bilaterally. no wheeze, rales, crackles, rhonchi   Chest wall: No tenderness or deformity. Heart:  Regular rate and rhythm, S1, S2 normal, no murmur, click, rub or gallop. Abdomen:  Soft, non-tender. Bowel sounds normal. No masses,  No organomegaly. Extremities: Extremities normal, atraumatic, no cyanosis or edema. Pulses: 2+ and symmetric all extremities. Skin: Skin color, texture, turgor normal. No rashes or lesions  Neurologic: Awake, Alert, oriented.  No obvious gross sensory or motor deficits    Labs:  Recent Results (from the past 24 hour(s))   TROPONIN I    Collection Time: 12/01/20 12:38 PM   Result Value Ref Range    Troponin-I, Qt. <0.05 <6.16 ng/mL   METABOLIC PANEL, BASIC    Collection Time: 12/02/20  5:18 AM   Result Value Ref Range    Sodium 137 136 - 145 mmol/L    Potassium 4.0 3.5 - 5.1 mmol/L    Chloride 104 97 - 108 mmol/L    CO2 27 21 - 32 mmol/L    Anion gap 6 5 - 15 mmol/L    Glucose 104 (H) 65 - 100 mg/dL    BUN 22 (H) 6 - 20 mg/dL    Creatinine 1.10 0.70 - 1.30 mg/dL    BUN/Creatinine ratio 20 12 - 20      GFR est AA >60 >60 ml/min/1.73m2    GFR est non-AA >60 >60 ml/min/1.73m2    Calcium 8.5 8.5 - 10.1 mg/dL         Imaging:  Xr Chest Port    Result Date: 11/30/2020  IMPRESSION: 1. Mild left retrocardiac atelectasis. 2. Mild unchanged enlargement of the cardiac silhouette. * Discharge Condition: stable  * Disposition: Home w/Family and HH PT, OT, RN   Home health to resume PT/OT eval and treat and monitor vitals and HR closely       Discharge Medications:  Current Discharge Medication List      CONTINUE these medications which have CHANGED    Details   furosemide (LASIX) 20 mg tablet Take 1 Tab by mouth daily. Qty: 30 Tab, Refills: 0         CONTINUE these medications which have NOT CHANGED    Details   omega 3-dha-epa-fish oil (Fish Oil) 100-160-1,000 mg cap Fish Oil   2 daily      aspirin 81 mg chewable tablet Take 1 Tab by mouth daily for 30 days. Qty: 30 Tab, Refills: 0      rOPINIRole (REQUIP) 0.25 mg tablet Take 0.25 mg by mouth nightly. tamsulosin (FLOMAX) 0.4 mg capsule Take 0.4 mg by mouth daily. pravastatin (PRAVACHOL) 40 mg tablet Take 40 mg by mouth nightly. levothyroxine (SYNTHROID) 100 mcg tablet Take 100 mcg by mouth Daily (before breakfast). multivitamin (ONE A DAY) tablet Take 1 Tab by mouth daily. STOP taking these medications       metoprolol succinate 100 mg CSpX Comments:   Reason for Stopping:         atorvastatin (LIPITOR) 20 mg tablet Comments:   Reason for Stopping:         fish oil-dha-epa 1,200-144-216 mg cap Comments:   Reason for Stopping:                 * Follow-up Care/Patient Instructions:   Activity: Activity as tolerated with fall precautions  PT/OT eval and treat   Diet: Cardiac Diet      Follow-up Information     Follow up With Specialties Details Why Contact Info    Christelle Steel MD Family Medicine On 12/8/2020 @ 2:15 [address] [place] [address]  [phone]      Mathieu Lobato NP Surgery On 12/15/2020 @ 11:30 in the [address]  [phone]          Spent 30 minutes evaluting and coordinating patient care and dc home with Home health with PT/OT eval and treat of which >50% was spent coordinating and counseling.      Signed:  Virginia Barrera MD  12/2/2020  12:15 PM

## 2020-12-02 NOTE — PROGRESS NOTES
Attempted to visit Mr. Joyce Henriquez in 2 acute care [facility] during rounds. Patient was alone in his room. Patient seemed to be sleeping in and did not respond to my knocking on door or verbal greeting. I provided silent support and prayer. Chaplains will follow up if further referrals are requested. Chaplain Lucy Faust M.Div.    can be reached by calling the  at [facility]  [phone]

## 2020-12-02 NOTE — DISCHARGE INSTRUCTIONS
Patient Education        Bradycardia: Care Instructions  Your Care Instructions     Bradycardia is a slow heart rate. A slow heart rate can be normal and healthy. Or it could be a sign of a problem with the heart's electrical system. If your heart beats too slowly, it may not supply your body with enough blood. This can make you weak or dizzy. Or it may make you pass out. Bradycardia can be caused by many things. This includes medicine, certain medical conditions, and changes in the heart that are the result of aging. How bradycardia is treated depends on what is causing it. Treatments include treating another health problem, changing a medicine, and getting a pacemaker. Treatment also depends on the symptoms. If bradycardia doesn't cause symptoms, it may not be treated. You and your doctor can decide what treatment is right for you. Follow-up care is a key part of your treatment and safety. Be sure to make and go to all appointments, and call your doctor if you are having problems. It's also a good idea to know your test results and keep a list of the medicines you take. How can you care for yourself at home? · Take your medicines exactly as prescribed. Call your doctor if you think you are having a problem with your medicine. If your bradycardia is caused by another disease, your doctor will try to treat the disease. If it is caused by heart medicines, he or she will adjust your medicines. · Make lifestyle changes to improve your heart health. ? Eat a heart-healthy diet that includes vegetables, fruits, nuts, beans, lean meat, fish, and whole grains. Limit alcohol, sodium, and sugar. ? Get regular exercise. Try for 30 minutes on most days of the week. If you do not have other heart problems, you likely do not have limits on the type or level of activity that you can do. You may want to walk, swim, bike, or do other activities.  Ask your doctor what level of exercise is safe for you.  ? Stay at a healthy weight. Lose weight if you need to.  ? Do not smoke. If you need help quitting, talk to your doctor about stop-smoking programs and medicines. These can increase your chances of quitting for good. ? Manage other health problems such as high blood pressure, high cholesterol, and diabetes. · If you get a pacemaker, you will get information about it. When should you call for help? Call 911 anytime you think you may need emergency care. For example, call if:    · You have symptoms of sudden heart failure. These may include:  ? Severe trouble breathing. ? A fast or irregular heartbeat. ? Coughing up pink, foamy mucus. ? You passed out.     · You have symptoms of a stroke. These may include:  ? Sudden numbness, tingling, weakness, or loss of movement in your face, arm, or leg, especially on only one side of your body. ? Sudden vision changes. ? Sudden trouble speaking. ? Sudden confusion or trouble understanding simple statements. ? Sudden problems with walking or balance. ? A sudden, severe headache that is different from past headaches. Call your doctor now or seek immediate medical care if:    · You have new or changed symptoms of heart failure, such as:  ? New or increased shortness of breath. ? New or worse swelling in your legs, ankles, or feet. ? Sudden weight gain, such as more than 2 to 3 pounds in a day or 5 pounds in a week. (Your doctor may suggest a different range of weight gain.)  ? Feeling dizzy or lightheaded or like you may faint. ? Feeling so tired or weak that you cannot do your usual activities. ? Not sleeping well. Shortness of breath wakes you at night. You need extra pillows to prop yourself up to breathe easier. Watch closely for changes in your health, and be sure to contact your doctor if:    · You do not get better as expected. Where can you learn more?   Go to http://www.gray.com/  Enter B402 in the search box to learn more about \"Bradycardia: Care Instructions. \"  Current as of: December 16, 2019               Content Version: 12.6  © 0022-1737 [employer]. Care instructions adapted under license by [employer] (which disclaims liability or warranty for this information). If you have questions about a medical condition or this instruction, always ask your healthcare professional. [place] [address] any warranty or liability for your use of this information.

## 2020-12-02 NOTE — PROGRESS NOTES
Progress Note    Patient: Chino Crump Sr. MRN: [id number]  SSN: [id number]    YOB: 1929  Age: 80 y.o. Sex: male      Admit Date: 11/30/2020    LOS: 0 days     Subjective:     Marty Barlow is a 80year-old male with past medical history significant for HTN, HLD, Hypothyroidism, Giant Cell arteritis with Polymyalgia Rheumatic (followed by Rheumatology), CAD s/ PCI, Carotid disease s/p endarterectomy, YARELIS, hearing impairment, and recent internal capsule CVA (just discharged from [facility] 11/19) who is followed for Bradycardia and significant ventricular ectopy. Telemetry Review: SR/SB, Couplets, Bigeminy    Review of Symptoms:   Review of Systems   Constitution: Negative. Cardiovascular: Negative. Respiratory: Negative. Musculoskeletal: Negative. Gastrointestinal: Negative. Genitourinary: Negative. Neurological: Negative. Psychiatric/Behavioral: Negative. Objective:     Vitals:    12/01/20 2000 12/02/20 0000 12/02/20 0400 12/02/20 0737   BP:  (!) 151/64 (!) 143/73 (!) 146/54   Pulse: 61 (!) 54 60 (!) 57   Resp:  20 20 18   Temp:  97.1 °F (36.2 °C) 97.4 °F (36.3 °C) 98.2 °F (36.8 °C)   SpO2: 95% 95% 95% 95%   Weight:       Height:            Intake and Output:  Current Shift: No intake/output data recorded. Last three shifts: 11/30 1901 - 12/02 0700  In: 720 [P.O.:720]  Out: 1450 [Urine:1450]    Physical Exam:     General: Well nourished elderly male sitting on side of bed, NAD, A&O  HEENT: Normocephalic, PERRL, no drainage  Neck: Supple, Trachea midline, No JVD  RESP: CTA bilaterally with symmetrical chest movement. No SOB or distress. On RA  Cardiovascular: RRR though slow no MRG  PVS: No rubor, cyanosis, no edema, Radial, DP, PT pulses equal bilaterally  ABD: obese , soft NT, Normoactive BS  Derm: Warm/Dry/Intact with no lesions, normal turgor  Neuro: A&O PPTS, cranial nerves II- XII grossly intact via interaction with patient.  No focal deficits  PSYCH: No anxiety or agitation    Lab/Data Review:  BMP:   Lab Results   Component Value Date/Time     12/02/2020 05:18 AM    K 4.0 12/02/2020 05:18 AM     12/02/2020 05:18 AM    CO2 27 12/02/2020 05:18 AM    AGAP 6 12/02/2020 05:18 AM     (H) 12/02/2020 05:18 AM    BUN 22 (H) 12/02/2020 05:18 AM    CREA 1.10 12/02/2020 05:18 AM    GFRAA >60 12/02/2020 05:18 AM    GFRNA >60 12/02/2020 05:18 AM            Current Facility-Administered Medications:     sodium chloride (NS) flush 5-40 mL, 5-40 mL, IntraVENous, Q8H, Oba, Oluwabunmi S, NP, 10 mL at 12/02/20 0559    acetaminophen (TYLENOL) tablet 650 mg, 650 mg, Oral, Q4H PRN, Oba, Oluwabunmi S, NP    ondansetron (ZOFRAN) injection 4 mg, 4 mg, IntraVENous, Q8H PRN, Oba, Oluwabunmi S, NP    heparin (porcine) injection 5,000 Units, 5,000 Units, SubCUTAneous, Q12H, Oba, Oluwabunmi S, NP, 5,000 Units at 12/02/20 1515    aspirin chewable tablet 81 mg, 81 mg, Oral, DAILY, Oba, Oluwabunmi S, NP, 81 mg at 12/02/20 7525    furosemide (LASIX) tablet 20 mg, 20 mg, Oral, DAILY, Oba, Oluwabunmi S, NP, 20 mg at 12/02/20 1602    levothyroxine (SYNTHROID) tablet 100 mcg, 100 mcg, Oral, ACB, Oba, Oluwabunmi S, NP, 100 mcg at 12/02/20 7548    multivitamin (ONE A DAY) tablet 1 Tab, 1 Tab, Oral, DAILY, Oba, Oluwabunmi S, NP, 1 Tab at 12/02/20 8870    pravastatin (PRAVACHOL) tablet 40 mg, 40 mg, Oral, QHS, Oba, Oluwabunmi S, NP, 40 mg at 12/01/20 2106    rOPINIRole (REQUIP) tablet 0.25 mg, 0.25 mg, Oral, QHS, Oba, Oluwabunmi S, NP, 0.25 mg at 12/01/20 2105    tamsulosin (FLOMAX) capsule 0.4 mg, 0.4 mg, Oral, DAILY, Oba, Oluwabunmi S, NP, 0.4 mg at 12/02/20 4853    psyllium husk-aspartame (METAMUCIL FIBER) packet 1 Packet, 1 Packet, Oral, BID, Oba, Milagro Kanaris, NP, 1 Packet at 12/02/20 370      Assessment:     Principal Problem:    Bradycardia (11/30/2020)        Plan:     Case discussed with Collaborating physician Dr. Rico Pierre and our recommendations are as follows:     1. Bradycardia - Sinus with 1*AVB and significant ventricular ectopy. Patient is asymptomatic to this. Continue to hold home BB and monitor HR trends. HR not below 50s overnight last night. Continue to hole BB at discharge and will need 96 hour holter at discharge. Close follow up with PCP and Cardiology. 2. Hypertension - BP is above goal. Consider adding ACE or ARB to regimen, low dose. 3. CAD - Denies chest pain or recent sx. Had PCI to LAD 2011 by Dr. Pablo Dent. Given significant ventricular ectopy patient would benefit from ischemic evaluation in OP setting. Patient should follow-up closely with Dr. Pablo Dent or myself. Continue ASA and Statin. 4. Ventricular Ectopy - patient should have 96 day holter monitor placed at  Discharge to get PVC count. He may need referral to EP. Avoid rate limiting medications. If wishes would benefit from ischemic evaluation as well. 5. Chronic HFpEF - Stage 2 Diastolic Dysfunction on Echo in November during CVA hospitalization. currently appears well compensated and euvolemic. Continue current medication regiment as above. BB now contraindicated given bradycardia. Thank you for involving us in the care of this patient. Please do not hesitate to call if additional questions arise.  If after hours please call [phone]    Signed By: Basia Tanner NP     December 2, 2020

## 2020-12-02 NOTE — PROGRESS NOTES
Advance Care Planning   Healthcare Decision Maker:       Primary Decision Maker: Brenda Small [facility] [phone]

## 2020-12-18 ENCOUNTER — OFFICE VISIT (OUTPATIENT)
Dept: SURGERY | Age: 85
End: 2020-12-18
Payer: MEDICARE

## 2020-12-18 VITALS
SYSTOLIC BLOOD PRESSURE: 123 MMHG | DIASTOLIC BLOOD PRESSURE: 43 MMHG | WEIGHT: 208 LBS | OXYGEN SATURATION: 98 % | HEIGHT: 70 IN | HEART RATE: 71 BPM | TEMPERATURE: 96.8 F | BODY MASS INDEX: 29.78 KG/M2

## 2020-12-18 DIAGNOSIS — I63.9 ACUTE CVA (CEREBROVASCULAR ACCIDENT) (HCC): Primary | ICD-10-CM

## 2020-12-18 PROCEDURE — 99213 OFFICE O/P EST LOW 20 MIN: CPT | Performed by: SURGERY

## 2020-12-19 NOTE — PROGRESS NOTES
VASCULAR FOLLOW UP      Subjective:   CHIEF COMPLAINTS:  Recent stroke  PRESENTATION OF ILLNESS:  Mr. Edin Palmer is here today with his son for follow-up of carotid artery stenosis. He was recently hospitalized for stroke symptoms but turned out to be carotid artery was not the etiology. Looks like he had bradycardia with a frequent dysrhythmia. Patient most likely the pacemaker. Patient is being seen by cardiologist recommendation was made not to place pacemaker due to his age. Patient gets occasionally confused. Most of the history is provided by his son. Patient says he does not have any complaints he has no acute problems with visions other than cataracts no speech problem denies any arm or leg being weak or numb. Last carotid duplex ultrasound shows less than 50% blockage bilaterally. Patient had a CT angiogram done recent month while he was hospitalized for stroke symptoms shows mild plaque disease on the right side system in the carotid system and the less than 50% blockage on the left side carotid system. Past Medical History:   Diagnosis Date    Arthritis     CAD (coronary artery disease)     Hard of hearing     Headache     Hypertension     PMR (polymyalgia rheumatica) (HCC)     Skin cancer     Sleep apnea     no longer uses cpap      Past Surgical History:   Procedure Laterality Date    CARDIAC SURG PROCEDURE UNLIST      cardiac stent    HX CAROTID STENT      HX CHOLECYSTECTOMY  1998    HX HERNIA REPAIR  1980    VASCULAR SURGERY PROCEDURE UNLIST      carotid endartectomy     Family History   Problem Relation Age of Onset   Hopper Arthritis-rheumatoid Mother     Kidney Disease Mother     Cancer Father         Stomach    Gout Son       Social History     Tobacco Use    Smoking status: Never Smoker    Smokeless tobacco: Never Used   Substance Use Topics    Alcohol use: No       Prior to Admission medications    Medication Sig Start Date End Date Taking?  Authorizing Provider furosemide (LASIX) 20 mg tablet Take 1 Tab by mouth daily. 12/2/20   Sallie Mendoza MD   omega 5-cnu-bhi-fish oil (Fish Oil) 100-160-1,000 mg cap Fish Oil   2 daily    Other, MD Elio   aspirin 81 mg chewable tablet Take 1 Tab by mouth daily for 30 days. 11/18/20 12/18/20  Salma Seay MD   rOPINIRole (REQUIP) 0.25 mg tablet Take 0.25 mg by mouth nightly. Provider, Historical   tamsulosin (FLOMAX) 0.4 mg capsule Take 0.4 mg by mouth daily. Provider, Historical   pravastatin (PRAVACHOL) 40 mg tablet Take 40 mg by mouth nightly. Provider, Historical   levothyroxine (SYNTHROID) 100 mcg tablet Take 100 mcg by mouth Daily (before breakfast). Provider, Historical   multivitamin (ONE A DAY) tablet Take 1 Tab by mouth daily. Provider, Historical     Allergies   Allergen Reactions    Pcn [Penicillins] Swelling        Review of Systems:  I reviewed the rest of organ systems personally and they were negative signed by Dr. Jaci Covarrubias    Objective:     Visit Vitals  BP (!) 123/43 (BP 1 Location: Left arm, BP Patient Position: Sitting)   Pulse 71   Temp 96.8 °F (36 °C)   Ht 5' 10\" (1.778 m)   Wt 208 lb (94.3 kg)   SpO2 98%   BMI 29.84 kg/m²     VITAL SIGNS REVIEWED. Physical Exam:  Patient is well-nourished pleasant in conversation is appropriate. Head and neck examination atraumatic, normocephalic. Gaze appropriate. Conversation appropriate. Neck examination shows supple. No mass. No obvious carotid bruit. Chest examination shows lungs are clear bilaterally well-expanded, no crackles or wheezes. Cardiovascular system regular rate, no obvious murmur. Skin warm to touch  and moist, no skin lesions. Abdomen is soft ,not tender or distended bowel sounds present. No palpable mass. Neurological examinations, no focal neuro deficits moving all 4 extremities. Cranial nerves intact. Sensation is intact as well. Hematologic: No obvious bruise or swelling or obvious lymphadenopathy.   Psychosocial: Appropriate. Has good effect. Musculoskeletal system: No muscle wasting, appropriate movements upper and lower extremity. Vascular examination: There is a carotid bruit is noted. Data Review:   Admission on 11/30/2020, Discharged on 12/02/2020   Component Date Value Ref Range Status    Ventricular Rate 11/30/2020 78  BPM Final    Atrial Rate 11/30/2020 61  BPM Final    P-R Interval 11/30/2020 312  ms Final    QRS Duration 11/30/2020 95  ms Final    Q-T Interval 11/30/2020 443  ms Final    QTC Calculation (Bezet) 11/30/2020 505  ms Final    Calculated P Axis 11/30/2020 29  degrees Final    Calculated R Axis 11/30/2020 -27  degrees Final    Calculated T Axis 11/30/2020 79  degrees Final    Diagnosis 11/30/2020    Final                    Value:Sinus rhythm with 1st degree A-V block  Ventricular bigeminy    LVH with secondary repolarization abnormality  Prolonged QT    No significant change was found  Confirmed by Yael Winston ([id number]) on 12/1/2020 6:08:50 PM      WBC 11/30/2020 6.9  4.4 - 11.3 K/uL Final    RBC 11/30/2020 4.46* 4.50 - 5.90 M/uL Final    HGB 11/30/2020 13.9  13.5 - 17.5 g/dL Final    HCT 11/30/2020 40.7* 41 - 53 % Final    MCV 11/30/2020 91.3  80 - 100 FL Final    MCH 11/30/2020 31.1  31 - 34 PG Final    MCHC 11/30/2020 34.1  31.0 - 36.0 g/dL Final    RDW 11/30/2020 13.9  11.5 - 14.5 % Final    PLATELET 69/26/5830 973  K/uL Final    MPV 11/30/2020 8.6  6.5 - 11.5 FL Final    NRBC 11/30/2020 10.0   WBC Final    ABSOLUTE NRBC 11/30/2020 0.00  K/uL Final    NEUTROPHILS 11/30/2020 61  42 - 75 % Final    LYMPHOCYTES 11/30/2020 23  20.5 - 51.1 % Final    MONOCYTES 11/30/2020 11* 1.7 - 9.3 % Final    EOSINOPHILS 11/30/2020 4* 0.9 - 2.9 % Final    BASOPHILS 11/30/2020 1  0.0 - 2.5 % Final    ABS. NEUTROPHILS 11/30/2020 4.3  1.8 - 7.7 K/UL Final    ABS. LYMPHOCYTES 11/30/2020 1.6  1.0 - 4.8 K/UL Final    ABS.  MONOCYTES 11/30/2020 0.7  0.2 - 2.4 K/UL Final    ABS. EOSINOPHILS 11/30/2020 0.3  0.0 - 0.7 K/UL Final    ABS. BASOPHILS 11/30/2020 0.1  0.0 - 0.2 K/UL Final    Prothrombin time 11/30/2020 10.8  9.0 - 11.1 sec Final    INR 11/30/2020 1.1  0.9 - 1.1   Final    Sodium 11/30/2020 139  136 - 145 mmol/L Final    Potassium 11/30/2020 4.5  3.5 - 5.1 mmol/L Final    Chloride 11/30/2020 104  97 - 108 mmol/L Final    CO2 11/30/2020 29  21 - 32 mmol/L Final    Anion gap 11/30/2020 6  5 - 15 mmol/L Final    Glucose 11/30/2020 93  65 - 100 mg/dL Final    BUN 11/30/2020 23* 6 - 20 mg/dL Final    Creatinine 11/30/2020 1.16  0.70 - 1.30 mg/dL Final    BUN/Creatinine ratio 11/30/2020 20  12 - 20   Final    GFR est AA 11/30/2020 >60  >60 ml/min/1.73m2 Final    GFR est non-AA 11/30/2020 59* >60 ml/min/1.73m2 Final    Calcium 11/30/2020 9.0  8.5 - 10.1 mg/dL Final    Bilirubin, total 11/30/2020 0.4  0.2 - 1.0 mg/dL Final    AST (SGOT) 11/30/2020 14* 15 - 37 U/L Final    ALT (SGPT) 11/30/2020 18  12 - 78 U/L Final    Alk.  phosphatase 11/30/2020 53  45 - 117 U/L Final    Protein, total 11/30/2020 7.0  6.4 - 8.2 g/dL Final    Albumin 11/30/2020 3.2* 3.5 - 5.0 g/dL Final    Globulin 11/30/2020 3.8  2.0 - 4.0 g/dL Final    A-G Ratio 11/30/2020 0.8* 1.1 - 2.2   Final    Troponin-I, Qt. 11/30/2020 <0.05  <0.05 ng/mL Final    Magnesium 11/30/2020 2.4  1.6 - 2.4 mg/dL Final    Troponin-I, Qt. 11/30/2020 <0.05  <0.05 ng/mL Final    Troponin-I, Qt. 12/01/2020 <0.05  <0.05 ng/mL Final    Troponin-I, Qt. 12/01/2020 <0.05  <0.05 ng/mL Final    Sodium 12/01/2020 137  136 - 145 mmol/L Final    Potassium 12/01/2020 4.2  3.5 - 5.1 mmol/L Final    Chloride 12/01/2020 104  97 - 108 mmol/L Final    CO2 12/01/2020 27  21 - 32 mmol/L Final    Anion gap 12/01/2020 6  5 - 15 mmol/L Final    Glucose 12/01/2020 97  65 - 100 mg/dL Final    BUN 12/01/2020 20  6 - 20 mg/dL Final    Creatinine 12/01/2020 1.05  0.70 - 1.30 mg/dL Final    BUN/Creatinine ratio 12/01/2020 19  12 - 20   Final    GFR est AA 12/01/2020 >60  >60 ml/min/1.73m2 Final    GFR est non-AA 12/01/2020 >60  >60 ml/min/1.73m2 Final    Calcium 12/01/2020 8.7  8.5 - 10.1 mg/dL Final    TSH 12/01/2020 3.21  0.36 - 3.74 uIU/mL Final    T4, Free 12/01/2020 1.3  0.8 - 1.5 ng/dL Final    Troponin-I, Qt. 12/01/2020 <0.05  <0.05 ng/mL Final    Sodium 12/02/2020 137  136 - 145 mmol/L Final    Potassium 12/02/2020 4.0  3.5 - 5.1 mmol/L Final    Chloride 12/02/2020 104  97 - 108 mmol/L Final    CO2 12/02/2020 27  21 - 32 mmol/L Final    Anion gap 12/02/2020 6  5 - 15 mmol/L Final    Glucose 12/02/2020 104* 65 - 100 mg/dL Final    BUN 12/02/2020 22* 6 - 20 mg/dL Final    Creatinine 12/02/2020 1.10  0.70 - 1.30 mg/dL Final    BUN/Creatinine ratio 12/02/2020 20  12 - 20   Final    GFR est AA 12/02/2020 >60  >60 ml/min/1.73m2 Final    GFR est non-AA 12/02/2020 >60  >60 ml/min/1.73m2 Final    Calcium 12/02/2020 8.5  8.5 - 10.1 mg/dL Final   Admission on 11/14/2020, Discharged on 11/19/2020   Component Date Value Ref Range Status    SAMPLES BEING HELD 11/14/2020 1red,1lav   Final    COMMENT 11/14/2020 Add-on orders for these samples will be processed based on acceptable specimen integrity and analyte stability, which may vary by analyte.     Final    Ventricular Rate 11/14/2020 70  BPM Final    Atrial Rate 11/14/2020 70  BPM Final    P-R Interval 11/14/2020 362  ms Final    QRS Duration 11/14/2020 88  ms Final    Q-T Interval 11/14/2020 456  ms Final    QTC Calculation (Bezet) 11/14/2020 492  ms Final    Calculated P Axis 11/14/2020 82  degrees Final    Calculated R Axis 11/14/2020 -27  degrees Final    Calculated T Axis 11/14/2020 91  degrees Final    Diagnosis 11/14/2020    Final                    Value:Sinus rhythm with 1st degree AV block with premature supraventricular   complexes and with frequent premature ventricular complexes  When compared with ECG of 16-JUL-2018 10:57,  premature ventricular complexes are now present  premature supraventricular complexes are now present  Vent. rate has increased BY  24 BPM  ST now depressed in Anterior leads  Nonspecific T wave abnormality, worse in Lateral leads    Confirmed by Jacqueline De Leon M.D., [facility] Roberson ([id number]) on 11/15/2020 8:36:17 PM      Color 11/14/2020 YELLOW/STRAW    Final    Appearance 11/14/2020 CLEAR  CLEAR   Final    Specific gravity 11/14/2020 1.021  1.003 - 1.030   Final    pH (UA) 11/14/2020 7.0  5.0 - 8.0   Final    Protein 11/14/2020 TRACE* NEG mg/dL Final    Glucose 11/14/2020 Negative  NEG mg/dL Final    Ketone 11/14/2020 Negative  NEG mg/dL Final    Bilirubin 11/14/2020 Negative  NEG   Final    Blood 11/14/2020 TRACE* NEG   Final    Urobilinogen 11/14/2020 0.2  0.2 - 1.0 EU/dL Final    Nitrites 11/14/2020 Negative  NEG   Final    Leukocyte Esterase 11/14/2020 TRACE* NEG   Final    WBC 11/14/2020 0-4  0 - 4 /hpf Final    RBC 11/14/2020 10-20  0 - 5 /hpf Final    Epithelial cells 11/14/2020 FEW  FEW /lpf Final    Bacteria 11/14/2020 Negative  NEG /hpf Final    Hyaline cast 11/14/2020 0-2  0 - 5 /lpf Final    Urine culture hold 11/14/2020 Urine on hold in Microbiology dept for 2 days. If unpreserved urine is submitted, it cannot be used for addtional testing after 24 hours, recollection will be required.     Final    Troponin-I, Qt. 11/14/2020 <0.05  <0.05 ng/mL Final    LIPID PROFILE 11/15/2020        Final    Cholesterol, total 11/15/2020 142  <200 MG/DL Final    Triglyceride 11/15/2020 102  <150 MG/DL Final    HDL Cholesterol 11/15/2020 39  MG/DL Final    LDL, calculated 11/15/2020 82.6  0 - 100 MG/DL Final    VLDL, calculated 11/15/2020 20.4  MG/DL Final    CHOL/HDL Ratio 11/15/2020 3.6  0.0 - 5.0   Final    Hemoglobin A1c 11/15/2020 5.1  4.0 - 5.6 % Final    Est. average glucose 11/15/2020 100  mg/dL Final    IVSd 11/17/2020 0.73  0.6 - 1.0 cm Final    LVIDd 11/17/2020 5.22  4.2 - 5.9 cm Final    LVIDs 11/17/2020 3.43 cm Final    LVOT d 11/17/2020 2.02  cm Final    LVPWd 11/17/2020 1.11* 0.6 - 1.0 cm Final    LVOT Peak Gradient 11/17/2020 4.87  mmHg Final    LVOT Peak Velocity 11/17/2020 110.30  cm/s Final    RVIDd 11/17/2020 4.14  cm Final    Left Atrium Major Axis 11/17/2020 3.57  cm Final    LA Area 4C 11/17/2020 24.25  cm2 Final    LA Vol 4C 11/17/2020 77.30* 18 - 58 mL Final    Left Atrium to Aortic Root Ratio 11/17/2020 0.00   Final    AV Cusp 11/17/2020 0.00  cm Final    Aortic Valve Area by Continuity of* 11/17/2020 2.33  cm2 Final    AV R PG 11/17/2020 72.74  mmHg Final    Aortic Regurgitant Pressure Half-t* 11/17/2020 487.45  ms Final    AR Max William 11/17/2020 426.43  cm/s Final    AoV PG 11/17/2020 9.20  mmHg Final    Aortic Valve Systolic Peak Velocity 02/05/9958 151.69  cm/s Final    MV A William 11/17/2020 92.62  cm/s Final    MV E William 11/17/2020 76.25  cm/s Final    Mitral Regurgitant PISA Peak Veloc* 11/17/2020 503.74  cm/s Final    Mitral Regurgitant Velocity Time I* 11/17/2020 194.12  cm Final    Tapse 11/17/2020 2.60* 1.5 - 2.0 cm Final    Triscuspid Valve Regurgitation Pea* 11/17/2020 42.90  mmHg Final    TR Max Velocity 11/17/2020 327.49  cm/s Final    MV E/A 11/17/2020 0.82   Final    LV Mass AL 11/17/2020 175.0  88 - 224 g Final    LV Mass AL Index 11/17/2020 85.0  49 - 115 g/m2 Final    Left Atrium Minor Axis 11/17/2020 1.73  cm Final    LA Vol Index 11/17/2020 37.53  16 - 28 ml/m2 Final    JOANIE/BSA Pk William 11/17/2020 1.1  cm2/m2 Final    TSH 11/15/2020 17.00* 0.36 - 3.74 uIU/mL Final    Magnesium 11/15/2020 2.3  1.6 - 2.4 mg/dL Final    Phosphorus 11/15/2020 3.2  2.6 - 4.7 MG/DL Final    Folate 11/15/2020 48.0* 5.0 - 21.0 ng/mL Final    Vitamin B12 11/15/2020 573  193 - 986 pg/mL Final    Troponin-I, Qt. 11/15/2020 <0.05  <0.05 ng/mL Final    C-Reactive protein 11/15/2020 3.78* 0.00 - 0.60 mg/dL Final    Ammonia 11/15/2020 <10  <32 UMOL/L Final    Troponin-I, Qt. 11/15/2020 <0.05  <0.05 ng/mL Final    Aspirin test 11/15/2020 394  ARU Final    SAMPLES BEING HELD 11/16/2020 1LAV   Final    COMMENT 11/16/2020 Add-on orders for these samples will be processed based on acceptable specimen integrity and analyte stability, which may vary by analyte. Final    Specimen source 11/17/2020 Nasopharyngeal    Final    SARS-CoV-2 11/17/2020 Not detected  NOTD   Final    Specimen source 11/17/2020 NP SWAB   Final    Specimen type 11/17/2020 NP Swab    Final    Health status 11/17/2020 Symptomatic Testing    Final        Assessment:     Problem List Items Addressed This Visit        Circulatory    Acute CVA (cerebrovascular accident) ([facility] [address] - Primary    Relevant Orders    DUPLEX CAROTID BILATERAL              Plan:     I discussed with family regarding with most recent CT angiogram neck to inform the results. Again most likely his stroke symptoms not related to carotid artery stenosis most likely related to cardiac etiology. I do recommend however follow repeat carotid duplex ultrasound 1 year follow-up. Patient was encouraged to continue to be active, patient's cholesterol level including total cholesterol, triglycerides, HDL LDL need to be checked and adjusted appropriately with medication therapy.         Ling Perdomo MD

## 2020-12-30 ENCOUNTER — HOSPITAL ENCOUNTER (OUTPATIENT)
Dept: VASCULAR SURGERY | Age: 85
Discharge: HOME | End: 2020-12-30
Attending: SURGERY
Payer: MEDICARE

## 2020-12-30 DIAGNOSIS — I63.9 ACUTE CVA (CEREBROVASCULAR ACCIDENT) (HCC): ICD-10-CM

## 2020-12-30 LAB
LEFT CCA DIST DIAS: 6 CM/S
LEFT CCA DIST SYS: 59 CM/S
LEFT CCA MID DIAS: 12 CM/S
LEFT CCA MID SYS: 99 CM/S
LEFT ECA SYS: 478 CM/S
LEFT ICA DIST DIAS: 18 CM/S
LEFT ICA DIST SYS: 88 CM/S
LEFT ICA MID DIAS: 28 CM/S
LEFT ICA MID SYS: 122 CM/S
LEFT ICA PROX DIAS: 16 CM/S
LEFT ICA PROX SYS: 89 CM/S
LEFT ICA/CCA SYS: 2.09
LEFT SUBCLAVIAN SYS: 100 CM/S
LEFT VERTEBRAL SYS: 47 CM/S
RIGHT CCA DIST DIAS: 9 CM/S
RIGHT CCA DIST SYS: 87 CM/S
RIGHT CCA MID DIAS: 8 CM/S
RIGHT CCA MID SYS: 98 CM/S
RIGHT ECA SYS: 52 CM/S
RIGHT ICA DIST DIAS: 19 CM/S
RIGHT ICA DIST SYS: 94 CM/S
RIGHT ICA MID DIAS: 10 CM/S
RIGHT ICA MID SYS: 49 CM/S
RIGHT ICA PROX DIAS: 11 CM/S
RIGHT ICA PROX SYS: 64 CM/S
RIGHT ICA/CCA SYS: 1.08
RIGHT SUBCLAVIAN SYS: 160 CM/S
RIGHT VERTEBRAL SYS: 53 CM/S

## 2020-12-30 PROCEDURE — 93880 EXTRACRANIAL BILAT STUDY: CPT

## 2021-04-19 ENCOUNTER — APPOINTMENT (OUTPATIENT)
Dept: VASCULAR SURGERY | Age: 86
End: 2021-04-19
Attending: HOSPITALIST
Payer: MEDICARE

## 2021-04-19 ENCOUNTER — APPOINTMENT (OUTPATIENT)
Dept: CT IMAGING | Age: 86
End: 2021-04-19
Attending: EMERGENCY MEDICINE
Payer: MEDICARE

## 2021-04-19 ENCOUNTER — APPOINTMENT (OUTPATIENT)
Dept: GENERAL RADIOLOGY | Age: 86
End: 2021-04-19
Attending: EMERGENCY MEDICINE
Payer: MEDICARE

## 2021-04-19 ENCOUNTER — HOSPITAL ENCOUNTER (OUTPATIENT)
Age: 86
Setting detail: OBSERVATION
Discharge: HOME HEALTH | End: 2021-04-22
Attending: EMERGENCY MEDICINE | Admitting: HOSPITALIST
Payer: MEDICARE

## 2021-04-19 DIAGNOSIS — R42 DIZZINESS: Primary | ICD-10-CM

## 2021-04-19 PROBLEM — E86.0 DEHYDRATION: Status: ACTIVE | Noted: 2021-04-19

## 2021-04-19 PROBLEM — R41.82 ALTERED MENTAL STATUS: Status: ACTIVE | Noted: 2021-04-19

## 2021-04-19 PROBLEM — R26.9 ABNORMAL GAIT: Status: ACTIVE | Noted: 2021-04-19

## 2021-04-19 LAB
ALBUMIN SERPL-MCNC: 2.8 G/DL (ref 3.5–5)
ALBUMIN/GLOB SERPL: 0.6 {RATIO} (ref 1.1–2.2)
ALP SERPL-CCNC: 59 U/L (ref 45–117)
ALT SERPL-CCNC: 17 U/L (ref 12–78)
ANION GAP SERPL CALC-SCNC: 11 MMOL/L (ref 5–15)
APPEARANCE UR: CLEAR
AST SERPL W P-5'-P-CCNC: 30 U/L (ref 15–37)
BACTERIA URNS QL MICRO: NEGATIVE /HPF
BASOPHILS # BLD: 0.1 K/UL (ref 0–0.2)
BASOPHILS NFR BLD: 1 % (ref 0–2.5)
BILIRUB SERPL-MCNC: 0.7 MG/DL (ref 0.2–1)
BILIRUB UR QL: NEGATIVE
BUN SERPL-MCNC: 21 MG/DL (ref 6–20)
BUN/CREAT SERPL: 25 (ref 12–20)
CA-I BLD-MCNC: 9.1 MG/DL (ref 8.5–10.1)
CHLORIDE SERPL-SCNC: 104 MMOL/L (ref 97–108)
CO2 SERPL-SCNC: 22 MMOL/L (ref 21–32)
COLOR UR: ABNORMAL
CREAT SERPL-MCNC: 0.85 MG/DL (ref 0.7–1.3)
EOSINOPHIL # BLD: 0.1 K/UL (ref 0–0.7)
EOSINOPHIL NFR BLD: 2 % (ref 0.9–2.9)
ERYTHROCYTE [DISTWIDTH] IN BLOOD BY AUTOMATED COUNT: 14.2 % (ref 11.5–14.5)
ERYTHROCYTE [SEDIMENTATION RATE] IN BLOOD: 16 MM/HR
GLOBULIN SER CALC-MCNC: 4.6 G/DL (ref 2–4)
GLUCOSE BLD STRIP.AUTO-MCNC: 100 MG/DL (ref 65–100)
GLUCOSE SERPL-MCNC: 79 MG/DL (ref 65–100)
GLUCOSE UR STRIP.AUTO-MCNC: NEGATIVE MG/DL
HCT VFR BLD AUTO: 42.6 % (ref 41–53)
HGB BLD-MCNC: 14.3 G/DL (ref 13.5–17.5)
HGB UR QL STRIP: ABNORMAL
INR PPP: 1.2 (ref 0.9–1.1)
KETONES UR QL STRIP.AUTO: NEGATIVE MG/DL
LEUKOCYTE ESTERASE UR QL STRIP.AUTO: NEGATIVE
LYMPHOCYTES # BLD: 1.6 K/UL (ref 1–4.8)
LYMPHOCYTES NFR BLD: 19 % (ref 20.5–51.1)
MAGNESIUM SERPL-MCNC: 1.9 MG/DL (ref 1.6–2.4)
MCH RBC QN AUTO: 30.5 PG (ref 31–34)
MCHC RBC AUTO-ENTMCNC: 33.5 G/DL (ref 31–36)
MCV RBC AUTO: 91 FL (ref 80–100)
MONOCYTES # BLD: 0.7 K/UL (ref 0.2–2.4)
MONOCYTES NFR BLD: 9 % (ref 1.7–9.3)
NEUTS SEG # BLD: 5.9 K/UL (ref 1.8–7.7)
NEUTS SEG NFR BLD: 69 % (ref 42–75)
NITRITE UR QL STRIP.AUTO: NEGATIVE
NRBC # BLD: 0.02 K/UL
NRBC BLD-RTO: 0.2 PER 100 WBC
PERFORMED BY, TECHID: NORMAL
PH UR STRIP: 6 [PH] (ref 5–8)
PLATELET # BLD AUTO: 163 K/UL (ref 150–400)
PMV BLD AUTO: 9.3 FL (ref 6.5–11.5)
POTASSIUM SERPL-SCNC: 4.7 MMOL/L (ref 3.5–5.1)
PROT SERPL-MCNC: 7.4 G/DL (ref 6.4–8.2)
PROT UR STRIP-MCNC: 30 MG/DL
PROTHROMBIN TIME: 11.6 SEC (ref 9–11.1)
RBC # BLD AUTO: 4.69 M/UL (ref 4.5–5.9)
RBC #/AREA URNS HPF: NORMAL /HPF (ref 0–3)
SODIUM SERPL-SCNC: 137 MMOL/L (ref 136–145)
SP GR UR REFRACTOMETRY: 1.02 (ref 1–1.03)
TROPONIN I SERPL-MCNC: 0.06 NG/ML
TROPONIN I SERPL-MCNC: <0.05 NG/ML
TSH SERPL DL<=0.05 MIU/L-ACNC: 6.27 UIU/ML (ref 0.36–3.74)
UROBILINOGEN UR QL STRIP.AUTO: 0.2 EU/DL (ref 0.2–1)
WBC # BLD AUTO: 8.3 K/UL (ref 4.4–11.3)
WBC URNS QL MICRO: NORMAL /HPF (ref 0–5)

## 2021-04-19 PROCEDURE — 71045 X-RAY EXAM CHEST 1 VIEW: CPT

## 2021-04-19 PROCEDURE — 97530 THERAPEUTIC ACTIVITIES: CPT

## 2021-04-19 PROCEDURE — 83735 ASSAY OF MAGNESIUM: CPT

## 2021-04-19 PROCEDURE — 93306 TTE W/DOPPLER COMPLETE: CPT

## 2021-04-19 PROCEDURE — 99285 EMERGENCY DEPT VISIT HI MDM: CPT

## 2021-04-19 PROCEDURE — 97165 OT EVAL LOW COMPLEX 30 MIN: CPT

## 2021-04-19 PROCEDURE — 99218 HC RM OBSERVATION: CPT

## 2021-04-19 PROCEDURE — 80053 COMPREHEN METABOLIC PANEL: CPT

## 2021-04-19 PROCEDURE — 85652 RBC SED RATE AUTOMATED: CPT

## 2021-04-19 PROCEDURE — 74011250637 HC RX REV CODE- 250/637: Performed by: HOSPITALIST

## 2021-04-19 PROCEDURE — 82962 GLUCOSE BLOOD TEST: CPT

## 2021-04-19 PROCEDURE — 70450 CT HEAD/BRAIN W/O DYE: CPT

## 2021-04-19 PROCEDURE — 93005 ELECTROCARDIOGRAM TRACING: CPT

## 2021-04-19 PROCEDURE — 85610 PROTHROMBIN TIME: CPT

## 2021-04-19 PROCEDURE — 84443 ASSAY THYROID STIM HORMONE: CPT

## 2021-04-19 PROCEDURE — 80061 LIPID PANEL: CPT

## 2021-04-19 PROCEDURE — 74011250636 HC RX REV CODE- 250/636: Performed by: HOSPITALIST

## 2021-04-19 PROCEDURE — 85025 COMPLETE CBC W/AUTO DIFF WBC: CPT

## 2021-04-19 PROCEDURE — 84484 ASSAY OF TROPONIN QUANT: CPT

## 2021-04-19 PROCEDURE — 81001 URINALYSIS AUTO W/SCOPE: CPT

## 2021-04-19 RX ORDER — ONDANSETRON 2 MG/ML
4 INJECTION INTRAMUSCULAR; INTRAVENOUS
Status: DISCONTINUED | OUTPATIENT
Start: 2021-04-19 | End: 2021-04-22 | Stop reason: HOSPADM

## 2021-04-19 RX ORDER — SODIUM CHLORIDE 9 MG/ML
75 INJECTION, SOLUTION INTRAVENOUS CONTINUOUS
Status: DISPENSED | OUTPATIENT
Start: 2021-04-19 | End: 2021-04-20

## 2021-04-19 RX ORDER — LEVOTHYROXINE SODIUM 100 UG/1
100 TABLET ORAL
Status: DISCONTINUED | OUTPATIENT
Start: 2021-04-20 | End: 2021-04-21

## 2021-04-19 RX ORDER — SODIUM CHLORIDE 0.9 % (FLUSH) 0.9 %
5-40 SYRINGE (ML) INJECTION AS NEEDED
Status: DISCONTINUED | OUTPATIENT
Start: 2021-04-19 | End: 2021-04-22 | Stop reason: HOSPADM

## 2021-04-19 RX ORDER — SODIUM CHLORIDE 0.9 % (FLUSH) 0.9 %
5-40 SYRINGE (ML) INJECTION EVERY 8 HOURS
Status: DISCONTINUED | OUTPATIENT
Start: 2021-04-19 | End: 2021-04-22 | Stop reason: HOSPADM

## 2021-04-19 RX ORDER — ACETAMINOPHEN 325 MG/1
650 TABLET ORAL
Status: DISCONTINUED | OUTPATIENT
Start: 2021-04-19 | End: 2021-04-22 | Stop reason: HOSPADM

## 2021-04-19 RX ORDER — POLYETHYLENE GLYCOL 3350 17 G/17G
17 POWDER, FOR SOLUTION ORAL DAILY PRN
Status: DISCONTINUED | OUTPATIENT
Start: 2021-04-19 | End: 2021-04-22 | Stop reason: HOSPADM

## 2021-04-19 RX ORDER — TAMSULOSIN HYDROCHLORIDE 0.4 MG/1
0.4 CAPSULE ORAL DAILY
Status: DISCONTINUED | OUTPATIENT
Start: 2021-04-20 | End: 2021-04-22 | Stop reason: HOSPADM

## 2021-04-19 RX ORDER — PRAVASTATIN SODIUM 40 MG/1
40 TABLET ORAL
Status: DISCONTINUED | OUTPATIENT
Start: 2021-04-19 | End: 2021-04-22 | Stop reason: HOSPADM

## 2021-04-19 RX ORDER — ENOXAPARIN SODIUM 100 MG/ML
40 INJECTION SUBCUTANEOUS DAILY
Status: DISCONTINUED | OUTPATIENT
Start: 2021-04-20 | End: 2021-04-19

## 2021-04-19 RX ORDER — BISMUTH SUBSALICYLATE 262 MG
1 TABLET,CHEWABLE ORAL DAILY
Status: DISCONTINUED | OUTPATIENT
Start: 2021-04-20 | End: 2021-04-22 | Stop reason: HOSPADM

## 2021-04-19 RX ORDER — ACETAMINOPHEN 650 MG/1
650 SUPPOSITORY RECTAL
Status: DISCONTINUED | OUTPATIENT
Start: 2021-04-19 | End: 2021-04-22 | Stop reason: HOSPADM

## 2021-04-19 RX ORDER — HYDRALAZINE HYDROCHLORIDE 20 MG/ML
10 INJECTION INTRAMUSCULAR; INTRAVENOUS
Status: DISCONTINUED | OUTPATIENT
Start: 2021-04-19 | End: 2021-04-22 | Stop reason: HOSPADM

## 2021-04-19 RX ORDER — AMLODIPINE BESYLATE 5 MG/1
5 TABLET ORAL
Status: DISCONTINUED | OUTPATIENT
Start: 2021-04-19 | End: 2021-04-20

## 2021-04-19 RX ORDER — ROPINIROLE 0.25 MG/1
0.25 TABLET, FILM COATED ORAL
Status: DISCONTINUED | OUTPATIENT
Start: 2021-04-19 | End: 2021-04-22 | Stop reason: HOSPADM

## 2021-04-19 RX ADMIN — AMLODIPINE BESYLATE 5 MG: 5 TABLET ORAL at 21:19

## 2021-04-19 RX ADMIN — Medication 10 ML: at 15:47

## 2021-04-19 RX ADMIN — ROPINIROLE HYDROCHLORIDE 0.25 MG: 0.25 TABLET, FILM COATED ORAL at 21:19

## 2021-04-19 RX ADMIN — Medication 10 ML: at 21:19

## 2021-04-19 RX ADMIN — SODIUM CHLORIDE 75 ML/HR: 9 INJECTION, SOLUTION INTRAVENOUS at 15:47

## 2021-04-19 RX ADMIN — PRAVASTATIN SODIUM 40 MG: 40 TABLET ORAL at 21:19

## 2021-04-19 NOTE — H&P
History and Physical      Chief Complaints:     Chief Complaint   Patient presents with    Altered mental status     pt presents with son, states father is altered from baseline. Pt has pmh of TIA last November. Pt ambulatory to triage. Son poor historian. Subjective:     Jessica Verde is a 80 y.o. male followed by Charli Dawn MD and  has a past medical history of Arthritis, CAD (coronary artery disease), Hard of hearing, Headache, Hypertension, PMR (polymyalgia rheumatica) ([address] Skin cancer, and Sleep apnea. Who presents from home with increased ataxia and confusion since yesterday. Patient has history of stroke November last year and normally can walk with a walker but since yesterday after coming back from [place] was noted by patient sounds to have increased confusion and then this morning it was noted that patient was having trouble with his gait. Patient son states that on standing he does complain of dizziness but on orthostatic check was found to be negative. Patient has no focal deficits and only lab abnormalities mild dehydration and the patient states she has a good appetite. Patient recently was admitted to our service for bradycardia for which patient was taken off his metoprolol dosing since has been followed by cardiology and Holter monitor was placed which showed sinus bradycardia with evidence of PVCs. Patient since then has done well and no further issues until today. Patient son is concerned about him being on his own and mentioned about possibility of placing patient in the [facility]. On evaluation in the emergency room CT scan of the head did not show any acute changes but that showed chronic microvascular ischemic changes and a chronic lacunar infarct in the posterior limb of the right internal capsule.   He does have history of temporal arthritis but denies any headache or vision disturbances denies any chest pain nausea vomiting or diarrhea or sick contacts. With patient's current symptoms patient was referred for observation on telemetry for altered mental status secondary to possible TIA versus dehydration    Past Medical History:   Diagnosis Date    Arthritis     CAD (coronary artery disease)     Hard of hearing     Headache     Hypertension     PMR (polymyalgia rheumatica) (HCC)     Skin cancer     Sleep apnea     no longer uses cpap      Past Surgical History:   Procedure Laterality Date    HX CAROTID STENT      HX CHOLECYSTECTOMY  1998    HX HERNIA REPAIR  1980    [place] CARDIAC SURG PROCEDURE UNLIST      cardiac stent    VASCULAR SURGERY PROCEDURE UNLIST      carotid endartectomy     Family History   Problem Relation Age of Onset   [place] Arthritis-rheumatoid Mother     Kidney Disease Mother     Cancer Father         Stomach    Gout Son       Social History     Tobacco Use    Smoking status: Never Smoker    Smokeless tobacco: Never Used   Substance Use Topics    Alcohol use: No       Prior to Admission medications    Medication Sig Start Date End Date Taking? Authorizing Provider   furosemide (LASIX) 20 mg tablet Take 1 Tab by mouth daily. 12/2/20   Luis Mendoza MD   omega 9-ddo-gir-fish oil (Fish Oil) 100-160-1,000 mg cap Fish Oil   2 daily    Other, MD Elio   rOPINIRole (REQUIP) 0.25 mg tablet Take 0.25 mg by mouth nightly. Provider, Historical   tamsulosin (FLOMAX) 0.4 mg capsule Take 0.4 mg by mouth daily. Provider, Historical   pravastatin (PRAVACHOL) 40 mg tablet Take 40 mg by mouth nightly. Provider, Historical   levothyroxine (SYNTHROID) 100 mcg tablet Take 100 mcg by mouth Daily (before breakfast). Provider, Historical   multivitamin (ONE A DAY) tablet Take 1 Tab by mouth daily. Provider, Historical     Allergies   Allergen Reactions    Pcn [Penicillins] Swelling        Review of Systems:  Review of Systems   Constitutional: Negative for chills, diaphoresis, fatigue and fever.    HENT: Negative for congestion, ear pain, postnasal drip, sinus pain and sore throat. Eyes: Negative for pain, discharge and redness. Respiratory: Negative for cough, shortness of breath and wheezing. Cardiovascular: Negative for chest pain and palpitations. Gastrointestinal: Negative for abdominal pain, constipation, diarrhea, nausea and vomiting. Genitourinary: Negative for dysuria, flank pain, frequency and urgency. Musculoskeletal: Positive for gait problem. Negative for arthralgias and myalgias. Skin: Negative. Neurological: Negative for dizziness, weakness and headaches. Psychiatric/Behavioral: Negative for agitation and hallucinations. The patient is not nervous/anxious. Objective:     Vitals:  Visit Vitals  BP (!) 171/75 (BP Patient Position: Sitting)   Pulse 64   Temp 97.6 °F (36.4 °C)   Resp 18   Ht 6' (1.829 m)   Wt 97.5 kg (215 lb)   SpO2 95%   BMI 29.16 kg/m²       Physical Exam:  General: Alert, cooperative, no distress. Head:  Normocephalic, without obvious abnormality, atraumatic. Eyes:  Conjunctivae/corneas clear. Pupils equal, round, reactive to light. Extraocular movements intact. Lungs:  Clear to auscultation bilaterally, no wheezes, crackles  Chest wall: No tenderness or deformity. Heart:  Regular rate and rhythm, S1, S2 normal, no murmur, click, rub, or gallop. Abdomen:   Soft, non-tender. Bowel sounds normal. No masses. No organomegaly. Back:  No spine tenderness to palpation  Extremities: Extremities normal, atraumatic, no cyanosis or edema. Pulses: Symmetric all extremities. Skin: Skin color, texture, turgor normal.   Lymph nodes: Cervical nodes normal.  Neurologic: CNII-XII intact. Normal strength, sensation, and reflexes throughout.       Labs:  Recent Results (from the past 24 hour(s))   GLUCOSE, POC    Collection Time: 04/19/21  9:23 AM   Result Value Ref Range    Glucose (POC) 100 65 - 100 mg/dL    Performed by [facility] [employer] MICROSCOPIC    Collection Time: 04/19/21  9:41 AM   Result Value Ref Range    Color Yellow/Straw      Appearance Clear Clear      Specific gravity 1.025 1.003 - 1.030      pH (UA) 6.0 5.0 - 8.0      Protein 30 (A) Negative mg/dL    Glucose Negative Negative mg/dL    Ketone Negative Negative mg/dL    Bilirubin Negative Negative      Blood Moderate (A) Negative      Urobilinogen 0.2 0.2 - 1.0 EU/dL    Nitrites Negative Negative      Leukocyte Esterase Negative Negative     URINE MICROSCOPIC    Collection Time: 04/19/21  9:41 AM   Result Value Ref Range    WBC 10-20 0 - 5 /hpf    RBC 10-20 0 - 3 /hpf    Bacteria Negative Negative /hpf   CBC WITH AUTOMATED DIFF    Collection Time: 04/19/21 10:23 AM   Result Value Ref Range    WBC 8.3 4.4 - 11.3 K/uL    RBC 4.69 4.50 - 5.90 M/uL    HGB 14.3 13.5 - 17.5 g/dL    HCT 42.6 41 - 53 %    MCV 91.0 80 - 100 FL    MCH 30.5 (L) 31 - 34 PG    MCHC 33.5 31.0 - 36.0 g/dL    RDW 14.2 11.5 - 14.5 %    PLATELET 745 000 - 961 K/uL    MPV 9.3 6.5 - 11.5 FL    NRBC 0.2  WBC    ABSOLUTE NRBC 0.02 K/uL    NEUTROPHILS 69 42 - 75 %    LYMPHOCYTES 19 (L) 20.5 - 51.1 %    MONOCYTES 9 1.7 - 9.3 %    EOSINOPHILS 2 0.9 - 2.9 %    BASOPHILS 1 0.0 - 2.5 %    ABS. NEUTROPHILS 5.9 1.8 - 7.7 K/UL    ABS. LYMPHOCYTES 1.6 1.0 - 4.8 K/UL    ABS. MONOCYTES 0.7 0.2 - 2.4 K/UL    ABS. EOSINOPHILS 0.1 0.0 - 0.7 K/UL    ABS.  BASOPHILS 0.1 0.0 - 0.2 K/UL   PROTHROMBIN TIME + INR    Collection Time: 04/19/21 10:23 AM   Result Value Ref Range    Prothrombin time 11.6 (H) 9.0 - 11.1 sec    INR 1.2 (H) 0.9 - 1.1     METABOLIC PANEL, COMPREHENSIVE    Collection Time: 04/19/21 10:23 AM   Result Value Ref Range    Sodium 137 136 - 145 mmol/L    Potassium 4.7 3.5 - 5.1 mmol/L    Chloride 104 97 - 108 mmol/L    CO2 22 21 - 32 mmol/L    Anion gap 11 5 - 15 mmol/L    Glucose 79 65 - 100 mg/dL    BUN 21 (H) 6 - 20 mg/dL    Creatinine 0.85 0.70 - 1.30 mg/dL    BUN/Creatinine ratio 25 (H) 12 - 20      GFR est AA >60 >60 ml/min/1.73m2    GFR est non-AA >60 >60 ml/min/1.73m2    Calcium 9.1 8.5 - 10.1 mg/dL    Bilirubin, total 0.7 0.2 - 1.0 mg/dL    AST (SGOT) 30 15 - 37 U/L    ALT (SGPT) 17 12 - 78 U/L    Alk.  phosphatase 59 45 - 117 U/L    Protein, total 7.4 6.4 - 8.2 g/dL    Albumin 2.8 (L) 3.5 - 5.0 g/dL    Globulin 4.6 (H) 2.0 - 4.0 g/dL    A-G Ratio 0.6 (L) 1.1 - 2.2     TROPONIN I    Collection Time: 04/19/21 10:23 AM   Result Value Ref Range    Troponin-I, Qt. 0.06 (H) <0.05 ng/mL   TROPONIN I    Collection Time: 04/19/21 12:30 PM   Result Value Ref Range    Troponin-I, Qt. <0.05 <0.05 ng/mL   EKG, 12 LEAD, INITIAL    Collection Time: 04/19/21  2:05 PM   Result Value Ref Range    Ventricular Rate 64 BPM    Atrial Rate 62 BPM    P-R Interval 318 ms    QRS Duration 102 ms    Q-T Interval 466 ms    QTC Calculation (Bezet) 481 ms    Calculated P Axis 35 degrees    Calculated R Axis -15 degrees    Calculated T Axis 47 degrees    Diagnosis       Sinus rhythm  Multiple ventricular premature complexes  Prolonged MD interval  Borderline left axis deviation  Abnormal R-wave progression, early transition  Borderline prolonged QT interval  Baseline wander in lead(s) I,II,aVR,aVL,V6     ECHO ADULT COMPLETE    Collection Time: 04/19/21  4:32 PM   Result Value Ref Range    LV ED Vol A2C 117.44 mL    IVSd 1.10 (A) 0.60 - 1.00 cm    LVIDd 4.99 4.20 - 5.90 cm    LVIDs 3.53 cm    LVOT d 2.19 cm    LVPWd 1.13 (A) 0.60 - 1.00 cm    LVOT SV 79.9 mL    LVOT SV 79.9 mL    BP EF 66.2 55.0 - 100.0 percent    LV Ejection Fraction MOD 2C 63 percent    LV Ejection Fraction MOD 4C 69 percent    LV ED Vol BP 77.28 67.0 - 155.0 mL    LV ES Vol A2C 29.83 mL    LV ES Vol BP 26.13 22.0 - 58.0 mL    LVOT Peak Gradient 3.01 mmHg    Left Ventricular Outflow Tract Mean Gradient 1.51 mmHg    LVOT Peak Velocity 86.80 cm/s    LVOT VTI 20.70 cm    RVIDd 3.04 cm    LA Volume 47.61 18.0 - 58.0 mL    LA Vol 2C 47.06 18.00 - 58.00 mL    LA Vol 4C 42.31 18.00 - 58.00 mL    Aortic Valve Area by Continuity of Peak Velocity 2.25 cm2    Aortic Valve Area by Continuity of Peak Velocity 2.25 cm2    Aortic Valve Area by Continuity of VTI 2.80 cm2    AoV PG 46.20 mmHg    Aortic Regurgitant Pressure Half-time 509.78 ms    AoV PG 8.40 mmHg    Aortic Valve Systolic Mean Gradient 9.54 mmHg    Aortic Valve Systolic Peak Velocity 597.42 cm/s    Aortic valve mean velocity 91.47 cm/s    AoV VTI 27.73 cm    MV A William 114.99 cm/s    Mitral Valve E Wave Deceleration Time 198.70 ms    MV E William 102.32 cm/s    MV E/A 0.89     Mitral Valve Pressure Half-time 57.62 ms    MVA (PHT) 3.82 cm2    MVA (PHT) 3.82 cm2    Triscuspid Valve Regurgitation Peak Gradient 19.01 mmHg    TR Max Velocity 218.03 cm/s    Ao Root D 3.53 cm    LV Mass .4 88.0 - 224.0 g    LV Mass AL Index 95.8 49.0 - 115.0 g/m2    Right Atrial Area 4C 20.0 cm2    RVSP 22.0 mmHg    LVES Vol Index BP 11.9 mL/m2    LVED Vol Index BP 35.2 mL/m2    Est. RA Pressure 3.0 mmHg    LA Vol Index 21.67 16.00 - 28.00 ml/m2    LA Vol Index 21.42 16.00 - 28.00 ml/m2    LA Vol Index 19.26 16.00 - 28.00 ml/m2    LVED Vol Index A2C 53.5 mL/m2    LVES Vol Index A2C 13.6 mL/m2    JOANIE/BSA VTI 1.3 cm2/m2       Imaging:  Ct Head Wo Cont    Result Date: 4/19/2021  Microvascular ischemic changes. Chronic lacunar infarct posterior limb of the right internal capsule. No acute intracranial abnormality. Assessment & Plan:      Altered mental status  Abnormal gait  Dehydration  Hypertension    Altered mental status:  -Patient presents with increased confusion over the last 24 hours possibly secondary to dehydration versus TIA  -CT scan of the head did not show any acute pathology just chronic changes  -Troponin x2 - we will monitor additional every 6 hours x3  -EKG shows sinus rhythm with multiple ventricular premature complexes with a prolonged CA interval with borderline prolonged QT and abnormal R wave progression   -Monitor on telemetry with neuro checks every 4 hours  -Continue current IV fluids normal saline at 75 cc an hour x1 day  -2D echo prelim shows preserved EF follow-up official results  -Reevaluate in a.m. after overnight IV fluids if continued symptoms then perform MRI MRA of the head and neck    Possible TIA:  -Patient has history of stroke with no focal deficits but has been seen by vascular and has had right endarterectomy done in the past but only minimal disease noted on most recent carotid Dopplers  -Obtain lipid profile  -PT/OT/speech therapy evaluation and treatment  -No focal deficits noted on exam continue to monitor with neuro checks every 4 hours  -No aspirin noted on medication list and will place on 81 mg low-dose    Dehydration:  -Patient noted to be on Lasix 20 mg oral daily and BUN/creatinine noted to be 21/0.85  -We will hold Lasix and start normal saline at 75 cc an hour x1 day  -We will repeat labs in a.m.     Hypertension:  -Patient blood pressure was 128/56 on arrival but increased to 171/75 on arrival to the medical floor  -Placed on hydralazine 10 mg IV every 6 hours as needed for systolic blood pressure greater 843 and diastolic greater than 307  -We will start patient on Norvasc 5 mg oral every afternoon  -Cardiology consultation in a.m.  -Follow-up 2D echo  -Monitor vitals every 4 hours    BPH:  -Continue Flomax home dose    Hyperlipidemia:  -Obtain lipid profile for now continue home dose of pravastatin    DVT prophylaxis:  -SCDs     CODE STATUS DNR DNI  -Patient designated decision-maker is his son Nicole Hillman     Spent 45 minutes evaluating and coordinating patient's observation admission to acute telemetry and expect less than 24-hour stay    Electronically signed by Lizet Walsh MD on 4/19/2021 at 5:35 PM

## 2021-04-19 NOTE — PROGRESS NOTES
OCCUPATIONAL THERAPY EVALUATION/DISCHARGE  Patient: Kay Fajardo Sr. (80 y.o. male)  Date: 4/19/2021  Primary Diagnosis: Altered mental status [R41.82]  Abnormal gait [R26.9]       Precautions: Impaired memory       ASSESSMENT  Based on the objective data described below, the patient presents with sufficient strength to be able to perform basic ADLs with modified independence. Patient demonstrated ability to jovana shoes with modified independence sitting EOB and performed toilet transfer with supervision for safety utilizing SPC device. Son present during evaluation; he reported patient has hx of CVA [place] 2020) and was modified independent with basic ADLs and IADLs. Son stated he would assist patient as needed with IADL tasks. Son also reported patient was presenting with increased confusion today. Current Level of Function (ADLs/self-care): Patient is modified independent with basic ADLs, including bathing and dressing tasks and requires supervision for safety with toilet transfers. Other factors to consider for discharge: impaired memory, requiring supervision for safety. PLAN :    Recommendation for discharge: (in order for the patient to meet his/her long term goals)  Occupational therapy at least 2 days/week in the home AND ensure assist and/or supervision for safety with basic ADLs/IADLs. This discharge recommendation:  Has been made in collaboration with the attending provider and/or case management    IF patient discharges home will need the following DME: patient owns DME required for discharge       SUBJECTIVE:   Patient stated I am not sure what brought me to the hospital today.     OBJECTIVE DATA SUMMARY:   HISTORY:   Past Medical History:   Diagnosis Date    Arthritis     CAD (coronary artery disease)     Hard of hearing     Headache     Hypertension     PMR (polymyalgia rheumatica) (HCC)     Skin cancer     Sleep apnea     no longer uses cpap     Past Surgical History:   Procedure Laterality Date    HX CAROTID STENT      HX CHOLECYSTECTOMY  1998    HX HERNIA REPAIR  1980    [place] CARDIAC SURG PROCEDURE UNLIST      cardiac stent    VASCULAR SURGERY PROCEDURE UNLIST      carotid endartectomy       Prior Level of Function/Environment/Context: Son reported patient has hx of CVA (Novemeber 2020) and was modified independent with basic ADLs and IADLs. Son stated he would assist patient as needed with IADL tasks. Expanded or extensive additional review of patient history:   Home Situation  Home Environment: Private residence  # Steps to Enter: 2  Rails to Enter: No  One/Two Story Residence: One story  Living Alone: Yes  Support Systems: Child(ja)  Patient Expects to be Discharged to[de-identified] Private residence  Current DME Used/Available at Home: [place], straight, Walker, rollator, Shower chair, Grab bars        EXAMINATION OF PERFORMANCE DEFICITS:  Cognitive/Behavioral Status:  Neurologic State: Alert  Orientation Level: Disoriented to place; Disoriented to situation;Disoriented to time;Oriented to person  Cognition: Follows commands; Appropriate for age attention/concentration             Hearing: Auditory  Auditory Impairment: Hard of hearing, bilateral      Range of Motion:    AROM: Within functional limits                         Strength:  Patient demonstrates 4+/5 BUE strength per mmt. Strength: Within functional limits                Coordination:     Fine Motor Skills-Upper: Left Intact; Right Intact                 Balance:  Sitting: Intact  Standing: Intact; With support    Functional Mobility and Transfers for ADLs:  Bed Mobility:       Transfers:   Toilet Transfer : Supervision;Modified independent    ADL Assessment:  Feeding: Modified independent    Oral Facial Hygiene/Grooming: Modified Independent    Bathing: Modified independent    Upper Body Dressing: Modified independent    Lower Body Dressing: Modified independent    Toileting: Modified independent                ADL Intervention and task modifications:                                          Occupational Therapy Evaluation Charge Determination   History Examination Decision-Making   LOW Complexity : Brief history review  LOW Complexity : 1-3 performance deficits relating to physical, cognitive , or psychosocial skils that result in activity limitations and / or participation restrictions  MEDIUM Complexity : Patient may present with comorbidities that affect occupational performnce. Miniml to moderate modification of tasks or assistance (eg, physical or verbal ) with assesment(s) is necessary to enable patient to complete evaluation       Based on the above components, the patient evaluation is determined to be of the following complexity level: LOW   Pain Ratin/10    Activity Tolerance:   Good  Please refer to the flowsheet for vital signs taken during this treatment. After treatment patient left in no apparent distress:    Call bell within reach, Caregiver / family present and sitting EOB    COMMUNICATION/EDUCATION:   The patients plan of care was discussed with: Registered nurse, Physician and Case management.      Thank you for this referral.  DUY Aguiar, OTR/L    Time Calculation: 23 mins

## 2021-04-19 NOTE — PROGRESS NOTES
Problem: Falls - Risk of  Goal: *Absence of Falls  Description: Document Susannah Devinejacob Fall Risk and appropriate interventions in the flowsheet.   Outcome: Progressing Towards Goal  Note: Fall Risk Interventions:                                Problem: Patient Education: Go to Patient Education Activity  Goal: Patient/Family Education  Outcome: Progressing Towards Goal

## 2021-04-19 NOTE — ED TRIAGE NOTES
.  Chief Complaint   Patient presents with    Altered mental status     pt presents with son, states father is altered from baseline. Pt has pmh of TIA last November. Pt ambulatory to triage. Son poor historian.       Pt recently being treated for UTI

## 2021-04-19 NOTE — ED PROVIDER NOTES
EMERGENCY DEPARTMENT HISTORY AND PHYSICAL EXAM      Date: 4/19/2021  Patient Name: Faustino Valerio.    History of Presenting Illness     Chief Complaint   Patient presents with    Altered mental status     pt presents with son, states father is altered from baseline. Pt has pmh of TIA last November. Pt ambulatory to triage. Son poor historian. History Provided By: Patient and Patient's Son    HPI: Carmelita Ruelas ., 80 y.o. male with a past medical history significant stroke presents to the ED with cc of son complains of altered mental status with his dad patient last known well yesterday 31 75 62; patient called son this morning told son that it is noted last night and he just came back from a trip which was not true; patient signs states patient has a more unstable gait which has been noticed over the past week and patient complained of dizziness this morning    There are no other complaints, changes, or physical findings at this time. PCP: Belinda Hayes MD    No current facility-administered medications on file prior to encounter. Current Outpatient Medications on File Prior to Encounter   Medication Sig Dispense Refill    furosemide (LASIX) 20 mg tablet Take 1 Tab by mouth daily. 30 Tab 0    omega 3-dha-epa-fish oil (Fish Oil) 100-160-1,000 mg cap Fish Oil   2 daily      rOPINIRole (REQUIP) 0.25 mg tablet Take 0.25 mg by mouth nightly.  tamsulosin (FLOMAX) 0.4 mg capsule Take 0.4 mg by mouth daily.  pravastatin (PRAVACHOL) 40 mg tablet Take 40 mg by mouth nightly.  levothyroxine (SYNTHROID) 100 mcg tablet Take 100 mcg by mouth Daily (before breakfast).  multivitamin (ONE A DAY) tablet Take 1 Tab by mouth daily.          Past History     Past Medical History:  Past Medical History:   Diagnosis Date    Arthritis     CAD (coronary artery disease)     Hard of hearing     Headache     Hypertension     PM[facility] rheumatica[facility])     Skin cancer     Sleep apnea     no longer uses cpap       Past Surgical History:  Past Surgical History:   Procedure Laterality Date    HX CAROTID STENT      HX CHOLECYSTECTOMY  1998    HX HERNIA REPAIR  1980    DC CARDIAC SURG PROCEDURE UNLIST      cardiac stent    VASCULAR SURGERY PROCEDURE UNLIST      carotid endartectomy       Family History:  Family History   Problem Relation Age of Onset   Johan Anton Arthritis-rheumatoid Mother     Kidney Disease Mother     Cancer Father         Stomach    Gout Son        Social History:  Social History     Tobacco Use    Smoking status: Never Smoker    Smokeless tobacco: Never Used   Substance Use Topics    Alcohol use: No    Drug use: Never       Allergies: Allergies   Allergen Reactions    Pcn [Penicillins] Swelling         Review of Systems     Review of Systems   Constitutional: Negative for chills and fever. HENT: Negative for rhinorrhea and sneezing. Eyes: Negative for pain and visual disturbance. Respiratory: Negative for cough and shortness of breath. Cardiovascular: Negative for chest pain and leg swelling. Gastrointestinal: Negative for abdominal pain and vomiting. Endocrine: Negative for polydipsia and polyuria. Genitourinary: Negative for dysuria and urgency. Musculoskeletal: Negative for back pain and neck pain. Skin: Negative for color change and pallor. Neurological: Positive for dizziness. Negative for weakness and headaches. Psychiatric/Behavioral: Positive for confusion and hallucinations. Negative for agitation and behavioral problems. Physical Exam     Physical Exam  Vitals signs and nursing note reviewed. Constitutional:       Appearance: He is well-developed. HENT:      Head: Normocephalic and atraumatic. Eyes:      General: No visual field deficit. Extraocular Movements: Extraocular movements intact. Pupils: Pupils are equal, round, and reactive to light. Neck:      Musculoskeletal: Normal range of motion and neck supple. Cardiovascular:      Rate and Rhythm: Normal rate and regular rhythm. Heart sounds: Normal heart sounds. Pulmonary:      Effort: Pulmonary effort is normal.      Breath sounds: Normal breath sounds. Abdominal:      General: Bowel sounds are normal.      Palpations: Abdomen is soft. Musculoskeletal: Normal range of motion. Skin:     General: Skin is dry. Capillary Refill: Capillary refill takes less than 2 seconds. Neurological:      Mental Status: He is alert and oriented to person, place, and time. Cranial Nerves: No dysarthria or facial asymmetry. Sensory: No sensory deficit. Motor: No weakness. Coordination: Coordination normal.   Psychiatric:         Mood and Affect: Mood normal.         Behavior: Behavior normal.         Lab and Diagnostic Study Results     Labs -     Recent Results (from the past 12 hour(s))   GLUCOSE, POC    Collection Time: 04/19/21  9:23 AM   Result Value Ref Range    Glucose (POC) 100 65 - 100 mg/dL    Performed by Ellen Carmona        Radiologic Studies -   @lastxrresult@  CT Results  (Last 48 hours)    None        CXR Results  (Last 48 hours)    None            Medical Decision Making   - I am the first provider for this patient. - I reviewed the vital signs, available nursing notes, past medical history, past surgical history, family history and social history. - Initial assessment performed. The patients presenting problems have been discussed, and they are in agreement with the care plan formulated and outlined with them. I have encouraged them to ask questions as they arise throughout their visit. Vital Signs-Reviewed the patient's vital signs.   Patient Vitals for the past 12 hrs:   Temp Pulse Resp BP SpO2   04/19/21 0914 97.8 °F (36.6 °C) 65 20 (!) 128/56 96 %       Records Reviewed: Nursing Notes    The patient presents with altered mental status with a differential diagnosis of  chronic dementia, CVA and UTI      ED Course: ED Course as of Apr 19 1237   Mon Apr 19, 2021   1130 EKG sinus rhythm rate 61 ME interval 307 QT interval 455, left ventricular hypertrophy no ST elevations or depressions    [SB]   1131 Final dispel pending 2-hour repeat troponin    [SB]      ED Course User Index  [SB] Lennie Gonzalez MD       Provider Notes (Medical Decision Making): MDM       Procedures   Medical Decision Makingedical Decision Making  Performed by: Rigo Meehan MD  PROCEDURES:  Procedures       Disposition   Disposition: Condition stable  Admitted to Floor Medical Floor the case was discussed with the admitting physician Reji        DISCHARGE PLAN:  1. Current Discharge Medication List      CONTINUE these medications which have NOT CHANGED    Details   furosemide (LASIX) 20 mg tablet Take 1 Tab by mouth daily. Qty: 30 Tab, Refills: 0      omega 3-dha-epa-fish oil (Fish Oil) 100-160-1,000 mg cap Fish Oil   2 daily      rOPINIRole (REQUIP) 0.25 mg tablet Take 0.25 mg by mouth nightly. tamsulosin (FLOMAX) 0.4 mg capsule Take 0.4 mg by mouth daily. pravastatin (PRAVACHOL) 40 mg tablet Take 40 mg by mouth nightly. levothyroxine (SYNTHROID) 100 mcg tablet Take 100 mcg by mouth Daily (before breakfast). multivitamin (ONE A DAY) tablet Take 1 Tab by mouth daily. 2.   Follow-up Information    None       3. Return to ED if worse   4. Current Discharge Medication List            Diagnosis     Clinical Impression: No diagnosis found. Attestations:    Rigo Meehan MD    Please note that this dictation was completed with [employer], the computer voice recognition software. Quite often unanticipated grammatical, syntax, homophones, and other interpretive errors are inadvertently transcribed by the computer software. Please disregard these errors. Please excuse any errors that have escaped final proofreading. Thank you.

## 2021-04-20 PROBLEM — R27.0 ATAXIA: Status: ACTIVE | Noted: 2021-04-20

## 2021-04-20 LAB
ANION GAP SERPL CALC-SCNC: 9 MMOL/L (ref 5–15)
ATRIAL RATE: 60 BPM
ATRIAL RATE: 62 BPM
BASOPHILS # BLD: 0 K/UL (ref 0–0.2)
BASOPHILS NFR BLD: 1 % (ref 0–2.5)
BUN SERPL-MCNC: 23 MG/DL (ref 6–20)
BUN/CREAT SERPL: 19 (ref 12–20)
CA-I BLD-MCNC: 8.7 MG/DL (ref 8.5–10.1)
CALCULATED P AXIS, ECG09: -35 DEGREES
CALCULATED P AXIS, ECG09: 35 DEGREES
CALCULATED R AXIS, ECG10: -15 DEGREES
CALCULATED R AXIS, ECG10: -34 DEGREES
CALCULATED T AXIS, ECG11: 47 DEGREES
CALCULATED T AXIS, ECG11: 74 DEGREES
CHLORIDE SERPL-SCNC: 104 MMOL/L (ref 97–108)
CHOLEST SERPL-MCNC: 142 MG/DL
CO2 SERPL-SCNC: 28 MMOL/L (ref 21–32)
CREAT SERPL-MCNC: 1.2 MG/DL (ref 0.7–1.3)
DIAGNOSIS, 93000: NORMAL
DIAGNOSIS, 93000: NORMAL
ECHO AO ROOT DIAM: 3.53 CM
ECHO AV AREA PEAK VELOCITY: 2.25 CM2
ECHO AV AREA PEAK VELOCITY: 2.25 CM2
ECHO AV AREA VTI: 2.8 CM2
ECHO AV AREA/BSA VTI: 1.3 CM2/M2
ECHO AV MEAN GRADIENT: 3.9 MMHG
ECHO AV MEAN VELOCITY: 91.47 CM/S
ECHO AV PEAK GRADIENT: 46.2 MMHG
ECHO AV PEAK GRADIENT: 8.4 MMHG
ECHO AV PEAK VELOCITY: 144.89 CM/S
ECHO AV REGURGITANT PHT: 509.78 MS
ECHO AV VTI: 27.73 CM
ECHO EST RA PRESSURE: 3 MMHG
ECHO LA VOL 2C: 47.06 ML (ref 18–58)
ECHO LA VOL 4C: 42.31 ML (ref 18–58)
ECHO LA VOL BP: 47.61 ML (ref 18–58)
ECHO LA VOL/BSA BIPLANE: 21.67 ML/M2 (ref 16–28)
ECHO LA VOLUME INDEX A2C: 21.42 ML/M2 (ref 16–28)
ECHO LA VOLUME INDEX A4C: 19.26 ML/M2 (ref 16–28)
ECHO LV EDV A2C: 117.44 ML
ECHO LV EDV BP: 77.28 ML (ref 67–155)
ECHO LV EDV INDEX BP: 35.2 ML/M2
ECHO LV EDV NDEX A2C: 53.5 ML/M2
ECHO LV EJECTION FRACTION A2C: 63 PERCENT
ECHO LV EJECTION FRACTION A4C: 69 PERCENT
ECHO LV EJECTION FRACTION BIPLANE: 66.2 PERCENT (ref 55–100)
ECHO LV ESV A2C: 29.83 ML
ECHO LV ESV BP: 26.13 ML (ref 22–58)
ECHO LV ESV INDEX A2C: 13.6 ML/M2
ECHO LV ESV INDEX BP: 11.9 ML/M2
ECHO LV INTERNAL DIMENSION DIASTOLIC: 4.99 CM (ref 4.2–5.9)
ECHO LV INTERNAL DIMENSION SYSTOLIC: 3.53 CM
ECHO LV IVSD: 1.1 CM (ref 0.6–1)
ECHO LV MASS 2D: 210.4 G (ref 88–224)
ECHO LV MASS INDEX 2D: 95.8 G/M2 (ref 49–115)
ECHO LV POSTERIOR WALL DIASTOLIC: 1.13 CM (ref 0.6–1)
ECHO LVOT DIAM: 2.19 CM
ECHO LVOT PEAK GRADIENT: 3.01 MMHG
ECHO LVOT PEAK VELOCITY: 86.8 CM/S
ECHO LVOT SV: 79.9 ML
ECHO LVOT SV: 79.9 ML
ECHO LVOT VTI: 20.7 CM
ECHO MV A VELOCITY: 114.99 CM/S
ECHO MV AREA PHT: 3.82 CM2
ECHO MV AREA PHT: 3.82 CM2
ECHO MV E DECELERATION TIME (DT): 198.7 MS
ECHO MV E VELOCITY: 102.32 CM/S
ECHO MV E/A RATIO: 0.89
ECHO MV PRESSURE HALF TIME (PHT): 57.62 MS
ECHO RA AREA 4C: 20 CM2
ECHO RIGHT VENTRICULAR SYSTOLIC PRESSURE (RVSP): 22 MMHG
ECHO RV INTERNAL DIMENSION: 3.04 CM
ECHO TV REGURGITANT MAX VELOCITY: 218.03 CM/S
ECHO TV REGURGITANT PEAK GRADIENT: 19.01 MMHG
EOSINOPHIL # BLD: 0.1 K/UL (ref 0–0.7)
EOSINOPHIL NFR BLD: 2 % (ref 0.9–2.9)
ERYTHROCYTE [DISTWIDTH] IN BLOOD BY AUTOMATED COUNT: 14.3 % (ref 11.5–14.5)
GLUCOSE SERPL-MCNC: 122 MG/DL (ref 65–100)
HCT VFR BLD AUTO: 42.9 % (ref 41–53)
HDLC SERPL-MCNC: 37 MG/DL
HDLC SERPL: 3.8 {RATIO} (ref 0–5)
HGB BLD-MCNC: 14.5 G/DL (ref 13.5–17.5)
LDLC SERPL CALC-MCNC: 69.4 MG/DL (ref 0–100)
LIPID PROFILE,FLP: ABNORMAL
LVOT MG: 1.51 MMHG
LYMPHOCYTES # BLD: 1 K/UL (ref 1–4.8)
LYMPHOCYTES NFR BLD: 15 % (ref 20.5–51.1)
MAGNESIUM SERPL-MCNC: 2.1 MG/DL (ref 1.6–2.4)
MCH RBC QN AUTO: 30.9 PG (ref 31–34)
MCHC RBC AUTO-ENTMCNC: 33.8 G/DL (ref 31–36)
MCV RBC AUTO: 91.3 FL (ref 80–100)
MONOCYTES # BLD: 0.7 K/UL (ref 0.2–2.4)
MONOCYTES NFR BLD: 10 % (ref 1.7–9.3)
NEUTS SEG # BLD: 5.1 K/UL (ref 1.8–7.7)
NEUTS SEG NFR BLD: 72 % (ref 42–75)
NRBC # BLD: 0.04 K/UL
NRBC BLD-RTO: 0.5 PER 100 WBC
P-R INTERVAL, ECG05: 307 MS
P-R INTERVAL, ECG05: 318 MS
PLATELET # BLD AUTO: 240 K/UL (ref 150–400)
PMV BLD AUTO: 8.6 FL (ref 6.5–11.5)
POTASSIUM SERPL-SCNC: 4.4 MMOL/L (ref 3.5–5.1)
Q-T INTERVAL, ECG07: 451 MS
Q-T INTERVAL, ECG07: 466 MS
QRS DURATION, ECG06: 102 MS
QRS DURATION, ECG06: 99 MS
QTC CALCULATION (BEZET), ECG08: 455 MS
QTC CALCULATION (BEZET), ECG08: 481 MS
RBC # BLD AUTO: 4.7 M/UL (ref 4.5–5.9)
SODIUM SERPL-SCNC: 141 MMOL/L (ref 136–145)
T4 FREE SERPL-MCNC: 1 NG/DL (ref 0.8–1.5)
TRIGL SERPL-MCNC: 178 MG/DL (ref ?–150)
VENTRICULAR RATE, ECG03: 61 BPM
VENTRICULAR RATE, ECG03: 64 BPM
VLDLC SERPL CALC-MCNC: 35.6 MG/DL
WBC # BLD AUTO: 7 K/UL (ref 4.4–11.3)

## 2021-04-20 PROCEDURE — 99218 HC RM OBSERVATION: CPT

## 2021-04-20 PROCEDURE — 65270000029 HC RM PRIVATE

## 2021-04-20 PROCEDURE — 84439 ASSAY OF FREE THYROXINE: CPT

## 2021-04-20 PROCEDURE — 96374 THER/PROPH/DIAG INJ IV PUSH: CPT

## 2021-04-20 PROCEDURE — 97116 GAIT TRAINING THERAPY: CPT

## 2021-04-20 PROCEDURE — 83735 ASSAY OF MAGNESIUM: CPT

## 2021-04-20 PROCEDURE — 74011250636 HC RX REV CODE- 250/636

## 2021-04-20 PROCEDURE — 80048 BASIC METABOLIC PNL TOTAL CA: CPT

## 2021-04-20 PROCEDURE — 97161 PT EVAL LOW COMPLEX 20 MIN: CPT

## 2021-04-20 PROCEDURE — 85025 COMPLETE CBC W/AUTO DIFF WBC: CPT

## 2021-04-20 PROCEDURE — 74011250636 HC RX REV CODE- 250/636: Performed by: HOSPITALIST

## 2021-04-20 PROCEDURE — 74011250637 HC RX REV CODE- 250/637: Performed by: HOSPITALIST

## 2021-04-20 PROCEDURE — 36415 COLL VENOUS BLD VENIPUNCTURE: CPT

## 2021-04-20 RX ORDER — LISINOPRIL 10 MG/1
10 TABLET ORAL DAILY
Status: DISCONTINUED | OUTPATIENT
Start: 2021-04-20 | End: 2021-04-22 | Stop reason: HOSPADM

## 2021-04-20 RX ORDER — LORAZEPAM 2 MG/ML
INJECTION INTRAMUSCULAR
Status: COMPLETED
Start: 2021-04-20 | End: 2021-04-20

## 2021-04-20 RX ORDER — AMLODIPINE BESYLATE 5 MG/1
5 TABLET ORAL DAILY
Status: DISCONTINUED | OUTPATIENT
Start: 2021-04-21 | End: 2021-04-22 | Stop reason: HOSPADM

## 2021-04-20 RX ORDER — LORAZEPAM 2 MG/ML
0.5 INJECTION INTRAMUSCULAR ONCE
Status: COMPLETED | OUTPATIENT
Start: 2021-04-20 | End: 2021-04-20

## 2021-04-20 RX ADMIN — PRAVASTATIN SODIUM 40 MG: 40 TABLET ORAL at 21:14

## 2021-04-20 RX ADMIN — LORAZEPAM 0.5 MG: 2 INJECTION, SOLUTION INTRAMUSCULAR; INTRAVENOUS at 03:10

## 2021-04-20 RX ADMIN — LISINOPRIL 10 MG: 10 TABLET ORAL at 17:38

## 2021-04-20 RX ADMIN — Medication 10 ML: at 05:52

## 2021-04-20 RX ADMIN — LEVOTHYROXINE SODIUM 100 MCG: 100 TABLET ORAL at 08:36

## 2021-04-20 RX ADMIN — ROPINIROLE HYDROCHLORIDE 0.25 MG: 0.25 TABLET, FILM COATED ORAL at 21:14

## 2021-04-20 RX ADMIN — Medication 10 ML: at 21:15

## 2021-04-20 RX ADMIN — TAMSULOSIN HYDROCHLORIDE 0.4 MG: 0.4 CAPSULE ORAL at 08:36

## 2021-04-20 RX ADMIN — MULTIVITAMIN TABLET 1 TABLET: TABLET at 08:36

## 2021-04-20 RX ADMIN — POLYETHYLENE GLYCOL 3350 17 G: 17 POWDER, FOR SOLUTION ORAL at 21:14

## 2021-04-20 RX ADMIN — SODIUM CHLORIDE 75 ML/HR: 9 INJECTION, SOLUTION INTRAVENOUS at 12:30

## 2021-04-20 RX ADMIN — LORAZEPAM 0.5 MG: 2 INJECTION INTRAMUSCULAR at 03:10

## 2021-04-20 NOTE — PROGRESS NOTES
PHYSICAL THERAPY TREATMENT  Patient: Mirna Rosales Sr. (80 y.o. male)  Date: 4/20/2021  Diagnosis: Altered mental status [R41.82]  Abnormal gait [R26.9]  Ataxia [R27.0] Abnormal gait       Precautions:  Fall risk   Chart, physical therapy assessment, plan of care and goals were reviewed. ASSESSMENT  Patient continues with skilled PT services and is progressing towards goals. Pt continues to be confused during treatment with difficulty following commands. Pt required supervision/stand-by assistance for sit<>stand transfers. Pt ambulated 10 ft with SPC with CGA x 1 for safety and cueing with pt demonstrating periods of unsteadiness. Current Level of Function Impacting Discharge (mobility/balance): decreased mobility and balance     Other factors to consider for discharge: pt is a fall risk and lived alone at home prior to admission. PLAN :  Patient continues to benefit from skilled intervention to address the above impairments. Continue treatment per established plan of care. to address goals. Recommendation for discharge: (in order for the patient to meet his/her long term goals)  Therapy up to 5 days/week in SNF setting or intensive home health therapy program    This discharge recommendation:  Has been made in collaboration with the attending provider and/or case management    IF patient discharges home will need the following DME: to be determined (TBD)       SUBJECTIVE:   Patient poor historian and confused.      OBJECTIVE DATA SUMMARY:   Critical Behavior:  Neurologic State: Drowsy, Eyes open to stimulus, Pharmacologically induced (comment), Sleeping  Orientation Level: Oriented to person, Disoriented to time, Disoriented to situation, Disoriented to place  Cognition: Impaired decision making, No command following, Decreased attention/concentration     Functional Mobility Training:  Bed Mobility:     Supine to Sit: Modified independent  Sit to Supine: Independent Transfers:  Sit to Stand: Supervision;Stand-by assistance  Stand to Sit: Supervision;Stand-by assistance                             Balance:  Sitting: Intact; With support  Standing: Intact; With support  Ambulation/Gait Training:  Distance (ft): 10 Feet (ft)  Assistive Device: Cane, straight  Ambulation - Level of Assistance: Contact guard assistance     Gait Description (WDL): Exceptions to WDL  Gait Abnormalities: Other(Posterior trunk lean )        Base of Support: Widened                             Stairs:              Treatment Session:   Pt pleasant but confused during treatment with poor command following. Pt ambulated 10 ft with SPC with CGA x 1 with unsteadiness noted but ability to recover from loss of balance. Pt very impulsive during treatment and required multiple cues for safety. Pain Rating:  Pt poor historian. Activity Tolerance:   Fair    After treatment patient left in no apparent distress:   Supine in bed and Call bell within reach    COMMUNICATION/COLLABORATION:   The patients plan of care was discussed with: Registered nurse.      Kj Cabrera, PT,          Problem: Mobility Impaired (Adult and Pediatric)  Goal: *Acute Goals and Plan of Care (Insert Text)  Outcome: Progressing Towards Goal

## 2021-04-20 NOTE — PROGRESS NOTES
Problem: Falls - Risk of  Goal: *Absence of Falls  Description: Document Kacey Kwok Fall Risk and appropriate interventions in the flowsheet.   Note: Fall Risk Interventions:

## 2021-04-20 NOTE — ROUTINE PROCESS
Sat up in chair. Ate well. Son been in at bedside. Camera monitor in use to maintain safety. Been up to void. No c/o.  Assisted to bed resting quietly

## 2021-04-20 NOTE — ROUTINE PROCESS
Confused conversation. Assisted with urinal. Voids yellow urine. Sitting up to eat. Pt feeding self. No resp distress. Monitor on. IVF intact. No shortness of breath. Speech clear.

## 2021-04-20 NOTE — ROUTINE PROCESS
Patient's son has arrived is updated on situation. Attempted to get patient back in bed but he is unwilling. Son is sitting with patient.

## 2021-04-20 NOTE — ROUTINE PROCESS
Paged Dr. Nisha Garcia for orders, patient is wandering hallway with primary nurse and unable to be redirected. Patient is confused, combative and trying to leave. New orders given.

## 2021-04-20 NOTE — ROUTINE PROCESS
Neurocheck performed and remains unchanged as previous. Complete documentation is under [place] NE[facility]O in flowsheets. Will continue to monitor patient.

## 2021-04-20 NOTE — PROGRESS NOTES
Care Management Interventions  Transition of Care Consult (CM Consult): SNF  Partner SNF: No  Reason Why Partner SNF Not Chosen: Location  Physical Therapy Consult: Yes  Current Support Network: Own Home, Family Lives [place], Lives Alone(Son Harjinder checks on him daily)  The Plan for Transition of Care is Related to the Following Treatment Goals : Son would like patient to go to SNF and then transition into assisted living.    The Patient and/or Patient Representative was Provided with a Choice of Provider and Agrees with the Discharge Plan?: Yes  Name of the Patient Representative Who was Provided with a Choice of Provider and Agrees with the Discharge Plan: [facility]  Freedom of Choice List was Provided with Basic Dialogue that Supports the Patient's Individualized Plan of Care/Goals, Treatment Preferences and Shares the Quality Data Associated with the Providers?: Yes

## 2021-04-20 NOTE — ROUTINE PROCESS
Bed exit alarming. Patient is trying to crawl out of bed over foot of bed. Patient is not redirectable. Unable to orient patient. Patient is becoming progressively agitated at nurse. Patient begins to try to walk out of room with unsteady gait. PCN attempted to help patient but he continues to push her away. Patient is out in hallway trying to [address]. Patient is pushing nurse all the way down to ICU. Attempted to have him sit in wheelchair but he becomes angry. House supervisor and security are called for assistance. Son is called by other nurse. Unable to reach him. Patient is finally coaxed into room by insisting that son is in room. Patient goes to bathroom and voids in toilet but misses. Patient is walked to chair at bedside. 3:10 Emergency dose of ativan 0.5mg IVP administered. Patient was very agitated and moderately combative.

## 2021-04-20 NOTE — ROUTINE PROCESS
Patient is in bed but is attempting to get out. He is talking to self. Unable to reorient patient. Patient is very confused but not combative. Patient is assisted with urinal. Patient tolerated activity well. He is assisted back into bed with all previous safety precautions continued. Will continue to closely monitor patient.

## 2021-04-20 NOTE — PROGRESS NOTES
Progress Note  Date:4/20/2021       Room:205/01  Patient Name:Sergio Cortes Sr. YOB: 1929     Age:89 y.o. Subjective    HPI:  Edwin Esposito is a 80 y.o. male followed by Munir Blas MD and  has a past medical history of Arthritis, CAD (coronary artery disease), Hard of hearing, Headache, Hypertension, PMR (polymyalgia rheumatica) ([address] Skin cancer, and Sleep apnea. Who presents from home with increased ataxia and confusion since yesterday. Patient has history of stroke November last year and normally can walk with a walker but since yesterday after coming back from [place] was noted by patient sounds to have increased confusion and then this morning it was noted that patient was having trouble with his gait. Patient son states that on standing he does complain of dizziness but on orthostatic check was found to be negative. Patient has no focal deficits and only lab abnormalities mild dehydration and the patient states she has a good appetite. Patient recently was admitted to our service for bradycardia for which patient was taken off his metoprolol dosing since has been followed by cardiology and Holter monitor was placed which showed sinus bradycardia with evidence of PVCs. Patient since then has done well and no further issues until today. Patient son is concerned about him being on his own and mentioned about possibility of placing patient in the [facility]. On evaluation in the emergency room CT scan of the head did not show any acute changes but that showed chronic microvascular ischemic changes and a chronic lacunar infarct in the posterior limb of the right internal capsule. He does have history of temporal arthritis but denies any headache or vision disturbances denies any chest pain nausea vomiting or diarrhea or sick contacts.   With patient's current symptoms patient was referred for observation on telemetry for altered mental status secondary to possible TIA versus dehydration    Seen on follow-up is awake alert but not oriented patient son at bedside and overnight patient had increased sundowning symptoms where he had episode of agitation attempted to get out of bed was given a dose of Ativan. Patient son does say he has episodes of hallucinations at home but generally resolve on their own. Patient still not safe to be discharged home and rehab does recommend skilled placement and patient son is in agreement so awaiting skilled rehab placement and for this reason patient not safe to be discharged home and will be converted to inpatient status    Review of Systems   Unable to perform ROS: Dementia       Objective           Vitals Last 24 Hours:  Patient Vitals for the past 24 hrs:   Temp Pulse Resp BP SpO2   04/20/21 1200 -- 70 -- -- --   04/20/21 0800 -- 68 -- -- --   04/20/21 0729 97.7 °F (36.5 °C) 68 20 (!) 155/76 95 %   04/20/21 0400 -- 69 -- -- --   04/19/21 2356 -- 67 -- -- --   04/19/21 2326 97.8 °F (36.6 °C) 67 20 (!) 168/79 93 %   04/19/21 2000 97.8 °F (36.6 °C) 67 18 (!) 148/79 96 %        I/O (24Hr): Intake/Output Summary (Last 24 hours) at 4/20/2021 1656  Last data filed at 4/20/2021 0911  Gross per 24 hour   Intake 1515 ml   Output 1150 ml   Net 365 ml       Physical Exam:  General: Alert, cooperative, no distress, appears stated age. Head:  Normocephalic, without obvious abnormality, atraumatic. Eyes:  Conjunctivae/corneas clear. Pupils equal, round, reactive to light. Extraocular movements intact. Lungs:  Clear to auscultation bilaterally. no wheeze, rales, crackles, rhonchi   Chest wall: No tenderness or deformity. Heart:  Regular rate and rhythm, S1, S2 normal, no murmur, click, rub or gallop. Abdomen:  Soft, non-tender. Bowel sounds normal. No masses,  No organomegaly. Extremities: Extremities normal, atraumatic, no cyanosis or edema. Pulses: 2+ and symmetric all extremities.   Skin: Skin color, texture, turgor normal. No rashes or lesions  Neurologic: Awake, Alert, not oriented no obvious gross sensory or motor deficits      Medications           Current Facility-Administered Medications   Medication Dose Route Frequency    [START ON 4/21/2021] amLODIPine (NORVASC) tablet 5 mg  5 mg Oral DAILY    lisinopriL (PRINIVIL, ZESTRIL) tablet 10 mg  10 mg Oral DAILY    sodium chloride (NS) flush 5-40 mL  5-40 mL IntraVENous Q8H    sodium chloride (NS) flush 5-40 mL  5-40 mL IntraVENous PRN    acetaminophen (TYLENOL) tablet 650 mg  650 mg Oral Q6H PRN    Or    acetaminophen (TYLENOL) suppository 650 mg  650 mg Rectal Q6H PRN    polyethylene glycol (MIRALAX) packet 17 g  17 g Oral DAILY PRN    ondansetron (ZOFRAN) injection 4 mg  4 mg IntraVENous Q6H PRN    levothyroxine (SYNTHROID) tablet 100 mcg  100 mcg Oral ACB    multivitamin (ONE A DAY) tablet 1 Tab  1 Tab Oral DAILY    pravastatin (PRAVACHOL) tablet 40 mg  40 mg Oral QHS    tamsulosin (FLOMAX) capsule 0.4 mg  0.4 mg Oral DAILY    hydrALAZINE (APRESOLINE) 20 mg/mL injection 10 mg  10 mg IntraVENous Q6H PRN    rOPINIRole (REQUIP) tablet 0.25 mg  0.25 mg Oral QHS         Allergies         Pcn [penicillins]       Labs/Imaging/Diagnostics      Labs:  Recent Results (from the past 48 hour(s))   GLUCOSE, POC    Collection Time: 04/19/21  9:23 AM   Result Value Ref Range    Glucose (POC) 100 65 - 100 mg/dL    Performed by Thalia COLE    URINALYSIS W/ RFLX MICROSCOPIC    Collection Time: 04/19/21  9:41 AM   Result Value Ref Range    Color Yellow/Straw      Appearance Clear Clear      Specific gravity 1.025 1.003 - 1.030      pH (UA) 6.0 5.0 - 8.0      Protein 30 (A) Negative mg/dL    Glucose Negative Negative mg/dL    Ketone Negative Negative mg/dL    Bilirubin Negative Negative      Blood Moderate (A) Negative      Urobilinogen 0.2 0.2 - 1.0 EU/dL    Nitrites Negative Negative      Leukocyte Esterase Negative Negative     URINE MICROSCOPIC    Collection Time: 04/19/21  9:41 AM   Result Value Ref Range    WBC 10-20 0 - 5 /hpf    RBC 10-20 0 - 3 /hpf    Bacteria Negative Negative /hpf   EKG, 12 LEAD, INITIAL    Collection Time: 04/19/21  9:49 AM   Result Value Ref Range    Ventricular Rate 61 BPM    Atrial Rate 60 BPM    P-R Interval 307 ms    QRS Duration 99 ms    Q-T Interval 451 ms    QTC Calculation (Bezet) 455 ms    Calculated P Axis -35 degrees    Calculated R Axis -34 degrees    Calculated T Axis 74 degrees    Diagnosis       Sinus rhythm  Prolonged KS interval  Abnormal R-wave progression, early transition  Left ventricular hypertrophy  Anterior Q waves, possibly due to LVH  Baseline wander in lead(s) III,V2     CBC WITH AUTOMATED DIFF    Collection Time: 04/19/21 10:23 AM   Result Value Ref Range    WBC 8.3 4.4 - 11.3 K/uL    RBC 4.69 4.50 - 5.90 M/uL    HGB 14.3 13.5 - 17.5 g/dL    HCT 42.6 41 - 53 %    MCV 91.0 80 - 100 FL    MCH 30.5 (L) 31 - 34 PG    MCHC 33.5 31.0 - 36.0 g/dL    RDW 14.2 11.5 - 14.5 %    PLATELET 219 554 - 353 K/uL    MPV 9.3 6.5 - 11.5 FL    NRBC 0.2  WBC    ABSOLUTE NRBC 0.02 K/uL    NEUTROPHILS 69 42 - 75 %    LYMPHOCYTES 19 (L) 20.5 - 51.1 %    MONOCYTES 9 1.7 - 9.3 %    EOSINOPHILS 2 0.9 - 2.9 %    BASOPHILS 1 0.0 - 2.5 %    ABS. NEUTROPHILS 5.9 1.8 - 7.7 K/UL    ABS. LYMPHOCYTES 1.6 1.0 - 4.8 K/UL    ABS. MONOCYTES 0.7 0.2 - 2.4 K/UL    ABS. EOSINOPHILS 0.1 0.0 - 0.7 K/UL    ABS.  BASOPHILS 0.1 0.0 - 0.2 K/UL   PROTHROMBIN TIME + INR    Collection Time: 04/19/21 10:23 AM   Result Value Ref Range    Prothrombin time 11.6 (H) 9.0 - 11.1 sec    INR 1.2 (H) 0.9 - 1.1     METABOLIC PANEL, COMPREHENSIVE    Collection Time: 04/19/21 10:23 AM   Result Value Ref Range    Sodium 137 136 - 145 mmol/L    Potassium 4.7 3.5 - 5.1 mmol/L    Chloride 104 97 - 108 mmol/L    CO2 22 21 - 32 mmol/L    Anion gap 11 5 - 15 mmol/L    Glucose 79 65 - 100 mg/dL    BUN 21 (H) 6 - 20 mg/dL    Creatinine 0.85 0.70 - 1.30 mg/dL    BUN/Creatinine ratio 25 (H) 12 - 20      GFR est AA >60 >60 ml/min/1.73m2    GFR est non-AA >60 >60 ml/min/1.73m2    Calcium 9.1 8.5 - 10.1 mg/dL    Bilirubin, total 0.7 0.2 - 1.0 mg/dL    AST (SGOT) 30 15 - 37 U/L    ALT (SGPT) 17 12 - 78 U/L    Alk.  phosphatase 59 45 - 117 U/L    Protein, total 7.4 6.4 - 8.2 g/dL    Albumin 2.8 (L) 3.5 - 5.0 g/dL    Globulin 4.6 (H) 2.0 - 4.0 g/dL    A-G Ratio 0.6 (L) 1.1 - 2.2     TROPONIN I    Collection Time: 04/19/21 10:23 AM   Result Value Ref Range    Troponin-I, Qt. 0.06 (H) <0.05 ng/mL   MAGNESIUM    Collection Time: 04/19/21 10:23 AM   Result Value Ref Range    Magnesium 1.9 1.6 - 2.4 mg/dL   TSH 3RD GENERATION    Collection Time: 04/19/21 10:23 AM   Result Value Ref Range    TSH 6.27 (H) 0.36 - 3.74 uIU/mL   SED RATE (ESR)    Collection Time: 04/19/21 10:23 AM   Result Value Ref Range    Sed rate, automated 16 mm/hr   LIPID PANEL    Collection Time: 04/19/21 10:23 AM   Result Value Ref Range    LIPID PROFILE        Cholesterol, total 142 <200 mg/dL    Triglyceride 178 (H) <150 mg/dL    HDL Cholesterol 37 mg/dL    LDL, calculated 69.4 0 - 100 mg/dL    VLDL, calculated 35.6 mg/dL    CHOL/HDL Ratio 3.8 0.0 - 5.0     TROPONIN I    Collection Time: 04/19/21 12:30 PM   Result Value Ref Range    Troponin-I, Qt. <0.05 <0.05 ng/mL   EKG, 12 LEAD, INITIAL    Collection Time: 04/19/21  2:05 PM   Result Value Ref Range    Ventricular Rate 64 BPM    Atrial Rate 62 BPM    P-R Interval 318 ms    QRS Duration 102 ms    Q-T Interval 466 ms    QTC Calculation (Bezet) 481 ms    Calculated P Axis 35 degrees    Calculated R Axis -15 degrees    Calculated T Axis 47 degrees    Diagnosis       Sinus rhythm  Multiple ventricular premature complexes  Prolonged WA interval  Borderline left axis deviation  Abnormal R-wave progression, early transition  Borderline prolonged QT interval  Baseline wander in lead(s) I,II,aVR,aVL,V6     ECHO ADULT COMPLETE    Collection Time: 04/19/21  4:32 PM   Result Value Ref Range    LV ED Vol A2C 117.44 mL    IVSd 1.10 (A) 0.60 - 1.00 cm    LVIDd 4.99 4.20 - 5.90 cm    LVIDs 3.53 cm    LVOT d 2.19 cm    LVPWd 1.13 (A) 0.60 - 1.00 cm    LVOT SV 79.9 mL    LVOT SV 79.9 mL    BP EF 66.2 55.0 - 100.0 percent    LV Ejection Fraction MOD 2C 63 percent    LV Ejection Fraction MOD 4C 69 percent    LV ED Vol BP 77.28 67.0 - 155.0 mL    LV ES Vol A2C 29.83 mL    LV ES Vol BP 26.13 22.0 - 58.0 mL    LVOT Peak Gradient 3.01 mmHg    Left Ventricular Outflow Tract Mean Gradient 1.51 mmHg    LVOT Peak Velocity 86.80 cm/s    LVOT VTI 20.70 cm    RVIDd 3.04 cm    LA Volume 47.61 18.0 - 58.0 mL    LA Vol 2C 47.06 18.00 - 58.00 mL    LA Vol 4C 42.31 18.00 - 58.00 mL    Aortic Valve Area by Continuity of Peak Velocity 2.25 cm2    Aortic Valve Area by Continuity of Peak Velocity 2.25 cm2    Aortic Valve Area by Continuity of VTI 2.80 cm2    AoV PG 46.20 mmHg    Aortic Regurgitant Pressure Half-time 509.78 ms    AoV PG 8.40 mmHg    Aortic Valve Systolic Mean Gradient 9.30 mmHg    Aortic Valve Systolic Peak Velocity 455.80 cm/s    Aortic valve mean velocity 91.47 cm/s    AoV VTI 27.73 cm    MV A William 114.99 cm/s    Mitral Valve E Wave Deceleration Time 198.70 ms    MV E William 102.32 cm/s    MV E/A 0.89     Mitral Valve Pressure Half-time 57.62 ms    MVA (PHT) 3.82 cm2    MVA (PHT) 3.82 cm2    Triscuspid Valve Regurgitation Peak Gradient 19.01 mmHg    TR Max Velocity 218.03 cm/s    Ao Root D 3.53 cm    LV Mass .4 88.0 - 224.0 g    LV Mass AL Index 95.8 49.0 - 115.0 g/m2    Right Atrial Area 4C 20.0 cm2    RVSP 22.0 mmHg    LVES Vol Index BP 11.9 mL/m2    LVED Vol Index BP 35.2 mL/m2    Est. RA Pressure 3.0 mmHg    LA Vol Index 21.67 16.00 - 28.00 ml/m2    LA Vol Index 21.42 16.00 - 28.00 ml/m2    LA Vol Index 19.26 16.00 - 28.00 ml/m2    LVED Vol Index A2C 53.5 mL/m2    LVES Vol Index A2C 13.6 mL/m2    JOANIE/BSA VTI 1.3 HS6/E0   METABOLIC PANEL, BASIC    Collection Time: 04/20/21  5:14 AM   Result Value Ref Range    Sodium 141 136 - 145 mmol/L Potassium 4.4 3.5 - 5.1 mmol/L    Chloride 104 97 - 108 mmol/L    CO2 28 21 - 32 mmol/L    Anion gap 9 5 - 15 mmol/L    Glucose 122 (H) 65 - 100 mg/dL    BUN 23 (H) 6 - 20 mg/dL    Creatinine 1.20 0.70 - 1.30 mg/dL    BUN/Creatinine ratio 19 12 - 20      GFR est AA >60 >60 ml/min/1.73m2    GFR est non-AA 57 (L) >60 ml/min/1.73m2    Calcium 8.7 8.5 - 10.1 mg/dL   MAGNESIUM    Collection Time: 04/20/21  5:14 AM   Result Value Ref Range    Magnesium 2.1 1.6 - 2.4 mg/dL   CBC WITH AUTOMATED DIFF    Collection Time: 04/20/21  5:14 AM   Result Value Ref Range    WBC 7.0 4.4 - 11.3 K/uL    RBC 4.70 4.50 - 5.90 M/uL    HGB 14.5 13.5 - 17.5 g/dL    HCT 42.9 41 - 53 %    MCV 91.3 80 - 100 FL    MCH 30.9 (L) 31 - 34 PG    MCHC 33.8 31.0 - 36.0 g/dL    RDW 14.3 11.5 - 14.5 %    PLATELET 075 465 - 364 K/uL    MPV 8.6 6.5 - 11.5 FL    NRBC 0.5  WBC    ABSOLUTE NRBC 0.04 K/uL    NEUTROPHILS 72 42 - 75 %    LYMPHOCYTES 15 (L) 20.5 - 51.1 %    MONOCYTES 10 (H) 1.7 - 9.3 %    EOSINOPHILS 2 0.9 - 2.9 %    BASOPHILS 1 0.0 - 2.5 %    ABS. NEUTROPHILS 5.1 1.8 - 7.7 K/UL    ABS. LYMPHOCYTES 1.0 1.0 - 4.8 K/UL    ABS. MONOCYTES 0.7 0.2 - 2.4 K/UL    ABS. EOSINOPHILS 0.1 0.0 - 0.7 K/UL    ABS. BASOPHILS 0.0 0.0 - 0.2 K/UL   T4, FREE    Collection Time: 04/20/21  5:14 AM   Result Value Ref Range    T4, Free 1.0 0.8 - 1.5 ng/dL        Imaging:  Ct Head Wo Cont    Result Date: 4/19/2021  Microvascular ischemic changes. Chronic lacunar infarct posterior limb of the right internal capsule. No acute intracranial abnormality.         Assessment//Plan           Problem List:  Hospital Problems  Date Reviewed: 12/19/2020          Codes Class Noted POA    Ataxia ICD-10-CM: R27.0  ICD-9-CM: 781.3  4/20/2021 Unknown        * (Principal) Abnormal gait ICD-10-CM: R26.9  ICD-9-CM: 781.2  4/19/2021 Unknown        Altered mental status ICD-10-CM: R41.82  ICD-9-CM: 780.97  4/19/2021 Unknown        Dehydration ICD-10-CM: E86.0  ICD-9-CM: 276.51 4/19/2021 Unknown            Altered mental status:  -Patient presents with increased confusion over the last 24 hours possibly secondary to dehydration versus TIA  -CT scan of the head did not show any acute pathology just chronic changes  -Troponin x2 - we will monitor additional every 6 hours x3  -EKG shows sinus rhythm with multiple ventricular premature complexes with a prolonged W[place] interval with borderline prolonged QT and abnormal R wave progression   -Monitor on telemetry with neuro checks every 4 hours  -Continue current IV fluids normal saline at 75 cc an hour x1 day  -2D echo prelim shows preserved EF follow-up official results  -Reevaluate in a.m. after overnight IV fluids if continued symptoms then perform MRI MRA of the head and neck     Possible TIA:  -Patient has history of stroke with no focal deficits but has been seen by vascular and has had right endarterectomy done in the past but only minimal disease noted on most recent carotid Dopplers  -Obtain lipid profile  -PT/OT/speech therapy evaluation and treatment and recommending skilled rehab so initially patient was scheduled for [facility] but currently patient will be screened for possible skilled rehab placement and will continue work with therapy during inpatient stay.   -No focal deficits noted on exam continue to monitor with neuro checks every 4 hours  -No aspirin noted on medication list and will place on 81 mg low-dose  -Discussed with patient's son about overnight events and offered MRI/MRA but with his age and not being a candidate for any intervention son did not feel benefit of testing but does wish for skilled rehab placement and possible [facility] placement soon after as patient is not safe to be at home and because of continued symptoms of confusion and gait ataxia change patient to inpatient status and continue monitor on telemetry    Dementia  -Vascular dementia and overnight patient had episode of sundowning and increased confusion and agitation and was needed and given a dose of Ativan. -Patient mostly improved during the day but will start Seroquel 25 mg oral every afternoon and will monitor closely       Dehydration:  -Patient noted to be on Lasix 20 mg oral daily and BUN/creatinine noted to be 21/0.85  -We will hold Lasix and start normal saline at 75 cc an hour x1 day  -Repeat labs show improvement in BUN but increasing creatinine to 1.2 continue current IV fluids monitor p.o. intake  -Repeat labs in a.m.     Hypertension:  -Patient blood pressure was 128/56 on arrival but increased to 171/75 on arrival to the medical floor  -Placed on hydralazine 10 mg IV every 6 hours as needed for systolic blood pressure greater 454 and diastolic greater than 515  -We will start patient on Norvasc 5 mg a day with the first dose now but with continued elevated blood pressure will increase dosing to 10 mg daily and add 10 mg of lisinopril  -Cardiology consultation in a.m appreciated  -2D echo showed preserved EF of 6065% mildly dilated right atrium  -Monitor vitals every 4 hours     BPH:  -Continue Flomax home dose     Hyperlipidemia:  -Obtain lipid profile for now continue home dose of pravastatin     DVT prophylaxis:  -SCDs      CODE STATUS DNR DNI  -Patient designated decision-maker is his son Jelena Head           DNR   Spent 30 minutes evaluting and coordinating patient care of which >50% was spent coordinating and counseling.   We will convert patient to inpatient status      Electronically signed by Angela Alfonso MD on 4/20/2021 at 4:56 PM

## 2021-04-20 NOTE — ROUTINE PROCESS
Neuro check performed and remains unchanged from previous assessment from day shift report. Documented under FREQ NEURO in flowsheets.

## 2021-04-20 NOTE — CONSULTS
CONSULTATION    REASON FOR CONSULT:  H/o Bradycardia in setting of Altered Mental Status    REQUESTING PROVIDER: Dr. Melany Jansen [facility] HM Team)    CHIEF COMPLAINT:    Chief Complaint   Patient presents with    Altered mental status     pt presents with son, states father is altered from baseline. Pt has pmh of TIA last November. Pt ambulatory to triage. Son poor historian. HISTORY OF PRESENT ILLNESS:  Ace Medrano is a 80year-old male with past medical history significant for HTN, HLD, Hypothyroidism, Giant Cell arteritis with Polymyalgia Rheumatic (followed by Rheumatology), CAD s/ PCI, Carotid disease s/p endarterectomy, YARELIS, hearing impairment, Bradycardia, and internal capsule CVA  who presented to ED with son with copmlaints of Altered Mental status and new difficulties with gait. Patient denies palpitations, chest pain, HO, SOB, weakness, dizziness this morning, but he is confused and overall poor historian. Workup in ED was unrevealing for acute intracranial findings, Lytes were stable, TSH 6.27, and troponins negative. His BP was noted to be elevated upon arrival and CT of Head did show microvascular changes. He was referred for further monitoring. This am he has no complaints, though is poor historian. Records from hospital admission course thus far reviewed. Telemetry Review: SR Toi       PAST MEDICAL HISTORY:    Past Medical History:   Diagnosis Date    Arthritis     CAD (coronary artery disease)     Hard of hearing     Headache     Hypertension     PMR (polymyalgia rheumatica) (HCC)     Skin cancer     Sleep apnea     no longer uses cpap         HOME MEDICATIONS:    Prior to Admission Medications   Prescriptions Last Dose Informant Patient Reported? Taking?   aspirin 81 mg chewable tablet Unknown at Unknown time  No No   Sig: Take 1 Tab by mouth daily for 30 days.    atorvastatin (LIPITOR) 20 mg tablet Unknown at Unknown time  Yes No   Sig: atorvastatin 20 mg tablet   fish oil-dha-epa 1,200-144-216 mg cap Unknown at Unknown time  Yes No   Sig: Take 1 Tab by mouth daily. furosemide (LASIX) 20 mg tablet Unknown at Unknown time  Yes No   Sig: furosemide 20 mg tablet   levothyroxine (SYNTHROID) 100 mcg tablet Unknown at Unknown time  Yes No   Sig: Take 100 mcg by mouth Daily (before breakfast). metoprolol succinate 100 mg CSpX Unknown at Unknown time  Yes No   Sig: metoprolol succinate   12.5mg daily. metoprolol tartrate (LOPRESSOR) 25 mg tablet Unknown at Unknown time  Yes No   Sig: Take 12.5 mg by mouth daily. multivitamin (ONE A DAY) tablet Unknown at Unknown time  Yes No   Sig: Take 1 Tab by mouth daily. omega 3-dha-epa-fish oil (Fish Oil) 100-160-1,000 mg cap Unknown at Unknown time  Yes No   Sig: Fish Oil   2 daily   pravastatin (PRAVACHOL) 40 mg tablet Unknown at Unknown time  Yes No   Sig: Take 40 mg by mouth nightly. rOPINIRole (REQUIP) 0.25 mg tablet Unknown at Unknown time  Yes No   Sig: Take 0.25 mg by mouth nightly. tamsulosin (FLOMAX) 0.4 mg capsule Unknown at Unknown time  Yes No   Sig: Take 0.4 mg by mouth daily. Facility-Administered Medications: None         ALLERGIES:    Allergies   Allergen Reactions    Pcn [Penicillins] Swelling         FAMILY HISTORY:    Family History   Problem Relation Age of Onset   [facility] Arthritis-rheumatoid Mother     Kidney Disease Mother     Cancer Father         Stomach    Gout Son          SOCIAL HISTORY:  *obtained from previous visit/chart review*  Tobacco: denies  Drugs: denies  ETOH: denies      REVIEW OF SYSTEMS:  CARISSA full ROS given confusion this am.  Limited ROS as above in HPI.      Patient Vitals for the past 24 hrs:   Temp Pulse Resp BP SpO2   04/20/21 1200 -- 70 -- -- --   04/20/21 0800 -- 68 -- -- --   04/20/21 0729 97.7 °F (36.5 °C) 68 20 (!) 155/76 95 %   04/20/21 0400 -- 69 -- -- --   04/19/21 2356 -- 67 -- -- --   04/19/21 2326 97.8 °F (36.6 °C) 67 20 (!) 168/79 93 %   04/19/21 2000 97.8 °F (36.6 °C) 67 18 (!) 148/79 96 %   04/19/21 1615 97.6 °F (36.4 °C) 64 18 (!) 171/75 95 %   04/19/21 1600 -- 64 -- -- --   04/19/21 1444 97.6 °F (36.4 °C) 60 18 (!) 171/73 96 %       PHYSICAL EXAMINATION:    General: Well nourished elderly male sitting in chair beside bed, NAD, A&O to person only  HEENT: Normocephalic, PERRL, no drainage  Neck: Supple, Trachea midline, No JVD  RESP: CTA bilaterally with symmetrical chest movement. No SOB or distress. On RA  Cardiovascular: RRR though no MRG  PVS: No rubor, cyanosis, no edema, Radial, DP, PT pulses equal bilaterally  ABD: obese , soft NT, Normoactive BS  Derm: Warm/Dry/Intact with no lesions, normal turgor  Neuro: A&O Person only, does not follow commands for complete neuro exam. ? Expressive aphasia. (Speech is different from previous encounters). PSYCH: Mild agitation. Recent labs results and imaging reviewed.       Recent Results (from the past 24 hour(s))   ECHO ADULT COMPLETE    Collection Time: 04/19/21  4:32 PM   Result Value Ref Range    LV ED Vol A2C 117.44 mL    IVSd 1.10 (A) 0.60 - 1.00 cm    LVIDd 4.99 4.20 - 5.90 cm    LVIDs 3.53 cm    LVOT d 2.19 cm    LVPWd 1.13 (A) 0.60 - 1.00 cm    LVOT SV 79.9 mL    LVOT SV 79.9 mL    BP EF 66.2 55.0 - 100.0 percent    LV Ejection Fraction MOD 2C 63 percent    LV Ejection Fraction MOD 4C 69 percent    LV ED Vol BP 77.28 67.0 - 155.0 mL    LV ES Vol A2C 29.83 mL    LV ES Vol BP 26.13 22.0 - 58.0 mL    LVOT Peak Gradient 3.01 mmHg    Left Ventricular Outflow Tract Mean Gradient 1.51 mmHg    LVOT Peak Velocity 86.80 cm/s    LVOT VTI 20.70 cm    RVIDd 3.04 cm    LA Volume 47.61 18.0 - 58.0 mL    LA Vol 2C 47.06 18.00 - 58.00 mL    LA Vol 4C 42.31 18.00 - 58.00 mL    Aortic Valve Area by Continuity of Peak Velocity 2.25 cm2    Aortic Valve Area by Continuity of Peak Velocity 2.25 cm2    Aortic Valve Area by Continuity of VTI 2.80 cm2    AoV PG 46.20 mmHg    Aortic Regurgitant Pressure Half-time 509.78 ms    AoV PG 8.40 mmHg    Aortic Valve Systolic Mean Gradient 4.83 mmHg    Aortic Valve Systolic Peak Velocity 098.70 cm/s    Aortic valve mean velocity 91.47 cm/s    AoV VTI 27.73 cm    MV A William 114.99 cm/s    Mitral Valve E Wave Deceleration Time 198.70 ms    MV E William 102.32 cm/s    MV E/A 0.89     Mitral Valve Pressure Half-time 57.62 ms    MVA (PHT) 3.82 cm2    MVA (PHT) 3.82 cm2    Triscuspid Valve Regurgitation Peak Gradient 19.01 mmHg    TR Max Velocity 218.03 cm/s    Ao Root D 3.53 cm    LV Mass .4 88.0 - 224.0 g    LV Mass AL Index 95.8 49.0 - 115.0 g/m2    Right Atrial Area 4C 20.0 cm2    RVSP 22.0 mmHg    LVES Vol Index BP 11.9 mL/m2    LVED Vol Index BP 35.2 mL/m2    Est. RA Pressure 3.0 mmHg    LA Vol Index 21.67 16.00 - 28.00 ml/m2    LA Vol Index 21.42 16.00 - 28.00 ml/m2    LA Vol Index 19.26 16.00 - 28.00 ml/m2    LVED Vol Index A2C 53.5 mL/m2    LVES Vol Index A2C 13.6 mL/m2    JOANIE/BSA VTI 1.3 YL5/Q0   METABOLIC PANEL, BASIC    Collection Time: 04/20/21  5:14 AM   Result Value Ref Range    Sodium 141 136 - 145 mmol/L    Potassium 4.4 3.5 - 5.1 mmol/L    Chloride 104 97 - 108 mmol/L    CO2 28 21 - 32 mmol/L    Anion gap 9 5 - 15 mmol/L    Glucose 122 (H) 65 - 100 mg/dL    BUN 23 (H) 6 - 20 mg/dL    Creatinine 1.20 0.70 - 1.30 mg/dL    BUN/Creatinine ratio 19 12 - 20      GFR est AA >60 >60 ml/min/1.73m2    GFR est non-AA 57 (L) >60 ml/min/1.73m2    Calcium 8.7 8.5 - 10.1 mg/dL   MAGNESIUM    Collection Time: 04/20/21  5:14 AM   Result Value Ref Range    Magnesium 2.1 1.6 - 2.4 mg/dL   CBC WITH AUTOMATED DIFF    Collection Time: 04/20/21  5:14 AM   Result Value Ref Range    WBC 7.0 4.4 - 11.3 K/uL    RBC 4.70 4.50 - 5.90 M/uL    HGB 14.5 13.5 - 17.5 g/dL    HCT 42.9 41 - 53 %    MCV 91.3 80 - 100 FL    MCH 30.9 (L) 31 - 34 PG    MCHC 33.8 31.0 - 36.0 g/dL    RDW 14.3 11.5 - 14.5 %    PLATELET 755 585 - 832 K/uL    MPV 8.6 6.5 - 11.5 FL    NRBC 0.5  WBC    ABSOLUTE NRBC 0.04 K/uL    NEUTROPHILS 72 42 - 75 %    LYMPHOCYTES 15 (L) 20.5 - 51.1 %    MONOCYTES 10 (H) 1.7 - 9.3 %    EOSINOPHILS 2 0.9 - 2.9 %    BASOPHILS 1 0.0 - 2.5 %    ABS. NEUTROPHILS 5.1 1.8 - 7.7 K/UL    ABS. LYMPHOCYTES 1.0 1.0 - 4.8 K/UL    ABS. MONOCYTES 0.7 0.2 - 2.4 K/UL    ABS. EOSINOPHILS 0.1 0.0 - 0.7 K/UL    ABS. BASOPHILS 0.0 0.0 - 0.2 K/UL       XR Results (maximum last 3): Results from [place] encounter on 04/19/21   XR CHEST PORT   Results from [place] encounter on 11/30/20   XR CHEST PORT    Impression IMPRESSION:   1. Mild left retrocardiac atelectasis. 2. Mild unchanged enlargement of the cardiac silhouette. Results from [place] encounter on 11/14/20   XR SHOULDER RT AP/LAT MIN 2 V    Impression IMPRESSION: No fracture is identified. .There are severe degenerative changes  right shoulder. If symptoms persist follow-up study is recommended. CT Results (maximum last 3): Results from [place] encounter on 04/19/21   CT HEAD WO CONT    Impression Microvascular ischemic changes. Chronic lacunar infarct posterior  limb of the right internal capsule. No acute intracranial abnormality. Results from [place] encounter on 11/14/20   CT PELV WO CONT    Impression IMPRESSION:   1. No fracture. 2. Incidentally imaged mild colonic diverticulitis at the junction of the  descending colon and sigmoid colon. CT PERF W CBF    Impression Impression:    No evidence for acute large vessel arterial occlusion. Intracranial and  extracranial atherosclerosis as described above.     No significant cerebral perfusion abnormality or mismatch                          Current Facility-Administered Medications:     [START ON 4/21/2021] amLODIPine (NORVASC) tablet 5 mg, 5 mg, Oral, DAILY, Yadiel Mendoza MD    sodium chloride (NS) flush 5-40 mL, 5-40 mL, IntraVENous, Q8H, Yadiel Mendoza MD, 10 mL at 04/20/21 0552    sodium chloride (NS) flush 5-40 mL, 5-40 mL, IntraVENous, PRN, Adalid Rueda MD    acetaminophen (TYLENOL) tablet 650 mg, 650 mg, Oral, Q6H PRN **OR** acetaminophen (TYLENOL) suppository 650 mg, 650 mg, Rectal, Q6H PRN, Yadiel Mendoza MD    polyethylene glycol (MIRALAX) packet 17 g, 17 g, Oral, DAILY PRN, Yadiel Mendoza MD    ondansetron (ZOFRAN) injection 4 mg, 4 mg, IntraVENous, Q6H PRN, Yadiel Mendoza MD    0.9% sodium chloride infusion, 75 mL/hr, IntraVENous, CONTINUOUS, Yadiel Mendoza MD, Last Rate: 75 mL/hr at 04/20/21 1230, 75 mL/hr at 04/20/21 1230    levothyroxine (SYNTHROID) tablet 100 mcg, 100 mcg, Oral, ACB, Yadiel Mendoza MD, 100 mcg at 04/20/21 0836    multivitamin (ONE A DAY) tablet 1 Tab, 1 Tab, Oral, DAILY, Yadiel Mendoza MD, 1 Tab at 04/20/21 0836    pravastatin (PRAVACHOL) tablet 40 mg, 40 mg, Oral, QHS, Yadiel Mendoza MD, 40 mg at 04/19/21 2119    tamsulosin (FLOMAX) capsule 0.4 mg, 0.4 mg, Oral, DAILY, Yadiel Mendoza MD, 0.4 mg at 04/20/21 0836    hydrALAZINE (APRESOLINE) 20 mg/mL injection 10 mg, 10 mg, IntraVENous, Q6H PRN, Yadiel Mendoza MD    rOPINIRole (REQUIP) tablet 0.25 mg, 0.25 mg, Oral, QHS, Yadiel Mendoza MD, 0.25 mg at 04/19/21 2119          Case discussed with collaborating physician Dr. Caroline Oconnell and our impression and recommendations are as follows:   1. H/O Bradycardia - None this visit. Sinus rhythm with ventricular ectopy as far as Trigeminy on Telemetry review this morning. Patient is asymptomatic to this as far as I can tell. Continue to monitor. Will make no medication changes at this time. Coreg could be attempted for ventricular ectopy, however hesitant given such significant bradycardia history. 2. Hypertension - BP is above goal. Consider adding ACE or ARB to regimen, low dose. Or restarting Lasix from home. 3. CAD - Denies chest pain or recent sx. Had PCI to LAD 2011 by Dr. Jasvir Villar. NST 8/2020 by [facility] negative for ischemia.  Continue ASA and Statin. 4. Ventricular Ectopy -Patient was in trigeminy in clinic 3/15/2021, but asymptomatic. Patient and son deferred EP referral or further workup at that time given patient was asymptomatic. Will continue medical management at this time. 5. Chronic HFpEF - Stage 2 Diastolic Dysfunction on Echo previously. Repeat TTE pending. . Continue current medication regiment as above. BB previously contraindicated given bradycardia. 6. Hypothyroidism - TSH above goal. Consider increasing Levothyroxine to 125mcg daily. Repeat TSH in 6 weeks. Thank you for involving us in the care of this patient. Please do not hesitate to call if additional questions arise.  If after hours please call [phone]

## 2021-04-20 NOTE — PROGRESS NOTES
PHYSICAL THERAPY EVALUATION  Patient: Pawan Chapa Sr. (80 y.o. male)  Date: 4/20/2021  Primary Diagnosis: Altered mental status [R41.82]  Abnormal gait [R26.9]        Precautions: Fall Risk, AMA        ASSESSMENT  Based on the objective data described below, the patient presents with decreased balance and impulsivity due to altered mental status. Pt confused throughout treatment with poor command following. Pt independent with bed mobility and requires supervision/stand-by assistance for sit<>stand transfers. Once standing, pt required mod assistance to maintain upright posture with SPC due to extreme posterior trunk lean. Pt requires CGA with SPC for next trial of ambulation for safety and cueing and for correction of LOB. Nursing reports pt has been ambulating around room with [facility] with good steadiness noted. Current Level of Function Impacting Discharge (mobility/balance): decreased balance, fall risk, AMA     Other factors to consider for discharge: Pt lived alone prior to admission to hospital.      Patient will benefit from skilled therapy intervention to address the above noted impairments. PLAN :  Recommendations and Planned Interventions: transfer training, gait training, therapeutic exercises, patient and family training/education and therapeutic activities      Frequency/Duration: Patient will be followed by physical therapy:  1-2 times daily, up to 5 days a week, to address goals. Recommendation for discharge: (in order for the patient to meet his/her long term goals)  Therapy up to 5 days/week in SNF setting    This discharge recommendation:  Has been made in collaboration with the attending provider and/or case management    IF patient discharges home will need the following DME: to be determined (TBD)         SUBJECTIVE:   Patient poor historian and confused.      OBJECTIVE DATA SUMMARY:   HISTORY:    Past Medical History:   Diagnosis Date    Arthritis     CAD (coronary artery disease)     Hard of hearing     Headache     Hypertension     PMR (polymyalgia rheumatica) (HCC)     Skin cancer     Sleep apnea     no longer uses cpap     Past Surgical History:   Procedure Laterality Date    HX CAROTID STENT      HX CHOLECYSTECTOMY  1998    HX HERNIA REPAIR  1980    SD CARDIAC SURG PROCEDURE UNLIST      cardiac stent    VASCULAR SURGERY PROCEDURE UNLIST      carotid endartectomy       Personal factors and/or comorbidities impacting plan of care:     Home Situation  Home Environment: Private residence  # Steps to Enter: 2  Rails to Enter: No  One/Two Story Residence: One story  Living Alone: Yes  Support Systems: Child(ja)  Patient Expects to be Discharged to[de-identified] Assisted living  Current DME Used/Available at Home: Cane, straight    EXAMINATION/PRESENTATION/DECISION MAKING:   Critical Behavior:  Neurologic State: Drowsy, Eyes open to stimulus, Pharmacologically induced (comment), Sleeping  Orientation Level: Oriented to person, Disoriented to time, Disoriented to situation, Disoriented to place  Cognition: Impaired decision making, No command following, Decreased attention/concentration     Hearing: Auditory  Auditory Impairment: Hard of hearing, bilateral  Range Of Motion:  AROM: Within functional limits                       Strength:    Strength: Generally decreased, functional                    Tone & Sensation:                                  Coordination:     Vision:      Functional Mobility:  Bed Mobility:     Supine to Sit: Independent  Sit to Supine: Independent     Transfers:  Sit to Stand: Supervision;Stand-by assistance  Stand to Sit: Supervision;Stand-by assistance                       Balance:   Sitting: Intact; With support  Standing: Intact; With support  Ambulation/Gait Training:  Distance (ft): 20 Feet (ft)  Assistive Device: Cane, straight  Ambulation - Level of Assistance: Contact guard assistance; Moderate assistance     Gait Description (WDL): Exceptions to WDL  Gait Abnormalities: Other(Posterior trunk lean )        Base of Support: Widened                              Stairs:              Treatment Session:   Pt pleasant but extremely confused during treatment session. Pt independent with all bed mobility and supine to sit transfer. Pt required supervision/stand-by assistance for sit<>stand transfer due to increased momentum required by pt to stand up from bed. Pt unsteady on feet demonstrating an extreme posterior trunk lean while standing with SPC requiring moderate assistance to keep upright. During 2nd trial of ambulation, pt only required CGA x 1 with SPC with slight unsteadiness on feet but less posterior trunk lean noted. Functional Measure:     Physical Therapy Evaluation Charge Determination   History Examination Presentation Decision-Making   MEDIUM  Complexity : 1-2 comorbidities / personal factors will impact the outcome/ POC  LOW Complexity : 1-2 Standardized tests and measures addressing body structure, function, activity limitation and / or participation in recreation  LOW Complexity : Stable, uncomplicated        Based on the above components, the patient evaluation is determined to be of the following complexity level: LOW     Pain Rating:  Pt poor historian, unable to get accurate pain rating. Activity Tolerance:   Good    After treatment patient left in no apparent distress:   Sitting EOB bed and Call bell within reach with nurse present. COMMUNICATION/EDUCATION:   The patients plan of care was discussed with: Registered nurse, Physician and Case management. Patient is unable to participate in goal setting and plan of care.     Thank you for this referral.  Haim Min, PT, DPT   Time Calculation: 12 mins       Problem: Mobility Impaired (Adult and Pediatric)  Goal: *Acute Goals and Plan of Care (Insert Text)  Outcome: Not Met  Note: FUNCTIONAL STATUS PRIOR TO ADMISSION: Patient was modified independent using a single point cane for functional mobility. HOME SUPPORT PRIOR TO ADMISSION: The patient lived alone with children to provide assistance. Physical Therapy Goals  Initiated 4/20/2021  1. Patient will transfer from bed to chair and chair to bed with independence using the least restrictive device within 7 day(s). 2.  Patient will perform sit to stand with independence within 7 day(s). 3.  Patient will ambulate with modified independence for 50 feet with the least restrictive device within 7 day(s). 4.  Patient will ascend/descend 4 stairs with no handrail(s) with modified independence within 7 day(s).

## 2021-04-20 NOTE — ROUTINE PROCESS
Patient is attempting to get out of bed. He is not redirectable. Patient is confused but pleasant. Patient is attempting to go home. He is assisted with using urinal. Bedside camera is placed to monitor patient closely. Patient is stable at this time. Will continue to monitor patient. All previous safety precautions are continued.

## 2021-04-21 LAB
ANION GAP SERPL CALC-SCNC: 7 MMOL/L (ref 5–15)
BASOPHILS # BLD: 0.1 K/UL (ref 0–0.2)
BASOPHILS NFR BLD: 1 % (ref 0–2.5)
BUN SERPL-MCNC: 19 MG/DL (ref 6–20)
BUN/CREAT SERPL: 20 (ref 12–20)
CA-I BLD-MCNC: 9 MG/DL (ref 8.5–10.1)
CHLORIDE SERPL-SCNC: 105 MMOL/L (ref 97–108)
CO2 SERPL-SCNC: 28 MMOL/L (ref 21–32)
CREAT SERPL-MCNC: 0.97 MG/DL (ref 0.7–1.3)
EOSINOPHIL # BLD: 0.2 K/UL (ref 0–0.7)
EOSINOPHIL NFR BLD: 3 % (ref 0.9–2.9)
ERYTHROCYTE [DISTWIDTH] IN BLOOD BY AUTOMATED COUNT: 14.2 % (ref 11.5–14.5)
GLUCOSE SERPL-MCNC: 105 MG/DL (ref 65–100)
HCT VFR BLD AUTO: 46.6 % (ref 41–53)
HGB BLD-MCNC: 15.9 G/DL (ref 13.5–17.5)
LYMPHOCYTES # BLD: 1.8 K/UL (ref 1–4.8)
LYMPHOCYTES NFR BLD: 22 % (ref 20.5–51.1)
MCH RBC QN AUTO: 31 PG (ref 31–34)
MCHC RBC AUTO-ENTMCNC: 34.1 G/DL (ref 31–36)
MCV RBC AUTO: 91 FL (ref 80–100)
MONOCYTES # BLD: 0.7 K/UL (ref 0.2–2.4)
MONOCYTES NFR BLD: 8 % (ref 1.7–9.3)
NEUTS SEG # BLD: 5.4 K/UL (ref 1.8–7.7)
NEUTS SEG NFR BLD: 66 % (ref 42–75)
NRBC # BLD: 0.03 K/UL
NRBC BLD-RTO: 0.3 PER 100 WBC
PLATELET # BLD AUTO: 196 K/UL (ref 150–400)
PMV BLD AUTO: 8.4 FL (ref 6.5–11.5)
POTASSIUM SERPL-SCNC: 4.3 MMOL/L (ref 3.5–5.1)
RBC # BLD AUTO: 5.12 M/UL (ref 4.5–5.9)
SODIUM SERPL-SCNC: 140 MMOL/L (ref 136–145)
WBC # BLD AUTO: 8.1 K/UL (ref 4.4–11.3)

## 2021-04-21 PROCEDURE — 65270000029 HC RM PRIVATE

## 2021-04-21 PROCEDURE — 97116 GAIT TRAINING THERAPY: CPT

## 2021-04-21 PROCEDURE — 74011250637 HC RX REV CODE- 250/637: Performed by: NURSE PRACTITIONER

## 2021-04-21 PROCEDURE — 85025 COMPLETE CBC W/AUTO DIFF WBC: CPT

## 2021-04-21 PROCEDURE — 80048 BASIC METABOLIC PNL TOTAL CA: CPT

## 2021-04-21 PROCEDURE — 36415 COLL VENOUS BLD VENIPUNCTURE: CPT

## 2021-04-21 PROCEDURE — 99218 HC RM OBSERVATION: CPT

## 2021-04-21 PROCEDURE — 74011250637 HC RX REV CODE- 250/637: Performed by: HOSPITALIST

## 2021-04-21 RX ORDER — CARVEDILOL 3.12 MG/1
3.12 TABLET ORAL 2 TIMES DAILY WITH MEALS
Status: DISCONTINUED | OUTPATIENT
Start: 2021-04-21 | End: 2021-04-22

## 2021-04-21 RX ORDER — LEVOTHYROXINE SODIUM 125 UG/1
125 TABLET ORAL
Status: DISCONTINUED | OUTPATIENT
Start: 2021-04-22 | End: 2021-04-22 | Stop reason: HOSPADM

## 2021-04-21 RX ORDER — TROLAMINE SALICYLATE 10 G/100G
CREAM TOPICAL 3 TIMES DAILY
Status: DISCONTINUED | OUTPATIENT
Start: 2021-04-21 | End: 2021-04-22 | Stop reason: HOSPADM

## 2021-04-21 RX ADMIN — PRAVASTATIN SODIUM 40 MG: 40 TABLET ORAL at 21:49

## 2021-04-21 RX ADMIN — Medication 10 ML: at 05:46

## 2021-04-21 RX ADMIN — LISINOPRIL 10 MG: 10 TABLET ORAL at 09:08

## 2021-04-21 RX ADMIN — BENZOCAINE AND MENTHOL 1 LOZENGE: 15; 3.6 LOZENGE ORAL at 18:00

## 2021-04-21 RX ADMIN — CARVEDILOL 3.12 MG: 3.12 TABLET, FILM COATED ORAL at 12:51

## 2021-04-21 RX ADMIN — LEVOTHYROXINE SODIUM 100 MCG: 100 TABLET ORAL at 07:32

## 2021-04-21 RX ADMIN — ROPINIROLE HYDROCHLORIDE 0.25 MG: 0.25 TABLET, FILM COATED ORAL at 21:49

## 2021-04-21 RX ADMIN — TAMSULOSIN HYDROCHLORIDE 0.4 MG: 0.4 CAPSULE ORAL at 09:08

## 2021-04-21 RX ADMIN — MULTIVITAMIN TABLET 1 TABLET: TABLET at 09:08

## 2021-04-21 RX ADMIN — AMLODIPINE BESYLATE 5 MG: 5 TABLET ORAL at 09:08

## 2021-04-21 RX ADMIN — CARVEDILOL 3.12 MG: 3.12 TABLET, FILM COATED ORAL at 17:59

## 2021-04-21 RX ADMIN — BENZOCAINE AND MENTHOL 1 LOZENGE: 15; 3.6 LOZENGE ORAL at 11:00

## 2021-04-21 RX ADMIN — Medication 10 ML: at 21:49

## 2021-04-21 RX ADMIN — Medication 10 ML: at 14:41

## 2021-04-21 NOTE — PROGRESS NOTES
Progress Note  Date:4/21/2021       Room:205/01  Patient Name:Sergio Cortes Sr. YOB: 1929     Age:89 y.o. Subjective    HPI:  Mariusz Berg is a 80 y.o. male followed by Mike Snow MD and  has a past medical history of Arthritis, CAD (coronary artery disease), Hard of hearing, Headache, Hypertension, PMR (polymyalgia rheumatica) ([address] Skin cancer, and Sleep apnea. Who presents from home with increased ataxia and confusion since yesterday. Patient has history of stroke November last year and normally can walk with a walker but since yesterday after coming back from [place] was noted by patient sounds to have increased confusion and then this morning it was noted that patient was having trouble with his gait. Patient son states that on standing he does complain of dizziness but on orthostatic check was found to be negative. Patient has no focal deficits and only lab abnormalities mild dehydration and the patient states she has a good appetite. Patient recently was admitted to our service for bradycardia for which patient was taken off his metoprolol dosing since has been followed by cardiology and Holter monitor was placed which showed sinus bradycardia with evidence of PVCs. Patient since then has done well and no further issues until today. Patient son is concerned about him being on his own and mentioned about possibility of placing patient in the [facility]. On evaluation in the emergency room CT scan of the head did not show any acute changes but that showed chronic microvascular ischemic changes and a chronic lacunar infarct in the posterior limb of the right internal capsule. He does have history of temporal arthritis but denies any headache or vision disturbances denies any chest pain nausea vomiting or diarrhea or sick contacts.   With patient's current symptoms patient was referred for observation on telemetry for altered mental status secondary to possible TIA versus dehydration    Seen on follow-up is awake alert and appears more oriented this morning. Sitting in the chair eating breakfast no acute distress patient had no issues overnight and rested well. Waiting for skilled rehab placement. With patient continued to have trigeminy episodes and with history of bradycardia while still being monitored radiology will start low-dose Coreg 3.125 twice daily and monitor for tolerance    Review of Systems   Unable to perform ROS: Dementia       Objective           Vitals Last 24 Hours:  Patient Vitals for the past 24 hrs:   Temp Pulse Resp BP SpO2   04/21/21 1139 97.5 °F (36.4 °C) 68 18 (!) 152/70 95 %   04/21/21 0707 98.4 °F (36.9 °C) 68 18 (!) 176/79 96 %   04/21/21 0400 98.5 °F (36.9 °C) 77 18 131/79 98 %   04/21/21 0000 97.9 °F (36.6 °C) 79 18 127/79 97 %   04/20/21 2000 98.5 °F (36.9 °C) 77 18 (!) 141/79 98 %   04/20/21 1804 98 °F (36.7 °C) 78 16 138/74 97 %   04/20/21 1600 -- 72 -- -- --        I/O (24Hr): Intake/Output Summary (Last 24 hours) at 4/21/2021 1323  Last data filed at 4/21/2021 0703  Gross per 24 hour   Intake 1275 ml   Output 1475 ml   Net -200 ml       Physical Exam:  General: Alert, cooperative, no distress, appears stated age. Head:  Normocephalic, without obvious abnormality, atraumatic. Eyes:  Conjunctivae/corneas clear. Pupils equal, round, reactive to light. Extraocular movements intact. Lungs:  Clear to auscultation bilaterally. no wheeze, rales, crackles, rhonchi   Chest wall: No tenderness or deformity. Heart:  Regular rate and rhythm, S1, S2 normal, no murmur, click, rub or gallop. Abdomen:  Soft, non-tender. Bowel sounds normal. No masses,  No organomegaly. Extremities: Extremities normal, atraumatic, no cyanosis or edema. Pulses: 2+ and symmetric all extremities.   Skin: Skin color, texture, turgor normal. No rashes or lesions  Neurologic: Awake, Alert, not oriented no obvious gross sensory or motor deficits      Medications           Current Facility-Administered Medications   Medication Dose Route Frequency    [START ON 4/22/2021] levothyroxine (SYNTHROID) tablet 125 mcg  125 mcg Oral ACB    carvediloL (COREG) tablet 3.125 mg  3.125 mg Oral BID WITH MEALS    benzocaine-menthoL (CEPACOL) lozenge 1 Lozenge  1 Lozenge Mucous Membrane PRN    trolamine salicylate (MYOFLEX) 10 % cream   Topical TID    amLODIPine (NORVASC) tablet 5 mg  5 mg Oral DAILY    lisinopriL (PRINIVIL, ZESTRIL) tablet 10 mg  10 mg Oral DAILY    sodium chloride (NS) flush 5-40 mL  5-40 mL IntraVENous Q8H    sodium chloride (NS) flush 5-40 mL  5-40 mL IntraVENous PRN    acetaminophen (TYLENOL) tablet 650 mg  650 mg Oral Q6H PRN    Or    acetaminophen (TYLENOL) suppository 650 mg  650 mg Rectal Q6H PRN    polyethylene glycol (MIRALAX) packet 17 g  17 g Oral DAILY PRN    ondansetron (ZOFRAN) injection 4 mg  4 mg IntraVENous Q6H PRN    multivitamin (ONE A DAY) tablet 1 Tab  1 Tab Oral DAILY    pravastatin (PRAVACHOL) tablet 40 mg  40 mg Oral QHS    tamsulosin (FLOMAX) capsule 0.4 mg  0.4 mg Oral DAILY    hydrALAZINE (APRESOLINE) 20 mg/mL injection 10 mg  10 mg IntraVENous Q6H PRN    rOPINIRole (REQUIP) tablet 0.25 mg  0.25 mg Oral QHS         Allergies         Pcn [penicillins]       Labs/Imaging/Diagnostics      Labs:  Recent Results (from the past 48 hour(s))   EKG, 12 LEAD, INITIAL    Collection Time: 04/19/21  2:05 PM   Result Value Ref Range    Ventricular Rate 64 BPM    Atrial Rate 62 BPM    P-R Interval 318 ms    QRS Duration 102 ms    Q-T Interval 466 ms    QTC Calculation (Bezet) 481 ms    Calculated P Axis 35 degrees    Calculated R Axis -15 degrees    Calculated T Axis 47 degrees    Diagnosis       Sinus rhythm  Multiple ventricular premature complexes  Prolonged SC interval  Borderline left axis deviation  Abnormal R-wave progression, early transition  Borderline prolonged QT interval  Baseline wander in lead(s) I,II,aVR,aVL,V6    Confirmed by [facility] Justin LIANG ([id number]) on 4/20/2021 5:20:48 PM     ECHO ADULT COMPLETE    Collection Time: 04/19/21  4:32 PM   Result Value Ref Range    LV ED Vol A2C 117.44 mL    IVSd 1.10 (A) 0.60 - 1.00 cm    LVIDd 4.99 4.20 - 5.90 cm    LVIDs 3.53 cm    LVOT d 2.19 cm    LVPWd 1.13 (A) 0.60 - 1.00 cm    LVOT SV 79.9 mL    LVOT SV 79.9 mL    BP EF 66.2 55.0 - 100.0 percent    LV Ejection Fraction MOD 2C 63 percent    LV Ejection Fraction MOD 4C 69 percent    LV ED Vol BP 77.28 67.0 - 155.0 mL    LV ES Vol A2C 29.83 mL    LV ES Vol BP 26.13 22.0 - 58.0 mL    LVOT Peak Gradient 3.01 mmHg    Left Ventricular Outflow Tract Mean Gradient 1.51 mmHg    LVOT Peak Velocity 86.80 cm/s    LVOT VTI 20.70 cm    RVIDd 3.04 cm    LA Volume 47.61 18.0 - 58.0 mL    LA Vol 2C 47.06 18.00 - 58.00 mL    LA Vol 4C 42.31 18.00 - 58.00 mL    Aortic Valve Area by Continuity of Peak Velocity 2.25 cm2    Aortic Valve Area by Continuity of Peak Velocity 2.25 cm2    Aortic Valve Area by Continuity of VTI 2.80 cm2    AoV PG 46.20 mmHg    Aortic Regurgitant Pressure Half-time 509.78 ms    AoV PG 8.40 mmHg    Aortic Valve Systolic Mean Gradient 3.21 mmHg    Aortic Valve Systolic Peak Velocity 581.29 cm/s    Aortic valve mean velocity 91.47 cm/s    AoV VTI 27.73 cm    MV A William 114.99 cm/s    Mitral Valve E Wave Deceleration Time 198.70 ms    MV E William 102.32 cm/s    MV E/A 0.89     Mitral Valve Pressure Half-time 57.62 ms    MVA (PHT) 3.82 cm2    MVA (PHT) 3.82 cm2    Triscuspid Valve Regurgitation Peak Gradient 19.01 mmHg    TR Max Velocity 218.03 cm/s    Ao Root D 3.53 cm    LV Mass .4 88.0 - 224.0 g    LV Mass AL Index 95.8 49.0 - 115.0 g/m2    Right Atrial Area 4C 20.0 cm2    RVSP 22.0 mmHg    LVES Vol Index BP 11.9 mL/m2    LVED Vol Index BP 35.2 mL/m2    Est. RA Pressure 3.0 mmHg    LA Vol Index 21.67 16.00 - 28.00 ml/m2    LA Vol Index 21.42 16.00 - 28.00 ml/m2    LA Vol Index 19.26 16.00 - 28.00 ml/m2    LVED Vol Index A2C 53.5 mL/m2    LVES Vol Index A2C 13.6 mL/m2    JOANIE/BSA VTI 1.3 KN3/Y0   METABOLIC PANEL, BASIC    Collection Time: 04/20/21  5:14 AM   Result Value Ref Range    Sodium 141 136 - 145 mmol/L    Potassium 4.4 3.5 - 5.1 mmol/L    Chloride 104 97 - 108 mmol/L    CO2 28 21 - 32 mmol/L    Anion gap 9 5 - 15 mmol/L    Glucose 122 (H) 65 - 100 mg/dL    BUN 23 (H) 6 - 20 mg/dL    Creatinine 1.20 0.70 - 1.30 mg/dL    BUN/Creatinine ratio 19 12 - 20      GFR est AA >60 >60 ml/min/1.73m2    GFR est non-AA 57 (L) >60 ml/min/1.73m2    Calcium 8.7 8.5 - 10.1 mg/dL   MAGNESIUM    Collection Time: 04/20/21  5:14 AM   Result Value Ref Range    Magnesium 2.1 1.6 - 2.4 mg/dL   CBC WITH AUTOMATED DIFF    Collection Time: 04/20/21  5:14 AM   Result Value Ref Range    WBC 7.0 4.4 - 11.3 K/uL    RBC 4.70 4.50 - 5.90 M/uL    HGB 14.5 13.5 - 17.5 g/dL    HCT 42.9 41 - 53 %    MCV 91.3 80 - 100 FL    MCH 30.9 (L) 31 - 34 PG    MCHC 33.8 31.0 - 36.0 g/dL    RDW 14.3 11.5 - 14.5 %    PLATELET 486 165 - 690 K/uL    MPV 8.6 6.5 - 11.5 FL    NRBC 0.5  WBC    ABSOLUTE NRBC 0.04 K/uL    NEUTROPHILS 72 42 - 75 %    LYMPHOCYTES 15 (L) 20.5 - 51.1 %    MONOCYTES 10 (H) 1.7 - 9.3 %    EOSINOPHILS 2 0.9 - 2.9 %    BASOPHILS 1 0.0 - 2.5 %    ABS. NEUTROPHILS 5.1 1.8 - 7.7 K/UL    ABS. LYMPHOCYTES 1.0 1.0 - 4.8 K/UL    ABS. MONOCYTES 0.7 0.2 - 2.4 K/UL    ABS. EOSINOPHILS 0.1 0.0 - 0.7 K/UL    ABS.  BASOPHILS 0.0 0.0 - 0.2 K/UL   T4, FREE    Collection Time: 04/20/21  5:14 AM   Result Value Ref Range    T4, Free 1.0 0.8 - 1.5 ng/dL   METABOLIC PANEL, BASIC    Collection Time: 04/21/21  8:42 AM   Result Value Ref Range    Sodium 140 136 - 145 mmol/L    Potassium 4.3 3.5 - 5.1 mmol/L    Chloride 105 97 - 108 mmol/L    CO2 28 21 - 32 mmol/L    Anion gap 7 5 - 15 mmol/L    Glucose 105 (H) 65 - 100 mg/dL    BUN 19 6 - 20 mg/dL    Creatinine 0.97 0.70 - 1.30 mg/dL    BUN/Creatinine ratio 20 12 - 20      GFR est AA >60 >60 ml/min/1.73m2    GFR est non-AA >60 >60 ml/min/1.73m2    Calcium 9.0 8.5 - 10.1 mg/dL   CBC WITH AUTOMATED DIFF    Collection Time: 04/21/21  8:42 AM   Result Value Ref Range    WBC 8.1 4.4 - 11.3 K/uL    RBC 5.12 4.50 - 5.90 M/uL    HGB 15.9 13.5 - 17.5 g/dL    HCT 46.6 41 - 53 %    MCV 91.0 80 - 100 FL    MCH 31.0 31 - 34 PG    MCHC 34.1 31.0 - 36.0 g/dL    RDW 14.2 11.5 - 14.5 %    PLATELET 741 679 - 630 K/uL    MPV 8.4 6.5 - 11.5 FL    NRBC 0.3  WBC    ABSOLUTE NRBC 0.03 K/uL    NEUTROPHILS 66 42 - 75 %    LYMPHOCYTES 22 20.5 - 51.1 %    MONOCYTES 8 1.7 - 9.3 %    EOSINOPHILS 3 (H) 0.9 - 2.9 %    BASOPHILS 1 0.0 - 2.5 %    ABS. NEUTROPHILS 5.4 1.8 - 7.7 K/UL    ABS. LYMPHOCYTES 1.8 1.0 - 4.8 K/UL    ABS. MONOCYTES 0.7 0.2 - 2.4 K/UL    ABS. EOSINOPHILS 0.2 0.0 - 0.7 K/UL    ABS. BASOPHILS 0.1 0.0 - 0.2 K/UL        Imaging:  Ct Head Wo Cont    Result Date: 4/19/2021  Microvascular ischemic changes. Chronic lacunar infarct posterior limb of the right internal capsule. No acute intracranial abnormality.         Assessment//Plan           Problem List:  Hospital Problems  Date Reviewed: 12/19/2020          Codes Class Noted POA    Ataxia ICD-10-CM: R27.0  ICD-9-CM: 781.3  4/20/2021 Unknown        * (Principal) Abnormal gait ICD-10-CM: R26.9  ICD-9-CM: 781.2  4/19/2021 Unknown        Altered mental status ICD-10-CM: R41.82  ICD-9-CM: 780.97  4/19/2021 Unknown        Dehydration ICD-10-CM: E86.0  ICD-9-CM: 276.51  4/19/2021 Unknown            Altered mental status:  -Patient presents with increased confusion over the last 24 hours possibly secondary to dehydration versus TIA  -CT scan of the head did not show any acute pathology just chronic changes  -Troponin x2 - we will monitor additional every 6 hours x3  -EKG shows sinus rhythm with multiple ventricular premature complexes with a prolonged [place] interval with borderline prolonged QT and abnormal R wave progression   -Monitor on telemetry with neuro checks every 4 hours  -Continue current IV fluids normal saline at 75 cc an hour x1 day  -2D echo prelim shows preserved EF follow-up official results  -Patient appears improved with mentation had been stable overnight with no episodes of hallucinations or confusion. Patient's dehydration has resolved and currently awaiting for skilled rehab placement     Possible TIA:  -Patient has history of stroke with no focal deficits but has been seen by vascular and has had right endarterectomy done in the past but only minimal disease noted on most recent carotid Dopplers  -Obtain lipid profile  -PT/OT/speech therapy evaluation and treatment and recommending skilled rehab so initially patient was scheduled for [facility] but currently patient will be screened for possible skilled rehab placement and will continue work with therapy during inpatient stay. -No focal deficits noted on exam continue to monitor with neuro checks every 4 hours  -No aspirin noted on medication list and will place on 81 mg low-dose  -Discussed with patient's son about overnight events and offered MRI/MRA but with his age and not being a candidate for any intervention son did not feel benefit of testing but does wish for skilled rehab placement and possible [facility] placement soon after as patient is not safe to be at home and because of continued symptoms of confusion and gait ataxia change patient to inpatient status and continue monitor on telemetry  -4/21 patient appears improved mentation continues to work with therapy and awaiting skilled rehab placement    Dementia  -Vascular dementia and overnight patient had episode of sundowning and increased confusion and agitation and was needed and given a dose of Ativan.   -Patient had been good overnight and no issues initially was attempting Seroquel 25 mg at bedtime with without medication patient overall did well and so we will continue to monitor and placed on only as needed dosing       Dehydration:  -Patient noted to be on Lasix 20 mg oral daily and BUN/creatinine noted to be 21/0.85  -We will hold Lasix and start normal saline at 75 cc an hour x1 day  -Repeat labs show improvement in BUN but increasing creatinine to 1.2 continue current IV fluids monitor p.o. intake  -Repeat labs on 4/21 show resolution     Hypertension:  -Patient blood pressure was 128/56 on arrival but increased to 171/75 on arrival to the medical floor  -Placed on hydralazine 10 mg IV every 6 hours as needed for systolic blood pressure greater 712 and diastolic greater than 013  -We will start patient on Norvasc 5 mg a day with the first dose now but with continued elevated blood pressure will increase dosing to 10 mg daily and add 10 mg of lisinopril  -Cardiology consultation in a.m appreciated  -2D echo showed preserved EF of 6065% mildly dilated right atrium  -Continue to adjust medications as blood pressure still elevated  -Monitor vitals every 4 hours     BPH:  -Continue Flomax home dose     Hyperlipidemia:  -Obtain lipid profile which shows LDL in good range at 169 with total cholesterol 142 we will continue current dose of pravastatin     DVT prophylaxis:  -SCDs      CODE STATUS DNR DNI  -Patient designated decision-maker is his son Gilberto Denis           DNR   Spent 30 minutes evaluting and coordinating patient care of which >50% was spent coordinating and counseling.   We will convert patient to inpatient status      Electronically signed by Yuliet Spivey MD on 4/21/2021 at 4:56 PM

## 2021-04-21 NOTE — PROGRESS NOTES
PHYSICAL THERAPY TREATMENT  Patient: Caterina Mark SrIan (80 y.o. male)  Date: 4/21/2021  Diagnosis: Altered mental status [R41.82]  Abnormal gait [R26.9]  Ataxia [R27.0] Abnormal gait       Precautions:    Chart, physical therapy assessment, plan of care and goals were reviewed. ASSESSMENT  Patient continues with skilled PT services and is progressing towards goals. .     Current Level of Function Impacting Discharge (mobility/balance): some unsteadiness with standing activities. Other factors to consider for discharge: Pt reports living home alone as prior level. PLAN :  Patient continues to benefit from skilled intervention to address the above impairments. Continue treatment per established plan of care. to address goals. Recommendation for discharge: (in order for the patient to meet his/her long term goals)  Therapy up to 5 days/week in SNF setting or intensive home health therapy program    This discharge recommendation:  Has been made in collaboration with the attending provider and/or case management    IF patient discharges home will need the following DME: to be determined (TBD)       SUBJECTIVE:   Patient stated Koby Cotton has a slight sore throat, no other issues.     OBJECTIVE DATA SUMMARY:   Critical Behavior:  Neurologic State: Sleeping(easily awakened)  Orientation Level: Disoriented to place, Disoriented to situation, Disoriented to time  Cognition: Decreased command following, Decreased attention/concentration, Follows commands, Impaired decision making, Memory loss, Impulsive, Poor safety awareness     Functional Mobility Training:  Bed Mobility:        Sit to Supine: Independent           Transfers:  Sit to Stand: Supervision;Modified independent  Stand to Sit: Modified independent;Supervision                             Balance:  Sitting: Intact; With support  Standing: Intact; With support  Ambulation/Gait Training:  Distance: 200' with AD and 39' with out AD  Assistive Device: Cane, straight  Ambulation - Level of Assistance: Contact guard assistance                 Base of Support: Widened                  Static stand x 3 mins with occasional UE support to perform ADL task. Stairs:     N/a    Treatment Session:   Pt seen in his room independent with getting out of bed and amb to bathroom without AD to wash his face and brush his hair  and jovana a robe prior to walking in valadez. Pt was able to sit EOB and jovana/doff shoes no LOB or unsteadiness. Pt amd in valadez with [facility] note he does amb close to wall and occasionally uses wall rail to steady himself when he \"drags\" his toe. Pt amb about 200' and was starting to get tired. Requested to be allowed to return to room and lie down since he had trouble sleeping earlier. Pt independent with return to supine in bed only assistance need was with straightening bed covers. Pain Ratin/10    Activity Tolerance:   Fair    After treatment patient left in no apparent distress:   Supine in bed    COMMUNICATION/COLLABORATION:   The patients plan of care was discussed with: Physical therapist and Registered nurse. BRAD Ewing   Time Calculation: 18 mins

## 2021-04-21 NOTE — PROGRESS NOTES
Reason for Admission:    AMS                   RUR Score:          16           Plan for utilizing home health:   SNF       PCP: First and Last name:  Jose Gamez MD     Name of Practice:  [address] [place] [address]   Are you a current patient: Yes/No:  yes   Approximate date of last visit:    Can you participate in a virtual visit with your PCP:  yes                    Current Advanced Directive/Advance Care Plan: DNR      Healthcare Decision Maker:   Click here to complete [id number] including selection of the Healthcare Decision Maker Relationship (ie \"Primary\")             Primary Decision MakerBlanche Estevan - Child - [phone]                  Transition of Care Plan:                    SNF

## 2021-04-21 NOTE — PROGRESS NOTES
Problem: Falls - Risk of  Goal: *Absence of Falls  Description: Document Kathryn Spare Fall Risk and appropriate interventions in the flowsheet.   Outcome: Progressing Towards Goal  Note: Fall Risk Interventions:  Mobility Interventions: Communicate number of staff needed for ambulation/transfer, Bed/chair exit alarm, OT consult for ADLs, Patient to call before getting OOB, PT Consult for assist device competence, Strengthening exercises (ROM-active/passive), Utilize walker, cane, or other assistive device    Mentation Interventions: Adequate sleep, hydration, pain control, Bed/chair exit alarm, Door open when patient unattended, Evaluate medications/consider consulting pharmacy, Eyeglasses and hearing aids, Familiar objects from home, Family/sitter at bedside, Increase mobility, More frequent rounding, Reorient patient, Room close to nurse's station, Toileting rounds, Update white board    Medication Interventions: Bed/chair exit alarm, Patient to call before getting OOB, Teach patient to arise slowly    Elimination Interventions: Bed/chair exit alarm, Call light in reach, Patient to call for help with toileting needs, Stay With Me (per policy), Toilet paper/wipes in reach, Toileting schedule/hourly rounds, Urinal in reach              Problem: Patient Education: Go to Patient Education Activity  Goal: Patient/Family Education  Outcome: Progressing Towards Goal     Problem: Patient Education: Go to Patient Education Activity  Goal: Patient/Family Education  Outcome: Progressing Towards Goal

## 2021-04-21 NOTE — PROGRESS NOTES
Progress Note    Patient: Viridiana Puckett Sr. MRN: [id number]  SSN: [id number]    YOB: 1929  Age: 80 y.o. Sex: male      Admit Date: 4/19/2021    LOS: 1 day     Subjective:     Keena Grimm is a 80year-old male with past medical history significant for HTN, HLD, Hypothyroidism, Giant Cell arteritis with Polymyalgia Rheumatic (followed by Rheumatology), CAD s/ PCI, Carotid disease s/p endarterectomy, YARELIS, hearing impairment, Bradycardia, and internal capsule CVA  who is followed for history of bradycardia who is currently admitted for worsening mental status. This am patient is more alert and appropriate than yesterday. No obvious hallucinations. He denies any complaints. Telemetry Review: Trigeminy, occasional Couplet    Review of Symptoms:   Limited ROS given confusion and baseline mental state      Objective:     Vitals:    04/20/21 2000 04/21/21 0000 04/21/21 0400 04/21/21 0707   BP: (!) 141/79 127/79 131/79 (!) 176/79   Pulse: 77 79 77 68   Resp: 18 18 18 18   Temp: 98.5 °F (36.9 °C) 97.9 °F (36.6 °C) 98.5 °F (36.9 °C) 98.4 °F (36.9 °C)   SpO2: 98% 97% 98% 96%   Weight:       Height:            Intake and Output:  Current Shift: 04/21 0701 - 04/21 1900  In: -   Out: 300 [Urine:300]  Last three shifts: 04/19 1901 - 04/21 0700  In: 0991 [P.O.:1040; I.V.:1550]  Out: 2325 [Urine:2325]    Physical Exam:   General: Well nourished elderly male sitting in chair beside bed, NAD, Alert and more appropriate thought patterns this morning  HEENT: Normocephalic, PERRL, no drainage  Neck: Supple, Trachea midline, No JVD  RESP: CTA bilaterally with symmetrical chest movement. No SOB or distress. On RA  Cardiovascular: RRR though no MRG  PVS: No rubor, cyanosis, no edema, Radial, DP, PT pulses equal bilaterally  ABD: obese , soft NT, Normoactive BS  Derm: Warm/Dry/Intact with no lesions, normal turgor  Neuro: A&O Person, no obvious deficits.   Still has difficulty finding words ?expressive aphasia  PSYCH: Mild agitation. Lab/Data Review:  BMP: No results found for: NA, K, CL, CO2, AGAP, GLU, BUN, CREA, GFRAA, GFRNA         Current Facility-Administered Medications:     [START ON 4/22/2021] levothyroxine (SYNTHROID) tablet 125 mcg, 125 mcg, Oral, ACBReji Goutham R, MD    amLODIPine (NORVASC) tablet 5 mg, 5 mg, Oral, DAILY, Yadiel Mendoza MD    lisinopriL (PRINIVIL, ZESTRIL) tablet 10 mg, 10 mg, Oral, DAILY, Yadiel Mendoza MD, 10 mg at 04/20/21 1738    sodium chloride (NS) flush 5-40 mL, 5-40 mL, IntraVENous, Q8H, Yadiel Mendoza MD, 10 mL at 04/21/21 0546    sodium chloride (NS) flush 5-40 mL, 5-40 mL, IntraVENous, PRN, Mata Mendoza MD    acetaminophen (TYLENOL) tablet 650 mg, 650 mg, Oral, Q6H PRN **OR** acetaminophen (TYLENOL) suppository 650 mg, 650 mg, Rectal, Q6H PRN, Yadiel Mendoza MD    polyethylene glycol (MIRALAX) packet 17 g, 17 g, Oral, DAILY PRN, Yadiel Mendoza MD, 17 g at 04/20/21 2114    ondansetron (ZOFRAN) injection 4 mg, 4 mg, IntraVENous, Q6H PRN, Mata Mendoza MD    multivitamin (ONE A DAY) tablet 1 Tab, 1 Tab, Oral, DAILY, Yadiel Mendoza MD, 1 Tab at 04/20/21 0836    pravastatin (PRAVACHOL) tablet 40 mg, 40 mg, Oral, QHS, Yadiel Mendoza MD, 40 mg at 04/20/21 2114    tamsulosin (FLOMAX) capsule 0.4 mg, 0.4 mg, Oral, DAILY, Yadiel Mendoza MD, 0.4 mg at 04/20/21 0836    hydrALAZINE (APRESOLINE) 20 mg/mL injection 10 mg, 10 mg, IntraVENous, Q6H PRN, Yadiel Mendoza MD    rOPINIRole (REQUIP) tablet 0.25 mg, 0.25 mg, Oral, QHS, Yadiel Mendoza MD, 0.25 mg at 04/20/21 2114      Assessment:     Principal Problem:    Abnormal gait (4/19/2021)    Active Problems:    Altered mental status (4/19/2021)      Dehydration (4/19/2021)      Ataxia (4/20/2021)        Plan:     Case discussed with Collaborating physician Dr. Javid Simpsno and our recommendations are as follows:   1. H/O Bradycardia - None this visit.   Sinus rhythm with ventricular ectopy as far as Trigeminy on Telemetry review this morning. Patient is asymptomatic to this as far as I can tell. Continue to monitor. Given patient's hospital stay has been lengthened awaiting placement and HR has been in 70s will trial Coreg 3.125mg PO BID for ventricular ectopy being that he can be closely monitored on Telemetry. 2. Hypertension - BP is above goal. Will start Coreg as above. Continue to trend and add back home regimen as patient tolerates. 3. CAD - Denies chest pain or recent sx. Had PCI to LAD 2011 by Dr. Sheri Tran. [facility]. Continue ASA and Statin. 4. Ventricular Ectopy -Patient was in trigeminy in clinic 3/15/2021, but asymptomatic. Patient and son deferred EP referral or further workup at that time given patient was asymptomatic. Will continue medical management at this time as above. 5. Chronic HFpEF - Stage 1 Diastolic Dysfunction on Echo this admission . Continue current medication regimen as above. 6. Hypothyroidism - TSH above goal. Consider increasing Levothyroxine to 125mcg daily. Repeat TSH in 6 weeks. Thank you for involving us in the care of this patient. Please do not hesitate to call if additional questions arise.  If after hours please call [phone]    Signed By: Skyla Rdz NP     April 21, 2021

## 2021-04-21 NOTE — PROGRESS NOTES
PHYSICAL THERAPY TREATMENT  Patient: Yeny Nunez Sr. (80 y.o. male)  Date: 4/21/2021  Diagnosis: Altered mental status [R41.82]  Abnormal gait [R26.9]  Ataxia [R27.0] Abnormal gait       Precautions:  Fall risk  Chart, physical therapy assessment, plan of care and goals were reviewed. ASSESSMENT  Patient continues with skilled PT services and is progressing towards goals. Pt demonstrated improvements today noted by increased distance ambulated with [facility] with increased steadiness and less assistance required during sit<>stand tranfers. Pt demonstrated improved cognitive status during treatment. Current Level of Function Impacting Discharge (mobility/balance): decreased balance     Other factors to consider for discharge: Pt lived at home prior to admission to hospital.          PLAN :  Patient continues to benefit from skilled intervention to address the above impairments. Continue treatment per established plan of care. to address goals. Recommendation for discharge: (in order for the patient to meet his/her long term goals)  Therapy up to 5 days/week in SNF setting or intensive home health therapy program    This discharge recommendation:  Has been made in collaboration with the attending provider and/or case management    IF patient discharges home will need the following DME: to be determined (TBD)       SUBJECTIVE:   Patient stated lets go for a walk.     OBJECTIVE DATA SUMMARY:   Critical Behavior:  Neurologic State: Sleeping(easily awakened)  Orientation Level: Disoriented to place, Disoriented to situation, Disoriented to time  Cognition: Decreased command following, Decreased attention/concentration, Follows commands, Impaired decision making, Memory loss, Impulsive, Poor safety awareness     Functional Mobility Training:  Bed Mobility:        Sit to Supine: Independent           Transfers:  Sit to Stand: Supervision;Modified independent  Stand to Sit: Modified independent;Supervision Balance:  Sitting: Intact; With support  Standing: Intact; With support  Ambulation/Gait Training:  Distance (ft): 80 Feet (ft)  Assistive Device: Cane, straight  Ambulation - Level of Assistance: Contact guard assistance                 Base of Support: Widened                             Stairs:              Treatment Session:   Pt pleasant during treatment and less confused today. Pt required mod I/supervision for safety during sit<>stand transfers. Pt ambulated [de-identified] ft with SPC with CGA assistance for safety with improvements noted in overall stability and distance walked. No posterior trunk lean noted during session today. Pain Ratin/10    Activity Tolerance:   Good    After treatment patient left in no apparent distress:   Sitting in chair and Call bell within reach    COMMUNICATION/COLLABORATION:   The patients plan of care was discussed with: Registered nurse, Physician, and Case management.      Ivelisse Dorado PT, DPT   Time Calculation: 13 mins         Problem: Mobility Impaired (Adult and Pediatric)  Goal: *Acute Goals and Plan of Care (Insert Text)  Outcome: Progressing Towards Goal

## 2021-04-21 NOTE — PROGRESS NOTES
Fermin Sequeira at [address]  denied SNF auth and needed P2P by [employer] at [phone].  ref# [id number], gave information to Dr. Octaviano Grant to complete by noon 4.22.21

## 2021-04-21 NOTE — PROGRESS NOTES
Inquired with R about auth status, they stated it was still pending. I called [address] [phone], and they stated the Liana Ham is already being reviewed by the physician and we should have an answer by tomorrow.  They stated they will not call us with outcome but we can call back and check status with ref# [de-identified]

## 2021-04-22 VITALS
WEIGHT: 209.2 LBS | SYSTOLIC BLOOD PRESSURE: 158 MMHG | HEIGHT: 72 IN | HEART RATE: 57 BPM | TEMPERATURE: 97.8 F | RESPIRATION RATE: 18 BRPM | BODY MASS INDEX: 28.33 KG/M2 | DIASTOLIC BLOOD PRESSURE: 61 MMHG | OXYGEN SATURATION: 94 %

## 2021-04-22 PROCEDURE — 99218 HC RM OBSERVATION: CPT

## 2021-04-22 PROCEDURE — 74011250637 HC RX REV CODE- 250/637: Performed by: HOSPITALIST

## 2021-04-22 PROCEDURE — 96125 COGNITIVE TEST BY HC PRO: CPT

## 2021-04-22 RX ORDER — AMLODIPINE BESYLATE 5 MG/1
5 TABLET ORAL DAILY
Qty: 30 TAB | Refills: 0 | Status: SHIPPED | OUTPATIENT
Start: 2021-04-23 | End: 2021-07-05

## 2021-04-22 RX ORDER — LEVOTHYROXINE SODIUM 125 UG/1
125 TABLET ORAL
Qty: 30 TAB | Refills: 0 | Status: SHIPPED | OUTPATIENT
Start: 2021-04-23

## 2021-04-22 RX ORDER — LISINOPRIL 10 MG/1
10 TABLET ORAL DAILY
Qty: 30 TAB | Refills: 0 | Status: SHIPPED | OUTPATIENT
Start: 2021-04-23 | End: 2021-07-05

## 2021-04-22 RX ADMIN — LISINOPRIL 10 MG: 10 TABLET ORAL at 09:52

## 2021-04-22 RX ADMIN — LEVOTHYROXINE SODIUM 125 MCG: 0.12 TABLET ORAL at 07:37

## 2021-04-22 RX ADMIN — Medication: at 09:51

## 2021-04-22 RX ADMIN — AMLODIPINE BESYLATE 5 MG: 5 TABLET ORAL at 09:52

## 2021-04-22 RX ADMIN — MULTIVITAMIN TABLET 1 TABLET: TABLET at 09:52

## 2021-04-22 RX ADMIN — TAMSULOSIN HYDROCHLORIDE 0.4 MG: 0.4 CAPSULE ORAL at 09:52

## 2021-04-22 NOTE — PROGRESS NOTES
Progress Note    Patient: Catina Sood Sr. MRN: [id number]  SSN: [id number]    YOB: 1929  Age: 80 y.o. Sex: male      Admit Date: 4/19/2021    LOS: 2 days     Subjective:     Dianne Clark is a 80year-old male with past medical history significant for HTN, HLD, Hypothyroidism, Giant Cell arteritis with Polymyalgia Rheumatic (followed by Rheumatology), CAD s/ PCI, Carotid disease s/p endarterectomy, YARELIS, hearing impairment, Bradycardia, and internal capsule CVA  who is followed for history of bradycardia who is currently admitted for worsening mental status. No changes in condition overnight. No obvious hallucinations this morning. He denies any complaints. Telemetry Review: Sinus Bradycardia as low as 45 with occasional PVC    Review of Symptoms:   Limited ROS given confusion and baseline mental state      Objective:     Vitals:    04/21/21 2040 04/22/21 0044 04/22/21 0344 04/22/21 0733   BP: (!) 121/57 (!) 105/44 (!) 137/51 (!) 142/50   Pulse: (!) 57 (!) 56 (!) 55 (!) 58   Resp: 18 18 18 18   Temp: 97.9 °F (36.6 °C) 98.3 °F (36.8 °C) 97.8 °F (36.6 °C) 98.1 °F (36.7 °C)   SpO2: 94% 94% 93% 94%   Weight:       Height:            Intake and Output:  Current Shift: No intake/output data recorded. Last three shifts: 04/20 1901 - 04/22 0700  In: 1540 [P.O.:1240; I.V.:300]  Out: 3050 [Urine:3050]    Physical Exam:   General: Well nourished elderly male sitting in chair beside bed, NAD, Alert and more appropriate thought patterns this morning  HEENT: Normocephalic, PERRL, no drainage  Neck: Supple, Trachea midline, No JVD  RESP: CTA bilaterally with symmetrical chest movement. No SOB or distress. On RA  Cardiovascular: RRR though no MRG  PVS: No rubor, cyanosis, no edema, Radial, DP, PT pulses equal bilaterally  ABD: obese , soft NT, Normoactive BS  Derm: Warm/Dry/Intact with no lesions, normal turgor  Neuro: A&O Person, no obvious deficits.   Still has difficulty finding words ?expressive aphasia  PSYCH: Mild agitation.        Lab/Data Review:  BMP: No results found for: NA, K, CL, CO2, AGAP, GLU, BUN, CREA, GFRAA, GFRNA         Current Facility-Administered Medications:     levothyroxine (SYNTHROID) tablet 125 mcg, 125 mcg, Oral, ACB, Yadiel Mendoza MD, 125 mcg at 04/22/21 0737    benzocaine-menthoL (CEPACOL) lozenge 1 Lozenge, 1 Lozenge, Mucous Membrane, PRN, Yadiel Mendoza MD, 1 Lozenge at 04/21/21 1800    trolamine salicylate (MYOFLEX) 10 % cream, , Topical, TID, Mecca Mendoza MD, Given at 04/22/21 0951    amLODIPine (NORVASC) tablet 5 mg, 5 mg, Oral, DAILY, Yadiel Mendoza MD, 5 mg at 04/22/21 0952    lisinopriL (PRINIVIL, ZESTRIL) tablet 10 mg, 10 mg, Oral, DAILY, Yadiel Mendoza MD, 10 mg at 04/22/21 8568    sodium chloride (NS) flush 5-40 mL, 5-40 mL, IntraVENous, Q8H, Yadiel Mendoza MD, Stopped at 04/22/21 0600    sodium chloride (NS) flush 5-40 mL, 5-40 mL, IntraVENous, PRN, Yadiel Mendoza MD    acetaminophen (TYLENOL) tablet 650 mg, 650 mg, Oral, Q6H PRN **OR** acetaminophen (TYLENOL) suppository 650 mg, 650 mg, Rectal, Q6H PRN, Yadiel Mendoza MD    polyethylene glycol (MIRALAX) packet 17 g, 17 g, Oral, DAILY PRN, Yadiel Mendoza MD, 17 g at 04/20/21 2114    ondansetron (ZOFRAN) injection 4 mg, 4 mg, IntraVENous, Q6H PRN, Mecca Mendoza MD    multivitamin (ONE A DAY) tablet 1 Tab, 1 Tab, Oral, DAILY, Yadiel Mendoza MD, 1 Tab at 04/22/21 0952    pravastatin (PRAVACHOL) tablet 40 mg, 40 mg, Oral, QHS, Yadiel Mendoza MD, 40 mg at 04/21/21 2149    tamsulosin (FLOMAX) capsule 0.4 mg, 0.4 mg, Oral, DAILY, Yadiel Mendoza MD, 0.4 mg at 04/22/21 3194    hydrALAZINE (APRESOLINE) 20 mg/mL injection 10 mg, 10 mg, IntraVENous, Q6H PRN, Yadiel Mendoza MD    rOPINIRole (REQUIP) tablet 0.25 mg, 0.25 mg, Oral, QHS, Yadiel Mendoza MD, 0.25 mg at 04/21/21 2149      Assessment:     Principal Problem:    Abnormal gait (4/19/2021)    Active Problems:    Altered mental status (4/19/2021)      Dehydration (4/19/2021)      Ataxia (4/20/2021)        Plan:     Case discussed with Collaborating physician Dr. Obdulio Narayanan and our recommendations are as follows:   1. H/O Bradycardia - Coreg 3.125mg PO BID for ventricular ectopy started yesterday 4/21 as HR had been 70s-80s, however patient HR as low as 45 overnight and this morning. Will d/c Coreg, though did help with ventricular ectopy. Would avoid BB at all costs in future moving forward as patient is extremely sensitive. 2. Hypertension - BP close to goal. Continue to trend and add back home regimen as patient tolerates. 3. CAD - Denies chest pain or recent sx. Had PCI to LAD 2011 by Dr. Swapnil Lloyd. [facility]. Continue ASA and Statin. 4. Ventricular Ectopy -Patient was in trigeminy in clinic 3/15/2021, but asymptomatic. Patient and son deferred EP referral or further workup at that time given patient was asymptomatic. Will continue medical management at this time as above. 5. Chronic HFpEF - Stage 1 Diastolic Dysfunction on Echo this admission . Continue current medication regimen as above. 6. Hypothyroidism - TSH above goal. Consider increasing Levothyroxine to 125mcg daily. Repeat TSH in 6 weeks. Thank you for involving us in the care of this patient. Please do not hesitate to call if additional questions arise.  If after hours please call [phone]    Signed By: Jana Seay NP     April 22, 2021

## 2021-04-22 NOTE — PROGRESS NOTES
Problem: Falls - Risk of  Goal: *Absence of Falls  Description: Document Phyllis Hernandez Fall Risk and appropriate interventions in the flowsheet.   Outcome: Progressing Towards Goal  Note: Fall Risk Interventions:  Mobility Interventions: Communicate number of staff needed for ambulation/transfer, Bed/chair exit alarm, OT consult for ADLs, Patient to call before getting OOB, PT Consult for assist device competence, Strengthening exercises (ROM-active/passive), Utilize walker, cane, or other assistive device    Mentation Interventions: Adequate sleep, hydration, pain control, Door open when patient unattended    Medication Interventions: Bed/chair exit alarm, Patient to call before getting OOB, Teach patient to arise slowly    Elimination Interventions: Bed/chair exit alarm, Call light in reach, Patient to call for help with toileting needs, Stay With Me (per policy), Toilet paper/wipes in reach, Toileting schedule/hourly rounds, Urinal in reach

## 2021-04-22 NOTE — DISCHARGE INSTRUCTIONS
Patient Education        Dehydration: Care Instructions  Your Care Instructions  Dehydration happens when your body loses too much fluid. This might happen when you do not drink enough water or you lose large amounts of fluids from your body because of diarrhea, vomiting, or sweating. Severe dehydration can be life-threatening. Water and minerals called electrolytes help put your body fluids back in balance. Learn the early signs of fluid loss, and drink more fluids to prevent dehydration. Follow-up care is a key part of your treatment and safety. Be sure to make and go to all appointments, and call your doctor if you are having problems. It's also a good idea to know your test results and keep a list of the medicines you take. How can you care for yourself at home? · To prevent dehydration, drink plenty of fluids. Choose water and other caffeine-free clear liquids until you feel better. If you have kidney, heart, or liver disease and have to limit fluids, talk with your doctor before you increase the amount of fluids you drink. · If you do not feel like eating or drinking, try taking small sips of water, sports drinks, or other rehydration drinks. · Get plenty of rest.  To prevent dehydration  · Add more fluids to your diet and daily routine, unless your doctor has told you not to. · During hot weather, drink more fluids. Drink even more fluids if you exercise a lot. Stay away from drinks with alcohol or caffeine. · Watch for the symptoms of dehydration. These include:  ? A dry, sticky mouth. ? Not much urine. ? Dry and sunken eyes. ? Feeling very tired. · Learn what problems can lead to dehydration. These include:  ? Diarrhea, fever, and vomiting. ? Any illness with a fever, such as pneumonia or the flu. ? Activities that cause heavy sweating, such as endurance races and heavy outdoor work in hot or humid weather. ?  Alcohol or drug use or problems caused by quitting their use (withdrawal). ? Certain medicines, such as cold and allergy pills (antihistamines), diet pills (diuretics), and laxatives. ? Certain diseases, such as diabetes, cancer, and heart or kidney disease. When should you call for help? Call 911 anytime you think you may need emergency care. For example, call if:    · You passed out (lost consciousness). Call your doctor now or seek immediate medical care if:    · You are confused and cannot think clearly.     · You are dizzy or lightheaded, or you feel like you may faint.     · You have signs of needing more fluids. You have sunken eyes, a dry mouth, and you pass only a little urine.     · You cannot keep fluids down. Watch closely for changes in your health, and be sure to contact your doctor if:    · You are not making tears.     · Your skin is very dry and sags slowly back into place after you pinch it.     · Your mouth and eyes are very dry. Where can you learn more? Go to http://www.gray.com/  Enter Q814 in the search box to learn more about \"Dehydration: Care Instructions. \"  Current as of: February 26, 2020               Content Version: 12.8  © 9326-4850 [employer]. Care instructions adapted under license by [employer] (which disclaims liability or warranty for this information). If you have questions about a medical condition or this instruction, always ask your healthcare professional. [place] [address] any warranty or liability for your use of this information. Patient Education        Altered Mental Status: Care Instructions  Your Care Instructions     Altered mental status is a change in how well your brain is working. As a result, you may be confused, be less alert than usual, or act in odd ways. This may include seeing or hearing things that aren't really there (hallucinations). A mental status change has many possible causes.  For example, it may be the result of an infection, an imbalance of chemicals in the body, or a chronic disease such as diabetes or COPD. It can also be caused by things such as a head injury, taking certain medicines, or using alcohol or drugs. The doctor may do tests to look for the cause. These tests may include urine tests, blood tests, and imaging tests such as a CT scan. Sometimes a clear cause isn't found. But tests can help the doctor rule out a serious cause of your symptoms. A change in mental status can be scary. But mental status will often return to normal when the cause is treated. So it is important to get any follow-up testing or treatment the doctor has suggested. The doctor has checked you carefully, but problems can develop later. If you notice any problems or new symptoms, get medical treatment right away. Follow-up care is a key part of your treatment and safety. Be sure to make and go to all appointments, and call your doctor if you are having problems. It's also a good idea to know your test results and keep a list of the medicines you take. How can you care for yourself at home? · Be safe with medicines. Take your medicines exactly as prescribed. Call your doctor if you think you are having a problem with your medicine. · Have another adult stay with you until you are better. This can help keep you safe. Ask that person to watch for signs that your mental status is getting worse. When should you call for help? Call 911 anytime you think you may need emergency care. For example, call if:    · You passed out (lost consciousness). Call your doctor now or seek immediate medical care if:    · Your mental status is getting worse.     · You have new symptoms, such as a fever, chills, or shortness of breath.     · You do not feel safe. Watch closely for changes in your health, and be sure to contact your doctor if:    · You do not get better as expected. Where can you learn more?   Go to http://www.gray.com/  Enter [id number] in the search box to learn more about \"Altered Mental Status: Care Instructions. \"  Current as of: August 4, 2020               Content Version: 12.8  © 2006-2021 [employer], [facility]. Care instructions adapted under license by [employer] (which disclaims liability or warranty for this information). If you have questions about a medical condition or this instruction, always ask your healthcare professional. [address] any warranty or liability for your use of this information.

## 2021-04-22 NOTE — PROGRESS NOTES
2nd Medicare notice discussed with son, Kira Rm, over the phone. Pt unable to sign due to some confusion. Copy mailed to patient's address.

## 2021-04-22 NOTE — PROGRESS NOTES
SPEECH LANGUAGE PATHOLOGY EVALUATION/DISCHARGE  Patient: Ej Bhatt Sr. (80 y.o. male)  Date: 4/22/2021  Primary Diagnosis: Altered mental status [R41.82]  Abnormal gait [R26.9]  Ataxia [R27.0]         ASSESSMENT :  Based on the objective data described below, the patient presents with mild-moderate cognitive impairment, characterized by deficits with short-term memory, problem solving and safety awareness. Patient son present for evaluation and states that the patient has has increasing bouts of confusion over the last 6-12 months. Patient also presents with occasional anomia and mild dysarthria within conversational speech, which patient caregiver states is baseline from past CVA (November 2020). Anomia and dysarthria do not detract from communication in the hospital setting. Skilled acute therapy provided by a speech-language pathologist is not indicated at this time; however, if patient is to return home or to an custodial, patient may benefit from [address] [place] [address] Dr services at that level of care to address safety awareness, problem solving, and memory strategies and supports. PLAN :  Recommendations:  -No further skilled acute therapy needed at this time. Patient may benefit from [address] [place] [address] Dr services at next level of care when supervision is decreased. Discharge Recommendations: Home with home health     SUBJECTIVE:   Patient stated I've been here for a couple of days.     OBJECTIVE:     Past Medical History:   Diagnosis Date    Arthritis     CAD (coronary artery disease)     Hard of hearing     Headache     Hypertension     PMR (polymyalgia rheumatica) (HCC)     Skin cancer     Sleep apnea     no longer uses cpap     Past Surgical History:   Procedure Laterality Date    HX CAROTID STENT      HX CHOLECYSTECTOMY  1998    HX HERNIA REPAIR  1980    MO CARDIAC SURG PROCEDURE UNLIST      cardiac stent    VASCULAR SURGERY PROCEDURE UNLIST      carotid endartectomy     Prior Level of Function/Home Situation: Home Situation  Home Environment: Private residence  # Steps to Enter: 2  Rails to Enter: No  One/Two Story Residence: One story  Living Alone: Yes  Support Systems: Child(ja)  Patient Expects to be Discharged to[de-identified] Assisted living  Current DME Used/Available at Home: uyen Pena  Mental Status:  Neurologic State: Alert, Confused  Orientation Level: Oriented to person, Oriented to place  Cognition: Follows commands, Memory loss  Perception: Appears intact        Motor Speech:  Oral-Motor Structure/Motor Speech  Face: No impairment  Labial: No impairment  Dentition: Partials (comment)(upper)  Oral Hygiene: good  Lingual: No impairment  Velum: Unable to visualize  Mandible: No impairment  Apraxic Characteristics: None  Dysarthric Characteristics: Imprecise  Intelligibility: No impairment  Language Comprehension and Expression:     Verbal Expression  Primary Mode of Expression: Verbal  Initiation: No impairment  Automatic Speech Task: No impairment  Repetition: Impaired  Word Repetition (%): 33 %  Phrase Repetition (%): 50 %  Sentence Repetition (%): 25 %  Repetition cueing type: Verbal  Naming: No impairment  Sentence Completion: No impairment  Sentence Formulation: No impairment  Conversation: Dysarthric  Speech Characteristics: Word retrieval  Interfering Components: Impaired thought organization  Effective Techniques: Cueing;Provide extra time  Overall Impairment: Mild-moderate  Cueing type: Verbal  Cueing amount:  Moderate        Neuro-Linguistics:     Memory: Impaired           Memory Exercises  Picture Recall Accuracy (%): 0 %  Digit/Word Span Length: 3  Digit/Word Span Accuracy (%): 50 %       Pain:  Pain Scale 1: Numeric (0 - 10)  Pain Intensity 1: 0       After treatment:   Patient left in no apparent distress in bed, Call bell within reach and Caregiver / family present    COMMUNICATION/EDUCATION:   Patient was educated regarding his deficit(s) of memory, word-finding, and dysarthria as this relates to his diagnosis.   He demonstrated Fair understanding as evidenced by asking questions, interest.    The patient's plan of care including recommendations, planned interventions, and recommended diet changes were discussed with: Physical therapist and Occupational therapist.       Thank you for this referral.  Tianna Moreno, SLP  Time Calculation: 24 mins

## 2021-04-22 NOTE — PROGRESS NOTES
Called son Arturo Solano to discuss discharge options since pt was denied at SNF by insurance. I informed him of his options at this point, which are:    Home with Home Health and [facility]  SNF and paying out of pocket. Son stated he would think about it and would be coming by this morning and we can discuss it further.

## 2021-04-22 NOTE — ADT AUTH CERT NOTES
Comments  Comment     Last edited by  on  at    Patient Demographics    Patient Name   Janice DIAZ   [id number] Sex   Male    1929 Address   [address] [place] [id number] Phone   [phone] [facility])   [phone] (Mobile) *Preferred*   Patient Demographics    Patient Name   Janice DIAZ   [id number] Sex   Male    1929 Address   [address] [place] [phone] Phone   [phone] (Home)   [phone] (Mobile) *Preferred*   CSN:   [id number]   Admit Date: Admit Time Room Bed   2021  9:16  [46544] 01 [24099]   Attending Providers    Provider Pager From To   Mary Kay Artis MD  21   Sachi Fay MD  21   Emergency Contact(s)    Name Relation Home Work Mobile   [address] [facility] Child [phone]     Utilization Reviews       Neurology [address] Day 3 (2021) by Bentley Treviño RN       Review Entered Review Status   2021 12:28 Completed      Criteria Review      Care Day: 3 Care Date: 2021 Level of Care: Inpatient Floor    Guideline Day 3    Level Of Care    ( ) * Activity level acceptable    ( ) * Complete discharge planning    Clinical Status    ( ) * No infection, or status acceptable    ( ) * Isolation not needed, or status acceptable    ( ) * Respiratory status acceptable    ( ) * Pain and nausea absent or adequately managed    ( ) * Ventilatory status acceptable    ( ) * Neurologic problems absent or stabilized    ( ) * Muscle or nerve damage absent or stable    ( ) * General Discharge Criteria met    Interventions    ( ) * Intake acceptable    ( ) * No inpatient interventions needed    * Milestone   Additional Notes   2021      ATTENDING NOTE:       Seen on follow-up is awake alert and appears more oriented this morning.  Sitting in the chair eating breakfast no acute distress patient had no issues overnight and rested well.  Waiting for skilled rehab placement.  With patient continued to have trigeminy episodes and with history of bradycardia while still being monitored radiology will start low-dose Coreg 3.125 twice daily and monitor for tolerance. Altered mental status:   -Patient presents with increased confusion over the last 24 hours possibly secondary to dehydration versus TIA   -CT scan of the head did not show any acute pathology just chronic changes   -Troponin x2 - we will monitor additional every 6 hours x3   -EKG shows sinus rhythm with multiple ventricular premature complexes with a prolonged OR interval with borderline prolonged QT and abnormal R wave progression    -Monitor on telemetry with neuro checks every 4 hours   -Continue current IV fluids normal saline at 75 cc an hour x1 day   -2D echo prelim shows preserved EF follow-up official results   -Patient appears improved with mentation had been stable overnight with no episodes of hallucinations or confusion.  Patient's dehydration has resolved and currently awaiting for skilled rehab placement       Possible TIA:   -Patient has history of stroke with no focal deficits but has been seen by vascular and has had right endarterectomy done in the past but only minimal disease noted on most recent carotid Dopplers   -Obtain lipid profile   -PT/OT/speech therapy evaluation and treatment and recommending skilled rehab so initially patient was scheduled for [facility] but currently patient will be screened for possible skilled rehab placement and will continue work with therapy during inpatient stay.    -No focal deficits noted on exam continue to monitor with neuro checks every 4 hours   -No aspirin noted on medication list and will place on 81 mg low-dose   -Discussed with patient's son about overnight events and offered MRI/MRA but with his age and not being a candidate for any intervention son did not feel benefit of testing but does wish for skilled rehab placement and possible [facility] placement soon after as patient is not safe to be at home and because of continued symptoms of confusion and gait ataxia change patient to inpatient status and continue monitor on telemetry   -4/21 patient appears improved mentation continues to work with therapy and awaiting skilled rehab placement       Dementia   -Vascular dementia and overnight patient had episode of sundowning and increased confusion and agitation and was needed and given a dose of Ativan.    -Patient had been good overnight and no issues initially was attempting Seroquel 25 mg at bedtime with without medication patient overall did well and so we will continue to monitor and placed on only as needed dosing           Dehydration:   -Patient noted to be on Lasix 20 mg oral daily and BUN/creatinine noted to be 21/0.85   -We will hold Lasix and start normal saline at 75 cc an hour x1 day   -Repeat labs show improvement in BUN but increasing creatinine to 1.2 continue current IV fluids monitor p.o. intake   -Repeat labs on 4/21 show resolution       Hypertension:   -Patient blood pressure was 128/56 on arrival but increased to 171/75 on arrival to the medical floor   -Placed on hydralazine 10 mg IV every 6 hours as needed for systolic blood pressure greater 414 and diastolic greater than 998   -We will start patient on Norvasc 5 mg a day with the first dose now but with continued elevated blood pressure will increase dosing to 10 mg daily and add 10 mg of lisinopril   -Cardiology consultation in a.m appreciated   -2D echo showed preserved EF of 6065% mildly dilated right atrium   -Continue to adjust medications as blood pressure still elevated   -Monitor vitals every 4 hours       BPH:   -Continue Flomax home dose       Hyperlipidemia:   -Obtain lipid profile which shows LDL in good range at 169 with total cholesterol 142 we will continue current dose of pravastatin       DVT prophylaxis:   -SCDs        CODE STATUS DNR DNI   -Patient designated decision-maker is his son Harjinder       Vitals Last 24 Hours:   Patient Vitals for the past 24 hrs:     Temp Pulse Resp BP SpO2   04/21/21 1139 97.5 °F (36.4 °C) 68 18 (!) 152/70 95 %   04/21/21 0707 98.4 °F (36.9 °C) 68 18 (!) 176/79 96 %   04/21/21 0400 98.5 °F (36.9 °C) 77 18 131/79 98 %   04/21/21 0000 97.9 °F (36.6 °C) 79 18 127/79 97 %   04/20/21 2000 98.5 °F (36.9 °C) 77 18 (!) 141/79 98 %   04/20/21 1804 98 °F (36.7 °C) 78 16 138/74 97 %   04/20/21 1600 - 72 - - -           I/O (24Hr):       Intake/Output Summary (Last 24 hours) at 4/21/2021 1323   Last data filed at 4/21/2021 0703   Gross per 24 hour   Intake 1275 ml   Output 1475 ml   Net -200 ml           Physical Exam:   General: Alert, cooperative, no distress, appears stated age. Head:   Normocephalic, without obvious abnormality, atraumatic. Eyes:   Conjunctivae/corneas clear. Pupils equal, round, reactive to light. Extraocular movements intact. Lungs:  Clear to auscultation bilaterally. no wheeze, rales, crackles, rhonchi    Chest wall: No tenderness or deformity. Heart:  Regular rate and rhythm, S1, S2 normal, no murmur, click, rub or gallop. Abdomen:  Soft, non-tender. Bowel sounds normal. No masses,  No organomegaly. Extremities: Extremities normal, atraumatic, no cyanosis or edema. Pulses: 2+ and symmetric all extremities.    Skin: Skin color, texture, turgor normal. No rashes or lesions   Neurologic: Awake, Alert, not oriented no obvious gross sensory or motor deficits      MEDS:       Current Facility-Administered Medications   Medication Dose Route Frequency   · [START ON 4/22/2021] levothyroxine (SYNTHROID) tablet 125 mcg 125 mcg Oral ACB   · carvediloL (COREG) tablet 3.125 mg 3.125 mg Oral BID WITH MEALS   · benzocaine-menthoL (CEPACOL) lozenge 1 Lozenge 1 Lozenge Mucous Membrane PRN   · trolamine salicylate (MYOFLEX) 10 % cream Topical TID   · amLODIPine (NORVASC) tablet 5 mg 5 mg Oral DAILY   · lisinopriL (PRINIVIL, ZESTRIL) tablet 10 mg 10 mg Oral DAILY   · sodium chloride (NS) flush 5-40 mL 5-40 mL IntraVENous Q8H   · sodium chloride (NS) flush 5-40 mL 5-40 mL IntraVENous PRN   · acetaminophen (TYLENOL) tablet 650 mg 650 mg Oral Q6H PRN     Or   · acetaminophen (TYLENOL) suppository 650 mg 650 mg Rectal Q6H PRN   · polyethylene glycol (MIRALAX) packet 17 g 17 g Oral DAILY PRN   · ondansetron (ZOFRAN) injection 4 mg 4 mg IntraVENous Q6H PRN   · multivitamin (ONE A DAY) tablet 1 Tab 1 Tab Oral DAILY   · pravastatin (PRAVACHOL) tablet 40 mg 40 mg Oral QHS   · tamsulosin (FLOMAX) capsule 0.4 mg 0.4 mg Oral DAILY   · hydrALAZINE (APRESOLINE) 20 mg/mL injection 10 mg 10 mg IntraVENous Q6H PRN   · rOPINIRole (REQUIP) tablet 0.25 mg 0.25 mg Oral QHS       LABS:       4/21/2021 08:42   WBC: 8.1   NRBC: 0.3   RBC: 5.12   HGB: 15.9   HCT: 46.6   MCV: 91.0   MCH: 31.0   MCHC: 34.1   RDW: 14.2   PLATELET: 926   MPV: 8.4   NEUTROPHILS: 66   LYMPHOCYTES: 22   MONOCYTES: 8   EOSINOPHILS: 3 (H)   BASOPHILS: 1   ABSOLUTE NRBC: 0.03   ABS. NEUTROPHILS: 5.4   ABS. LYMPHOCYTES: 1.8   ABS. MONOCYTES: 0.7   ABS. EOSINOPHILS: 0.2   ABS.  BASOPHILS: 0.1   Sodium: 140   Potassium: 4.3   Chloride: 105   CO2: 28   Anion gap: 7   Glucose: 105 (H)   BUN: 19   Creatinine: 0.97   BUN/Creatinine ratio: 20   Calcium: 9.0   GFR est non-AA: >60   GFR est AA: >60         Neurology [address] Day 2 (4/20/2021) by Donaldo Schulte RN       Review Entered Review Status   4/22/2021 12:25 Completed      Criteria Review      Care Day: 2 Care Date: 4/20/2021 Level of Care: Inpatient Floor    Guideline Day 2    Clinical Status    (X) * No ICU or intermediate care needs    * Milestone   Additional Notes   4/20/2021      ATTENDING NOTE:       Seen on follow-up is awake alert but not oriented patient son at bedside and overnight patient had increased sundowning symptoms where he had episode of agitation attempted to get out of bed was given a dose of Ativan.  Patient son does say he has episodes of hallucinations at home but generally resolve on their own. Nathaniel Sorto still not safe to be discharged home and rehab does recommend skilled placement and patient son is in agreement so awaiting skilled rehab placement and for this reason patient not safe to be discharged home and will be converted to inpatient status. Altered mental status:   -Patient presents with increased confusion over the last 24 hours possibly secondary to dehydration versus TIA   -CT scan of the head did not show any acute pathology just chronic changes   -Troponin x2 - we will monitor additional every 6 hours x3   -EKG shows sinus rhythm with multiple ventricular premature complexes with a prolonged NH interval with borderline prolonged QT and abnormal R wave progression    -Monitor on telemetry with neuro checks every 4 hours   -Continue current IV fluids normal saline at 75 cc an hour x1 day   -2D echo prelim shows preserved EF follow-up official results   -Reevaluate in a.m. after overnight IV fluids if continued symptoms then perform MRI MRA of the head and neck       Possible TIA:   -Patient has history of stroke with no focal deficits but has been seen by vascular and has had right endarterectomy done in the past but only minimal disease noted on most recent carotid Dopplers   -Obtain lipid profile   -PT/OT/speech therapy evaluation and treatment and recommending skilled rehab so initially patient was scheduled for [facility] but currently patient will be screened for possible skilled rehab placement and will continue work with therapy during inpatient stay.    -No focal deficits noted on exam continue to monitor with neuro checks every 4 hours   -No aspirin noted on medication list and will place on 81 mg low-dose   -Discussed with patient's son about overnight events and offered MRI/MRA but with his age and not being a candidate for any intervention son did not feel benefit of testing but does wish for skilled rehab placement and [facility] [address] [facility] soon after as patient is not safe to be at home and because of continued symptoms of confusion and gait ataxia change patient to inpatient status and continue monitor on telemetry       Dementia   -Vascular dementia and overnight patient had episode of sundowning and increased confusion and agitation and was needed and given a dose of Ativan.    -Patient mostly improved during the day but will start Seroquel 25 mg oral every afternoon and will monitor closely           Dehydration:   -Patient noted to be on Lasix 20 mg oral daily and BUN/creatinine noted to be 21/0.85   -We will hold Lasix and start normal saline at 75 cc an hour x1 day   -Repeat labs show improvement in BUN but increasing creatinine to 1.2 continue current IV fluids monitor p.o. intake   -Repeat labs in a.m.       Hypertension:   -Patient blood pressure was 128/56 on arrival but increased to 171/75 on arrival to the medical floor   -Placed on hydralazine 10 mg IV every 6 hours as needed for systolic blood pressure greater 536 and diastolic greater than 165   -We will start patient on Norvasc 5 mg a day with the first dose now but with continued elevated blood pressure will increase dosing to 10 mg daily and add 10 mg of lisinopril   -Cardiology consultation in a.m appreciated   -2D echo showed preserved EF of 6065% mildly dilated right atrium   -Monitor vitals every 4 hours       BPH:   -Continue Flomax home dose       Hyperlipidemia:   -Obtain lipid profile for now continue home dose of pravastatin       DVT prophylaxis:   -SCDs        CODE STATUS DNR DNI   -Patient designated decision-maker is his son Harjinder       Patient Vitals for the past 24 hrs:     Temp Pulse Resp BP SpO2   04/20/21 1200 - 70 - - -   04/20/21 0800 - 68 - - -   04/20/21 0729 97.7 °F (36.5 °C) 68 20 (!) 155/76 95 %   04/20/21 0400 - 69 - - -   04/19/21 2356 - 67 - - -   04/19/21 2326 97.8 °F (36.6 °C) 67 20 (!) 168/79 93 %   04/19/21 2000 97.8 °F (36.6 °C) 67 18 (!) 148/79 96 %           I/O (24Hr):       Intake/Output Summary (Last 24 hours) at 4/20/2021 1656   Last data filed at 4/20/2021 0911   Gross per 24 hour   Intake 1515 ml   Output 1150 ml   Net 365 ml           Physical Exam:   General: Alert, cooperative, no distress, appears stated age. Head:   Normocephalic, without obvious abnormality, atraumatic. Eyes:   Conjunctivae/corneas clear. Pupils equal, round, reactive to light. Extraocular movements intact. Lungs:  Clear to auscultation bilaterally. no wheeze, rales, crackles, rhonchi    Chest wall: No tenderness or deformity. Heart:  Regular rate and rhythm, S1, S2 normal, no murmur, click, rub or gallop. Abdomen:  Soft, non-tender. Bowel sounds normal. No masses,  No organomegaly. Extremities: Extremities normal, atraumatic, no cyanosis or edema. Pulses: 2+ and symmetric all extremities.    Skin: Skin color, texture, turgor normal. No rashes or lesions   Neurologic: Awake, Alert, not oriented no obvious gross sensory or motor deficits      MEDS:    Current Facility-Administered Medications   Medication Dose Route Frequency   · [START ON 4/21/2021] amLODIPine (NORVASC) tablet 5 mg 5 mg Oral DAILY   · lisinopriL (PRINIVIL, ZESTRIL) tablet 10 mg 10 mg Oral DAILY   · sodium chloride (NS) flush 5-40 mL 5-40 mL IntraVENous Q8H   · sodium chloride (NS) flush 5-40 mL 5-40 mL IntraVENous PRN   · acetaminophen (TYLENOL) tablet 650 mg 650 mg Oral Q6H PRN     Or   · acetaminophen (TYLENOL) suppository 650 mg 650 mg Rectal Q6H PRN   · polyethylene glycol (MIRALAX) packet 17 g 17 g Oral DAILY PRN   · ondansetron (ZOFRAN) injection 4 mg 4 mg IntraVENous Q6H PRN   · levothyroxine (SYNTHROID) tablet 100 mcg 100 mcg Oral ACB   · multivitamin (ONE A DAY) tablet 1 Tab 1 Tab Oral DAILY   · pravastatin (PRAVACHOL) tablet 40 mg 40 mg Oral QHS   · tamsulosin (FLOMAX) capsule 0.4 mg 0.4 mg Oral DAILY   · hydrALAZINE (APRESOLINE) 20 mg/mL injection 10 mg 10 mg IntraVENous Q6H PRN   · rOPINIRole (REQUIP) tablet 0.25 mg 0.25 mg Oral QHS      LABS:       4/20/2021 05:14   WBC: 7.0   NRBC: 0.5   RBC: 4.70   HGB: 14.5   HCT: 42.9   MCV: 91.3   MCH: 30.9 (L)   MCHC: 33.8   RDW: 14.3   PLATELET: 176   MPV: 8.6   NEUTROPHILS: 72   LYMPHOCYTES: 15 (L)   MONOCYTES: 10 (H)   EOSINOPHILS: 2   BASOPHILS: 1   ABSOLUTE NRBC: 0.04   ABS. NEUTROPHILS: 5.1   ABS. LYMPHOCYTES: 1.0   ABS. MONOCYTES: 0.7   ABS. EOSINOPHILS: 0.1   ABS.  BASOPHILS: 0.0   Sodium: 141   Potassium: 4.4   Chloride: 104   CO2: 28   Anion gap: 9   Glucose: 122 (H)   BUN: 23 (H)   Creatinine: 1.20   BUN/Creatinine ratio: 19   Calcium: 8.7   Magnesium: 2.1   GFR est non-AA: 57 (L)   GFR est AA: >60

## 2021-04-22 NOTE — PROGRESS NOTES
Problem: Falls - Risk of  Goal: *Absence of Falls  Description: Document Kacey Kwok Fall Risk and appropriate interventions in the flowsheet.   4/22/2021 1318 by Zainab Salas RN  Outcome: Resolved/Met  Note: Fall Risk Interventions:  Mobility Interventions: Communicate number of staff needed for ambulation/transfer    Mentation Interventions: Adequate sleep, hydration, pain control    Medication Interventions: Bed/chair exit alarm    Elimination Interventions: Bed/chair exit alarm           4/22/2021 0746 by Zainab Salas RN  Outcome: Progressing Towards Goal  Note: Fall Risk Interventions:  Mobility Interventions: Communicate number of staff needed for ambulation/transfer, Bed/chair exit alarm, OT consult for ADLs, Patient to call before getting OOB, PT Consult for assist device competence, Strengthening exercises (ROM-active/passive), Utilize walker, cane, or other assistive device    Mentation Interventions: Adequate sleep, hydration, pain control, Door open when patient unattended    Medication Interventions: Bed/chair exit alarm, Patient to call before getting OOB, Teach patient to arise slowly    Elimination Interventions: Bed/chair exit alarm, Call light in reach, Patient to call for help with toileting needs, Stay With Me (per policy), Toilet paper/wipes in reach, Toileting schedule/hourly rounds, Urinal in reach

## 2021-04-22 NOTE — ROUTINE PROCESS
Patient discharged home, son at bedside, all instructions and follow up appointments reviewed and all belongings with son.

## 2021-04-22 NOTE — PROGRESS NOTES
Visit attempted for patient in 2 acute care [place] [id number] during rounds. Staff was providing care for the patient in the room. There was one visitor inside the room. I provided silent support and prayer. Chaplains will follow up if further referrals are requested. Chaplain Lelo Sanchez M.Div.    can be reached by calling the  at [facility]  [phone]

## 2021-04-22 NOTE — DISCHARGE SUMMARY
Discharge Summary       PATIENT ID: Chelsea Hoyos Sr. MRN: [id number]   YOB: 1929    DATE OF ADMISSION: 4/19/2021  9:16 AM    DATE OF DISCHARGE: 04/22/2021   PRIMARY CARE PROVIDER: Jose Gamez MD     ATTENDING PHYSICIAN: Jordan Lynne  DISCHARGING PROVIDER: Ijeoma Marcano MD    To contact this individual call [phone] and ask the  to page. If unavailable ask to be transferred the Adult Hospitalist Department. CONSULTATIONS: IP CONSULT TO CARDIOLOGY    PROCEDURES/SURGERIES: * No surgery found *    ADMITTING [address] [place] [address] COURSE:     HPI  Chelsea Hoyos Sr. is a 80 y. o. male followed by Marek Spence a past medical history of Arthritis, CAD (coronary artery disease), Hard of hearing, Headache, Hypertension, PMR (polymyalgia rheumatica) (HCC), Skin cancer, and Sleep apnea.  Who presents from home with increased ataxia and confusion since yesterday.  Patient has history of stroke November last year and normally can walk with a walker but since yesterday after coming back from [facility] was noted by patient sounds to have increased confusion and then this morning it was noted that patient was having trouble with his gait.  Patient son states that on standing he does complain of dizziness but on orthostatic check was found to be negative.  Patient has no focal deficits and only lab abnormalities mild dehydration and the patient states she has a good appetite.  Patient recently was admitted to our service for bradycardia for which patient was taken off his metoprolol dosing since has been followed by cardiology and Holter monitor was placed which showed sinus bradycardia with evidence of PVCs.  Patient since then has done well and no further issues until today.  Patient son is concerned about him being on his own and mentioned about possibility of placing patient in the [facility].    On evaluation in the emergency room CT scan of the head did not show any acute changes but that showed chronic microvascular ischemic changes and a chronic lacunar infarct in the posterior limb of the right internal capsule.  He does have history of temporal arthritis but denies any headache or vision disturbances denies any chest pain nausea vomiting or diarrhea or sick contacts.  With patient's current symptoms patient was referred for observation on telemetry for altered mental status secondary to possible TIA versus dehydration    Hospital Course  1. Altered mental status  Patient presents with increased confusion and ataxia. CT of the head did not show any acute pathology. Patient also with some dehydration on presentation. Further work-up for TIA includes 2D echo which shows preserved EF. Carotic Doppler shows left-sided disease noted with severe ECA and mild ICA stenosis. Prior right carotic endarterectomy with patent flow. Discussed with patient's son regarding brain MRI but with his age and not being a candidate for any intervention, son did not feel benefit of testing. Patient improved with his mentation. Patient was evaluated by physical therapy, plan is to place patient at an assisted living facility after discharge to home. 2.  Hypertension  Patient needed adjustment of antihypertensives including addition of Norvasc and lisinopril. Patient to follow-up with PCP as outpatient for reevaluation. 3.  PVCs  Patient was evaluated by cardiology and started on low-dose Coreg however patient noted to have bradycardia and Coreg was discontinued. Patient to follow-up with PCP/cardiology as indicated. 4.  Hypothyroidism  Patient's TSH was elevated, Synthroid dose increased from 100 mcg to 125 mcg. Follow-up TSH in 3 to 4 weeks recommended. DISCHARGE DIAGNOSES / PLAN:      1. Altered mental status  2. Hypertension  3. PVCs  4. Hypothyroidism  5. Dementia     ADDITIONAL CARE RECOMMENDATIONS:     Follow-up with PCP in 1 week.     PENDING TEST RESULTS:   At the time of discharge the following test results are still pending: None    FOLLOW UP APPOINTMENTS:    Follow-up Information     Follow up With Specialties Details Why James Fraga MD Family Medicine On 4/26/2021 @ 11:45 [address] [facility]  [phone]               DIET: Cardiac Diet  Oral Nutritional Supplements: No Oral Supplement prescribed    ACTIVITY: Activity as tolerated    WOUND CARE: None    EQUIPMENT needed: None      DISCHARGE MEDICATIONS:  Current Discharge Medication List      START taking these medications    Details   amLODIPine (NORVASC) 5 mg tablet Take 1 Tab by mouth daily. Qty: 30 Tab, Refills: 0      lisinopriL (PRINIVIL, ZESTRIL) 10 mg tablet Take 1 Tab by mouth daily. Qty: 30 Tab, Refills: 0         CONTINUE these medications which have CHANGED    Details   levothyroxine (SYNTHROID) 125 mcg tablet Take 1 Tab by mouth Daily (before breakfast). Qty: 30 Tab, Refills: 0         CONTINUE these medications which have NOT CHANGED    Details   omega 3-dha-epa-fish oil (Fish Oil) 100-160-1,000 mg cap Fish Oil   2 daily      rOPINIRole (REQUIP) 0.25 mg tablet Take 0.25 mg by mouth nightly. tamsulosin (FLOMAX) 0.4 mg capsule Take 0.4 mg by mouth daily. pravastatin (PRAVACHOL) 40 mg tablet Take 40 mg by mouth nightly. multivitamin (ONE A DAY) tablet Take 1 Tab by mouth daily. STOP taking these medications       furosemide (LASIX) 20 mg tablet Comments:   Reason for Stopping:                 NOTIFY YOUR PHYSICIAN FOR ANY OF THE FOLLOWING:   Fever over 101 degrees for 24 hours. Chest pain, shortness of breath, fever, chills, nausea, vomiting, diarrhea, change in mentation, falling, weakness, bleeding. Severe pain or pain not relieved by medications. Or, any other signs or symptoms that you may have questions about.     DISPOSITION:    Home With:   OT  PT  HH  RN       Long term SNF/Inpatient Rehab    Independent/assisted living    Hospice    Other: PATIENT CONDITION AT DISCHARGE:     Functional status    Poor     Deconditioned     Independent      Cognition     Lucid     Forgetful     Dementia      Catheters/lines (plus indication)    Gaspar     PICC     PEG     None      Code status     Full code     DNR      PHYSICAL EXAMINATION AT DISCHARGE:  General:          Alert, cooperative, no distress, appears stated age. HEENT:           Atraumatic, anicteric sclerae, pink conjunctivae                          No oral ulcers, mucosa moist, throat clear, dentition fair  Neck:               Supple, symmetrical  Lungs:             Clear to auscultation bilaterally. No Wheezing or Rhonchi. No rales. Chest wall:      No tenderness  No Accessory muscle use. Heart:              Regular  rhythm,  No  murmur   No edema  Abdomen:        Soft, non-tender. Not distended. Bowel sounds normal  Extremities:     No cyanosis. No clubbing,                            Skin turgor normal, Capillary refill normal  Skin:                Not pale. Not Jaundiced  No rashes   Psych:             Not anxious or agitated.   Neurologic:      Alert, moves all extremities, answers questions appropriately and responds to commands       CHRONIC MEDICAL DIAGNOSES:  Problem List as of 4/22/2021 Date Reviewed: 12/19/2020          Codes Class Noted - Resolved    Ataxia ICD-10-CM: R27.0  ICD-9-CM: 781.3  4/20/2021 - Present        * (Principal) Abnormal gait ICD-10-CM: R26.9  ICD-9-CM: 781.2  4/19/2021 - Present        Altered mental status ICD-10-CM: R41.82  ICD-9-CM: 780.97  4/19/2021 - Present        Dehydration ICD-10-CM: E86.0  ICD-9-CM: 276.51  4/19/2021 - Present        Bradycardia ICD-10-CM: R00.1  ICD-9-CM: 427.89  11/30/2020 - Present        Acute CVA (cerebrovascular accident) [facility]) ICD-10-CM: I63.9  ICD-9-CM: 434.91  11/15/2020 - Present        Giant cell arteritis with polymyalgia rheumatica ([facility] [address] ICD-10-CM: M31.5  ICD-9-CM: 446.5, 725  8/14/2018 - Present        Long term (current) use of systemic steroids ICD-10-CM: Z79.52  ICD-9-CM: V58.65  8/14/2018 - Present        Long-term use of immunosuppressant medication ICD-10-CM: Z79.899  ICD-9-CM: V58.69  8/14/2018 - Present        PMR (polymyalgia rheumatica) (HCC) ICD-10-CM: M35.3  ICD-9-CM: 235  8/14/2018 - Present        Chronic headaches ICD-10-CM: R51.9, G89.29  ICD-9-CM: 784.0  7/20/2018 - Present              Greater than 60 minutes were spent with the patient on counseling and coordination of care    Signed:   Zuleika Herrera MD  4/22/2021  12:22 PM

## 2021-05-23 ENCOUNTER — HOSPITAL ENCOUNTER (OUTPATIENT)
Age: 86
Setting detail: OBSERVATION
Discharge: HOME HEALTH | End: 2021-05-24
Attending: EMERGENCY MEDICINE | Admitting: FAMILY MEDICINE
Payer: MEDICARE

## 2021-05-23 DIAGNOSIS — N39.0 URINARY TRACT INFECTION WITH HEMATURIA, SITE UNSPECIFIED: Primary | ICD-10-CM

## 2021-05-23 DIAGNOSIS — R31.9 URINARY TRACT INFECTION WITH HEMATURIA, SITE UNSPECIFIED: Primary | ICD-10-CM
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
## 2021-05-23 LAB
ANION GAP SERPL CALC-SCNC: 8 MMOL/L (ref 5–15)
APPEARANCE UR: CLEAR
BACTERIA URNS QL MICRO: ABNORMAL /HPF
BASOPHILS # BLD: 0.1 K/UL (ref 0–0.2)
BASOPHILS NFR BLD: 1 % (ref 0–2.5)
BILIRUB UR QL: NEGATIVE
BUN SERPL-MCNC: 21 MG/DL (ref 6–20)
BUN/CREAT SERPL: 16 (ref 12–20)
CA-I BLD-MCNC: 9 MG/DL (ref 8.5–10.1)
CHLORIDE SERPL-SCNC: 105 MMOL/L (ref 97–108)
CO2 SERPL-SCNC: 27 MMOL/L (ref 21–32)
COLOR UR: ABNORMAL
CREAT SERPL-MCNC: 1.35 MG/DL (ref 0.7–1.3)
EOSINOPHIL # BLD: 0.1 K/UL (ref 0–0.7)
EOSINOPHIL NFR BLD: 2 % (ref 0.9–2.9)
ERYTHROCYTE [DISTWIDTH] IN BLOOD BY AUTOMATED COUNT: 14.4 % (ref 11.5–14.5)
GLUCOSE SERPL-MCNC: 131 MG/DL (ref 65–100)
GLUCOSE UR STRIP.AUTO-MCNC: NEGATIVE MG/DL
HCT VFR BLD AUTO: 42.2 % (ref 41–53)
HGB BLD-MCNC: 14.5 G/DL (ref 13.5–17.5)
HGB UR QL STRIP: ABNORMAL
KETONES UR QL STRIP.AUTO: NEGATIVE MG/DL
LEUKOCYTE ESTERASE UR QL STRIP.AUTO: ABNORMAL
LYMPHOCYTES # BLD: 1.3 K/UL (ref 1–4.8)
LYMPHOCYTES NFR BLD: 18 % (ref 20.5–51.1)
MCH RBC QN AUTO: 30.8 PG (ref 31–34)
MCHC RBC AUTO-ENTMCNC: 34.3 G/DL (ref 31–36)
MCV RBC AUTO: 89.7 FL (ref 80–100)
MONOCYTES # BLD: 0.6 K/UL (ref 0.2–2.4)
MONOCYTES NFR BLD: 8 % (ref 1.7–9.3)
MUCOUS THREADS URNS QL MICRO: ABNORMAL /LPF
NEUTS SEG # BLD: 5.1 K/UL (ref 1.8–7.7)
NEUTS SEG NFR BLD: 71 % (ref 42–75)
NITRITE UR QL STRIP.AUTO: NEGATIVE
NRBC # BLD: 0.01 K/UL
NRBC BLD-RTO: 0.1 PER 100 WBC
PH UR STRIP: 6.5 [PH] (ref 5–8)
PLATELET # BLD AUTO: 254 K/UL (ref 150–400)
PMV BLD AUTO: 8 FL (ref 6.5–11.5)
POTASSIUM SERPL-SCNC: 3.8 MMOL/L (ref 3.5–5.1)
PROT UR STRIP-MCNC: 30 MG/DL
RBC # BLD AUTO: 4.7 M/UL (ref 4.5–5.9)
RBC #/AREA URNS HPF: >100 /HPF (ref 0–3)
SODIUM SERPL-SCNC: 140 MMOL/L (ref 136–145)
SP GR UR REFRACTOMETRY: 1.01 (ref 1–1.03)
UROBILINOGEN UR QL STRIP.AUTO: 0.2 EU/DL (ref 0.2–1)
WBC # BLD AUTO: 7.1 K/UL (ref 4.4–11.3)
WBC URNS QL MICRO: ABNORMAL /HPF (ref 0–5)

## 2021-05-23 PROCEDURE — 80048 BASIC METABOLIC PNL TOTAL CA: CPT

## 2021-05-23 PROCEDURE — 99284 EMERGENCY DEPT VISIT MOD MDM: CPT

## 2021-05-23 PROCEDURE — 99218 HC RM OBSERVATION: CPT

## 2021-05-23 PROCEDURE — 74011000258 HC RX REV CODE- 258: Performed by: EMERGENCY MEDICINE

## 2021-05-23 PROCEDURE — 87086 URINE CULTURE/COLONY COUNT: CPT

## 2021-05-23 PROCEDURE — 74011250636 HC RX REV CODE- 250/636: Performed by: NURSE PRACTITIONER

## 2021-05-23 PROCEDURE — 74011250636 HC RX REV CODE- 250/636: Performed by: EMERGENCY MEDICINE

## 2021-05-23 PROCEDURE — 96374 THER/PROPH/DIAG INJ IV PUSH: CPT

## 2021-05-23 PROCEDURE — 85025 COMPLETE CBC W/AUTO DIFF WBC: CPT

## 2021-05-23 PROCEDURE — 81001 URINALYSIS AUTO W/SCOPE: CPT

## 2021-05-23 PROCEDURE — 36415 COLL VENOUS BLD VENIPUNCTURE: CPT

## 2021-05-23 RX ORDER — SODIUM CHLORIDE 0.9 % (FLUSH) 0.9 %
5-40 SYRINGE (ML) INJECTION EVERY 8 HOURS
Status: DISCONTINUED | OUTPATIENT
Start: 2021-05-23 | End: 2021-05-24 | Stop reason: HOSPADM

## 2021-05-23 RX ORDER — POLYETHYLENE GLYCOL 3350 17 G/17G
17 POWDER, FOR SOLUTION ORAL DAILY PRN
Status: DISCONTINUED | OUTPATIENT
Start: 2021-05-23 | End: 2021-05-24 | Stop reason: HOSPADM

## 2021-05-23 RX ORDER — ONDANSETRON 2 MG/ML
4 INJECTION INTRAMUSCULAR; INTRAVENOUS
Status: DISCONTINUED | OUTPATIENT
Start: 2021-05-23 | End: 2021-05-24 | Stop reason: HOSPADM

## 2021-05-23 RX ORDER — SODIUM CHLORIDE 9 MG/ML
75 INJECTION, SOLUTION INTRAVENOUS CONTINUOUS
Status: DISCONTINUED | OUTPATIENT
Start: 2021-05-23 | End: 2021-05-24 | Stop reason: HOSPADM

## 2021-05-23 RX ORDER — ACETAMINOPHEN 325 MG/1
650 TABLET ORAL
Status: DISCONTINUED | OUTPATIENT
Start: 2021-05-23 | End: 2021-05-24 | Stop reason: HOSPADM

## 2021-05-23 RX ORDER — PROMETHAZINE HYDROCHLORIDE 25 MG/1
12.5 TABLET ORAL
Status: DISCONTINUED | OUTPATIENT
Start: 2021-05-23 | End: 2021-05-24 | Stop reason: HOSPADM

## 2021-05-23 RX ORDER — ACETAMINOPHEN 650 MG/1
650 SUPPOSITORY RECTAL
Status: DISCONTINUED | OUTPATIENT
Start: 2021-05-23 | End: 2021-05-24 | Stop reason: HOSPADM

## 2021-05-23 RX ORDER — SODIUM CHLORIDE 0.9 % (FLUSH) 0.9 %
5-40 SYRINGE (ML) INJECTION AS NEEDED
Status: DISCONTINUED | OUTPATIENT
Start: 2021-05-23 | End: 2021-05-24 | Stop reason: HOSPADM

## 2021-05-23 RX ADMIN — Medication 10 ML: at 21:52

## 2021-05-23 RX ADMIN — SODIUM CHLORIDE 75 ML/HR: 9 INJECTION, SOLUTION INTRAVENOUS at 21:52

## 2021-05-23 RX ADMIN — CEFTRIAXONE SODIUM 1 G: 1 INJECTION, POWDER, FOR SOLUTION INTRAMUSCULAR; INTRAVENOUS at 18:59

## 2021-05-23 NOTE — H&P
HOSPITALIST [id number]      NAME: Daintza Goldstein.   :  1929   MRN:  [id number]     Date/Time:  2021 6:52 PM    Patient PCP: Jaky Schaefer MD       Subjective:       CHIEF COMPLAINT: Weakness; Fall    HISTORY OF PRESENT ILLNESS:       Leanne Dahl is a 80 y.o.  male who presents with a fall. Patient lives alone. Per medical records he has a history of ataxia and difficulty ambulating. He was apparently ambulating down his street using a walker. He attempted to ambulate onto a grassy surface and fell while trying to navigate this transition. There was apparently report seeing him fall backwards onto his buttocks. Patient states that her had a stroke in 2020 that left his with some right sided weakness. To get his strength better he goes to the [facility] and walks daily. Patient reports feeling generally weak over the past 1-2 days. Denies focal weakness, headache, difficulty with speech, paresthesias, GI or  complaints. We were asked to admit for work up and evaluation of the above problems. We will bring patient in on observational status for fall, acute kidney insufficiency, and dehydration. He will be evaluated by physical therapy in a.m.     Past Medical History:   Diagnosis Date    Arthritis     CAD (coronary artery disease)     Hard of hearing     Headache     Hypertension     PMR (polymyalgia rheumatica) (HCC)     Skin cancer     Sleep apnea     no longer uses cpap        Past Surgical History:   Procedure Laterality Date    HX CAROTID STENT      HX CHOLECYSTECTOMY      HX HERNIA REPAIR      [place] CARDIAC SURG PROCEDURE UNLIST      cardiac stent    VASCULAR SURGERY PROCEDURE UNLIST      carotid endartectomy       Social History     Tobacco Use    Smoking status: Never Smoker    Smokeless tobacco: Never Used   Substance Use Topics    Alcohol use: No        Family History   Problem Relation Age of Onset   Yuriy Irizarry Arthritis-rheumatoid Mother     Kidney Disease Mother     Cancer Father         Stomach    Gout Son        Allergies   Allergen Reactions    Pcn [Penicillins] Swelling        Prior to Admission medications    Medication Sig Start Date End Date Taking? Authorizing Provider   levothyroxine (SYNTHROID) 125 mcg tablet Take 1 Tab by mouth Daily (before breakfast). 4/23/21   Courtney Resendiz MD   amLODIPine (NORVASC) 5 mg tablet Take 1 Tab by mouth daily. 4/23/21   Courtney Resendiz MD   lisinopriL (PRINIVIL, ZESTRIL) 10 mg tablet Take 1 Tab by mouth daily. 4/23/21   Courtney Resendiz MD   omega 8-nqj-rac-fish oil (Fish Oil) 100-160-1,000 mg cap Fish Oil   2 daily    Other, MD Elio   rOPINIRole (REQUIP) 0.25 mg tablet Take 0.25 mg by mouth nightly. Provider, Historical   tamsulosin (FLOMAX) 0.4 mg capsule Take 0.4 mg by mouth daily. Provider, Historical   pravastatin (PRAVACHOL) 40 mg tablet Take 40 mg by mouth nightly. Provider, Historical   multivitamin (ONE A DAY) tablet Take 1 Tab by mouth daily. Provider, Historical       Immunizations are UTD per pt. Patient will be admitted for No admission diagnoses are documented for this encounter. to hospitalist service as Observation and evaluated daily via physical assessment, diagnostic testing, and laboratory assessment. LAB DATA REVIEWED:      Recent Labs     05/23/21  1721   WBC 7.1   HGB 14.5   HCT 42.2        Recent Labs     05/23/21  1721      K 3.8      CO2 27   BUN 21*   CREA 1.35*   *   CA 9.0     No results for input(s): ALT, AP, TBIL, TBILI, TP, ALB, GLOB, GGT, AML, LPSE in the last 72 hours. No lab exists for component: SGOT, GPT, AMYP, HLPSE  No results for input(s): INR, PTP, APTT, INREXT in the last 72 hours. No results for input(s): FE, TIBC, PSAT, FERR in the last 72 hours.    Lab Results   Component Value Date/Time    Folate 48.0 (H) 11/15/2020 04:25 AM      No results for input(s): PH, PCO2, PO2 in the last 72 hours. No results for input(s): CPK, CKNDX, TROIQ in the last 72 hours. No lab exists for component: CPKMB  Lab Results   Component Value Date/Time    Cholesterol, total 142 04/19/2021 10:23 AM    HDL Cholesterol 37 04/19/2021 10:23 AM    LDL, calculated 69.4 04/19/2021 10:23 AM    Triglyceride 178 (H) 04/19/2021 10:23 AM    CHOL/HDL Ratio 3.8 04/19/2021 10:23 AM     Lab Results   Component Value Date/Time    Glucose (POC) 100 04/19/2021 09:23 AM     Lab Results   Component Value Date/Time    Color Yellow/Straw 05/23/2021 05:30 PM    Appearance Clear 05/23/2021 05:30 PM    Specific gravity 1.015 05/23/2021 05:30 PM    Specific gravity 1.021 11/14/2020 11:23 PM    pH (UA) 6.5 05/23/2021 05:30 PM    Protein 30 (A) 05/23/2021 05:30 PM    Glucose Negative 05/23/2021 05:30 PM    Ketone Negative 05/23/2021 05:30 PM    Bilirubin Negative 05/23/2021 05:30 PM    Urobilinogen 0.2 05/23/2021 05:30 PM    Nitrites Negative 05/23/2021 05:30 PM    Leukocyte Esterase Trace (A) 05/23/2021 05:30 PM    Epithelial cells FEW 11/14/2020 11:23 PM    Bacteria 2+ (A) 05/23/2021 05:30 PM    WBC 10-20 05/23/2021 05:30 PM    RBC >100 (H) 05/23/2021 05:30 PM     arterial blood gases  CT Results  (Last 48 hours)    None              REVIEW OF SYSTEMS:       Total of 12 systems reviewed as follows:         General:  Reports generalized weakness over the past 1-2 days.      Eyes:    Denies blurred vision, eye pain, loss of vision, double vision    ENT:    Denies rhinorrhea, pharyngitis     Respiratory:   Denies cough, sputum production, SOB, HO, wheezing, pleuritic pain     Cardiology:   Denies chest pain, palpitations, orthopnea, PND, edema, syncope     Gastrointestinal:  Denies abdominal pain , N/V, diarrhea, dysphagia, constipation, bleeding     Genitourinary:  Denies frequency, urgency, dysuria, hematuria, incontinence     Muskuloskeletal :  Denies arthralgia, myalgia, back pain    Hematology:  Denies easy bruising, nose or gum bleeding, lymphadenopathy     Dermatological: Denies rash, ulceration, pruritis, color change / jaundice    Endocrine:   Denies hot flashes or polydipsia     Neurological:  Reports chronic gait difficulty    Psychological: Denies Feelings of anxiety, depression, agitation    Objective:     VITALS:    Visit Vitals  BP (!) 152/70   Pulse 63   Temp 98 °F (36.7 °C)   Resp 18   Ht 6' (1.829 m)   Wt 97.5 kg (215 lb)   SpO2 94%   BMI 29.16 kg/m²       PHYSICAL EXAM:    General:    Elderly  man. Alert & oriented x3, W/D, W/N cooperative, no distress, appears stated age. HEENT: Atraumatic, anicteric sclerae, pink conjunctivae EOMs intact. No oral ulcers, mucosa moist, throat clear, dentition fair    Neck: Supple, symmetrical, thyroid: nontender, midline, without nodules and/or masses. NO JVD noted    Lungs:   Clear lung sounds noted throughout all lung fields. No adventitious lung sounds noted. Clear to auscultation bilaterally. Chest wall:  No tenderness. No Accessory muscle use. Heart:  Regular rate and rhythm, S1, S2. No  murmurs, gallops, and/or rubs. Cardiac rhythm normal sinus rhythm    Abdomen:   Soft, non-tender. Not distended. Bowel sounds normal    Extremities: No cyanosis. No clubbing, Neurovascularly intact. Skin turgor normal, Capillary refill normal, Radial dial pulse 2+    Skin:     Not pale. Not Jaundiced. No rashes     Psych:  Good insight. Not depressed. Not anxious or agitated. Neurologic: CN 2-12 intact. No facial asymmetry. No aphasia or slurred speech. Symmetrical strength, Sensation grossly intact. Alert and oriented X 4.       Data Review:      Review and/or order of clinical lab test      Labs/ Imaging:     Recent Labs     05/23/21  1721   WBC 7.1   HGB 14.5   HCT 42.2        Recent Labs     05/23/21  1721      K 3.8      CO2 27   BUN 21*   CREA 1.35*   *   CA 9.0 No results for input(s): ALT, AP, TBIL, TBILI, TP, ALB, GLOB, GGT, AML, LPSE in the last 72 hours. No lab exists for component: SGOT, GPT, AMYP, HLPSE  No results for input(s): INR, PTP, APTT, INREXT in the last 72 hours. No results for input(s): FE, TIBC, PSAT, FERR in the last 72 hours. Lab Results   Component Value Date/Time    Folate 48.0 (H) 11/15/2020 04:25 AM      No results for input(s): PH, PCO2, PO2 in the last 72 hours. No results for input(s): CPK, CKNDX, TROIQ in the last 72 hours. No lab exists for component: CPKMB  Lab Results   Component Value Date/Time    Cholesterol, total 142 04/19/2021 10:23 AM    HDL Cholesterol 37 04/19/2021 10:23 AM    LDL, calculated 69.4 04/19/2021 10:23 AM    Triglyceride 178 (H) 04/19/2021 10:23 AM    CHOL/HDL Ratio 3.8 04/19/2021 10:23 AM     Lab Results   Component Value Date/Time    Glucose (POC) 100 04/19/2021 09:23 AM     Lab Results   Component Value Date/Time    Color Yellow/Straw 05/23/2021 05:30 PM    Appearance Clear 05/23/2021 05:30 PM    Specific gravity 1.015 05/23/2021 05:30 PM    Specific gravity 1.021 11/14/2020 11:23 PM    pH (UA) 6.5 05/23/2021 05:30 PM    Protein 30 (A) 05/23/2021 05:30 PM    Glucose Negative 05/23/2021 05:30 PM    Ketone Negative 05/23/2021 05:30 PM    Bilirubin Negative 05/23/2021 05:30 PM    Urobilinogen 0.2 05/23/2021 05:30 PM    Nitrites Negative 05/23/2021 05:30 PM    Leukocyte Esterase Trace (A) 05/23/2021 05:30 PM    Epithelial cells FEW 11/14/2020 11:23 PM    Bacteria 2+ (A) 05/23/2021 05:30 PM    WBC 10-20 05/23/2021 05:30 PM    RBC >100 (H) 05/23/2021 05:30 PM           Medications Reviewed:     Current Facility-Administered Medications   Medication Dose Route Frequency    cefTRIAXone (ROCEPHIN) 1 g in 0.9% sodium chloride (MBP/ADV) 50 mL MBP  1 g IntraVENous NOW     Current Outpatient Medications   Medication Sig    levothyroxine (SYNTHROID) 125 mcg tablet Take 1 Tab by mouth Daily (before breakfast).     amLODIPine (NORVASC) 5 mg tablet Take 1 Tab by mouth daily.  lisinopriL (PRINIVIL, ZESTRIL) 10 mg tablet Take 1 Tab by mouth daily.  omega 3-dha-epa-fish oil (Fish Oil) 100-160-1,000 mg cap Fish Oil   2 daily    rOPINIRole (REQUIP) 0.25 mg tablet Take 0.25 mg by mouth nightly.  tamsulosin (FLOMAX) 0.4 mg capsule Take 0.4 mg by mouth daily.  pravastatin (PRAVACHOL) 40 mg tablet Take 40 mg by mouth nightly.  multivitamin (ONE A DAY) tablet Take 1 Tab by mouth daily. ______________________________________________________________________    Given the patient's current clinical presentation, I have a high level of concern for decompensation if discharged from the emergency department. Complex decision making was performed, which includes reviewing the patient's available past medical records, laboratory results, radiological results, and subspecialty consults. My assessment of this patient's clinical condition and my plan of care is as follows. Assessment / Plan:  1. Mild acute renal insufficiency  2. Dehydration  Creatinine 1.35  Patient report poor p.o. intake  IV fluids for rehydration  Encourage fluid by mouth  Labs in the a.m. Hold nephrotoxic medications    3. Fall secondary to gait dysfunction  Ambulate with assistance  Consult physical therapy for eval and treat  Initiate safety precautions    4. Asymptomatic bacteriuria  Urinalysis not confirmatory for UTI  Urine culture pending    5. Hypertension  Blood pressure 152/70  Monitor vital signs according to protocol  Continue home Norvasc; consider increasing dose to 10 mg  A.m. labs    Labs: CBC, BMP    Activity: As tolerated with assistance, physical therapy consult in the a.m.     DVT Prophylaxis: SCDs    GI Prophylaxis: Not applicable    Code Status: Full code    Surrogate Decision Maker: Emmie Jacobo [phone]         _______________________________________________________________________  Care Plan discussed with: Comments   Patient x    Family      RN     Care Manager                    Consultant:      _______________________________________________________________________      Expected  Disposition:   Home with Family    HH/PT/OT/RN x   SNF/LTC      ________________________________________________________________________  TOTAL TIME:      Total time spent with patient 35 minutes with more than 50% of time dedicated coordination of care and educating patient and family. ________________________________________________________________________  Signed: Vinnie Mccann NP    Procedures: see electronic medical records for all procedures/Xrays and details which were not copied into this note but were reviewed prior to creation of Plan.

## 2021-05-23 NOTE — ED PROVIDER NOTES
HPI   Patient lives alone. Per medical records he has a history of ataxia and difficulty ambulating. He was apparently ambulating down his street using a walker. He attempted to ambulate onto a grassy surface and fell while trying to navigate this transition. There was apparently report seeing him fall backwards onto his buttocks. Patient has 2 small skin tears on each upper extremity that were bandaged by EMS. Patient reports feeling generally weak over the last 12 to 24 hours. Denies focal weakness, headache, difficulty with speech, paresthesias, GI or  complaints.   Past Medical History:   Diagnosis Date    Arthritis     CAD (coronary artery disease)     Hard of hearing     Headache     Hypertension     PMR (polymyalgia rheumatica) (HCC)     Skin cancer     Sleep apnea     no longer uses cpap       Past Surgical History:   Procedure Laterality Date    HX CAROTID STENT      HX CHOLECYSTECTOMY  1998    HX HERNIA REPAIR  1980    RI CARDIAC SURG PROCEDURE UNLIST      cardiac stent    VASCULAR SURGERY PROCEDURE UNLIST      carotid endartectomy         Family History:   Problem Relation Age of Onset    Arthritis-rheumatoid Mother     Kidney Disease Mother     Cancer Father         Stomach    Gout Son        Social History     Socioeconomic History    Marital status:      Spouse name: Not on file    Number of children: Not on file    Years of education: Not on file    Highest education level: Not on file   Occupational History    Not on file   Tobacco Use    Smoking status: Never Smoker    Smokeless tobacco: Never Used   Substance and Sexual Activity    Alcohol use: No    Drug use: Never    Sexual activity: Not on file   Other Topics Concern    Not on file   Social History Narrative    Not on file     Social Determinants of Health     Financial Resource Strain:     Difficulty of Paying Living Expenses:    Food Insecurity:     Worried About Running Out of Food in the Last Year:     Ran Out of Food in the Last Year:    Transportation Needs:     Lack of Transportation (Medical):  Lack of Transportation (Non-Medical):    Physical Activity:     Days of Exercise per Week:     Minutes of Exercise per Session:    Stress:     Feeling of Stress :    Social Connections:     Frequency of Communication with Friends and Family:     Frequency of Social Gatherings with Friends and Family:     Attends Mandaeism Services:     Active Member of Clubs or Organizations:     Attends Club or Organization Meetings:     Marital Status:    Intimate Partner Violence:     Fear of Current or Ex-Partner:     Emotionally Abused:     Physically Abused:     Sexually Abused: ALLERGIES: Pcn [penicillins]    Review of Systems   Constitutional: Negative. HENT: Negative. Eyes: Negative. Respiratory: Negative. Cardiovascular: Negative. Gastrointestinal: Negative. Endocrine: Negative. Genitourinary: Negative. Musculoskeletal: Positive for gait problem. Allergic/Immunologic: Negative. Neurological: Positive for weakness. Hematological: Negative. Psychiatric/Behavioral: Negative. All other systems reviewed and are negative. Vitals:    05/23/21 1711   BP: (!) 152/70   Pulse: 63   Resp: 18   Temp: 98 °F (36.7 °C)   SpO2: 94%   Weight: 97.5 kg (215 lb)   Height: 6' (1.829 m)            Physical Exam  Vitals and nursing note reviewed. Constitutional:       General: He is not in acute distress. Appearance: Normal appearance. He is obese. He is not ill-appearing, toxic-appearing or diaphoretic. HENT:      Head: Normocephalic and atraumatic. Nose: Nose normal.      Mouth/Throat:      Mouth: Mucous membranes are moist.   Eyes:      Extraocular Movements: Extraocular movements intact. Pupils: Pupils are equal, round, and reactive to light.       Comments: Pinpoint and minimally reactive pupils   Cardiovascular:      Rate and Rhythm: Normal rate and regular rhythm. Heart sounds: Normal heart sounds. No murmur heard. No friction rub. No gallop. Pulmonary:      Effort: Pulmonary effort is normal. No respiratory distress. Breath sounds: Normal breath sounds. No wheezing or rales. Chest:      Chest wall: No tenderness. Abdominal:      General: Abdomen is flat. There is no distension. Palpations: Abdomen is soft. There is no mass. Tenderness: There is no abdominal tenderness. There is no guarding or rebound. Hernia: No hernia is present. Musculoskeletal:         General: No swelling, tenderness, deformity or signs of injury. Normal range of motion. Cervical back: Normal range of motion and neck supple. No rigidity. Skin:     General: Skin is warm and dry. Coloration: Skin is not jaundiced or pale. Comments: Skin tear-1 on each anterior forearm. Neurological:      Mental Status: He is alert and oriented to person, place, and time. Mental status is at baseline. Cranial Nerves: No cranial nerve deficit. Sensory: No sensory deficit. Motor: No weakness. Coordination: Coordination normal.      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 Psychiatric:         Mood and Affect: Mood normal.         Behavior: Behavior normal.          MDM     Patient appears to have suffered only minor skin injuries after mechanical fall. Given his complaint of generalized weakness, will check basic labs. Likely discharge.   Procedures

## 2021-05-24 VITALS
BODY MASS INDEX: 29.12 KG/M2 | OXYGEN SATURATION: 98 % | RESPIRATION RATE: 20 BRPM | HEART RATE: 73 BPM | HEIGHT: 72 IN | SYSTOLIC BLOOD PRESSURE: 160 MMHG | DIASTOLIC BLOOD PRESSURE: 69 MMHG | WEIGHT: 215 LBS | TEMPERATURE: 97.5 F

## 2021-05-24 LAB
ANION GAP SERPL CALC-SCNC: 9 MMOL/L (ref 5–15)
BACTERIA SPEC CULT: NORMAL
BUN SERPL-MCNC: 21 MG/DL (ref 6–20)
BUN/CREAT SERPL: 19 (ref 12–20)
CA-I BLD-MCNC: 8.6 MG/DL (ref 8.5–10.1)
CHLORIDE SERPL-SCNC: 106 MMOL/L (ref 97–108)
CO2 SERPL-SCNC: 26 MMOL/L (ref 21–32)
CREAT SERPL-MCNC: 1.11 MG/DL (ref 0.7–1.3)
ERYTHROCYTE [DISTWIDTH] IN BLOOD BY AUTOMATED COUNT: 14.4 % (ref 11.5–14.5)
GLUCOSE SERPL-MCNC: 112 MG/DL (ref 65–100)
HCT VFR BLD AUTO: 40.1 % (ref 41–53)
HGB BLD-MCNC: 14.1 G/DL (ref 13.5–17.5)
MCH RBC QN AUTO: 31.2 PG (ref 31–34)
MCHC RBC AUTO-ENTMCNC: 35 G/DL (ref 31–36)
MCV RBC AUTO: 89.1 FL (ref 80–100)
NRBC # BLD: 0 K/UL
NRBC BLD-RTO: 0 PER 100 WBC
PLATELET # BLD AUTO: 243 K/UL (ref 150–400)
PMV BLD AUTO: 8.2 FL (ref 6.5–11.5)
POTASSIUM SERPL-SCNC: 3.8 MMOL/L (ref 3.5–5.1)
RBC # BLD AUTO: 4.5 M/UL (ref 4.5–5.9)
SODIUM SERPL-SCNC: 141 MMOL/L (ref 136–145)
SPECIAL REQUESTS,SREQ: NORMAL
WBC # BLD AUTO: 8.4 K/UL (ref 4.4–11.3)

## 2021-05-24 PROCEDURE — 36415 COLL VENOUS BLD VENIPUNCTURE: CPT

## 2021-05-24 PROCEDURE — 97161 PT EVAL LOW COMPLEX 20 MIN: CPT

## 2021-05-24 PROCEDURE — 80048 BASIC METABOLIC PNL TOTAL CA: CPT

## 2021-05-24 PROCEDURE — 85027 COMPLETE CBC AUTOMATED: CPT

## 2021-05-24 PROCEDURE — 99218 HC RM OBSERVATION: CPT

## 2021-05-24 RX ADMIN — Medication 10 ML: at 05:23

## 2021-05-24 NOTE — PROGRESS NOTES
Referral sent to [facility], per request of patient/son.  CROWDER formed signed by son at the bedside, per pt request.

## 2021-05-24 NOTE — PROGRESS NOTES
Patient stable for dc per MD.Son at bedside. Son taking patient home. Mask on. DC instructions explained and patient ans son verbalized understanding.

## 2021-05-24 NOTE — DISCHARGE INSTRUCTIONS
Patient Education        Dehydration: Care Instructions  Your Care Instructions  Dehydration happens when your body loses too much fluid. This might happen when you do not drink enough water or you lose large amounts of fluids from your body because of diarrhea, vomiting, or sweating. Severe dehydration can be life-threatening. Water and minerals called electrolytes help put your body fluids back in balance. Learn the early signs of fluid loss, and drink more fluids to prevent dehydration. Follow-up care is a key part of your treatment and safety. Be sure to make and go to all appointments, and call your doctor if you are having problems. It's also a good idea to know your test results and keep a list of the medicines you take. How can you care for yourself at home? · To prevent dehydration, drink plenty of fluids. Choose water and other caffeine-free clear liquids until you feel better. If you have kidney, heart, or liver disease and have to limit fluids, talk with your doctor before you increase the amount of fluids you drink. · If you do not feel like eating or drinking, try taking small sips of water, sports drinks, or other rehydration drinks. · Get plenty of rest.  To prevent dehydration  · Add more fluids to your diet and daily routine, unless your doctor has told you not to. · During hot weather, drink more fluids. Drink even more fluids if you exercise a lot. Stay away from drinks with alcohol or caffeine. · Watch for the symptoms of dehydration. These include:  ? A dry, sticky mouth. ? Not much urine. ? Dry and sunken eyes. ? Feeling very tired. · Learn what problems can lead to dehydration. These include:  ? Diarrhea, fever, and vomiting. ? Any illness with a fever, such as pneumonia or the flu. ? Activities that cause heavy sweating, such as endurance races and heavy outdoor work in hot or humid weather. ?  Alcohol or drug use or problems caused by quitting their use (withdrawal). ? Certain medicines, such as cold and allergy pills (antihistamines), diet pills (diuretics), and laxatives. ? Certain diseases, such as diabetes, cancer, and heart or kidney disease. When should you call for help? Call 911 anytime you think you may need emergency care. For example, call if:    · You passed out (lost consciousness). Call your doctor now or seek immediate medical care if:    · You are confused and cannot think clearly.     · You are dizzy or lightheaded, or you feel like you may faint.     · You have signs of needing more fluids. You have sunken eyes, a dry mouth, and you pass only a little urine.     · You cannot keep fluids down. Watch closely for changes in your health, and be sure to contact your doctor if:    · You are not making tears.     · Your skin is very dry and sags slowly back into place after you pinch it.     · Your mouth and eyes are very dry. Where can you learn more? Go to http://www.gray.com/  Enter Q814 in the search box to learn more about \"Dehydration: Care Instructions. \"  Current as of: February 26, 2020               Content Version: 12.8  © 0908-5500 [employer]. Care instructions adapted under license by [employer] (which disclaims liability or warranty for this information). If you have questions about a medical condition or this instruction, always ask your healthcare professional. [place] [address] any warranty or liability for your use of this information.

## 2021-05-24 NOTE — PROGRESS NOTES
Problem: Falls - Risk of  Goal: *Absence of Falls  Description: Document Gaby Parker Fall Risk and appropriate interventions in the flowsheet. Outcome: Progressing Towards Goal  Note: Fall Risk Interventions:                                Problem: Patient Education: Go to Patient Education Activity  Goal: Patient/Family Education  Outcome: Progressing Towards Goal     Problem: Pressure Injury - Risk of  Goal: *Prevention of pressure injury  Description: Document Wali Scale and appropriate interventions in the flowsheet.   Outcome: Progressing Towards Goal  Note: Pressure Injury Interventions:       Moisture Interventions: Minimize layers    Activity Interventions: PT/OT evaluation    Mobility Interventions: PT/OT evaluation                          Problem: Patient Education: Go to Patient Education Activity  Goal: Patient/Family Education  Outcome: Progressing Towards Goal     Problem: Pain  Goal: *Control of Pain  Outcome: Progressing Towards Goal  Goal: *PALLIATIVE CARE:  Alleviation of Pain  Outcome: Progressing Towards Goal     Problem: Patient Education: Go to Patient Education Activity  Goal: Patient/Family Education  Outcome: Progressing Towards Goal

## 2021-05-24 NOTE — PROGRESS NOTES
Care Management Interventions  PCP Verified by CM: Yes  Mode of Transport at Discharge: Other (see comment) (Son)  Transition of Care Consult (CM Consult): Home Health  Physical Therapy Consult: Yes  Occupational Therapy Consult: Yes  Current Support Network: Lives Alone, Family Lives Nearby  Confirm Follow Up Transport: Family (Son)  The Plan for Transition of Care is Related to the Following Treatment Goals : Plan to discharge patient home with home health servies. The Patient and/or Patient Representative was Provided with a Choice of Provider and Agrees with the Discharge Plan?: Yes  Name of the Patient Representative Who was Provided with a Choice of Provider and Agrees with the Discharge Plan: Solomon Simms  Freedom of Choice List was Provided with Basic Dialogue that Supports the Patient's Individualized Plan of Care/Goals, Treatment Preferences and Shares the Quality Data Associated with the Providers?: Yes  Discharge Location  Discharge Placement: Home with home health (Patient refuses to go to a facility.  Son stated that he had everything set up for patient to go to the [facility], but pt refuses.)

## 2021-05-24 NOTE — DISCHARGE SUMMARY
Discharge Summary       PATIENT ID: Madhuri Cancino Sr. MRN: [id number]   YOB: 1929    DATE OF ADMISSION: 5/23/2021  5:14 PM    DATE OF DISCHARGE: 05/24/2021   PRIMARY CARE PROVIDER: Wendy Gonsalez MD     ATTENDING PHYSICIAN: Marta Mata  DISCHARGING PROVIDER: Modesto Sarabia MD    To contact this individual call [phone] and ask the  to page. If unavailable ask to be transferred the Adult Hospitalist Department. CONSULTATIONS: IP CONSULT TO HOSPITALIST    PROCEDURES/SURGERIES: * No surgery found *    ADMITTING [address] COURSE:     \[facility] [place] [facility]\""  Everett Rao is a 80 y.o.  male who presents with a fall. Patient lives alone.  Per medical records he has a history of ataxia and difficulty ambulating.  He was apparently ambulating down his street using a walker.  He attempted to ambulate onto a grassy surface and fell while trying to navigate this transition. Marbin Dolan was apparently report seeing him fall backwards onto his buttocks. Patient states that her had a stroke in November 2020 that left his with some right sided weakness. To get his strength better he goes to the [facility] and walks daily. Patient reports feeling generally weak over the past 1-2 days.  Denies focal weakness, headache, difficulty with speech, paresthesias, GI or  complaints. We were asked to admit for work up and evaluation of the above problems. We will bring patient in on observational status for fall, acute kidney insufficiency, and dehydration. He will be evaluated by physical therapy in a.m. Hospital Course  Patient presents with mechanical fall. Patient at baseline ambulates with a walker and he lives by himself and the son checks on him frequently. On presentation patient with mild dehydration and mild elevation of creatinine but improved with IV fluid. Patient was also evaluated by physical therapy and recommended home with home health. Disposition plan discussed with patient's son. Patient also noted to have some bacteria on urinalysis but patient asymptomatic. Urine culture was obtained and pending at this time. Unless urine culture comes back positive, we will keep antibiotics on hold. DISCHARGE DIAGNOSES / PLAN:      1. Mechanical fall  2. Dehydration  3. Asymptomatic bacteriuria  4. Hypertension     ADDITIONAL CARE RECOMMENDATIONS:     Fall precautions. Follow-up with PCP in 1 week. PENDING TEST RESULTS:   At the time of discharge the following test results are still pending: None    FOLLOW UP APPOINTMENTS:    Follow-up Information     Follow up With Specialties Details Why Contact Lelia Reno MD Family Medicine On 5/26/2021 @10:15 [address] [place] [address]  [phone]               DIET: Cardiac Diet  Oral Nutritional Supplements: No Oral Supplement prescribed    ACTIVITY: PT/OT per [address]: None    EQUIPMENT needed: None      DISCHARGE MEDICATIONS:  Current Discharge Medication List      CONTINUE these medications which have NOT CHANGED    Details   levothyroxine (SYNTHROID) 125 mcg tablet Take 1 Tab by mouth Daily (before breakfast). Qty: 30 Tab, Refills: 0      amLODIPine (NORVASC) 5 mg tablet Take 1 Tab by mouth daily. Qty: 30 Tab, Refills: 0      lisinopriL (PRINIVIL, ZESTRIL) 10 mg tablet Take 1 Tab by mouth daily. Qty: 30 Tab, Refills: 0      omega 3-dha-epa-fish oil (Fish Oil) 100-160-1,000 mg cap Fish Oil   2 daily      rOPINIRole (REQUIP) 0.25 mg tablet Take 0.25 mg by mouth nightly. tamsulosin (FLOMAX) 0.4 mg capsule Take 0.4 mg by mouth daily. pravastatin (PRAVACHOL) 40 mg tablet Take 40 mg by mouth nightly. multivitamin (ONE A DAY) tablet Take 1 Tab by mouth daily. NOTIFY YOUR PHYSICIAN FOR ANY OF THE FOLLOWING:   Fever over 101 degrees for 24 hours. Chest pain, shortness of breath, fever, chills, nausea, vomiting, diarrhea, change in mentation, falling, weakness, bleeding.  Severe pain or pain not relieved by medications. Or, any other signs or symptoms that you may have questions about. DISPOSITION:    Home With:   OT  PT  HH  RN       Long term SNF/Inpatient Rehab    Independent/assisted living    Hospice    Other:       PATIENT CONDITION AT DISCHARGE:     Functional status    Poor     Deconditioned     Independent      Cognition     Lucid     Forgetful     Dementia      Catheters/lines (plus indication)    Gas[place]     PICC     PEG     None      Code status     Full code     DNR      PHYSICAL EXAMINATION AT DISCHARGE:  General:          Alert, cooperative, no distress, appears stated age. HEENT:           Atraumatic, anicteric sclerae, pink conjunctivae                          No oral ulcers, mucosa moist, throat clear, dentition fair  Neck:               Supple, symmetrical  Lungs:             Clear to auscultation bilaterally. No Wheezing or Rhonchi. No rales. Chest wall:      No tenderness  No Accessory muscle use. Heart:              Regular  rhythm,  No  murmur   No edema  Abdomen:        Soft, non-tender. Not distended. Bowel sounds normal  Extremities:     No cyanosis. No clubbing,                            Skin turgor normal, Capillary refill normal  Skin:                Not pale. Not Jaundiced  No rashes   Psych:             Not anxious or agitated. Neurologic:      Alert, moves all extremities, answers questions appropriately and responds to commands       CHRONIC MEDICAL DIAGNOSES:  Problem List as of 5/24/2021 Date Reviewed: 12/19/2020        Codes Class Noted - Resolved    Fall ICD-10-CM: W19. Giacomowendy Boourray  ICD-9-CM: E888.9  5/23/2021 - Present        Ataxia ICD-10-CM: R27.0  ICD-9-CM: 781.3  4/20/2021 - Present        Abnormal gait ICD-10-CM: R26.9  ICD-9-CM: 781.2  4/19/2021 - Present        Altered mental status ICD-10-CM: R41.82  ICD-9-CM: 780.97  4/19/2021 - Present        Dehydration ICD-10-CM: E86.0  ICD-9-CM: 276.51  4/19/2021 - Present        Bradycardia ICD-10-CM: R00.1  ICD-9-CM: 427.89  11/30/2020 - Present        Acute CVA (cerebrovascular accident) 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) ICD-10-CM: M31.5  ICD-9-CM: 446.5, 725  8/14/2018 - Present        Long term (current) use of systemic steroids ICD-10-CM: Z79.52  ICD-9-CM: V58.65  8/14/2018 - Present        Long-term use of immunosuppressant medication ICD-10-CM: Z79.899  ICD-9-CM: V58.69  8/14/2018 - Present        PMR (polymyalgia rheumatica) (HCC) ICD-10-CM: M35.3  ICD-9-CM: 363  8/14/2018 - Present        Chronic headaches ICD-10-CM: R51.9, G89.29  ICD-9-CM: 784.0  7/20/2018 - Present              Greater than 60 minutes were spent with the patient on counseling and coordination of care    Signed:   Sita Griffin MD  5/24/2021  11:54 AM

## 2021-06-28 ENCOUNTER — HOSPITAL ENCOUNTER (EMERGENCY)
Age: 86
Discharge: ANOTHER HOSPITAL | End: 2021-06-28
Attending: EMERGENCY MEDICINE
Payer: MEDICARE

## 2021-06-28 ENCOUNTER — APPOINTMENT (OUTPATIENT)
Dept: CT IMAGING | Age: 86
End: 2021-06-28
Attending: EMERGENCY MEDICINE
Payer: MEDICARE

## 2021-06-28 ENCOUNTER — HOSPITAL ENCOUNTER (OUTPATIENT)
Age: 86
Setting detail: OBSERVATION
Discharge: NURSING, REHAB OR LONG-TERM CARE | End: 2021-07-05
Attending: EMERGENCY MEDICINE | Admitting: FAMILY MEDICINE
Payer: MEDICARE

## 2021-06-28 VITALS
BODY MASS INDEX: 29.12 KG/M2 | SYSTOLIC BLOOD PRESSURE: 147 MMHG | DIASTOLIC BLOOD PRESSURE: 71 MMHG | TEMPERATURE: 98 F | WEIGHT: 215 LBS | HEART RATE: 63 BPM | RESPIRATION RATE: 19 BRPM | HEIGHT: 72 IN | OXYGEN SATURATION: 98 %

## 2021-06-28 DIAGNOSIS — R53.1 WEAKNESS: Primary | ICD-10-CM

## 2021-06-28 DIAGNOSIS — I63.50 CEREBROVASCULAR ACCIDENT (CVA) DUE TO OCCLUSION OF CEREBRAL ARTERY (HCC): Primary | ICD-10-CM

## 2021-06-28 DIAGNOSIS — R31.9 HEMATURIA, UNSPECIFIED TYPE: ICD-10-CM

## 2021-06-28 LAB
ANION GAP SERPL CALC-SCNC: 6 MMOL/L (ref 5–15)
APPEARANCE UR: ABNORMAL
BACTERIA URNS QL MICRO: NEGATIVE /HPF
BASOPHILS # BLD: 0.1 K/UL (ref 0–0.1)
BASOPHILS NFR BLD: 1 % (ref 0–1)
BILIRUB UR QL: NEGATIVE
BUN SERPL-MCNC: 26 MG/DL (ref 6–20)
BUN/CREAT SERPL: 20 (ref 12–20)
CA-I BLD-MCNC: 8.9 MG/DL (ref 8.5–10.1)
CHLORIDE SERPL-SCNC: 105 MMOL/L (ref 97–108)
CO2 SERPL-SCNC: 28 MMOL/L (ref 21–32)
COLOR UR: ABNORMAL
CREAT SERPL-MCNC: 1.3 MG/DL (ref 0.7–1.3)
DIFFERENTIAL METHOD BLD: ABNORMAL
EOSINOPHIL # BLD: 0.2 K/UL (ref 0–0.4)
EOSINOPHIL NFR BLD: 2 % (ref 0–7)
ERYTHROCYTE [DISTWIDTH] IN BLOOD BY AUTOMATED COUNT: 14.3 % (ref 11.5–14.5)
GLUCOSE SERPL-MCNC: 97 MG/DL (ref 65–100)
GLUCOSE UR STRIP.AUTO-MCNC: NEGATIVE MG/DL
HCT VFR BLD AUTO: 44.5 % (ref 36.6–50.3)
HGB BLD-MCNC: 14.9 G/DL (ref 12.1–17)
HGB UR QL STRIP: NEGATIVE
HYALINE CASTS URNS QL MICRO: ABNORMAL /LPF (ref 0–5)
IMM GRANULOCYTES # BLD AUTO: 0.1 K/UL (ref 0–0.04)
IMM GRANULOCYTES NFR BLD AUTO: 1 % (ref 0–0.5)
KETONES UR QL STRIP.AUTO: NEGATIVE MG/DL
LEUKOCYTE ESTERASE UR QL STRIP.AUTO: ABNORMAL
LYMPHOCYTES # BLD: 2 K/UL (ref 0.8–3.5)
LYMPHOCYTES NFR BLD: 22 % (ref 12–49)
MCH RBC QN AUTO: 30.5 PG (ref 26–34)
MCHC RBC AUTO-ENTMCNC: 33.5 G/DL (ref 30–36.5)
MCV RBC AUTO: 91.2 FL (ref 80–99)
MONOCYTES # BLD: 0.7 K/UL (ref 0–1)
MONOCYTES NFR BLD: 8 % (ref 5–13)
MUCOUS THREADS URNS QL MICRO: ABNORMAL /LPF
NEUTS SEG # BLD: 6 K/UL (ref 1.8–8)
NEUTS SEG NFR BLD: 66 % (ref 32–75)
NITRITE UR QL STRIP.AUTO: NEGATIVE
NRBC # BLD: 0 K/UL (ref 0–0.01)
NRBC BLD-RTO: 0 PER 100 WBC
PH UR STRIP: 5 [PH] (ref 5–8)
PLATELET # BLD AUTO: 284 K/UL (ref 150–400)
PMV BLD AUTO: 10.1 FL (ref 8.9–12.9)
POTASSIUM SERPL-SCNC: 4 MMOL/L (ref 3.5–5.1)
PROT UR STRIP-MCNC: 30 MG/DL
RBC # BLD AUTO: 4.88 M/UL (ref 4.1–5.7)
RBC #/AREA URNS HPF: ABNORMAL /HPF (ref 0–5)
SODIUM SERPL-SCNC: 139 MMOL/L (ref 136–145)
SP GR UR REFRACTOMETRY: 1.02 (ref 1–1.03)
UA: UC IF INDICATED,UAUC: ABNORMAL
UROBILINOGEN UR QL STRIP.AUTO: 0.1 EU/DL (ref 0.1–1)
WBC # BLD AUTO: 9 K/UL (ref 4.1–11.1)
WBC URNS QL MICRO: ABNORMAL /HPF (ref 0–4)

## 2021-06-28 PROCEDURE — 74011250637 HC RX REV CODE- 250/637: Performed by: EMERGENCY MEDICINE

## 2021-06-28 PROCEDURE — 87086 URINE CULTURE/COLONY COUNT: CPT

## 2021-06-28 PROCEDURE — G0378 HOSPITAL OBSERVATION PER HR: HCPCS

## 2021-06-28 PROCEDURE — 93005 ELECTROCARDIOGRAM TRACING: CPT

## 2021-06-28 PROCEDURE — 36415 COLL VENOUS BLD VENIPUNCTURE: CPT

## 2021-06-28 PROCEDURE — 99284 EMERGENCY DEPT VISIT MOD MDM: CPT

## 2021-06-28 PROCEDURE — 99218 HC RM OBSERVATION: CPT

## 2021-06-28 PROCEDURE — 70450 CT HEAD/BRAIN W/O DYE: CPT

## 2021-06-28 PROCEDURE — 81001 URINALYSIS AUTO W/SCOPE: CPT

## 2021-06-28 PROCEDURE — 99285 EMERGENCY DEPT VISIT HI MDM: CPT

## 2021-06-28 PROCEDURE — 80048 BASIC METABOLIC PNL TOTAL CA: CPT

## 2021-06-28 PROCEDURE — 85025 COMPLETE CBC W/AUTO DIFF WBC: CPT

## 2021-06-28 RX ORDER — GUAIFENESIN 100 MG/5ML
324 LIQUID (ML) ORAL
Status: COMPLETED | OUTPATIENT
Start: 2021-06-28 | End: 2021-06-28

## 2021-06-28 RX ORDER — MAGNESIUM CITRATE
296 SOLUTION, ORAL ORAL
Status: COMPLETED | OUTPATIENT
Start: 2021-06-28 | End: 2021-06-28

## 2021-06-28 RX ADMIN — ASPIRIN 243 MG: 81 TABLET, CHEWABLE ORAL at 16:48

## 2021-06-28 RX ADMIN — MAGNESIUM CITRATE 296 ML: 1.75 LIQUID ORAL at 21:31

## 2021-06-28 NOTE — Clinical Note
Patient Class[de-identified] OBSERVATION [104]   Type of Bed: Telemetry [19]   Cardiac Monitoring Required?: No   Reason for Observation: weakness   Admitting Diagnosis: Weakness [[id number]]   Admitting Physician: Cassandra Vieira [[id number]]   Attending Physician: Cassandra Vieira [[id number]]

## 2021-06-28 NOTE — ED TRIAGE NOTES
patient transferred from [facility] ED for CVA. No alert called. Patient arrived with right forearm 22g , patent. A&OX3, vitals stable, left facial droop present, left arm drift present, per report gait unsteady. Patient on monitor, bloodwork taken, EKG done and given to Dr. Daniele Menjivar. IV did not flush and caused patient pain. IV removed, IV restarted in right outer forearm by Marlen Reese LPN.

## 2021-06-28 NOTE — ED TRIAGE NOTES
Pt son states he was at PCP for placement at assisted living facility. Son noted some walking deficits. Left sided facial drooping and left arm drift/weakness.

## 2021-06-28 NOTE — ED PROVIDER NOTES
EMERGENCY DEPARTMENT HISTORY AND PHYSICAL EXAM      Date: 6/28/2021  Patient Name: Obdulio Waite Sr.      History of Presenting Illness     Chief Complaint   Patient presents with    Neurologic Problem       History Provided By: Patient    HPI: Susanne Yi, 80 y.o. male with a past medical history significant hypertension presents to the ED with cc of left-sided weakness, slurred speech over the last 2 days, difficulty ambulating status post fall 2 days ago; patient normally ambulates using a cane    There are no other complaints, changes, or physical findings at this time. PCP: Sean Deng MD    Current Outpatient Medications   Medication Sig Dispense Refill    levothyroxine (SYNTHROID) 125 mcg tablet Take 1 Tab by mouth Daily (before breakfast). 30 Tab 0    amLODIPine (NORVASC) 5 mg tablet Take 1 Tab by mouth daily. 30 Tab 0    lisinopriL (PRINIVIL, ZESTRIL) 10 mg tablet Take 1 Tab by mouth daily. 30 Tab 0    omega 3-dha-epa-fish oil (Fish Oil) 100-160-1,000 mg cap Fish Oil   2 daily      rOPINIRole (REQUIP) 0.25 mg tablet Take 0.25 mg by mouth nightly.  tamsulosin (FLOMAX) 0.4 mg capsule Take 0.4 mg by mouth daily.  pravastatin (PRAVACHOL) 40 mg tablet Take 40 mg by mouth nightly.  multivitamin (ONE A DAY) tablet Take 1 Tab by mouth daily.          Past History     Past Medical History:  Past Medical History:   Diagnosis Date    Arthritis     CAD (coronary artery disease)     Hard of hearing     Headache     Hypertension     PMR (polymyalgia rheumatica) (HCC)     Skin cancer     Sleep apnea     no longer uses cpap       Past Surgical History:  Past Surgical History:   Procedure Laterality Date    HX CAROTID STENT      HX CHOLECYSTECTOMY  1998    HX HERNIA REPAIR  1980    RI CARDIAC SURG PROCEDURE UNLIST      cardiac stent    VASCULAR SURGERY PROCEDURE UNLIST      carotid endartectomy       Family History:  Family History   Problem Relation Age of Onset    Arthritis-rheumatoid Mother     Kidney Disease Mother     Cancer Father         Stomach    Gout Son        Social History:  Social History     Tobacco Use    Smoking status: Never Smoker    Smokeless tobacco: Never Used   Substance Use Topics    Alcohol use: No    Drug use: Never       Allergies: Allergies   Allergen Reactions    Pcn [Penicillins] Swelling         Review of Systems     Review of Systems   Constitutional: Negative for chills and fever. HENT: Negative for rhinorrhea and sore throat. Eyes: Negative for pain and visual disturbance. Respiratory: Negative for cough and shortness of breath. Cardiovascular: Negative for chest pain and leg swelling. Gastrointestinal: Negative for abdominal pain and vomiting. Endocrine: Negative for polydipsia and polyuria. Genitourinary: Negative for dysuria and urgency. Musculoskeletal: Negative for back pain and neck pain. Skin: Negative for color change and wound. Neurological: Positive for facial asymmetry, speech difficulty and weakness. Psychiatric/Behavioral: Negative. Physical Exam     Physical Exam  Vitals and nursing note reviewed. Constitutional:       Appearance: Normal appearance. HENT:      Head: Normocephalic. Mouth/Throat:      Mouth: Mucous membranes are moist.      Pharynx: Oropharynx is clear. Eyes:      Extraocular Movements: Extraocular movements intact. Conjunctiva/sclera: Conjunctivae normal.      Pupils: Pupils are equal, round, and reactive to light. Cardiovascular:      Rate and Rhythm: Normal rate and regular rhythm. Pulses: Normal pulses. Heart sounds: Normal heart sounds. Pulmonary:      Effort: Pulmonary effort is normal.      Breath sounds: Normal breath sounds. Abdominal:      General: Bowel sounds are normal.      Palpations: Abdomen is soft. Musculoskeletal:         General: Normal range of motion. Cervical back: Normal range of motion and neck supple.    Skin: General: Skin is warm and dry. Capillary Refill: Capillary refill takes less than 2 seconds. Neurological:      Mental Status: He is alert and oriented to person, place, and time. Cranial Nerves: Cranial nerve deficit present. Sensory: No sensory deficit. Motor: Weakness present. Coordination: Coordination normal.   Psychiatric:         Mood and Affect: Mood normal.         Behavior: Behavior normal.         Lab and Diagnostic Study Results     Labs -   No results found for this or any previous visit (from the past 12 hour(s)). Radiologic Studies -   [unfilled]  CT Results  (Last 48 hours)    None        CXR Results  (Last 48 hours)    None          Medical Decision Making and ED Course   - I am the first and primary provider for this patient AND AM THE PRIMARY PROVIDER OF RECORD. - I reviewed the vital signs, available nursing notes, past medical history, past surgical history, family history and social history. - Initial assessment performed. The patients presenting problems have been discussed, and the staff are in agreement with the care plan formulated and outlined with them. I have encouraged them to ask questions as they arise throughout their visit. Vital Signs-Reviewed the patient's vital signs. No data found. Records Reviewed: Nursing Notes    The patient presents with weakness with a differential diagnosis of CVA, TIA, hypoglycemia, hyponatremia, arrhythmia    ED Course:              Provider Notes (Medical Decision Making): MDM           Consultations:       Consultations: - NONE        Procedures and Critical Care       Performed by: Diya Sarah MD  PROCEDURES:  Procedures         HYPERTENSION COUNSELING: Education was provided to the patient today regarding their hypertension. Patient is made aware of their elevated blood pressure and is instructed to follow up this week with their Primary Care for a recheck.  Patient is counseled regarding consequences of chronic, uncontrolled hypertension including kidney disease, heart disease, stroke or even death. Patient states their understanding and agrees to follow up this week. Additionally, during their visit, I discussed sodium restriction, maintaining ideal body weight and regular exercise program as physiologic means to achieve blood pressure control. The patient will strive towards this. Ronit Hitchcock MD        Disposition     Disposition: Transferred to [place] [id number] patient verbally agreed to transfer and understand the risks involved as outlined in the EMTALA form. Remove if not discharged  DISCHARGE PLAN:  1. Current Discharge Medication List      CONTINUE these medications which have NOT CHANGED    Details   levothyroxine (SYNTHROID) 125 mcg tablet Take 1 Tab by mouth Daily (before breakfast). Qty: 30 Tab, Refills: 0      amLODIPine (NORVASC) 5 mg tablet Take 1 Tab by mouth daily. Qty: 30 Tab, Refills: 0      lisinopriL (PRINIVIL, ZESTRIL) 10 mg tablet Take 1 Tab by mouth daily. Qty: 30 Tab, Refills: 0      omega 3-dha-epa-fish oil (Fish Oil) 100-160-1,000 mg cap Fish Oil   2 daily      rOPINIRole (REQUIP) 0.25 mg tablet Take 0.25 mg by mouth nightly. tamsulosin (FLOMAX) 0.4 mg capsule Take 0.4 mg by mouth daily. pravastatin (PRAVACHOL) 40 mg tablet Take 40 mg by mouth nightly. multivitamin (ONE A DAY) tablet Take 1 Tab by mouth daily. 2.   Follow-up Information    None       3. Return to ED if worse   4. Current Discharge Medication List          Diagnosis     Clinical Impression: No diagnosis found. Attestations:    Ronit Hitchcock MD    Please note that this dictation was completed with [employer], the [employer] voice recognition software. Quite often unanticipated grammatical, syntax, homophones, and other interpretive errors are inadvertently transcribed by the computer software. Please disregard these errors. Please excuse any errors that have escaped final proofreading. Thank you.

## 2021-06-29 ENCOUNTER — APPOINTMENT (OUTPATIENT)
Dept: MRI IMAGING | Age: 86
End: 2021-06-29
Attending: PSYCHIATRY & NEUROLOGY
Payer: MEDICARE

## 2021-06-29 PROBLEM — R53.1 GENERALIZED WEAKNESS: Status: ACTIVE | Noted: 2021-06-29

## 2021-06-29 LAB
ATRIAL RATE: 63 BPM
CALCULATED P AXIS, ECG09: 55 DEGREES
CALCULATED R AXIS, ECG10: -26 DEGREES
CALCULATED T AXIS, ECG11: 98 DEGREES
DIAGNOSIS, 93000: NORMAL
P-R INTERVAL, ECG05: 300 MS
Q-T INTERVAL, ECG07: 432 MS
QRS DURATION, ECG06: 96 MS
QTC CALCULATION (BEZET), ECG08: 486 MS
VENTRICULAR RATE, ECG03: 76 BPM

## 2021-06-29 PROCEDURE — 74011250637 HC RX REV CODE- 250/637: Performed by: FAMILY MEDICINE

## 2021-06-29 PROCEDURE — 65270000029 HC RM PRIVATE

## 2021-06-29 PROCEDURE — G0378 HOSPITAL OBSERVATION PER HR: HCPCS

## 2021-06-29 PROCEDURE — 99218 HC RM OBSERVATION: CPT

## 2021-06-29 PROCEDURE — 95816 EEG AWAKE AND DROWSY: CPT | Performed by: PSYCHIATRY & NEUROLOGY

## 2021-06-29 PROCEDURE — 70551 MRI BRAIN STEM W/O DYE: CPT

## 2021-06-29 PROCEDURE — 36415 COLL VENOUS BLD VENIPUNCTURE: CPT

## 2021-06-29 PROCEDURE — 92610 EVALUATE SWALLOWING FUNCTION: CPT

## 2021-06-29 PROCEDURE — 81001 URINALYSIS AUTO W/SCOPE: CPT

## 2021-06-29 PROCEDURE — 74011250636 HC RX REV CODE- 250/636: Performed by: FAMILY MEDICINE

## 2021-06-29 PROCEDURE — 87040 BLOOD CULTURE FOR BACTERIA: CPT

## 2021-06-29 RX ORDER — POLYETHYLENE GLYCOL 3350 17 G/17G
17 POWDER, FOR SOLUTION ORAL DAILY PRN
Status: DISCONTINUED | OUTPATIENT
Start: 2021-06-29 | End: 2021-07-05 | Stop reason: HOSPADM

## 2021-06-29 RX ORDER — ONDANSETRON 4 MG/1
4 TABLET, ORALLY DISINTEGRATING ORAL
Status: DISCONTINUED | OUTPATIENT
Start: 2021-06-29 | End: 2021-07-05 | Stop reason: HOSPADM

## 2021-06-29 RX ORDER — ACETAMINOPHEN 325 MG/1
650 TABLET ORAL
Status: DISCONTINUED | OUTPATIENT
Start: 2021-06-29 | End: 2021-07-05 | Stop reason: HOSPADM

## 2021-06-29 RX ORDER — ONDANSETRON 2 MG/ML
4 INJECTION INTRAMUSCULAR; INTRAVENOUS
Status: DISCONTINUED | OUTPATIENT
Start: 2021-06-29 | End: 2021-07-05 | Stop reason: HOSPADM

## 2021-06-29 RX ORDER — LEVOFLOXACIN 5 MG/ML
500 INJECTION, SOLUTION INTRAVENOUS
Status: DISCONTINUED | OUTPATIENT
Start: 2021-06-29 | End: 2021-07-05

## 2021-06-29 RX ORDER — ROPINIROLE 0.25 MG/1
0.25 TABLET, FILM COATED ORAL
Status: DISCONTINUED | OUTPATIENT
Start: 2021-06-29 | End: 2021-07-05 | Stop reason: HOSPADM

## 2021-06-29 RX ORDER — SODIUM CHLORIDE 9 MG/ML
75 INJECTION, SOLUTION INTRAVENOUS CONTINUOUS
Status: DISCONTINUED | OUTPATIENT
Start: 2021-06-29 | End: 2021-07-05

## 2021-06-29 RX ORDER — GUAIFENESIN 100 MG/5ML
81 LIQUID (ML) ORAL DAILY
Status: DISCONTINUED | OUTPATIENT
Start: 2021-06-29 | End: 2021-07-05 | Stop reason: HOSPADM

## 2021-06-29 RX ORDER — ENOXAPARIN SODIUM 100 MG/ML
40 INJECTION SUBCUTANEOUS DAILY
Status: DISCONTINUED | OUTPATIENT
Start: 2021-06-29 | End: 2021-07-05 | Stop reason: HOSPADM

## 2021-06-29 RX ORDER — PRAVASTATIN SODIUM 40 MG/1
40 TABLET ORAL
Status: DISCONTINUED | OUTPATIENT
Start: 2021-06-29 | End: 2021-07-05 | Stop reason: HOSPADM

## 2021-06-29 RX ORDER — LISINOPRIL 10 MG/1
10 TABLET ORAL DAILY
Status: DISCONTINUED | OUTPATIENT
Start: 2021-06-29 | End: 2021-06-29

## 2021-06-29 RX ORDER — BISMUTH SUBSALICYLATE 262 MG
1 TABLET,CHEWABLE ORAL DAILY
Status: DISCONTINUED | OUTPATIENT
Start: 2021-06-29 | End: 2021-07-05 | Stop reason: HOSPADM

## 2021-06-29 RX ORDER — AMLODIPINE BESYLATE 5 MG/1
5 TABLET ORAL DAILY
Status: DISCONTINUED | OUTPATIENT
Start: 2021-06-29 | End: 2021-06-29

## 2021-06-29 RX ORDER — TAMSULOSIN HYDROCHLORIDE 0.4 MG/1
0.4 CAPSULE ORAL DAILY
Status: DISCONTINUED | OUTPATIENT
Start: 2021-06-29 | End: 2021-07-05 | Stop reason: HOSPADM

## 2021-06-29 RX ORDER — ACETAMINOPHEN 650 MG/1
650 SUPPOSITORY RECTAL
Status: DISCONTINUED | OUTPATIENT
Start: 2021-06-29 | End: 2021-07-05 | Stop reason: HOSPADM

## 2021-06-29 RX ORDER — ICOSAPENT ETHYL 1000 MG/1
1 CAPSULE ORAL 2 TIMES DAILY
Status: DISCONTINUED | OUTPATIENT
Start: 2021-06-29 | End: 2021-07-05 | Stop reason: HOSPADM

## 2021-06-29 RX ADMIN — PRAVASTATIN SODIUM 40 MG: 40 TABLET ORAL at 22:16

## 2021-06-29 RX ADMIN — LISINOPRIL 10 MG: 10 TABLET ORAL at 12:14

## 2021-06-29 RX ADMIN — ROPINIROLE HYDROCHLORIDE 0.25 MG: 0.25 TABLET, FILM COATED ORAL at 22:16

## 2021-06-29 RX ADMIN — MULTIVITAMIN TABLET 1 TABLET: TABLET at 12:24

## 2021-06-29 RX ADMIN — TAMSULOSIN HYDROCHLORIDE 0.4 MG: 0.4 CAPSULE ORAL at 12:14

## 2021-06-29 RX ADMIN — ICOSAPENT ETHYL 1 CAPSULE: 1000 CAPSULE ORAL at 21:00

## 2021-06-29 RX ADMIN — LEVOFLOXACIN 500 MG: 5 INJECTION, SOLUTION INTRAVENOUS at 12:14

## 2021-06-29 RX ADMIN — SODIUM CHLORIDE 75 ML/HR: 9 INJECTION, SOLUTION INTRAVENOUS at 12:21

## 2021-06-29 RX ADMIN — AMLODIPINE BESYLATE 5 MG: 5 TABLET ORAL at 12:14

## 2021-06-29 RX ADMIN — ACETAMINOPHEN 650 MG: 325 TABLET ORAL at 22:16

## 2021-06-29 RX ADMIN — ENOXAPARIN SODIUM 40 MG: 40 INJECTION SUBCUTANEOUS at 12:14

## 2021-06-29 RX ADMIN — ICOSAPENT ETHYL 1 CAPSULE: 1000 CAPSULE ORAL at 13:00

## 2021-06-29 RX ADMIN — ASPIRIN 81 MG: 81 TABLET, CHEWABLE ORAL at 15:03

## 2021-06-29 NOTE — ED NOTES
2100- swallow screen performed by writer. patient had no difficulties drinking water with and without a straw. no coughing, choking or difficulty speaking or swallowing. Dinner tray provided. 2226- patient is oriented, alert, but having to be told multiple times to use call bell/stay in bed. Patient is easily redirectable but just needing to be reminded of situation as the night as progressed. Patient now resting, in view of nurses station . 2321- patient pulled IV out. Catheter was sitting on bedside. No blood present. Patient stated \" I didn't know I needed it\"   IV restarted in left arm. Explained to patient importance and why we need IV access. 2330- patient placed on hospital bed with bed alarm turned on and siderails up X3.       0023- patient sleeping. vitals updated. 0225- patient up to bedside commode. gait is unsteady and weak, but able to transfer with assistance. Pt had BM after taking laxative by request earlier (see MAR)     0507- Report called to [address] [facility] [address] for bed [address]. Patient cannot go up until 0700 per staffing. Pt on tele box.

## 2021-06-29 NOTE — H&P
History and Physical    NAME: Gini Paige.   :  1929   MRN:  [id number]     Date/Time:  2021 12:26 PM    Patient PCP: Clive Bone MD  ______________________________________________________________________             Subjective:     CHIEF COMPLAINT:     Left-sided weakness    HISTORY OF PRESENT ILLNESS:       Patient is a 80y.o. year old male signal past medical history of hypertension hypothyroidism BPH hyperlipidemia history of stroke in the past came to emergency room because of left-sided weakness according to the patient son as patient is not a very good historian he has some left-sided weakness and speech slurred for last 2 days and yesterday have difficulty in ambulating came to emergency room seen by the ER physician and recommended patient to be admitted for further work-up initial CT scan of the head was negative    History physical taken from patient's    Past Medical History:   Diagnosis Date    Arthritis     CAD (coronary artery disease)     Hard of hearing     Headache     Hypertension     PMR (polymyalgia rheumatica) ([address]     Skin cancer     Sleep apnea     no longer uses cpap        Past Surgical History:   Procedure Laterality Date    HX CAROTID STENT      HX CHOLECYSTECTOMY      HX HERNIA REPAIR      HI CARDIAC SURG PROCEDURE UNLIST      cardiac stent    VASCULAR SURGERY PROCEDURE UNLIST      carotid endartectomy       Social History     Tobacco Use    Smoking status: Never Smoker    Smokeless tobacco: Never Used   Substance Use Topics    Alcohol use: No        Family History   Problem Relation Age of Onset   [place] Arthritis-rheumatoid Mother     Kidney Disease Mother     Cancer Father         Stomach    Gout Son        Allergies   Allergen Reactions    Pcn [Penicillins] Swelling        Prior to Admission medications    Medication Sig Start Date End Date Taking?  Authorizing Provider   levothyroxine (SYNTHROID) 125 mcg tablet Take 1 Tab by mouth Daily (before breakfast). 4/23/21   Walter Reynoso MD   amLODIPine (NORVASC) 5 mg tablet Take 1 Tab by mouth daily. 4/23/21   Walter Reynoso MD   lisinopriL (PRINIVIL, ZESTRIL) 10 mg tablet Take 1 Tab by mouth daily. 4/23/21   Walter Reynoso MD   omega 7-mne-knv-fish oil (Fish Oil) 100-160-1,000 mg cap Fish Oil   2 daily    Other, MD Elio   rOPINIRole (REQUIP) 0.25 mg tablet Take 0.25 mg by mouth nightly. Provider, Historical   tamsulosin (FLOMAX) 0.4 mg capsule Take 0.4 mg by mouth daily. Provider, Historical   pravastatin (PRAVACHOL) 40 mg tablet Take 40 mg by mouth nightly. Provider, Historical   multivitamin (ONE A DAY) tablet Take 1 Tab by mouth daily.     Provider, Historical         Current Facility-Administered Medications:     [START ON 6/30/2021] levothyroxine (SYNTHROID) tablet 125 mcg, 125 mcg, Oral, ACB, Andrew Suh MD    multivitamin (ONE A DAY) tablet 1 Tablet, 1 Tablet, Oral, DAILY, Meño Suh MD, 1 Tablet at 06/29/21 1224    icosapent ethyL (VASCEPA) capsule 1 Capsule, 1 Capsule, Oral, BID, Andrew Suh MD    pravastatin (PRAVACHOL) tablet 40 mg, 40 mg, Oral, QHS, Meño Suh MD    rOPINIRole (REQUIP) tablet 0.25 mg, 0.25 mg, Oral, QHS, Meño Suh MD    tamsulosin [facility]) capsule 0.4 mg, 0.4 mg, Oral, DAILY, Meño Suh MD, 0.4 mg at 06/29/21 1214    0.9% sodium chloride infusion, 75 mL/hr, IntraVENous, CONTINUOUS, Meño Suh MD, Last Rate: 75 mL/hr at 06/29/21 1221, 75 mL/hr at 06/29/21 1221    acetaminophen (TYLENOL) tablet 650 mg, 650 mg, Oral, Q6H PRN **OR** acetaminophen (TYLENOL) suppository 650 mg, 650 mg, Rectal, Q6H PRN, Andrew Suh MD    polyethylene glycol (MIRALAX) packet 17 g, 17 g, Oral, DAILY PRN, Andrew Suh MD    ondansetron (ZOFRAN ODT) tablet 4 mg, 4 mg, Oral, Q8H PRN **OR** ondansetron (ZOFRAN) injection 4 mg, 4 mg, IntraVENous, Q6H PRN, Meño Suh MD    enoxaparin (LOVENOX) injection 40 mg, 40 mg, SubCUTAneous, DAILY, Meño Suh MD, 40 mg at 06/29/21 1214    levoFLOXacin (LEVAQUIN) 500 mg in D5W IVPB, 500 mg, IntraVENous, Q48H, Meño Suh MD, Last Rate: 100 mL/hr at 06/29/21 1214, 500 mg at 06/29/21 1214    LAB DATA REVIEWED:    Recent Results (from the past 24 hour(s))   CBC WITH AUTOMATED DIFF    Collection Time: 06/28/21  7:25 PM   Result Value Ref Range    WBC 9.0 4.1 - 11.1 K/uL    RBC 4.88 4.10 - 5.70 M/uL    HGB 14.9 12.1 - 17.0 g/dL    HCT 44.5 36.6 - 50.3 %    MCV 91.2 80.0 - 99.0 FL    MCH 30.5 26.0 - 34.0 PG    MCHC 33.5 30.0 - 36.5 g/dL    RDW 14.3 11.5 - 14.5 %    PLATELET 921 476 - 579 K/uL    MPV 10.1 8.9 - 12.9 FL    NRBC 0.0 0.0  WBC    ABSOLUTE NRBC 0.00 0.00 - 0.01 K/uL    NEUTROPHILS 66 32 - 75 %    LYMPHOCYTES 22 12 - 49 %    MONOCYTES 8 5 - 13 %    EOSINOPHILS 2 0 - 7 %    BASOPHILS 1 0 - 1 %    IMMATURE GRANULOCYTES 1 (H) 0 - 0.5 %    ABS. NEUTROPHILS 6.0 1.8 - 8.0 K/UL    ABS. LYMPHOCYTES 2.0 0.8 - 3.5 K/UL    ABS. MONOCYTES 0.7 0.0 - 1.0 K/UL    ABS. EOSINOPHILS 0.2 0.0 - 0.4 K/UL    ABS. BASOPHILS 0.1 0.0 - 0.1 K/UL    ABS. IMM.  GRANS. 0.1 (H) 0.00 - 0.04 K/UL    DF AUTOMATED     METABOLIC PANEL, BASIC    Collection Time: 06/28/21  7:25 PM   Result Value Ref Range    Sodium 139 136 - 145 mmol/L    Potassium 4.0 3.5 - 5.1 mmol/L    Chloride 105 97 - 108 mmol/L    CO2 28 21 - 32 mmol/L    Anion gap 6 5 - 15 mmol/L    Glucose 97 65 - 100 mg/dL    BUN 26 (H) 6 - 20 mg/dL    Creatinine 1.30 0.70 - 1.30 mg/dL    BUN/Creatinine ratio 20 12 - 20      GFR est AA >60 >60 ml/min/1.73m2    GFR est non-AA 52 (L) >60 ml/min/1.73m2    Calcium 8.9 8.5 - 10.1 mg/dL   URINALYSIS W/ REFLEX CULTURE    Collection Time: 06/28/21  8:00 PM    Specimen: Urine   Result Value Ref Range    Color Molly      Appearance Turbid (A) Clear      Specific gravity 1.025 1.003 - 1.030      pH (UA) 5.0 5.0 - 8.0      Protein 30 (A) Negative mg/dL    Glucose Negative Negative mg/dL    Ketone Negative Negative mg/dL    Bilirubin Negative Negative      Blood Negative Negative      Urobilinogen 0.1 0.1 - 1.0 EU/dL    Nitrites Negative Negative      Leukocyte Esterase Trace (A) Negative      WBC 20-50 0 - 4 /hpf    RBC  0 - 5 /hpf    Bacteria Negative Negative /hpf    UA:UC IF INDICATED Urine Culture Ordered (A) Culture not indicated by UA result      Mucus Trace (A) Negative /lpf    Hyaline cast 0-2 0 - 5 /lpf       XR Results (most recent):  Results from Hospital Encounter encounter on 04/19/21    XR CHEST PORT    Narrative  Chest single view. Comparison single view chest November 30, 2020    Coarse reticular markings throughout the lungs similar compared to prior  imaging. No gross interstitial or alveolar pulmonary edema. Cardiac silhouette  enlargement. Atherosclerotic change thoracic aorta. No pneumothorax or sizable  pleural effusion. No orders to display        Review of Systems:  C patient not a good historian    Objective:   VITALS:    Visit Vitals  BP (!) 198/82 (BP 1 Location: Right upper arm, BP Patient Position: At rest)   Pulse 71   Temp 98.8 °F (37.1 °C)   Resp 18   Ht 6' (1.829 m)   Wt 97.5 kg (215 lb)   SpO2 94%   BMI 29.16 kg/m²       Physical Exam:   Constitutional: p alert awake x1   HENT:   Head: Normocephalic and atraumatic. Eyes: Pupils are equal, round, and reactive to light. EOM are normal.   Cardiovascular: Normal rate, regular rhythm and normal heart sounds. Pulmonary/Chest: Breath sounds normal. No wheezes. No rales. Exhibits no tenderness. Abdominal: Soft. Bowel sounds are normal. There is no abdominal tenderness. There is no rebound and no guarding. Musculoskeletal: Normal range of motion.    Neurological: Alert awake x1 move all extremities  ASSESSMENT & PLAN:    Left-sided weakness rule out CVA  Hypertension  Hypothyroidism  Hyperlipidemia  BPH  Restless leg syndrome  Dehydration  Possible UTI    Admit to telemetry floor neurology consult PT OT speech therapy consult  2D echo carotid Dopplers  Continue home medication including levothyroxine pravastatin Requip and Flomax  Start on IV fluids start on aspirin 81 mg daily  Follow-up urine cultures start on Levaquin    Medication reconciliation done discussed with the patient son      Current Facility-Administered Medications:     [START ON 6/30/2021] levothyroxine (SYNTHROID) tablet 125 mcg, 125 mcg, Oral, ACB, Rylan Suh MD    multivitamin (ONE A DAY) tablet 1 Tablet, 1 Tablet, Oral, DAILY, Meño Suh MD, 1 Tablet at 06/29/21 1224    icosapent ethyL (VASCEPA) capsule 1 Capsule, 1 Capsule, Oral, BID, Rylan Suh MD    pravastatin (PRAVACHOL) tablet 40 mg, 40 mg, Oral, QHS, Meño Suh MD    rOPINIRole (REQUIP) tablet 0.25 mg, 0.25 mg, Oral, QHS, Meño Suh MD    [facility]) capsule 0.4 mg, 0.4 mg, Oral, DAILY, Luzi Perdomo MD, 0.4 mg at 06/29/21 1214    0.9% sodium chloride infusion, 75 mL/hr, IntraVENous, CONTINUOUS, Meño Suh MD, Last Rate: 75 mL/hr at 06/29/21 1221, 75 mL/hr at 06/29/21 1221    acetaminophen (TYLENOL) tablet 650 mg, 650 mg, Oral, Q6H PRN **OR** acetaminophen (TYLENOL) suppository 650 mg, 650 mg, Rectal, Q6H PRN, Rylan Suh MD    polyethylene glycol (MIRALAX) packet 17 g, 17 g, Oral, DAILY PRN, Rylan Suh MD    ondansetron (ZOFRAN ODT) tablet 4 mg, 4 mg, Oral, Q8H PRN **OR** ondansetron (ZOFRAN) injection 4 mg, 4 mg, IntraVENous, Q6H PRN, Meño Suh MD    enoxaparin (LOVENOX) injection 40 mg, 40 mg, SubCUTAneous, DAILY, Meño Suh MD, 40 mg at 06/29/21 1214    levoFLOXacin (LEVAQUIN) 500 mg in D5W IVPB, 500 mg, IntraVENous, Q48H, Meño Suh MD, Last Rate: 100 mL/hr at 06/29/21 1214, 500 mg at 06/29/21 1214          ________________________________________________________________________    Signed: Michael Edouard MD

## 2021-06-29 NOTE — ED PROVIDER NOTES
EMERGENCY DEPARTMENT HISTORY AND PHYSICAL EXAM      Date: 6/28/2021  Patient Name: lCint Gomez Sr.    History of Presenting Illness     Chief Complaint   Patient presents with    Extremity Weakness       History Provided By: Patient    HPI: Hari Rabago, 80 y.o. male with PMHx as noted below presents the emergency department as a transfer from outside facility for stroke work-up. History is limited as patient is poor historian. He presented to outside facility complaining of left-sided weakness and ambulatory difficulty since Saturday or Sunday, could not recall a specific day. Was evaluated the outside facility with a head CT which showed no acute findings and was transferred here for stroke work-up. Patient denies any new or worsening symptoms. Pt denies any other alleviating or exacerbating factors. Additionally, pt specifically denies any recent fever, chills, headache, nausea, vomiting, abdominal pain, CP, SOB, lightheadedness, dizziness, numbness, weakness, BLE swelling, heart palpitations, urinary sxs, diarrhea, constipation, melena, hematochezia, cough, or congestion. PCP: Prudence Oneill MD    Current Outpatient Medications   Medication Sig Dispense Refill    levothyroxine (SYNTHROID) 125 mcg tablet Take 1 Tab by mouth Daily (before breakfast). 30 Tab 0    amLODIPine (NORVASC) 5 mg tablet Take 1 Tab by mouth daily. 30 Tab 0    lisinopriL (PRINIVIL, ZESTRIL) 10 mg tablet Take 1 Tab by mouth daily. 30 Tab 0    omega 3-dha-epa-fish oil (Fish Oil) 100-160-1,000 mg cap Fish Oil   2 daily      rOPINIRole (REQUIP) 0.25 mg tablet Take 0.25 mg by mouth nightly.  tamsulosin (FLOMAX) 0.4 mg capsule Take 0.4 mg by mouth daily.  pravastatin (PRAVACHOL) 40 mg tablet Take 40 mg by mouth nightly.  multivitamin (ONE A DAY) tablet Take 1 Tab by mouth daily.          Past History     Past Medical History:  Past Medical History:   Diagnosis Date    Arthritis     CAD (coronary artery disease)     Hard of hearing     Headache     Hypertension     PMR (polymyalgia rheumatica) (HCC)     Skin cancer     Sleep apnea     no longer uses cpap       Past Surgical History:  Past Surgical History:   Procedure Laterality Date    HX CAROTID STENT      HX CHOLECYSTECTOMY  1998    HX HERNIA REPAIR  1980    NE CARDIAC SURG PROCEDURE UNLIST      cardiac stent    VASCULAR SURGERY PROCEDURE UNLIST      carotid endartectomy       Family History:  Family History   Problem Relation Age of Onset   Hopper Arthritis-rheumatoid Mother     Kidney Disease Mother     Cancer Father         Stomach    Gout Son        Social History:  Social History     Tobacco Use    Smoking status: Never Smoker    Smokeless tobacco: Never Used   Substance Use Topics    Alcohol use: No    Drug use: Never       Allergies: Allergies   Allergen Reactions    Pcn [Penicillins] Swelling         Review of Systems   Review of Systems  Constitutional: Negative for fever, chills, and fatigue. HENT: Negative for congestion, sore throat, rhinorrhea, sneezing and neck stiffness   Eyes: Negative for discharge and redness. Respiratory: Negative for  shortness of breath, wheezing   Cardiovascular: Negative for chest pain, palpitations   Gastrointestinal: Negative for nausea, vomiting, abdominal pain, constipation, diarrhea and blood in stool. Genitourinary: Negative for dysuria, urgency, frequency, hematuria, flank pain, decreased urine volume, discharge,   Musculoskeletal: Negative for myalgias or joint pain . Skin: Negative for rash or lesions . Neurological: Positive weakness. Negative ight-headedness, numbness and headaches. Physical Exam   Physical Exam    GENERAL: alert and oriented, no acute distress  EYES: PEERL, No injection, discharge or icterus. ENT: Mucous membranes pink and moist.  NECK: Supple  LUNGS: Airway patent. Non-labored respirations. Breath sounds clear with good air entry bilaterally.   HEART: Regular rate and rhythm. No peripheral edema  ABDOMEN: Non-distended and non-tender, without guarding or rebound. SKIN:  warm, dry  EXTREMITIES: Without swelling, tenderness or deformity, symmetric with normal ROM  NEUROLOGICAL: Alert, oriented, left lower extremity weakness noted      Diagnostic Study Results     Labs -     Recent Results (from the past 12 hour(s))   CBC WITH AUTOMATED DIFF    Collection Time: 06/28/21  7:25 PM   Result Value Ref Range    WBC 9.0 4.1 - 11.1 K/uL    RBC 4.88 4.10 - 5.70 M/uL    HGB 14.9 12.1 - 17.0 g/dL    HCT 44.5 36.6 - 50.3 %    MCV 91.2 80.0 - 99.0 FL    MCH 30.5 26.0 - 34.0 PG    MCHC 33.5 30.0 - 36.5 g/dL    RDW 14.3 11.5 - 14.5 %    PLATELET 914 575 - 677 K/uL    MPV 10.1 8.9 - 12.9 FL    NRBC 0.0 0.0  WBC    ABSOLUTE NRBC 0.00 0.00 - 0.01 K/uL    NEUTROPHILS 66 32 - 75 %    LYMPHOCYTES 22 12 - 49 %    MONOCYTES 8 5 - 13 %    EOSINOPHILS 2 0 - 7 %    BASOPHILS 1 0 - 1 %    IMMATURE GRANULOCYTES 1 (H) 0 - 0.5 %    ABS. NEUTROPHILS 6.0 1.8 - 8.0 K/UL    ABS. LYMPHOCYTES 2.0 0.8 - 3.5 K/UL    ABS. MONOCYTES 0.7 0.0 - 1.0 K/UL    ABS. EOSINOPHILS 0.2 0.0 - 0.4 K/UL    ABS. BASOPHILS 0.1 0.0 - 0.1 K/UL    ABS. IMM.  GRANS. 0.1 (H) 0.00 - 0.04 K/UL    DF AUTOMATED     METABOLIC PANEL, BASIC    Collection Time: 06/28/21  7:25 PM   Result Value Ref Range    Sodium 139 136 - 145 mmol/L    Potassium 4.0 3.5 - 5.1 mmol/L    Chloride 105 97 - 108 mmol/L    CO2 28 21 - 32 mmol/L    Anion gap 6 5 - 15 mmol/L    Glucose 97 65 - 100 mg/dL    BUN 26 (H) 6 - 20 mg/dL    Creatinine 1.30 0.70 - 1.30 mg/dL    BUN/Creatinine ratio 20 12 - 20      GFR est AA >60 >60 ml/min/1.73m2    GFR est non-AA 52 (L) >60 ml/min/1.73m2    Calcium 8.9 8.5 - 10.1 mg/dL   URINALYSIS W/ REFLEX CULTURE    Collection Time: 06/28/21  8:00 PM    Specimen: Urine   Result Value Ref Range    Color Molly      Appearance Turbid (A) Clear      Specific gravity 1.025 1.003 - 1.030      pH (UA) 5.0 5.0 - 8.0      Protein 30 (A) Negative mg/dL    Glucose Negative Negative mg/dL    Ketone Negative Negative mg/dL    Bilirubin Negative Negative      Blood Negative Negative      Urobilinogen 0.1 0.1 - 1.0 EU/dL    Nitrites Negative Negative      Leukocyte Esterase Trace (A) Negative      WBC 20-50 0 - 4 /hpf    RBC  0 - 5 /hpf    Bacteria Negative Negative /hpf    UA:UC IF INDICATED Urine Culture Ordered (A) Culture not indicated by UA result      Mucus Trace (A) Negative /lpf    Hyaline cast 0-2 0 - 5 /lpf       Radiologic Studies -   No orders to display     CT Results  (Last 48 hours)               06/28/21 1625  CT HEAD WO CONT Final result    Impression:  No interval change. No acute intracranial abnormality. Narrative:  CT SCAN OF THE BRAIN WITHOUT CONTRAST:               CLINICAL HISTORY: Left lower extremity weakness. Thin axial and coronal and sagittal reconstructions were obtained. All CT scans at this facility are performed using dose reduction optimization   techniques as appropriate to a performed exam including the following: Automated   exposure control, adjustments of the mA and/or kV according to patient size, or   use of iterative reconstruction technique. Comparison is made with a previous CT brain examination April 19, 2021. Ventricles are midline. Mild cortical atrophy is again noted and unchanged. Again noted are moderate microvascular ischemic changes and chronic lacunar   infarct in the posterior limb of the right internal capsule. Chronic lacunar   infarct in the left periventricular white matter is also noted and also   unchanged. No intra or extra-axial hemorrhage or acute infarction a major   arterial distribution is demonstrated. Partial empty sella is noted. Scans through the posterior fossa show no major infarction or hemorrhage. The cerebellar tonsils are at the level of the foramen magnum in a satisfactory   position.            Mild mucosal changes in the ethmoid air cells are again noted. Rest of the   paranasal sinuses are clear. Mastoid air cells are clear. Both orbits have a normal CT appearance. CXR Results  (Last 48 hours)    None            Medical Decision Making     Diego FAUSTIN MD am the first provider for this patient and am the attending of record for this patient encounter. I reviewed the vital signs, available nursing notes, past medical history, past surgical history, family history and social history. Vital Signs-Reviewed the patient's vital signs. Patient Vitals for the past 12 hrs:   Temp Pulse Resp BP SpO2   06/28/21 2219 -- 76 18 (!) 160/91 99 %   06/28/21 2057 -- 69 -- (!) 182/77 99 %   06/28/21 1912 97.9 °F (36.6 °C) 68 16 (!) 167/72 97 %   06/28/21 1859 -- 74 -- -- --           Records Reviewed: Nursing Notes and Old Medical Records    Provider Notes (Medical Decision Making): On presentation, the patient is well appearing, in no acute distress with normal vital signs. Based on my history and exam the differential diagnosis for this patient includes stroke, TIA, infection, dehydration, metabolic abnormality, abdominal dysfunction. Reviewed imaging from outside facility, no acute findings. Per patient his symptom onset was greater than 24 hours prior to presentation is similar no acute intervention was indicated. Patient received aspirin at outside facility. Will admit for further work-up. ED Course:   Initial assessment performed. The patients presenting problems have been discussed, and they are in agreement with the care plan formulated and outlined with them. I have encouraged them to ask questions as they arise throughout their visit. PROGRESS:  The patient has been re-evaluated and sx have improved. Reviewed available results with patient and have counseled them on diagnosis and care plan.  They have expressed understanding, and all their questions have been answered. They agree with plan for admission. Admit Note  Patient is being admitted to Dr. Mayra Bazan  The results of their tests and reasons for their admission have been discussed with them and/or available family. They convey agreement and understanding for the need to be admitted and for their admission diagnosis. Consultation has been made with the inpatient physician specialist for hospitalization. Disposition:  admission    PLAN:  1. Admit     Diagnosis     Clinical Impression:   1. Weakness    2. Hematuria, unspecified type        Please note that this dictation was completed with Dragon, computer voice recognition software. Quite often unanticipated grammatical, syntax, homophones, and other interpretive errors are inadvertently transcribed by the computer software. Please disregard these errors. Additionally, please excuse any errors that have escaped final proofreading.

## 2021-06-29 NOTE — PROGRESS NOTES
Two person skin assessment performed by Parag Arredondo RN. Patient had blanchable redness on sacral area. No open areas. Skin is clean,dry, and intact. Will continue to monitor.

## 2021-06-29 NOTE — PROGRESS NOTES
Problem: Dysphagia (Adult)  Goal: *Acute Goals and Plan of Care (Insert Text)  Description: Speech Therapy Goals  Initiated 6/29/2021  -Patient will tolerate regular diet with thin liquids without signs/symptoms of aspiration given no cues within 7 day(s). [ ] Not met  [ ]  MET   [ ] Progressing  [ ] Duncan Riding  -Patient will participate in complete speech/language assessment as indicated. [ ] Not met  [ ]  MET   [ ] Progressing  [ ] Duncan Riding  -Patient will demonstrate understanding of swallow safety precautions and aspiration precautions, diet recs with no cues within 7 day(s). [ ] Not met  [ ]  MET   [ ] Progressing  [ ] Discontinue  Outcome: Not Met   SPEECH [address] EVALUATION  Patient: Ed Poole Sr. (80 y.o. male)  Date: 6/29/2021  Primary Diagnosis: Weakness [R53.1]  Generalized weakness [R53.1]        Precautions: aspiration       ASSESSMENT :  Based on the objective data described below, the patient presents with oropharyngeal sw function WNL. Pt tolerates all trials without overt s/s aspiration. Mildly prolonged mastication is noted due to missing teeth. Pt's vocal quality is low volume and  hoarse, breathy. Pt's speech is at times confused but pt jokes with clinician, non-fluent productiong, halting, delayed word finding with dysarthric speech is noted. 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 Pt was receiving HH prior to current admission; however, pt and son have been working with the [facility] in [facility] for placement per pt's son. Patient will benefit from skilled intervention to address the above impairments. Patients rehabilitation potential is considered to be Good     PLAN :  Recommendations and Planned Interventions:   At this time, recommend cont current regular/thin diet with restrictions per MD, 1:1 assistance with ALL PO intake, STRICT aspiration and GERD precautions, monitor pt closely for s/s aspiration, meds as tolerated. Recommend further SLP assessment to address speech and language deficits in next level of care; however, will plan to follow-up with pt in this setting if prolonged admission occurs. Frequency/Duration: Patient will be followed by speech-language pathology 2 times a week to address goals. Discharge Recommendations: To Be Determined, PT/OT pending     SUBJECTIVE:   Patient alert, agreeable, pleasant. Son is present with pt's consent. OBJECTIVE:     CT Results  (Last 48 hours)                 06/28/21 1625  CT HEAD WO CONT Final result    Impression:  No interval change. No acute intracranial abnormality. Narrative:  CT SCAN OF THE BRAIN WITHOUT CONTRAST:               CLINICAL HISTORY: Left lower extremity weakness. Thin axial and coronal and sagittal reconstructions were obtained. All CT scans at this facility are performed using dose reduction optimization   techniques as appropriate to a performed exam including the following: Automated   exposure control, adjustments of the mA and/or kV according to patient size, or   use of iterative reconstruction technique. Comparison is made with a previous CT brain examination April 19, 2021. Ventricles are midline. Mild cortical atrophy is again noted and unchanged. Again noted are moderate microvascular ischemic changes and chronic lacunar   infarct in the posterior limb of the right internal capsule. Chronic lacunar   infarct in the left periventricular white matter is also noted and also   unchanged. No intra or extra-axial hemorrhage or acute infarction a major   arterial distribution is demonstrated. Partial empty sella is noted. Scans through the posterior fossa show no major infarction or hemorrhage. The cerebellar tonsils are at the level of the foramen magnum in a satisfactory   position.            Mild mucosal changes in the ethmoid air cells are again noted. Rest of the   paranasal sinuses are clear. Mastoid air cells are clear. Both orbits have a normal CT appearance. Past Medical History:   Diagnosis Date    Arthritis     CAD (coronary artery disease)     Hard of hearing     Headache     Hypertension     PMR (polymyalgia rheumatica) (HCC)     Skin cancer     Sleep apnea     no longer uses cpap     Past Surgical History:   Procedure Laterality Date    HX CAROTID STENT      HX CHOLECYSTECTOMY  1998    HX HERNIA REPAIR  1980    DE CARDIAC SURG PROCEDURE UNLIST      cardiac stent    VASCULAR SURGERY PROCEDURE UNLIST      carotid endartectomy     Prior Level of Function/Home Situation: Son supports pt, pt has not been driving since recent CVA 11/2020  Home Situation  Home Environment: Private residence  # Steps to Enter: 1  One/Two Story Residence: One story  Living Alone: Yes  Support Systems: Child(ja)  Patient Expects to be Discharged to[de-identified] Assisted living  Current DME Used/Available at Home: [address] prior to admission: regular/thin  Current Diet:  regular/thin, restrictions per MD   Cognitive and Communication Status:  Neurologic State: Alert, Appropriate for age  Orientation Level: Oriented to person, Oriented to place  Cognition: Appropriate decision making, Appropriate for age attention/concentration, Appropriate safety awareness    Swallowing Evaluation:   Oral Assessment:  Oral Assessment  Labial: No impairment  Dentition: Intact  Oral Hygiene: WFL  Lingual: No impairment  Velum: No impairment  P.O. Trials:  Patient Position: upright in bed  Vocal quality prior to P.O.: Breathy;Hoarse;Low volume  Consistency Presented: Puree; Solid; Thin liquid  How Presented: Self-fed/presented;SLP-fed/presented;Spoon;Straw;Successive swallows     Bolus Acceptance: No impairment  Bolus Formation/Control: No impairment     Propulsion: No impairment  Oral Residue: Less than 10% of bolus; Lingual  Initiation of Swallow: No impairment  Laryngeal Elevation: Functional  Aspiration Signs/Symptoms: None  Pharyngeal Phase Characteristics: No impairment, issues, or problems     Oral Phase Severity: No impairment  Pharyngeal Phase Severity : No impairment  Voice:    Vocal Quality: Breathy;Hoarse;Low volume    Pain:  Pain Scale 1: Numeric (0 - 10)  Pain Intensity 1: 0       After treatment:   Patient left in no apparent distress in bed, Call bell within reach, and Nursing notified    COMMUNICATION/EDUCATION:   Patient was educated regarding purpose of SLP assessment, POC, diet recs and sw safety precautions. Patient demonstrated Good understanding as evidenced by verbal responsiveness. The patient's plan of care including recommendations, planned interventions, and recommended diet changes were discussed with: Registered nurse. Patient/family have participated as able in goal setting and plan of care. Patient/family agree to work toward stated goals and plan of care.     Thank you for this referral.  Bacilio Herrera M.S. CCC-SLP  Time Calculation: 26 mins

## 2021-06-30 ENCOUNTER — APPOINTMENT (OUTPATIENT)
Dept: NON INVASIVE DIAGNOSTICS | Age: 86
End: 2021-06-30
Attending: FAMILY MEDICINE
Payer: MEDICARE

## 2021-06-30 LAB
ALBUMIN SERPL-MCNC: 3 G/DL (ref 3.5–5)
ALBUMIN/GLOB SERPL: 0.8 {RATIO} (ref 1.1–2.2)
ALP SERPL-CCNC: 58 U/L (ref 45–117)
ALT SERPL-CCNC: 17 U/L (ref 12–78)
ANION GAP SERPL CALC-SCNC: 8 MMOL/L (ref 5–15)
APPEARANCE UR: CLEAR
AST SERPL W P-5'-P-CCNC: 13 U/L (ref 15–37)
BACTERIA SPEC CULT: NORMAL
BACTERIA URNS QL MICRO: NEGATIVE /HPF
BASOPHILS # BLD: 0 K/UL (ref 0–0.1)
BASOPHILS NFR BLD: 0 % (ref 0–1)
BILIRUB SERPL-MCNC: 0.7 MG/DL (ref 0.2–1)
BILIRUB UR QL: NEGATIVE
BUN SERPL-MCNC: 17 MG/DL (ref 6–20)
BUN/CREAT SERPL: 20 (ref 12–20)
CA-I BLD-MCNC: 8.3 MG/DL (ref 8.5–10.1)
CHLORIDE SERPL-SCNC: 107 MMOL/L (ref 97–108)
CO2 SERPL-SCNC: 24 MMOL/L (ref 21–32)
COLONY COUNT,CNT: NORMAL
COLONY COUNT,CNT: NORMAL
COLOR UR: ABNORMAL
CREAT SERPL-MCNC: 0.84 MG/DL (ref 0.7–1.3)
DIFFERENTIAL METHOD BLD: ABNORMAL
ECHO LA MAJOR AXIS: 3.6 CM
ECHO LA MINOR AXIS: 1.64 CM
ECHO LV EJECTION FRACTION BIPLANE: 65 % (ref 55–100)
ECHO LV INTERNAL DIMENSION DIASTOLIC: 5.7 CM (ref 4.2–5.9)
ECHO LV INTERNAL DIMENSION SYSTOLIC: 3.7 CM
ECHO LV IVSD: 1.2 CM (ref 0.6–1)
ECHO LV MASS 2D: 211.7 G (ref 88–224)
ECHO LV MASS INDEX 2D: 96.2 G/M2 (ref 49–115)
ECHO LV POSTERIOR WALL DIASTOLIC: 0.7 CM (ref 0.6–1)
ECHO RA AREA 4C: 19.7 CM2
ECHO RV INTERNAL DIMENSION: 3.6 CM
EOSINOPHIL # BLD: 0.2 K/UL (ref 0–0.4)
EOSINOPHIL NFR BLD: 2 % (ref 0–7)
ERYTHROCYTE [DISTWIDTH] IN BLOOD BY AUTOMATED COUNT: 14.1 % (ref 11.5–14.5)
GLOBULIN SER CALC-MCNC: 3.8 G/DL (ref 2–4)
GLUCOSE SERPL-MCNC: 105 MG/DL (ref 65–100)
GLUCOSE UR STRIP.AUTO-MCNC: NEGATIVE MG/DL
HCT VFR BLD AUTO: 41.4 % (ref 36.6–50.3)
HGB BLD-MCNC: 13.8 G/DL (ref 12.1–17)
HGB UR QL STRIP: NEGATIVE
IMM GRANULOCYTES # BLD AUTO: 0 K/UL (ref 0–0.04)
IMM GRANULOCYTES NFR BLD AUTO: 1 % (ref 0–0.5)
KETONES UR QL STRIP.AUTO: NEGATIVE MG/DL
LEFT CCA DIST DIAS: 9.5 CM/S
LEFT CCA DIST SYS: 85.9 CM/S
LEFT CCA PROX DIAS: 11.2 CM/S
LEFT CCA PROX SYS: 99.7 CM/S
LEFT ECA DIAS: 0 CM/S
LEFT ECA SYS: 438 CM/S
LEFT ICA DIST DIAS: 12.4 CM/S
LEFT ICA DIST SYS: 88 CM/S
LEFT ICA PROX DIAS: 31.5 CM/S
LEFT ICA PROX SYS: 134 CM/S
LEFT SUBCLAVIAN DIAS: 0 CM/S
LEFT SUBCLAVIAN SYS: 122 CM/S
LEFT VERTEBRAL DIAS: 8.73 CM/S
LEFT VERTEBRAL SYS: 47.1 CM/S
LEUKOCYTE ESTERASE UR QL STRIP.AUTO: ABNORMAL
LYMPHOCYTES # BLD: 1.2 K/UL (ref 0.8–3.5)
LYMPHOCYTES NFR BLD: 14 % (ref 12–49)
MCH RBC QN AUTO: 30.4 PG (ref 26–34)
MCHC RBC AUTO-ENTMCNC: 33.3 G/DL (ref 30–36.5)
MCV RBC AUTO: 91.2 FL (ref 80–99)
MONOCYTES # BLD: 0.7 K/UL (ref 0–1)
MONOCYTES NFR BLD: 8 % (ref 5–13)
MUCOUS THREADS URNS QL MICRO: ABNORMAL /LPF
NEUTS SEG # BLD: 6.2 K/UL (ref 1.8–8)
NEUTS SEG NFR BLD: 75 % (ref 32–75)
NITRITE UR QL STRIP.AUTO: NEGATIVE
NRBC # BLD: 0 K/UL (ref 0–0.01)
NRBC BLD-RTO: 0 PER 100 WBC
PH UR STRIP: 6 [PH] (ref 5–8)
PLATELET # BLD AUTO: 257 K/UL (ref 150–400)
PMV BLD AUTO: 10.5 FL (ref 8.9–12.9)
POTASSIUM SERPL-SCNC: 4.1 MMOL/L (ref 3.5–5.1)
PROT SERPL-MCNC: 6.8 G/DL (ref 6.4–8.2)
PROT UR STRIP-MCNC: 30 MG/DL
RBC # BLD AUTO: 4.54 M/UL (ref 4.1–5.7)
RBC #/AREA URNS HPF: ABNORMAL /HPF (ref 0–5)
RIGHT CCA DIST DIAS: 0 CM/S
RIGHT CCA DIST SYS: 101 CM/S
RIGHT CCA PROX DIAS: 0 CM/S
RIGHT CCA PROX SYS: 129 CM/S
RIGHT ECA DIAS: 0 CM/S
RIGHT ECA SYS: 93.9 CM/S
RIGHT ICA DIST DIAS: 3.9 CM/S
RIGHT ICA DIST SYS: 31.6 CM/S
RIGHT ICA PROX DIAS: 0 CM/S
RIGHT ICA PROX SYS: 82.5 CM/S
RIGHT SUBCLAVIAN DIAS: 0 CM/S
RIGHT SUBCLAVIAN SYS: 156 CM/S
RIGHT VERTEBRAL DIAS: 9.37 CM/S
RIGHT VERTEBRAL SYS: 42.5 CM/S
SODIUM SERPL-SCNC: 139 MMOL/L (ref 136–145)
SP GR UR REFRACTOMETRY: 1.01 (ref 1–1.03)
SPECIAL REQUESTS,SREQ: NORMAL
UROBILINOGEN UR QL STRIP.AUTO: 0.1 EU/DL (ref 0.1–1)
WBC # BLD AUTO: 8.2 K/UL (ref 4.1–11.1)
WBC URNS QL MICRO: ABNORMAL /HPF (ref 0–4)

## 2021-06-30 PROCEDURE — 97530 THERAPEUTIC ACTIVITIES: CPT

## 2021-06-30 PROCEDURE — 97162 PT EVAL MOD COMPLEX 30 MIN: CPT

## 2021-06-30 PROCEDURE — 92523 SPEECH SOUND LANG COMPREHEN: CPT

## 2021-06-30 PROCEDURE — 93306 TTE W/DOPPLER COMPLETE: CPT

## 2021-06-30 PROCEDURE — 36415 COLL VENOUS BLD VENIPUNCTURE: CPT

## 2021-06-30 PROCEDURE — G0378 HOSPITAL OBSERVATION PER HR: HCPCS

## 2021-06-30 PROCEDURE — 93880 EXTRACRANIAL BILAT STUDY: CPT

## 2021-06-30 PROCEDURE — 74011250637 HC RX REV CODE- 250/637: Performed by: PSYCHIATRY & NEUROLOGY

## 2021-06-30 PROCEDURE — 74011250637 HC RX REV CODE- 250/637: Performed by: FAMILY MEDICINE

## 2021-06-30 PROCEDURE — 74011250636 HC RX REV CODE- 250/636: Performed by: FAMILY MEDICINE

## 2021-06-30 PROCEDURE — 97165 OT EVAL LOW COMPLEX 30 MIN: CPT

## 2021-06-30 PROCEDURE — 65270000029 HC RM PRIVATE

## 2021-06-30 PROCEDURE — 85025 COMPLETE CBC W/AUTO DIFF WBC: CPT

## 2021-06-30 PROCEDURE — 80053 COMPREHEN METABOLIC PANEL: CPT

## 2021-06-30 RX ORDER — DONEPEZIL HYDROCHLORIDE 5 MG/1
5 TABLET, FILM COATED ORAL
Status: DISCONTINUED | OUTPATIENT
Start: 2021-06-30 | End: 2021-07-05 | Stop reason: HOSPADM

## 2021-06-30 RX ADMIN — ENOXAPARIN SODIUM 40 MG: 40 INJECTION SUBCUTANEOUS at 09:28

## 2021-06-30 RX ADMIN — PRAVASTATIN SODIUM 40 MG: 40 TABLET ORAL at 21:30

## 2021-06-30 RX ADMIN — TAMSULOSIN HYDROCHLORIDE 0.4 MG: 0.4 CAPSULE ORAL at 09:28

## 2021-06-30 RX ADMIN — ASPIRIN 81 MG: 81 TABLET, CHEWABLE ORAL at 09:28

## 2021-06-30 RX ADMIN — LEVOTHYROXINE SODIUM 125 MCG: 0.03 TABLET ORAL at 05:59

## 2021-06-30 RX ADMIN — MULTIVITAMIN TABLET 1 TABLET: TABLET at 09:28

## 2021-06-30 RX ADMIN — ICOSAPENT ETHYL 1 CAPSULE: 1000 CAPSULE ORAL at 21:00

## 2021-06-30 RX ADMIN — ROPINIROLE HYDROCHLORIDE 0.25 MG: 0.25 TABLET, FILM COATED ORAL at 21:30

## 2021-06-30 RX ADMIN — DONEPEZIL HYDROCHLORIDE 5 MG: 5 TABLET, FILM COATED ORAL at 21:30

## 2021-06-30 RX ADMIN — ICOSAPENT ETHYL 1 CAPSULE: 1000 CAPSULE ORAL at 09:00

## 2021-06-30 NOTE — PROGRESS NOTES
OT eval order received and acknowledged. OT eval attempted at 10:54 however pt currently off of the floor in CVL. Will continue to follow patient and attempt OT eval at a later time. Thank you.

## 2021-06-30 NOTE — PROGRESS NOTES
Problem: Pressure Injury - Risk of  Goal: *Prevention of pressure injury  Description: Document Wali Scale and appropriate interventions in the flowsheet. Outcome: Progressing Towards Goal  Note: Pressure Injury Interventions:  Sensory Interventions: Float heels, Assess changes in LOC, Check visual cues for pain    Moisture Interventions: Absorbent underpads, Apply protective barrier, creams and emollients, Minimize layers    Activity Interventions: Increase time out of bed    Mobility Interventions: Float heels, PT/OT evaluation    Nutrition Interventions: Document food/fluid/supplement intake    Friction and Shear Interventions: Apply protective barrier, creams and emollients, HOB 30 degrees or less                Problem: Patient Education: Go to Patient Education Activity  Goal: Patient/Family Education  Outcome: Progressing Towards Goal     Problem: Falls - Risk of  Goal: *Absence of Falls  Description: Document Dex Fall Risk and appropriate interventions in the flowsheet.   Outcome: Progressing Towards Goal  Note: Fall Risk Interventions:  Mobility Interventions: Bed/chair exit alarm         Medication Interventions: Bed/chair exit alarm         History of Falls Interventions: Bed/chair exit alarm         Problem: Patient Education: Go to Patient Education Activity  Goal: Patient/Family Education  Outcome: Progressing Towards Goal     Problem: Patient Education: Go to Patient Education Activity  Goal: Patient/Family Education  Outcome: Progressing Towards Goal

## 2021-06-30 NOTE — PROGRESS NOTES
General Daily Progress Note          Patient Name:   Lynda Ponce. YOB: 1929       Age:  80 y.o. Admit Date: 6/28/2021      Subjective:       Patient alert awake confused eating well  Heart rates on the lower side 58           Objective:     Visit Vitals  BP (!) 104/53   Pulse (!) 58   Temp 98.3 °F (36.8 °C)   Resp 18   Ht 6' (1.829 m)   Wt 97.5 kg (215 lb)   SpO2 94%   BMI 29.16 kg/m²        Recent Results (from the past 24 hour(s))   CULTURE, BLOOD    Collection Time: 06/29/21 11:24 AM    Specimen: Blood   Result Value Ref Range    Special Requests: No Special Requests      Culture result: No growth after 19 hours     CBC WITH AUTOMATED DIFF    Collection Time: 06/30/21  7:22 AM   Result Value Ref Range    WBC 8.2 4.1 - 11.1 K/uL    RBC 4.54 4.10 - 5.70 M/uL    HGB 13.8 12.1 - 17.0 g/dL    HCT 41.4 36.6 - 50.3 %    MCV 91.2 80.0 - 99.0 FL    MCH 30.4 26.0 - 34.0 PG    MCHC 33.3 30.0 - 36.5 g/dL    RDW 14.1 11.5 - 14.5 %    PLATELET 036 101 - 565 K/uL    MPV 10.5 8.9 - 12.9 FL    NRBC 0.0 0.0  WBC    ABSOLUTE NRBC 0.00 0.00 - 0.01 K/uL    NEUTROPHILS 75 32 - 75 %    LYMPHOCYTES 14 12 - 49 %    MONOCYTES 8 5 - 13 %    EOSINOPHILS 2 0 - 7 %    BASOPHILS 0 0 - 1 %    IMMATURE GRANULOCYTES 1 (H) 0 - 0.5 %    ABS. NEUTROPHILS 6.2 1.8 - 8.0 K/UL    ABS. LYMPHOCYTES 1.2 0.8 - 3.5 K/UL    ABS. MONOCYTES 0.7 0.0 - 1.0 K/UL    ABS. EOSINOPHILS 0.2 0.0 - 0.4 K/UL    ABS. BASOPHILS 0.0 0.0 - 0.1 K/UL    ABS. IMM. GRANS. 0.0 0.00 - 0.04 K/UL    DF AUTOMATED       [unfilled]      Review of Systems    Constitutional: Negative for chills and fever. HENT: Negative. Eyes: Negative. Respiratory: Negative. Cardiovascular: Negative. Gastrointestinal: Negative for abdominal pain and nausea. Skin: Negative. Neurological: Negative. Physical Exam:      Constitutional: Alert awake. HENT:   Head: Normocephalic and atraumatic.    Eyes: Pupils are equal, round, and reactive to light. EOM are normal.   Cardiovascular: Normal rate, regular rhythm and normal heart sounds. Pulmonary/Chest: Breath sounds normal. No wheezes. No rales. Exhibits no tenderness. Abdominal: Soft. Bowel sounds are normal. There is no abdominal tenderness. There is no rebound and no guarding. Musculoskeletal: Normal range of motion. Neurological: Alert awake moving all extremity    MRI BRAIN WO CONT   Final Result   1. Acute lacunar infarct of the right thalamus. 2.  Chronic right thalamic lacunar infarct in moderate chronic small vessel   ischemic changes. Recent Results (from the past 24 hour(s))   CULTURE, BLOOD    Collection Time: 06/29/21 11:24 AM    Specimen: Blood   Result Value Ref Range    Special Requests: No Special Requests      Culture result: No growth after 19 hours     CBC WITH AUTOMATED DIFF    Collection Time: 06/30/21  7:22 AM   Result Value Ref Range    WBC 8.2 4.1 - 11.1 K/uL    RBC 4.54 4.10 - 5.70 M/uL    HGB 13.8 12.1 - 17.0 g/dL    HCT 41.4 36.6 - 50.3 %    MCV 91.2 80.0 - 99.0 FL    MCH 30.4 26.0 - 34.0 PG    MCHC 33.3 30.0 - 36.5 g/dL    RDW 14.1 11.5 - 14.5 %    PLATELET 131 079 - 057 K/uL    MPV 10.5 8.9 - 12.9 FL    NRBC 0.0 0.0  WBC    ABSOLUTE NRBC 0.00 0.00 - 0.01 K/uL    NEUTROPHILS 75 32 - 75 %    LYMPHOCYTES 14 12 - 49 %    MONOCYTES 8 5 - 13 %    EOSINOPHILS 2 0 - 7 %    BASOPHILS 0 0 - 1 %    IMMATURE GRANULOCYTES 1 (H) 0 - 0.5 %    ABS. NEUTROPHILS 6.2 1.8 - 8.0 K/UL    ABS. LYMPHOCYTES 1.2 0.8 - 3.5 K/UL    ABS. MONOCYTES 0.7 0.0 - 1.0 K/UL    ABS. EOSINOPHILS 0.2 0.0 - 0.4 K/UL    ABS. BASOPHILS 0.0 0.0 - 0.1 K/UL    ABS. IMM.  GRANS. 0.0 0.00 - 0.04 K/UL    DF AUTOMATED         Results     Procedure Component Value Units Date/Time    CULTURE, BLOOD #1 [[id number]] Collected: 06/29/21 1201    Order Status: Completed Specimen: Blood Updated: 06/29/21 1211    CULTURE, BLOOD #2 [[id number]] Collected: 06/29/21 1124    Order Status: Completed Specimen: Blood Updated: 06/30/21 0737     Special Requests: No Special Requests        Culture result: No growth after 19 hours       CULTURE, URINE [[id number]] Collected: 06/29/21 1100    Order Status: Canceled Specimen: Urine     CULTURE, URINE [[id number]] Collected: 06/28/21 2000    Order Status: Completed Specimen: Urine Updated: 06/28/21 2137           Labs:     Recent Labs     06/30/21 0722 06/28/21 1925   WBC 8.2 9.0   HGB 13.8 14.9   HCT 41.4 44.5    284     Recent Labs     06/28/21 1925      K 4.0      CO2 28   BUN 26*   CREA 1.30   GLU 97   CA 8.9     No results for input(s): ALT, AP, TBIL, TBILI, TP, ALB, GLOB, GGT, AML, LPSE in the last 72 hours. No lab exists for component: SGOT, GPT, AMYP, HLPSE  No results for input(s): INR, PTP, APTT, INREXT in the last 72 hours. No results for input(s): FE, TIBC, PSAT, FERR in the last 72 hours. Lab Results   Component Value Date/Time    Folate 48.0 (H) 11/15/2020 04:25 AM      No results for input(s): PH, PCO2, PO2 in the last 72 hours. No results for input(s): CPK, CKNDX, TROIQ in the last 72 hours.     No lab exists for component: CPKMB  Lab Results   Component Value Date/Time    Cholesterol, total 142 04/19/2021 10:23 AM    HDL Cholesterol 37 04/19/2021 10:23 AM    LDL, calculated 69.4 04/19/2021 10:23 AM    Triglyceride 178 (H) 04/19/2021 10:23 AM    CHOL/HDL Ratio 3.8 04/19/2021 10:23 AM     Lab Results   Component Value Date/Time    Glucose (POC) 100 04/19/2021 09:23 AM     Lab Results   Component Value Date/Time    Color Molly 06/28/2021 08:00 PM    Appearance Turbid (A) 06/28/2021 08:00 PM    Specific gravity 1.025 06/28/2021 08:00 PM    Specific gravity 1.021 11/14/2020 11:23 PM    pH (UA) 5.0 06/28/2021 08:00 PM    Protein 30 (A) 06/28/2021 08:00 PM    Glucose Negative 06/28/2021 08:00 PM    Ketone Negative 06/28/2021 08:00 PM    Bilirubin Negative 06/28/2021 08:00 PM    Urobilinogen 0.1 06/28/2021 08:00 PM    Nitrites Negative 06/28/2021 08:00 PM    Leukocyte Esterase Trace (A) 06/28/2021 08:00 PM    Epithelial cells FEW 11/14/2020 11:23 PM    Bacteria Negative 06/28/2021 08:00 PM    WBC 20-50 06/28/2021 08:00 PM    RBC  06/28/2021 08:00 PM         Assessment:     Acute lacunar infarct of the right thalamus  Hypertension  Hypothyroidism  Hyperlipidemia  BPH  Restless leg syndrome  Dehydration  Possible UTI      Plan:     Continue aspirin 81 mg daily  Aricept 5 mg at bedtime  Vascepa 1 capsule twice a day  Levaquin 500 daily  Levothyroxine 125 mcg daily  Pravachol 40 mg daily  Requip 0.25 mg daily  Flomax 0.4 mg daily  Continue IV fluids    Carotid Doppler 2D echo PT OT consult      Current Facility-Administered Medications:     donepeziL (ARICEPT) tablet 5 mg, 5 mg, Oral, QHS, Tanwi IV, Radha Carter MD    levothyroxine (SYNTHROID) tablet 125 mcg, 125 mcg, Oral, ACB, Meño Suh MD, 125 mcg at 06/30/21 0559    multivitamin (ONE A DAY) tablet 1 Tablet, 1 Tablet, Oral, DAILY, Meño Suh MD, 1 Tablet at 06/29/21 1224    icosapent ethyL (VASCEPA) capsule 1 Capsule, 1 Capsule, Oral, BID, Meño Suh MD, 1 Capsule at 06/29/21 2100    pravastatin (PRAVACHOL) tablet 40 mg, 40 mg, Oral, QHS, Meño Suh MD, 40 mg at 06/29/21 2216    rOPINIRole (REQUIP) tablet 0.25 mg, 0.25 mg, Oral, QHS, Meño Suh MD, 0.25 mg at 06/29/21 2216    tamsulosin (FLOMAX) capsule 0.4 mg, 0.4 mg, Oral, DAILY, Meño Suh MD, 0.4 mg at 06/29/21 1214    0.9% sodium chloride infusion, 75 mL/hr, IntraVENous, CONTINUOUS, Meño Suh MD, Last Rate: 75 mL/hr at 06/29/21 1221, 75 mL/hr at 06/29/21 1221    acetaminophen (TYLENOL) tablet 650 mg, 650 mg, Oral, Q6H PRN, 650 mg at 06/29/21 2216 **OR** acetaminophen (TYLENOL) suppository 650 mg, 650 mg, Rectal, Q6H PRN, Meño Suh MD    polyethylene glycol (MIRALAX) packet 17 g, 17 g, Oral, DAILY PRN, Meño Suh MD    ondansetron (ZOFRAN ODT) tablet 4 mg, 4 mg, Oral, Q8H PRN **OR** ondansetron (ZOFRAN) injection 4 mg, 4 mg, IntraVENous, Q6H PRN, Meño Suh MD    enoxaparin (LOVENOX) injection 40 mg, 40 mg, SubCUTAneous, DAILY, Meño Suh MD, 40 mg at 06/29/21 1214    levoFLOXacin (LEVAQUIN) 500 mg in D5W IVPB, 500 mg, IntraVENous, Q48H, Meño Suh MD, Last Rate: 100 mL/hr at 06/29/21 1214, 500 mg at 06/29/21 1214    aspirin chewable tablet 81 mg, 81 mg, Oral, DAILY, Meño Suh MD, 81 mg at 06/29/21 1500

## 2021-06-30 NOTE — PROGRESS NOTES
OCCUPATIONAL THERAPY EVALUATION  Patient: Zafar Butler Sr. (80 y.o. male)  Date: 6/30/2021  Primary Diagnosis: Weakness [R53.1]  Generalized weakness [R53.1]        Precautions: fall risk       ASSESSMENT  Pt is a 79 y/o M with PMH of HTN, hypothyroidism, benign prostatic hypertrophy, CAD, dyslipidemia, history of previous CVA, presenting to [facility] with left-sided weakness, slurred speech, facial droop, and difficulty ambulating, admitted 6/28/21 and being treated for weakness, CVA. In the ED, CT head (-), MRI showing acute lacunar infarct of the right thalamus. Chronic right thalamic lacunar infarct in moderate chronic small vessel ischemic changes. Pt received semi-supine in bed upon arrival, AXO x4 however with some confusion noted, pt agreeable to OT and PT evaluations at this time. Per pt report, pt lives alone in a one-story home with 3 DANK and B HR, was IND with ADLs and Mod I using rollator/SPC for mobility at [facility]. Per medical chart, pt reportedly has recently had several falls and one due to wheel of walker braking causing patient to distrust the equipment. Son had been working on senior living placement for patient into [facility] in [facility] but patient declined at last minute. With this recent hospitalization patient now considering senior living so son has PCP completing the senior living medical assessment. Based on current observations, pt presents with deficits in generalized strength/AROM, static/dynamic sitting balance, static/dynamic standing balance, functional activity tolerance, and cognition/poor safety awareness impacting overall performance of ADLs and functional transfers/mobility. Pt currently requires min A HHA wit cues for rolling and mod A supine>sit to elevate trunk into upright position. Pt demos fair static sitting balance, poor dynamic with increased cues and physical assist 2' posterior lean.  STS completed to/from EOB with gait belt and RW, min A with cues for safe hand placement during transfers, min-mod A for balance/safety with functional bathroom mobility and CGA during basic grooming (combing hair) standing sink side, coordination observed grossly decreased, however WFL for ADL. Pt tolerates approx 5 minutes with standing ADL at sink prior to ambulating with PT in hallways (see PT note for details). Pt returned to sitting with min A and close SBA for sit>supine at end of evaluation. Overall, pt tolerates session fair today, currently presents with some confusion/poor safety awareness and impaired sitting/standing balance, generalized strength and coordination with self cares. Pt would benefit from continued skilled OT services to address current impairments and improve overall IND and safety with self cares and functional transfers/mobility. Recommend discharge to IRF once medically appropriate as pt was IND at [facility] and currently presents with decline from baseline. Other factors to consider for discharge: lives alone, DME, time since onset, severity of deficits      Patient will benefit from skilled therapy intervention to address the above noted impairments. PLAN :  Recommendations and Planned Interventions: self care training, functional mobility training, therapeutic exercise, balance training, therapeutic activities, cognitive retraining, endurance activities, neuromuscular re-education, patient education, and family training/education    Frequency/Duration: Patient will be followed by occupational therapy 5 times a week to address goals.     Recommendation for discharge: (in order for the patient to meet his/her long term goals)  IRF    This discharge recommendation:  Has been made in collaboration with the attending provider and/or case management       SUBJECTIVE:   Patient stated I think there's a comb I'd like to use in the pocket of my pants    OBJECTIVE DATA SUMMARY:   HISTORY:   Past Medical History:   Diagnosis Date    Arthritis     CAD (coronary artery disease)     Hard of hearing     Headache     Hypertension     PMR (polymyalgia rheumatica) ([facility])     Skin cancer     Sleep apnea     no longer uses cpap     Past Surgical History:   Procedure Laterality Date    HX CAROTID STENT      HX CHOLECYSTECTOMY  1998    HX HERNIA REPAIR  1980    ME CARDIAC SURG PROCEDURE UNLIST      cardiac stent    VASCULAR SURGERY PROCEDURE UNLIST      carotid endartectomy       Expanded or extensive additional review of patient history:     Home Situation  Home Environment: Private residence  # Steps to Enter: 3  Rails to Enter: Yes  Hand Rails : Bilateral  One/Two Story Residence: One story  Living Alone: Yes  Support Systems: Family member(s)  Patient Expects to be Discharged to[de-identified] Assisted living  Current DME Used/Available at Home: Walker, rollator, Cane, straight    EXAMINATION OF PERFORMANCE DEFICITS:  Cognitive/Behavioral Status:  Neurologic State: Alert;Confused  Orientation Level: Oriented X4  Cognition: Follows commands;Poor safety awareness    Hearing: Auditory  Auditory Impairment: Hard of hearing, bilateral    Vision/Perceptual:     WFL      Range of Motion:  AROM: Generally decreased, functional     Strength:  Strength: Generally decreased, functional (Grossly observed 3+/5 B UE)    Coordination:  Coordination: Generally decreased, functional  Fine Motor Skills-Upper: Comment (grossly decreased, functional)       Tone & Sensation:  Sensation: Intact    Balance:  Sitting: Impaired; Without support  Sitting - Static: Fair (occasional)  Sitting - Dynamic: Poor (constant support)  Standing: Impaired;Pull to stand; With support  Standing - Static: Fair;Constant support  Standing - Dynamic : Poor;Constant support    Functional Mobility and Transfers for ADLs:  Bed Mobility:  Rolling: Minimum assistance  Supine to Sit: Moderate assistance  Sit to Supine: Stand-by assistance  Scooting: Contact guard assistance    Transfers:  Sit to Stand: Minimum assistance  Stand to Sit: Minimum assistance  Bathroom Mobility: Moderate assistance    ADL Intervention and task modifications:  Grooming  Grooming Assistance: Contact guard assistance  Position Performed: Standing  Washing Face: Contact guard assistance  Brushing/Combing Hair: Contact guard assistance    Lower Body Dressing Assistance  Socks: Maximum assistance (Simulated)  Position Performed: Seated edge of bed    Therapeutic Exercise:  Pt would benefit from UE HEP to improve overall UE AROM/strength and can be further educated in next treatment session. Functional Measure:    [employer] AM-PACTM \"6 Clicks\"                                                       Daily Activity Inpatient Short Form  How much help from another person does the patient currently need. .. Total; A Lot A Little None   1. Putting on and taking off regular lower body clothing? []  1 [x]  2 []  3 []  4   2. Bathing (including washing, rinsing, drying)? []  1 [x]  2 []  3 []  4   3. Toileting, which includes using toilet, bedpan or urinal? [] 1 [x]  2 []  3 []  4   4. Putting on and taking off regular upper body clothing? []  1 []  2 [x]  3 []  4   5. Taking care of personal grooming such as brushing teeth? []  1 []  2 [x]  3 []  4   6. Eating meals? []  1 []  2 [x]  3 []  4   © 2007, Trustees of [facility] [employer], under license to [employer]. All rights reserved     Score: 15/24     Interpretation of Tool:  Represents clinically-significant functional categories (i.e. Activities of daily living).   Percentage of Impairment CH    0%   CI    1-19% CJ    20-39% CK    40-59% CL    60-79% CM    80-99% CN     100%   [facility]  Score 6-24 24 23 20-22 15-19 10-14 7-9 6        Occupational Therapy Evaluation Charge Determination   History Examination Decision-Making   LOW Complexity : Brief history review  LOW Complexity : 1-3 performance deficits relating to physical, cognitive , or psychosocial skils that result in activity limitations and / or participation restrictions  MEDIUM Complexity : Patient may present with comorbidities that affect occupational performnce. Miniml to moderate modification of tasks or assistance (eg, physical or verbal ) with assesment(s) is necessary to enable patient to complete evaluation       Based on the above components, the patient evaluation is determined to be of the following complexity level: LOW   Pain Ratin/10    Activity Tolerance:   Fair  Please refer to the flowsheet for vital signs taken during this treatment. After treatment patient left in no apparent distress:    Supine in bed, Call bell within reach, Bed / chair alarm activated, and Side rails x 3    COMMUNICATION/EDUCATION:   The patients plan of care was discussed with: Physical therapist and Registered nurse. Patient/family have participated as able in goal setting and plan of care. and Patient/family agree to work toward stated goals and plan of care. This patients plan of care is appropriate for delegation to [facility].     OT/PT sessions occurred together for increased patient and clinician safety    Thank you for this referral.  Mary Jane John  Time Calculation: 33 mins   Problem: Self Care Deficits Care Plan (Adult)  Goal: *Acute Goals and Plan of Care (Insert Text)  Description: Pt will be Mod I sup <> sit in prep for EOB ADLs  Pt will be Mod I grooming standing sink side LRAD  Pt will be Mod I LE dressing sitting EOB/long sit  Pt will be Mod I sit <>  prep for toileting LRAD  Pt will be Mod I toileting/toilet transfer/cloth mgmt LRAD  Pt will be SPV following UE HEP in prep for self care tasks   Outcome: Not Met

## 2021-06-30 NOTE — PROGRESS NOTES
Son [facility]) would like to be notified by Dr. Daniel Durbin once results have posted for his MRI and carotid duplex. The son's name is Chase Beverly phone number is [phone].

## 2021-06-30 NOTE — PROGRESS NOTES
Attempted to start IV fluids back on patient and patient stated he was 80years old and didn't need that, he swatted his hand and refused again so I left him alone will re-evaluate.

## 2021-06-30 NOTE — PROGRESS NOTES
SPEECH LANGUAGE PATHOLOGY EVALUATION  Patient: Maury Mccarty Sr. (80 y.o. )  Date: 6/30/2021  Primary Diagnosis: <principal problem not specified>   Precautions:    ASSESSMENT :  Based on the objective data described below, the patient presents with a moderate cognitive-linguistic deficit c/b deficits in orientation, following 2-step commands, responding to simple personal and general wh questions, responsive naming, confrontational naming, and remaining on topic when responding to questions/engaging in conversation. Patient also presents with mild dysarthria. Patient will benefit from skilled intervention to address the above impairments. Patients rehabilitation potential is considered to be Fair     PLAN :  Recommendations and Planned Interventions:  Recommend ST targeting cognitive linguistic deficits and dysarthria. Frequency/Duration: Patient will be followed by speech-language pathology 3-5X/week to address goals. Discharge Recommendations: To Be Determined     SUBJECTIVE:   Patient is agreeable to beginning eval     OBJECTIVE:     Informal speech-language evaluation with various subtest administered    Receptive Language:  Reliability of yes/no responses to questions [x] WNL    [] Impaired [] Mild [] Mod [] Severe  Simple yes/no questions   [x] WNL    [] Impaired [] Mild [] Mod [] Severe   Follow commands                                  [x] 1 Step [x] WNL        [x] 2 Step [x] Impaired [x] Mild [] Mod [] Severe   Objective Information: Patient presents with a mild receptive language deficit. Patient appropriately responds to personal and general yes/no questions. Patient independently and accurately follows simple 1-step commands. Patient exhibits difficulty following 2-step commands; patient completed with 80% accuracy.     Expressive Language:  Automatic speech   [x] WNL    [] Impaired [] Mild [] Mod [] Severe  Responsive naming   [] WNL    [x] Impaired [] Mild [x] Mod [] Severe   Simple [facility] questions              [] WNL    [x] Impaired [] Mild [x] Mod [x] Severe   Phrase completion   [] WNL    [x] Impaired [] Mild [x] Mod [] Severe   Generative naming   5-7 words per minute    Objective Information: Patient able to verbalize basic orientation; however, patient not oriented to place and situation. Patient exhibits difficulty responding to personal and general wh questions. Patient unable to provide meaningful and on topic responses to clinician questions despite mod-max support. Patient exhibited min moments of anomia during conversation with clinician. Patient not receptive to cues to aid in word retrieval.    General Orientation:  Attention:  [x] Alert    [] Drowsy  Orientation:  [x] Person   [] Place    [x] Time    Speech:  Oral Verbal Apraxia: [x] None    [] Mild    [] Moderate    [] Severe   Dysarthria:  [] None    [x] Mild    [] Moderate    [] Severe   Intelligibility:  [] WNL    [] Words   [] Phrases   [x] Sentences   [x] Conversation      % Intelligible: Around 75%. Patient with 100% intelligibility at the word level. Patient's intelligibility decreased at the sentence and conversational levels. Patient reported \"my speech can be more clear\". Pain Level (0-10 scale) post treatment: 0    After treatment:   Patient left in no apparent distress in bed, Call bell within reach and Nursing notified    COMMUNICATION/EDUCATION:   Patient was educated regarding his cognitive-linguistic deficits and dysarthria. He demonstrated Fair understanding as evidenced by verbal agreement; however, patient with cognitive-linguistic deficits. The patient's plan of care including recommendations, planned interventions, and recommended diet changes were discussed with: Registered nurse. Patient/family agree to work toward stated goals and plan of care.     Problem: Communication Impaired (Adult)  Goal: *Acute Goals and Plan of Care (Insert Text)  Description: Speech Therapy Goals  Initiated 6/30/2021  -Patient will follow 2-step commands in 10 out of 10 opportunities when provided min cues. [ ] Not met  [ ]  MET   [ ] Progressing  [ ] Duncan Riding  -Patient will respond to orientation questions with 100% accuracy when provided min cues. [ ] Not met  [ ]  MET   [ ] Progressing  [ ] Discontinue  Patient will complete phrase completion tasks in 8 out of 10 opportunities when provided min-mod support         [ ] Not met  [ ]  MET   [ ] Progressing  [ ] Discontinue  Patient will name common objects in 5 out of 10 opportunities. [ ] Not met  [ ]  MET   [ ] Shantal Mohr  [ ] Discontinue  Patient will complete responsive naming task in 8 out of 10 opportunities when provided min-mod support. [ ] Not met  [ ]  MET   [ ] Shantal Mohr  [ ] Discontinue  Patient will appropriately respond to personal and general wh questions in 8 out of 10 opportunities when provided min-mod support        [ ] Not met  [ ]  MET   [ ] Progressing  [ ] Discontinue  Patient will utilize compensatory strategies to increase intelligibility at the sentence level in 8 out of 10 opportunities. [ ] Not met  [ ]  MET   [ ] Shantal Mohr  [ ] Discontinue  Patient will utilize compensatory strategies to increase intelligibility at the conversational level during a 3-5 minute conversation with clinician.          [ ] Not met  [ ]  MET   [ ] Shantal Mohr  [ ] Duncan Singleton  6/30/2021 1501 by Adonay Cabral, SLP  Outcome: Not Met    Thank you for this referral.  Shantel Vital M.S., CCC-SLP  Time Calculation: 25 mins

## 2021-06-30 NOTE — PROGRESS NOTES
Reason for Admission:  L sided weakness, slurred speech                     RUR Score:      18%               Plan for utilizing home health:          PCP: First and Last name:  Denis Lance MD     Name of Practice:    Are you a current patient: Yes/No: Yes   Approximate date of last visit: 6/28/2021   Can you participate in a virtual visit with your PCP:                     Current Advanced Directive/Advance Care Plan: Full Code    Advance Directive on file    Healthcare Decision Maker:   Click here to [address] including selection of the Healthcare Decision Maker Relationship (ie \"Primary\")             Primary Decision MakerIsidro Burlington - [phone]                  Transition of Care Plan:                      * Disposition- TBD pending therapy recommendations  * PT/OT/SLP consults  * PCP F/U    Ayo Dockery is a 80 yr old male who presented to hospital with chief complaint of L sided weakness, slurred speech and facial droop. Information for assessment provided by chart review and interview with patient and his son, Priyanka Anton. PMH is significant for HTN, AR, CAD and sleep apnea. Per son patient lives home alone and has been performing his own ADL's and ambulating with a RW or cane. Patient recently had several falls and one due to wheel of walker braking causing patient to distrust the equipment. Son had been working on [facility]AWNA placement for patient into [facility] in [facility] but patient declined at last minute. With this recent hospitalization patient now considering care home so son has PCP completing the care home medical assessment. Mr. Priyanka Anton states he is waiting for medical W/U to be complete and recommendations from therapy. Patient has been in rehab 2-3 times at [facility]. Patient is currently open for home health services through [facility]. Care Management Interventions  PCP Verified by CM:  Yes  Discharge Durable Medical Equipment: No  Physical Therapy Consult: Yes  Occupational Therapy Consult: Yes  Speech Therapy Consult: Yes  Current Support Network: Lives Alone  The Patient and/or Patient Representative was Provided with a Choice of Provider and Agrees with the Discharge Plan?: Yes  Name of the Patient Representative Who was Provided with a Choice of Provider and Agrees with the Discharge Plan: Patient/son     Yazan MCKAY Jamsine, MARGARETM-SW  Complex Care Coordinator/Wu  C: [phone]

## 2021-06-30 NOTE — CONSULTS
NEURO CONSULT      REASON FOR ADMISSION:  Left-sided weakness      HISTORY:  Mr. John Pendleton is 80years old with a past medical history of hypertension, hypothyroidism, benign prostatic hypertrophy, dyslipidemia, history of previous stroke, is consulted to neurology for having been admitted with left-sided weakness. Patient was admitted to the ER in the ER he had a head CT without contrast that did not show any clear-cut new lesion he subsequently had an MRI of the brain without contrast that showed a left thalamic lacunar infarct is currently on the floor. Patient is confused and perseverates occasionally  .           ROS: Unreliable    General:                     No fever, no chills, no sweats, no generalized weakness, no weight loss/gain,                                       No loss of appetite   Eyes:                           No blurred vision, no eye pain, no loss of vision, no double vision  ENT:                            rhinorrhea, no pharyngitis   Respiratory:               No cough, no sputum production, no SOB, no HO, no wheezing, no pleuritic pain   Cardiology:                No chest pain, no palpitations, no orthopnea, no PND, no edema, no syncope   Gastrointestinal:       No abdominal pain , no N/V, no diarrhea, no dysphagia, no constipation, no bleeding   Genitourinary:           frequency, no urgency, no dysuria, no hematuria, no incontinence   Muskuloskeletal :      No arthralgia, no myalgia, no back pain  Hematology:              No easy bruising, no nose or gum bleeding, no lymphadenopathy   Dermatological:         No rash, no ulceration, no pruritis, no color change / jaundice  Endocrine:                 hot flashes or polydipsia   Neurological:             No headache, no dizziness, no confusion, no focal weakness, no paresthesia,                                      No Speech difficulties, no memory loss, no gait difficulty  Psychological:          No neelings of anxiety, no depression, no agitation      NEURO EXAM:    Mental status: Awake, but confused and verbigerates as well as perseverates. He is not aphasic    Cranial nerves: Cranial nerve exam is intact for age    Motor exam: Patient has a very mild almost subjective left-sided weakness    Sensory exam: Sensory exam is difficult to determine this patient. He is somewhat confused    Coordination: He did not follow coordination request    Gait and Station: He was not ambulated    ASSESSMENT:  Left thalamic lacunar infarct  Confusion and perseveration and verbigeration.   The patient may be demented      PLAN:  Stroke work-up and treatment  Continue aspirin  Aricept 5 mg p.o. daily first dose now  Other orders already given      ALLERGIES:    Allergies   Allergen Reactions    Pcn [Penicillins] Swelling       MEDS:      Current Facility-Administered Medications:     levothyroxine (SYNTHROID) tablet 125 mcg, 125 mcg, Oral, ACB, Meño Suh MD, 125 mcg at 06/30/21 0559    multivitamin (ONE A DAY) tablet 1 Tablet, 1 Tablet, Oral, DAILY, Meño Suh MD, 1 Tablet at 06/29/21 1224    icosapent ethyL (VASCEPA) capsule 1 Capsule, 1 Capsule, Oral, BID, Meño Suh MD, 1 Capsule at 06/29/21 2100    pravastatin (PRAVACHOL) tablet 40 mg, 40 mg, Oral, QHS, Meño Suh MD, 40 mg at 06/29/21 2216    rOPINIRole (REQUIP) tablet 0.25 mg, 0.25 mg, Oral, QHS, Meño Suh MD, 0.25 mg at 06/29/21 2216    tamsulosin (FLOMAX) capsule 0.4 mg, 0.4 mg, Oral, DAILY, Meño Suh MD, 0.4 mg at 06/29/21 1214    0.9% sodium chloride infusion, 75 mL/hr, IntraVENous, CONTINUOUS, Meño Suh MD, Last Rate: 75 mL/hr at 06/29/21 1221, 75 mL/hr at 06/29/21 1221    acetaminophen (TYLENOL) tablet 650 mg, 650 mg, Oral, Q6H PRN, 650 mg at 06/29/21 2216 **OR** acetaminophen (TYLENOL) suppository 650 mg, 650 mg, Rectal, Q6H PRN, Meño Suh MD    polyethylene glycol (MIRALAX) packet 17 g, 17 g, Oral, DAILY PRN, Lyla Suh [facility], MD    ondansetron (ZOFRAN ODT) tablet 4 mg, 4 mg, Oral, Q8H PRN **OR** ondansetron (ZOFRAN) injection 4 mg, 4 mg, IntraVENous, Q6H PRN, Giacomo Suh MD    enoxaparin (LOVENOX) injection 40 mg, 40 mg, SubCUTAneous, DAILY, Meño Suh MD, 40 mg at 06/29/21 1214    levoFLOXacin (LEVAQUIN) 500 mg in D5W IVPB, 500 mg, IntraVENous, Q48H, Meño Suh MD, Last Rate: 100 mL/hr at 06/29/21 1214, 500 mg at 06/29/21 1214    aspirin chewable tablet 81 mg, 81 mg, Oral, DAILY, Meño Suh MD, 81 mg at 06/29/21 1503    LABS:  Recent Results (from the past 24 hour(s))   CULTURE, BLOOD    Collection Time: 06/29/21 11:24 AM    Specimen: Blood   Result Value Ref Range    Special Requests: No Special Requests      Culture result: No growth after 19 hours         Visit Vitals  BP (!) 104/53   Pulse (!) 58   Temp 98.3 °F (36.8 °C)   Resp 18   Ht 6' (1.829 m)   Wt 97.5 kg (215 lb)   SpO2 94%   BMI 29.16 kg/m²       Imaging:  MRI BRAIN WO CONT   Final Result   1. Acute lacunar infarct of the right thalamus. 2.  Chronic right thalamic lacunar infarct in moderate chronic small vessel   ischemic changes.

## 2021-06-30 NOTE — PROGRESS NOTES
PHYSICAL THERAPY EVALUATION  Patient: Paty Darby Sr. (80 y.o. male)  Date: 6/30/2021  Primary Diagnosis: Weakness [R53.1]  Generalized weakness [R53.1]        Precautions: falls       ASSESSMENT  Pt is a 81 y/o M with PMH of HTN, hypothyroidism, benign prostatic hypertrophy, CAD, dyslipidemia, history of previous CVA, presenting to [facility] with left-sided weakness, slurred speech, facial droop, and difficulty ambulating, admitted 6/28/21 and being treated for weakness, CVA. In the ED, CT head (-), MRI showing acute lacunar infarct of the right thalamus. Chronic right thalamic lacunar infarct in moderate chronic small vessel ischemic changes. Pt received semi-supine in bed upon arrival, AXO x4 however with confusion noted, pt agreeable to PT/OT evaluations at this time. Per pt report, pt lives alone in a one-story home with 3 DANK and B HR, was IND with ADLs and Mod I using rollator/SPC for mobility at [facility]. Per medical chart, pt reportedly has recently had several falls and one due to wheel of walker braking causing patient to distrust the equipment. Son had been working on snf placement for patient into [facility] but patient declined at last minute. With this recent hospitalization patient now considering [facility]NA so son has PCP completing the SHAWNA medical assessment.      Based on current observations, pt presents with deficits in generalized strength/AROM, static/dynamic sitting balance, static/dynamic standing balance, functional activity tolerance, and cognition/poor safety awareness impacting overall performance of amb and functional transfers/mobility. Pt currently requires min A HHA with cues for rolling and mod A supine>sit to elevate trunk into upright position. Pt demos fair static sitting balance, poor dynamic with increased cues and mod to max assist 2' posterior lean.  Pt completed sit <> stand transfer to/from EOB with gait belt and RW, min A with cues for safe hand placement during transfers, min-mod A for balance/safety with functional bathroom mobility and CGA during basic grooming with OT, see note for details. Pt then amb 100 feet in hallway with gt belt, RW, and min A; demonstrates forward flexion at hips with rounded shoulders and forward head posture, WBOS with short, shuffling step to gt pattern able to perform step through with verbal cues, increased unsteadiness noted with directional changes requiring increased verbal and tactile cues for safety. Attempted coordination and sensation testing but unable to formally test due to limited command following and [place]; grossly limited throughout as noted with functional mobility. Pt returned to sitting with min A and close SBA for sit>supine at end of evaluation. Pt will benefit from continued skilled PT to address above deficits. DC recommendations IRF due to acute CVA and pt being I at baseline. Current Level of Function Impacting Discharge (mobility/balance): min to mod A    Other factors to consider for discharge: severity of deficits, lives alone      PLAN :  Recommendations and Planned Interventions: bed mobility training, transfer training, gait training, therapeutic exercises, neuromuscular re-education, patient and family training/education and therapeutic activities      Recommend with staff: amb with gt belt, RW, and close min A, can sit in chair but would need chair alarm due to poor safety awareness    Frequency/Duration: Patient will be followed by physical therapy:  5 times a week to address goals. Recommendation for discharge: (in order for the patient to meet his/her long term goals)  IRF    This discharge recommendation:  Has been made in collaboration with the attending provider and/or case management    IF patient discharges home will need the following DME: none         SUBJECTIVE:   Patient stated [place] had a stroke last year and they said I had another one.     OBJECTIVE DATA SUMMARY:   HISTORY:    Past Medical History:   Diagnosis Date    Arthritis     CAD (coronary artery disease)     Hard of hearing     Headache     Hypertension     PMR (polymyalgia rheumatica) (HCC)     Skin cancer     Sleep apnea     no longer uses cpap     Past Surgical History:   Procedure Laterality Date    HX CAROTID STENT      HX CHOLECYSTECTOMY  1998    HX HERNIA REPAIR  1980    AK CARDIAC SURG PROCEDURE UNLIST      cardiac stent    VASCULAR SURGERY PROCEDURE UNLIST      carotid endartectomy       Home Situation  Home Environment: Private residence  # Steps to Enter: 3  Rails to Enter: Yes  Hand Rails : Bilateral  One/Two Story Residence: One story  Living Alone: Yes  Support Systems: Family member(s)  Patient Expects to be Discharged to[de-identified] Assisted living  Current DME Used/Available at Home: Walker, rollator, Cane, straight    EXAMINATION/PRESENTATION/DECISION MAKING:   Critical Behavior:  Neurologic State: Alert, Confused  Orientation Level: Oriented X4  Cognition: Follows commands, Poor safety awareness     Hearing: Auditory  Auditory Impairment: Hard of hearing, bilateral  Skin:  Intact where visible, large bruise noted on left knee  Edema: left knee no pitting noted   Range Of Motion:  AROM: Generally decreased, functional                       Strength:    Strength: Generally decreased, functional (Grossly observed 3+/5 B UE)                    Tone & Sensation:                  Sensation: Intact               Coordination:  Coordination: Generally decreased, functional  Vision:      Functional Mobility:  Bed Mobility:  Rolling: Minimum assistance  Supine to Sit: Moderate assistance  Sit to Supine: Stand-by assistance  Scooting: Contact guard assistance  Transfers:  Sit to Stand: Minimum assistance  Stand to Sit: Minimum assistance                       Balance:   Sitting: Impaired; Without support  Sitting - Static: Fair (occasional)  Sitting - Dynamic: Poor (constant support)  Standing: Impaired;Pull to stand; With support  Standing - Static: Fair;Constant support  Standing - Dynamic : Poor;Constant support  Ambulation/Gait Training:  Distance (ft): 100 Feet (ft)  Assistive Device: Gait belt;Walker, rolling  Ambulation - Level of Assistance: Minimal assistance     Gait Description (WDL): Exceptions to [facility]           Base of Support: Widened        Therapeutic Exercises:   Not completed this session    Functional Measure:  [facility] Mobility Inpatient Short Form  How much difficulty does the patient currently have. .. Unable A Lot A Little None   1. Turning over in bed (including adjusting bedclothes, sheets and blankets)? [] 1   [] 2   [x] 3   [] 4   2. Sitting down on and standing up from a chair with arms ( e.g., wheelchair, bedside commode, etc.)   [] 1   [] 2   [x] 3   [] 4   3. Moving from lying on back to sitting on the side of the bed? [] 1   [] 2   [x] 3   [] 4          How much help from another person does the patient currently need. .. Total A Lot A Little None   4. Moving to and from a bed to a chair (including a wheelchair)? [] 1   [x] 2   [] 3   [] 4   5. Need to walk in hospital room? [] 1   [x] 2   [] 3   [] 4   6. Climbing 3-5 steps with a railing? [] 1   [x] 2   [] 3   [] 4   © 2007, Trustees of [address] [place] [address], under license to [employer]. All rights reserved     Score:  Initial: 15/24 Most Recent: X (Date: 6/30/21 )   Interpretation of Tool:  Represents activities that are increasingly more difficult (i.e. Bed mobility, Transfers, Gait).   Score 24 23 22-20 19-15 14-10 9-7 6   Modifier CH CI CJ CK CL CM CN         Physical Therapy Evaluation Charge Determination   History Examination Presentation Decision-Making   HIGH Complexity :3+ comorbidities / personal factors will impact the outcome/ POC  HIGH Complexity : 4+ Standardized tests and measures addressing body structure, function, activity limitation and / or participation in recreation  LOW Complexity : Stable, uncomplicated  Other outcome measures [facility] 6  mod      Based on the above components, the patient evaluation is determined to be of the following complexity level: LOW     Pain Ratin/10 reported     Activity Tolerance:   Fair, requires rest breaks and observed SOB with activity    After treatment patient left in no apparent distress:   Supine in bed, Call bell within reach, Bed / chair alarm activated and Side rails x 3 and nsg updated. GOALS:    Problem: Mobility Impaired (Adult and Pediatric)  Goal: *Acute Goals and Plan of Care (Insert Text)  Description: Pt will be I with LE HEP in 7 days. Pt will perform bed mobility with mod I in 7 days. Pt will perform transfers with mod I in 7 days. Pt will amb  feet with LRAD safely with mod I in 7 days. .   Pt will demonstrate improvement in standing dynamic balance from min A to SBA in 7 days. Outcome: Not Met       COMMUNICATION/EDUCATION:   The patients plan of care was discussed with: Occupational therapist, Registered nurse, and Case management. Fall prevention education was provided and the patient/caregiver indicated understanding., Patient/family have participated as able in goal setting and plan of care. , and Patient/family agree to work toward stated goals and plan of care. PT/OT sessions occurred together for increased safety of pt and clinician.        Thank you for this referral.  Jared De Dios, PT, DPT   Time Calculation: 33 mins

## 2021-06-30 NOTE — PROGRESS NOTES
Bedside and Verbal shift change report given to Mervin Olivarez RN, RN (oncoming nurse) by Sarmad Lopez RN (offgoing nurse). Report included the following information SBAR, Kardex, MAR, Recent Results, Med Rec Status and Cardiac Rhythm NSR.

## 2021-07-01 LAB
ALBUMIN SERPL-MCNC: 3.1 G/DL (ref 3.5–5)
ALBUMIN/GLOB SERPL: 0.8 {RATIO} (ref 1.1–2.2)
ALP SERPL-CCNC: 53 U/L (ref 45–117)
ALT SERPL-CCNC: 18 U/L (ref 12–78)
ANION GAP SERPL CALC-SCNC: 8 MMOL/L (ref 5–15)
AST SERPL W P-5'-P-CCNC: 15 U/L (ref 15–37)
BASOPHILS # BLD: 0.1 K/UL (ref 0–0.1)
BASOPHILS NFR BLD: 1 % (ref 0–1)
BILIRUB SERPL-MCNC: 0.7 MG/DL (ref 0.2–1)
BUN SERPL-MCNC: 17 MG/DL (ref 6–20)
BUN/CREAT SERPL: 20 (ref 12–20)
CA-I BLD-MCNC: 8.4 MG/DL (ref 8.5–10.1)
CHLORIDE SERPL-SCNC: 107 MMOL/L (ref 97–108)
CO2 SERPL-SCNC: 23 MMOL/L (ref 21–32)
COVID-19 RAPID TEST, COVR: NOT DETECTED
CREAT SERPL-MCNC: 0.85 MG/DL (ref 0.7–1.3)
DIFFERENTIAL METHOD BLD: ABNORMAL
EOSINOPHIL # BLD: 0.2 K/UL (ref 0–0.4)
EOSINOPHIL NFR BLD: 3 % (ref 0–7)
ERYTHROCYTE [DISTWIDTH] IN BLOOD BY AUTOMATED COUNT: 14.1 % (ref 11.5–14.5)
GLOBULIN SER CALC-MCNC: 3.7 G/DL (ref 2–4)
GLUCOSE SERPL-MCNC: 95 MG/DL (ref 65–100)
HCT VFR BLD AUTO: 40.1 % (ref 36.6–50.3)
HGB BLD-MCNC: 13.6 G/DL (ref 12.1–17)
IMM GRANULOCYTES # BLD AUTO: 0.1 K/UL (ref 0–0.04)
IMM GRANULOCYTES NFR BLD AUTO: 1 % (ref 0–0.5)
LYMPHOCYTES # BLD: 1.2 K/UL (ref 0.8–3.5)
LYMPHOCYTES NFR BLD: 18 % (ref 12–49)
MCH RBC QN AUTO: 30.5 PG (ref 26–34)
MCHC RBC AUTO-ENTMCNC: 33.9 G/DL (ref 30–36.5)
MCV RBC AUTO: 89.9 FL (ref 80–99)
MONOCYTES # BLD: 0.7 K/UL (ref 0–1)
MONOCYTES NFR BLD: 10 % (ref 5–13)
NEUTS SEG # BLD: 4.7 K/UL (ref 1.8–8)
NEUTS SEG NFR BLD: 67 % (ref 32–75)
NRBC # BLD: 0 K/UL (ref 0–0.01)
NRBC BLD-RTO: 0 PER 100 WBC
PLATELET # BLD AUTO: 259 K/UL (ref 150–400)
PMV BLD AUTO: 10.1 FL (ref 8.9–12.9)
POTASSIUM SERPL-SCNC: 4.1 MMOL/L (ref 3.5–5.1)
PROT SERPL-MCNC: 6.8 G/DL (ref 6.4–8.2)
RBC # BLD AUTO: 4.46 M/UL (ref 4.1–5.7)
SODIUM SERPL-SCNC: 138 MMOL/L (ref 136–145)
SPECIMEN SOURCE: NORMAL
WBC # BLD AUTO: 6.8 K/UL (ref 4.1–11.1)

## 2021-07-01 PROCEDURE — 74011250637 HC RX REV CODE- 250/637: Performed by: FAMILY MEDICINE

## 2021-07-01 PROCEDURE — G0378 HOSPITAL OBSERVATION PER HR: HCPCS

## 2021-07-01 PROCEDURE — 87635 SARS-COV-2 COVID-19 AMP PRB: CPT

## 2021-07-01 PROCEDURE — 74011250636 HC RX REV CODE- 250/636: Performed by: FAMILY MEDICINE

## 2021-07-01 PROCEDURE — 36415 COLL VENOUS BLD VENIPUNCTURE: CPT

## 2021-07-01 PROCEDURE — 85025 COMPLETE CBC W/AUTO DIFF WBC: CPT

## 2021-07-01 PROCEDURE — 80053 COMPREHEN METABOLIC PANEL: CPT

## 2021-07-01 PROCEDURE — 97530 THERAPEUTIC ACTIVITIES: CPT

## 2021-07-01 PROCEDURE — 74011250637 HC RX REV CODE- 250/637: Performed by: PSYCHIATRY & NEUROLOGY

## 2021-07-01 PROCEDURE — 65270000029 HC RM PRIVATE

## 2021-07-01 RX ORDER — CLOPIDOGREL BISULFATE 75 MG/1
75 TABLET ORAL DAILY
Qty: 30 TABLET | Refills: 0 | Status: SHIPPED | OUTPATIENT
Start: 2021-07-01

## 2021-07-01 RX ORDER — DONEPEZIL HYDROCHLORIDE 5 MG/1
5 TABLET, FILM COATED ORAL
Qty: 30 TABLET | Refills: 0 | Status: SHIPPED | OUTPATIENT
Start: 2021-07-01

## 2021-07-01 RX ORDER — GUAIFENESIN 100 MG/5ML
81 LIQUID (ML) ORAL DAILY
Qty: 30 TABLET | Refills: 0 | Status: SHIPPED | OUTPATIENT
Start: 2021-07-02

## 2021-07-01 RX ADMIN — ASPIRIN 81 MG: 81 TABLET, CHEWABLE ORAL at 09:25

## 2021-07-01 RX ADMIN — ICOSAPENT ETHYL 1 CAPSULE: 1000 CAPSULE ORAL at 09:00

## 2021-07-01 RX ADMIN — TAMSULOSIN HYDROCHLORIDE 0.4 MG: 0.4 CAPSULE ORAL at 09:25

## 2021-07-01 RX ADMIN — ICOSAPENT ETHYL 1 CAPSULE: 1000 CAPSULE ORAL at 21:00

## 2021-07-01 RX ADMIN — SODIUM CHLORIDE 75 ML/HR: 9 INJECTION, SOLUTION INTRAVENOUS at 12:07

## 2021-07-01 RX ADMIN — ENOXAPARIN SODIUM 40 MG: 40 INJECTION SUBCUTANEOUS at 09:26

## 2021-07-01 RX ADMIN — PRAVASTATIN SODIUM 40 MG: 40 TABLET ORAL at 21:29

## 2021-07-01 RX ADMIN — ROPINIROLE HYDROCHLORIDE 0.25 MG: 0.25 TABLET, FILM COATED ORAL at 21:29

## 2021-07-01 RX ADMIN — MULTIVITAMIN TABLET 1 TABLET: TABLET at 09:25

## 2021-07-01 RX ADMIN — DONEPEZIL HYDROCHLORIDE 5 MG: 5 TABLET, FILM COATED ORAL at 21:29

## 2021-07-01 RX ADMIN — LEVOTHYROXINE SODIUM 125 MCG: 0.03 TABLET ORAL at 06:04

## 2021-07-01 RX ADMIN — LEVOFLOXACIN 500 MG: 5 INJECTION, SOLUTION INTRAVENOUS at 12:06

## 2021-07-01 NOTE — DISCHARGE SUMMARY
Discharge Summary       PATIENT ID: Tonya Lee Sr. MRN: [id number]   YOB: 1929    DATE OF ADMISSION: 6/28/2021  6:56 PM    DATE OF DISCHARGE:   PRIMARY CARE PROVIDER: Denis Lance MD     ATTENDING PHYSICIAN: Haydee Suh  DISCHARGING PROVIDER: Haydee Suh      CONSULTATIONS: IP CONSULT TO NEUROLOGY    PROCEDURES/SURGERIES: * No surgery found *    ADMITTING DIAGNOSES:    Patient Active Problem List    Diagnosis Date Noted    Generalized weakness 06/29/2021    Weakness 06/28/2021    Fall 05/23/2021    Ataxia 04/20/2021    Abnormal gait 04/19/2021    Altered mental status 04/19/2021    Dehydration 04/19/2021    Bradycardia 11/30/2020    Acute CVA (cerebrovascular accident) ([facility] 11/15/2020    Giant cell arteritis with polymyalgia rheumatica ([facility] 08/14/2018    Long term (current) use of systemic steroids 08/14/2018    Long-term use of immunosuppressant medication 08/14/2018    PMR (polymyalgia rheumatica) ([facility] 08/14/2018    Chronic headaches 07/20/2018       DISCHARGE DIAGNOSES / PLAN:      Acute lacunar infarct of the right thalamus  Hypertension  Hypothyroidism  Hyperlipidemia  BPH  Restless leg syndrome  Dehydration  Possible UTI        DISCHARGE MEDICATIONS:  Current Discharge Medication List      START taking these medications    Details   aspirin 81 mg chewable tablet Take 1 Tablet by mouth daily. Qty: 30 Tablet, Refills: 0  Start date: 7/2/2021      donepeziL (ARICEPT) 5 mg tablet Take 1 Tablet by mouth nightly. Qty: 30 Tablet, Refills: 0  Start date: 7/1/2021      clopidogreL (Plavix) 75 mg tab Take 1 Tablet by mouth daily. Qty: 30 Tablet, Refills: 0  Start date: 7/1/2021         CONTINUE these medications which have NOT CHANGED    Details   levothyroxine (SYNTHROID) 125 mcg tablet Take 1 Tab by mouth Daily (before breakfast).   Qty: 30 Tab, Refills: 0      omega 3-dha-epa-fish oil (Fish Oil) 100-160-1,000 mg cap Fish Oil   2 daily      rOPINIRole (REQUIP) 0.25 mg tablet Take 0.25 mg by mouth nightly. tamsulosin (FLOMAX) 0.4 mg capsule Take 0.4 mg by mouth daily. pravastatin (PRAVACHOL) 40 mg tablet Take 40 mg by mouth nightly. multivitamin (ONE A DAY) tablet Take 1 Tab by mouth daily. STOP taking these medications       amLODIPine (NORVASC) 5 mg tablet Comments:   Reason for Stopping:         lisinopriL (PRINIVIL, ZESTRIL) 10 mg tablet Comments:   Reason for Stopping:                 NOTIFY YOUR PHYSICIAN FOR ANY OF THE FOLLOWING:   Fever over 101 degrees for 24 hours. Chest pain, shortness of breath, fever, chills, nausea, vomiting, diarrhea, change in mentation, falling, weakness, bleeding. Severe pain or pain not relieved by medications. Or, any other signs or symptoms that you may have questions about. DISPOSITION:  x  Home With:   OT  PT  HH  RN       Long term SNF/Inpatient Rehab    Independent/assisted living    Hospice    Other:       PATIENT CONDITION AT DISCHARGE: Stable      PHYSICAL EXAMINATION AT DISCHARGE:  General:          Alert, cooperative, no distress, appears stated age. HEENT:           Atraumatic, anicteric sclerae, pink conjunctivae                          No oral ulcers, mucosa moist, throat clear, dentition fair  Neck:               Supple, symmetrical  Lungs:             Clear to auscultation bilaterally. No Wheezing or Rhonchi. No rales. Chest wall:      No tenderness  No Accessory muscle use. Heart:              Regular  rhythm,  No  murmur   No edema  Abdomen:        Soft, non-tender. Not distended. Bowel sounds normal  Extremities:     No cyanosis. No clubbing,                            Skin turgor normal, Capillary refill normal  Skin:                Not pale. Not Jaundiced  No rashes   Psych:             Not anxious or agitated. Neurologic:      Alert, moves all extremities, answers questions appropriately and responds to commands     MRI BRAIN WO CONT   Final Result   1.   Acute lacunar infarct of the right thalamus. 2.  Chronic right thalamic lacunar infarct in moderate chronic small vessel   ischemic changes.            Recent Results (from the past 24 hour(s))   ECHO ADULT COMPLETE    Collection Time: 06/30/21 11:44 AM   Result Value Ref Range    LVIDd 5.70 4.20 - 5.90 cm    LVPWd 0.70 0.60 - 1.00 cm    LVIDs 3.70 cm    IVSd 1.20 0.60 - 1.00 cm    RVIDd 3.6 cm    BP EF 65.0 55.0 - 100.0 %    LV Mass .7 88.0 - 224.0 g    LV Mass AL Index 96.2 49.0 - 115.0 g/m2    Right Atrial Area 4C 19.7 cm2    Left Atrium Minor Axis 1.64 cm    Left Atrium Major Axis 3.60 cm   DUPLEX CAROTID BILATERAL    Collection Time: 06/30/21 12:57 PM   Result Value Ref Range    Left CCA dist sys 85.9 cm/s    Left CCA dist beach 9.5 cm/s    Left CCA prox sys 99.7 cm/s    Left CCA prox beach 11.2 cm/s    Left ICA dist sys 88.0 cm/s    Left ICA dist beach 12.4 cm/s    Left ICA prox sys 134.0 cm/s    Left ICA prox beach 31.5 cm/s    Left ECA sys 438.0 cm/s    LEFT EXTERNAL CAROTID ARTERY D 0.00 cm/s    Left subclavian sys 122.0 cm/s    LEFT SUBCLAVIAN ARTERY D 0.00 cm/s    Left vertebral sys 47.1 cm/s    LEFT VERTEBRAL ARTERY D 8.73 cm/s    Right cca dist sys 101.0 cm/s    Right CCA dist beach 0.0 cm/s    Right CCA prox sys 129.0 cm/s    Right CCA prox beach 0.0 cm/s    Right ICA dist sys 31.6 cm/s    Right ICA dist beach 3.9 cm/s    Right ICA prox sys 82.5 cm/s    Right ICA prox beach 0.0 cm/s    Right eca sys 93.9 cm/s    RIGHT EXTERNAL CAROTID ARTERY D 0.00 cm/s    Right subclavian sys 156.0 cm/s    RIGHT SUBCLAVIAN ARTERY D 0.00 cm/s    Right vertebral sys 42.5 cm/s    RIGHT VERTEBRAL ARTERY D 9.37 cm/s   CBC WITH AUTOMATED DIFF    Collection Time: 07/01/21  8:05 AM   Result Value Ref Range    WBC 6.8 4.1 - 11.1 K/uL    RBC 4.46 4.10 - 5.70 M/uL    HGB 13.6 12.1 - 17.0 g/dL    HCT 40.1 36.6 - 50.3 %    MCV 89.9 80.0 - 99.0 FL    MCH 30.5 26.0 - 34.0 PG    MCHC 33.9 30.0 - 36.5 g/dL    RDW 14.1 11.5 - 14.5 %    PLATELET 394 884 - 400 K/uL    MPV 10.1 8.9 - 12.9 FL    NRBC 0.0 0.0  WBC    ABSOLUTE NRBC 0.00 0.00 - 0.01 K/uL    NEUTROPHILS 67 32 - 75 %    LYMPHOCYTES 18 12 - 49 %    MONOCYTES 10 5 - 13 %    EOSINOPHILS 3 0 - 7 %    BASOPHILS 1 0 - 1 %    IMMATURE GRANULOCYTES 1 (H) 0 - 0.5 %    ABS. NEUTROPHILS 4.7 1.8 - 8.0 K/UL    ABS. LYMPHOCYTES 1.2 0.8 - 3.5 K/UL    ABS. MONOCYTES 0.7 0.0 - 1.0 K/UL    ABS. EOSINOPHILS 0.2 0.0 - 0.4 K/UL    ABS. BASOPHILS 0.1 0.0 - 0.1 K/UL    ABS. IMM. GRANS. 0.1 (H) 0.00 - 0.04 K/UL    DF AUTOMATED     METABOLIC PANEL, COMPREHENSIVE    Collection Time: 07/01/21  8:05 AM   Result Value Ref Range    Sodium 138 136 - 145 mmol/L    Potassium 4.1 3.5 - 5.1 mmol/L    Chloride 107 97 - 108 mmol/L    CO2 23 21 - 32 mmol/L    Anion gap 8 5 - 15 mmol/L    Glucose 95 65 - 100 mg/dL    BUN 17 6 - 20 mg/dL    Creatinine 0.85 0.70 - 1.30 mg/dL    BUN/Creatinine ratio 20 12 - 20      GFR est AA >60 >60 ml/min/1.73m2    GFR est non-AA >60 >60 ml/min/1.73m2    Calcium 8.4 (L) 8.5 - 10.1 mg/dL    Bilirubin, total 0.7 0.2 - 1.0 mg/dL    AST (SGOT) 15 15 - 37 U/L    ALT (SGPT) 18 12 - 78 U/L    Alk.  phosphatase 53 45 - 117 U/L    Protein, total 6.8 6.4 - 8.2 g/dL    Albumin 3.1 (L) 3.5 - 5.0 g/dL    Globulin 3.7 2.0 - 4.0 g/dL    A-G Ratio 0.8 (L) 1.1 - 2.2            HOSPITAL COURSE:    Patient is a 80y.o. year old male signal past medical history of hypertension hypothyroidism BPH hyperlipidemia history of stroke in the past came to emergency room because of left-sided weakness according to the patient son as patient is not a very good historian he has some left-sided weakness and speech slurred for last 2 days and yesterday have difficulty in ambulating came to emergency room seen by the ER physician and recommended patient to be admitted for further work-up initial CT scan of the head was negative      History of present illness precipitating physical at the time of admission as the patient had admitted because of possible stroke work-up including the MRI which shows acute lacunar infarct of the right thalamus    Echo done shows ejection fraction about 50 to 55%    Carotid Doppler done shows moderate 50 to 79% stenosis of left internal carotid artery    Patient is alert awake slightly confused hard of hearing resting the bed seen by the physical therapy occupational therapy recommend inpatient rehab    Speech therapy evaluation initiated not completed yet    Medication reconciliation done time of discharge patient 35 minutes  Discussed with the neurology for discharge      Call patient's son left message to call back    50% time spent counseling and coordination of care    Recommend to repeat carotid Dopplers in 3 to 6 months      Signed:   Wanda De Leon MD  7/1/2021  9:31 AM

## 2021-07-01 NOTE — PROGRESS NOTES
Problem: Falls - Risk of  Goal: *Absence of Falls  Description: Document Emir Bloom Fall Risk and appropriate interventions in the flowsheet.   Outcome: Progressing Towards Goal  Note: Fall Risk Interventions:  Mobility Interventions: Bed/chair exit alarm         Medication Interventions: Bed/chair exit alarm         History of Falls Interventions: Consult care management for discharge planning

## 2021-07-01 NOTE — PROGRESS NOTES
[facility] will accept the patient pending insurance authorization and a negative covid test. They indicated they have initiated auth. Anticipate a response within 24-48 hours. CM notified primary nurse, Shelia, of needing a rapid Covid test. Patient and his son updated at the bedside.

## 2021-07-01 NOTE — PROGRESS NOTES
OCCUPATIONAL THERAPY TREATMENT  Patient: Vinnie Hughes  (80 y.o. male)  Date: 7/1/2021  Diagnosis: Weakness [R53.1]  Generalized weakness [R53.1] <principal problem not specified>       Precautions:    Chart, occupational therapy assessment, plan of care, and goals were reviewed. ASSESSMENT  Patient continues with skilled OT services and is progressing towards goals. Upon SOLIS arrival, pt semi supine in bed with son in room, pt agreeable to tx session. Pt completed rolling and scooting with SBA, supine>sit completed with ModA. Pt completed sit>stand with CGA/Disha using RW. Pt ambulated to bathroom and completed toilet transfer with CGA, pt impulsive during transfer and walks away from RW to complete toilet transfer. Pt ambulated to recliner in room and completed chair transfer. Pt completed UE therex in chair (see chart below). Pt tolerated session well today. Will continue to follow pt throughout remainder of stay and progress towards OT goals. Recommending IRF at discharge when medically appropriate. Current Level of Function Impacting Discharge (ADLs): sit>stand CGA/Disha, toilet transfer Disha    Other factors to consider for discharge: lives alone, DME, time since onset, severity of deficits          PLAN :  Patient continues to benefit from skilled intervention to address the above impairments. Continue treatment per established plan of care. to address goals.     Recommend next OT session: self care training, functional mobility training, therapeutic exercise, balance training, therapeutic activities, cognitive retraining, endurance activities, neuromuscular re-education, patient education, and family training/education    Recommendation for discharge: (in order for the patient to meet his/her long term goals)  Therapy 3 hours per day 5-7 days per week    This discharge recommendation:  Has been made in collaboration with the attending provider and/or case management    IF patient discharges home will need the following DME: TBD       SUBJECTIVE:   Patient stated Scott Pool was waiting on you to tell me how I am doing.     OBJECTIVE DATA SUMMARY:   Cognitive/Behavioral Status:  Neurologic State: Alert  Orientation Level: Oriented to place  Cognition: Decreased attention/concentration    Functional Mobility and Transfers for ADLs:  Bed Mobility:  Rolling: Stand-by assistance  Supine to Sit: Moderate assistance  Scooting: Stand-by assistance    Transfers:  Sit to Stand: Contact guard assistance;Minimum assistance  Functional Transfers  Toilet Transfer : Minimum assistance  Bed to Chair: Minimum assistance    Balance:  Sitting: Impaired; Without support  Sitting - Static: Fair (occasional)  Sitting - Dynamic: Fair (occasional)  Standing: Impaired; With support  Standing - Static: Constant support; Fair  Standing - Dynamic : Constant support; Fair    ADL Intervention:    Toileting  Bladder Hygiene: Stand-by assistance    Therapeutic Exercises:   Exercise Sets Reps AROM AAROM PROM Self PROM Comments   Shoulder flex/ext 1 10 [x] [] [] []    Elbow flex/ext 1 10 [x] [] [] []    Wrist flex/ext 1 10 [x] [] [] []      Pain:  0/10    Activity Tolerance:   Fair and requires rest breaks  Please refer to the flowsheet for vital signs taken during this treatment. After treatment patient left in no apparent distress:   Sitting in chair, Call bell within reach, and Caregiver / family present    COMMUNICATION/COLLABORATION:   The patients plan of care was discussed with: Registered nurse.      IRMA Zazueta  Time Calculation: 28 mins    Problem: Self Care Deficits Care Plan (Adult)  Goal: *Acute Goals and Plan of Care (Insert Text)  Description: Pt will be Mod I sup <> sit in prep for EOB ADLs  Pt will be Mod I grooming standing sink side LRAD  Pt will be Mod I LE dressing sitting EOB/long sit  Pt will be Mod I sit <>  prep for toileting LRAD  Pt will be Mod I toileting/toilet transfer/cloth mgmt LRAD  Pt will be SPV following UE HEP in prep for self care tasks   Outcome: Progressing Towards Goal

## 2021-07-01 NOTE — PROGRESS NOTES
NEURO PROGRESS NOTE        SUBJECTIVE:   Stroke, right thalamic lacunar infarct  Left-sided weakness  Dementia    EXAM:  Somnolent but arousable  Responds verbally though with a lot of perseveration and verbigeration  Confused  Attempts to follow commands  Mild left-sided weakness stable      ASSESSMENT/PLAN:  Continue aspirin  Patient can be discharged from a neurological standpoint  Follow-up with Dr. Pedro Abdalla in 2 weeks.   Case management to call and make appointment at [phone]    ALLERGIES:    Allergies   Allergen Reactions    Pcn [Penicillins] Swelling       MEDS:      Current Facility-Administered Medications:     donepeziL (ARICEPT) tablet 5 mg, 5 mg, Oral, QHS, Tanwi IV, Namita Teague MD, 5 mg at 06/30/21 2130    levothyroxine (SYNTHROID) tablet 125 mcg, 125 mcg, Oral, ACB, Meño Suh MD, 125 mcg at 07/01/21 0604    multivitamin (ONE A DAY) tablet 1 Tablet, 1 Tablet, Oral, DAILY, Meño Suh MD, 1 Tablet at 06/30/21 2426    icosapent ethyL (VASCEPA) capsule 1 Capsule, 1 Capsule, Oral, BID, Meño Suh MD, 1 Capsule at 06/30/21 2100    pravastatin (PRAVACHOL) tablet 40 mg, 40 mg, Oral, QHS, Meño Suh MD, 40 mg at 06/30/21 2130    rOPINIRole (REQUIP) tablet 0.25 mg, 0.25 mg, Oral, QHS, Meño Suh MD, 0.25 mg at 06/30/21 2130    tamsulosin (FLOMAX) capsule 0.4 mg, 0.4 mg, Oral, DAILY, Meño Suh MD, 0.4 mg at 06/30/21 2194    0.9% sodium chloride infusion, 75 mL/hr, IntraVENous, CONTINUOUS, Meño Suh MD, Last Rate: 75 mL/hr at 06/29/21 1221, 75 mL/hr at 06/29/21 1221    acetaminophen (TYLENOL) tablet 650 mg, 650 mg, Oral, Q6H PRN, 650 mg at 06/29/21 2216 **OR** acetaminophen (TYLENOL) suppository 650 mg, 650 mg, Rectal, Q6H PRN, Meño Suh MD    polyethylene glycol (MIRALAX) packet 17 g, 17 g, Oral, DAILY PRN, Meño Suh MD    ondansetron (ZOFRAN ODT) tablet 4 mg, 4 mg, Oral, Q8H PRN **OR** ondansetron (ZOFRAN) injection 4 mg, 4 mg, IntraVENous, Q6H PRN, Andrew Suh MD    enoxaparin (LOVENOX) injection 40 mg, 40 mg, SubCUTAneous, DAILY, Meño Suh MD, 40 mg at 06/30/21 0928    levoFLOXacin (LEVAQUIN) 500 mg in D5W IVPB, 500 mg, IntraVENous, Q48H, Meño Suh MD, Last Rate: 100 mL/hr at 06/29/21 1214, 500 mg at 06/29/21 1214    aspirin chewable tablet 81 mg, 81 mg, Oral, DAILY, Meño Suh MD, 81 mg at 06/30/21 3917    LABS:  Recent Results (from the past 24 hour(s))   ECHO ADULT COMPLETE    Collection Time: 06/30/21 11:44 AM   Result Value Ref Range    LVIDd 5.70 4.20 - 5.90 cm    LVPWd 0.70 0.60 - 1.00 cm    LVIDs 3.70 cm    IVSd 1.20 0.60 - 1.00 cm    RVIDd 3.6 cm    BP EF 65.0 55.0 - 100.0 %    LV Mass .7 88.0 - 224.0 g    LV Mass AL Index 96.2 49.0 - 115.0 g/m2    Right Atrial Area 4C 19.7 cm2    Left Atrium Minor Axis 1.64 cm    Left Atrium Major Axis 3.60 cm   DUPLEX CAROTID BILATERAL    Collection Time: 06/30/21 12:57 PM   Result Value Ref Range    Left CCA dist sys 85.9 cm/s    Left CCA dist beach 9.5 cm/s    Left CCA prox sys 99.7 cm/s    Left CCA prox beach 11.2 cm/s    Left ICA dist sys 88.0 cm/s    Left ICA dist beach 12.4 cm/s    Left ICA prox sys 134.0 cm/s    Left ICA prox beach 31.5 cm/s    Left ECA sys 438.0 cm/s    LEFT EXTERNAL CAROTID ARTERY D 0.00 cm/s    Left subclavian sys 122.0 cm/s    LEFT SUBCLAVIAN ARTERY D 0.00 cm/s    Left vertebral sys 47.1 cm/s    LEFT VERTEBRAL ARTERY D 8.73 cm/s    Right cca dist sys 101.0 cm/s    Right CCA dist beach 0.0 cm/s    Right CCA prox sys 129.0 cm/s    Right CCA prox beach 0.0 cm/s    Right ICA dist sys 31.6 cm/s    Right ICA dist beach 3.9 cm/s    Right ICA prox sys 82.5 cm/s    Right ICA prox beach 0.0 cm/s    Right eca sys 93.9 cm/s    RIGHT EXTERNAL CAROTID ARTERY D 0.00 cm/s    Right subclavian sys 156.0 cm/s    RIGHT SUBCLAVIAN ARTERY D 0.00 cm/s    Right vertebral sys 42.5 cm/s    RIGHT VERTEBRAL ARTERY D 9.37 cm/s   CBC WITH AUTOMATED DIFF Collection Time: 07/01/21  8:05 AM   Result Value Ref Range    WBC 6.8 4.1 - 11.1 K/uL    RBC 4.46 4.10 - 5.70 M/uL    HGB 13.6 12.1 - 17.0 g/dL    HCT 40.1 36.6 - 50.3 %    MCV 89.9 80.0 - 99.0 FL    MCH 30.5 26.0 - 34.0 PG    MCHC 33.9 30.0 - 36.5 g/dL    RDW 14.1 11.5 - 14.5 %    PLATELET 112 143 - 506 K/uL    MPV 10.1 8.9 - 12.9 FL    NRBC 0.0 0.0  WBC    ABSOLUTE NRBC 0.00 0.00 - 0.01 K/uL    NEUTROPHILS 67 32 - 75 %    LYMPHOCYTES 18 12 - 49 %    MONOCYTES 10 5 - 13 %    EOSINOPHILS 3 0 - 7 %    BASOPHILS 1 0 - 1 %    IMMATURE GRANULOCYTES 1 (H) 0 - 0.5 %    ABS. NEUTROPHILS 4.7 1.8 - 8.0 K/UL    ABS. LYMPHOCYTES 1.2 0.8 - 3.5 K/UL    ABS. MONOCYTES 0.7 0.0 - 1.0 K/UL    ABS. EOSINOPHILS 0.2 0.0 - 0.4 K/UL    ABS. BASOPHILS 0.1 0.0 - 0.1 K/UL    ABS. IMM. GRANS. 0.1 (H) 0.00 - 0.04 K/UL    DF AUTOMATED     METABOLIC PANEL, COMPREHENSIVE    Collection Time: 07/01/21  8:05 AM   Result Value Ref Range    Sodium 138 136 - 145 mmol/L    Potassium 4.1 3.5 - 5.1 mmol/L    Chloride 107 97 - 108 mmol/L    CO2 23 21 - 32 mmol/L    Anion gap 8 5 - 15 mmol/L    Glucose 95 65 - 100 mg/dL    BUN 17 6 - 20 mg/dL    Creatinine 0.85 0.70 - 1.30 mg/dL    BUN/Creatinine ratio 20 12 - 20      GFR est AA >60 >60 ml/min/1.73m2    GFR est non-AA >60 >60 ml/min/1.73m2    Calcium 8.4 (L) 8.5 - 10.1 mg/dL    Bilirubin, total 0.7 0.2 - 1.0 mg/dL    AST (SGOT) 15 15 - 37 U/L    ALT (SGPT) 18 12 - 78 U/L    Alk. phosphatase 53 45 - 117 U/L    Protein, total 6.8 6.4 - 8.2 g/dL    Albumin 3.1 (L) 3.5 - 5.0 g/dL    Globulin 3.7 2.0 - 4.0 g/dL    A-G Ratio 0.8 (L) 1.1 - 2.2         Visit Vitals  BP (!) 145/58   Pulse 60   Temp 98.2 °F (36.8 °C)   Resp 18   Ht 6' (1.829 m)   Wt 97.5 kg (215 lb)   SpO2 96%   BMI 29.16 kg/m²       Imaging:  MRI BRAIN WO CONT   Final Result   1. Acute lacunar infarct of the right thalamus. 2.  Chronic right thalamic lacunar infarct in moderate chronic small vessel   ischemic changes.

## 2021-07-01 NOTE — PROGRESS NOTES
CM reached out to the patient's son, Frank Benítez, at [phone] to discuss discharge recs for IRF. He stated they had already spoken to [address] [facility] at [facility] and [address] [place] [address] in [facility] and that is where they would like for the patient to go. Choice obtained and referral has been sent.

## 2021-07-01 NOTE — PROGRESS NOTES
PHYSICAL THERAPY TREATMENT  Patient: Ed Poole Sr. (80 y.o. male)  Date: 7/1/2021  Diagnosis: Weakness [R53.1]  Generalized weakness [R53.1] <principal problem not specified>       Precautions:    Chart, physical therapy assessment, plan of care and goals were reviewed. ASSESSMENT  Patient continues with skilled PT services and is progressing towards goals. Pt semi supine in bed upon arrival and agreeable to session. Pt [place] and slightly confused, but overall follows simple commands. Pt completed bed mobility with vc for technique and mod A for trunk assist. Completed STS with min A from EOB and toilet. Patient increased ambulation distance this session with RW and min A, patient gait is slightly unsteady but no overt LOB noted. Educated patient on proximity to RW during gait as pt noted to have fwd flexed posture and ambulating with RW too far fwd. Patient returned to room and requested to use restroom; able to perform pericare with SBA. Patient returned to bed 2/2 fatigue and declining participation with therex. Left resting comfortably in bed with call bell in reach and needs met, bed alarm on. Recommending d/c to IRF once medically appropriate. Current Level of Function Impacting Discharge (mobility/balance): decreased balance during OOB mobility    Other factors to consider for discharge: PLOF, time since onset, severity of deficits, family assist          PLAN :  Patient continues to benefit from skilled intervention to address the above impairments. Continue treatment per established plan of care. to address goals.     Recommendation for discharge: (in order for the patient to meet his/her long term goals)  Therapy 3 hours per day 5-7 days per week    This discharge recommendation:  Has been made in collaboration with the attending provider and/or case management    IF patient discharges home will need the following DME: to be determined (TBD)       SUBJECTIVE:   Patient stated I know my balance isn't good, what can I do to make it better?     OBJECTIVE DATA SUMMARY:   Critical Behavior:  Neurologic State: Alert  Orientation Level: Oriented to person, Oriented to situation  Cognition: Decreased attention/concentration, Follows commands     Functional Mobility Training:  Bed Mobility:  Rolling: Minimum assistance  Supine to Sit: Moderate assistance  Sit to Supine: Stand-by assistance  Scooting: Minimum assistance    Transfers:  Sit to Stand: Minimum assistance  Stand to Sit: Minimum assistance    Balance:  Sitting: Impaired; Without support  Sitting - Static: Fair (occasional)  Sitting - Dynamic: Fair (occasional)  Standing: Impaired;Pull to stand; With support  Standing - Static: Constant support; Fair  Standing - Dynamic : Constant support; Fair    Ambulation/Gait Training:  Distance (ft): 115 Feet (ft)  Assistive Device: Gait belt;Walker, rolling  Ambulation - Level of Assistance: Minimal assistance  Base of Support: Widened    Therapeutic Exercises:   Declined 2/2 fatigue    Pain Ratin/10    Activity Tolerance:   Fair and requires rest breaks  Please refer to the flowsheet for vital signs taken during this treatment. After treatment patient left in no apparent distress:   Supine in bed, Call bell within reach, Bed / chair alarm activated, and Side rails x 3    COMMUNICATION/COLLABORATION:   The patients plan of care was discussed with: Registered nurse.      Alexandru Obrien PTA   Time Calculation: 28 mins

## 2021-07-02 PROCEDURE — G0378 HOSPITAL OBSERVATION PER HR: HCPCS

## 2021-07-02 PROCEDURE — 97530 THERAPEUTIC ACTIVITIES: CPT

## 2021-07-02 PROCEDURE — 97110 THERAPEUTIC EXERCISES: CPT

## 2021-07-02 PROCEDURE — 74011250637 HC RX REV CODE- 250/637: Performed by: FAMILY MEDICINE

## 2021-07-02 PROCEDURE — 74011250636 HC RX REV CODE- 250/636: Performed by: FAMILY MEDICINE

## 2021-07-02 PROCEDURE — 65270000029 HC RM PRIVATE

## 2021-07-02 PROCEDURE — 74011250637 HC RX REV CODE- 250/637: Performed by: PSYCHIATRY & NEUROLOGY

## 2021-07-02 PROCEDURE — 97116 GAIT TRAINING THERAPY: CPT

## 2021-07-02 RX ADMIN — DONEPEZIL HYDROCHLORIDE 5 MG: 5 TABLET, FILM COATED ORAL at 21:21

## 2021-07-02 RX ADMIN — ASPIRIN 81 MG: 81 TABLET, CHEWABLE ORAL at 10:05

## 2021-07-02 RX ADMIN — TAMSULOSIN HYDROCHLORIDE 0.4 MG: 0.4 CAPSULE ORAL at 10:05

## 2021-07-02 RX ADMIN — ICOSAPENT ETHYL 1 CAPSULE: 1000 CAPSULE ORAL at 21:00

## 2021-07-02 RX ADMIN — ROPINIROLE HYDROCHLORIDE 0.25 MG: 0.25 TABLET, FILM COATED ORAL at 21:21

## 2021-07-02 RX ADMIN — PRAVASTATIN SODIUM 40 MG: 40 TABLET ORAL at 21:22

## 2021-07-02 RX ADMIN — MULTIVITAMIN TABLET 1 TABLET: TABLET at 10:05

## 2021-07-02 RX ADMIN — ICOSAPENT ETHYL 1 CAPSULE: 1000 CAPSULE ORAL at 09:00

## 2021-07-02 RX ADMIN — ENOXAPARIN SODIUM 40 MG: 40 INJECTION SUBCUTANEOUS at 10:05

## 2021-07-02 RX ADMIN — LEVOTHYROXINE SODIUM 125 MCG: 0.03 TABLET ORAL at 06:32

## 2021-07-02 NOTE — PROGRESS NOTES
Problem: Falls - Risk of  Goal: *Absence of Falls  Description: Document Bard  Fall Risk and appropriate interventions in the flowsheet.   Outcome: Progressing Towards Goal  Note: Fall Risk Interventions:  Mobility Interventions: Bed/chair exit alarm         Medication Interventions: Bed/chair exit alarm    Elimination Interventions: Call light in reach    History of Falls Interventions: Bed/chair exit alarm

## 2021-07-02 NOTE — PROGRESS NOTES
MELANIE has reached out to [facility] with no response regarding auth. CM attempted to reach admission's liaison multiple times via phone at [phone] with no response. MELANIE reached back out and was informed their admissions liaison is off today. MELANIE inquired about auth and was informed Duyen Reed remains pending at this time.

## 2021-07-02 NOTE — DISCHARGE SUMMARY
Discharge Summary       PATIENT ID: Gabriel Combs Sr. MRN: [id number]   YOB: 1929    DATE OF ADMISSION: 6/28/2021  6:56 PM    DATE OF DISCHARGE:   PRIMARY CARE PROVIDER: Nando Campbell MD     ATTENDING PHYSICIAN: Rupinder Suh  DISCHARGING PROVIDER: Rupinder Suh      CONSULTATIONS: IP CONSULT TO NEUROLOGY    PROCEDURES/SURGERIES: * No surgery found *    ADMITTING DIAGNOSES:    Patient Active Problem List    Diagnosis Date Noted    Generalized weakness 06/29/2021    Weakness 06/28/2021    Fall 05/23/2021    Ataxia 04/20/2021    Abnormal gait 04/19/2021    Altered mental status 04/19/2021    Dehydration 04/19/2021    Bradycardia 11/30/2020    Acute CVA (cerebrovascular accident) ([facility] 11/15/2020    [facility] arter[facility] with [facility]yalgia [facility]a [facility] 08/14/2018    Long term (current) use of systemic steroids 08/14/2018    Long-term use of immunosuppressant medication 08/14/2018    PMR (polymyalgia rheumatica) ([facility] 08/14/2018    Chronic headaches 07/20/2018       DISCHARGE DIAGNOSES / PLAN:      Acute lacunar infarct of the right thalamus  Hypertension  Hypothyroidism  Hyperlipidemia  BPH  Restless leg syndrome  Dehydration  Possible UTI        DISCHARGE MEDICATIONS:  Current Discharge Medication List      START taking these medications    Details   aspirin 81 mg chewable tablet Take 1 Tablet by mouth daily. Qty: 30 Tablet, Refills: 0  Start date: 7/2/2021      donepeziL (ARICEPT) 5 mg tablet Take 1 Tablet by mouth nightly. Qty: 30 Tablet, Refills: 0  Start date: 7/1/2021      clopidogreL (Plavix) 75 mg tab Take 1 Tablet by mouth daily. Qty: 30 Tablet, Refills: 0  Start date: 7/1/2021         CONTINUE these medications which have NOT CHANGED    Details   levothyroxine (SYNTHROID) 125 mcg tablet Take 1 Tab by mouth Daily (before breakfast).   Qty: 30 Tab, Refills: 0      omega 3-dha-epa-fish oil (Fish Oil) 100-160-1,000 mg cap Fish Oil   2 daily      rOPINIRole (REQUIP) 0.25 mg tablet Take 0.25 mg by mouth nightly. tamsulosin (FLOMAX) 0.4 mg capsule Take 0.4 mg by mouth daily. pravastatin (PRAVACHOL) 40 mg tablet Take 40 mg by mouth nightly. multivitamin (ONE A DAY) tablet Take 1 Tab by mouth daily. STOP taking these medications       amLODIPine (NORVASC) 5 mg tablet Comments:   Reason for Stopping:         lisinopriL (PRINIVIL, ZESTRIL) 10 mg tablet Comments:   Reason for Stopping:                 NOTIFY YOUR PHYSICIAN FOR ANY OF THE FOLLOWING:   Fever over 101 degrees for 24 hours. Chest pain, shortness of breath, fever, chills, nausea, vomiting, diarrhea, change in mentation, falling, weakness, bleeding. Severe pain or pain not relieved by medications. Or, any other signs or symptoms that you may have questions about. DISPOSITION:  x  Home With:   OT  PT  HH  RN       Long term SNF/Inpatient Rehab    Independent/assisted living    Hospice    Other:       PATIENT CONDITION AT DISCHARGE: Stable      PHYSICAL EXAMINATION AT DISCHARGE:  General:          Alert, cooperative, no distress, appears stated age. HEENT:           Atraumatic, anicteric sclerae, pink conjunctivae                          No oral ulcers, mucosa moist, throat clear, dentition fair  Neck:               Supple, symmetrical  Lungs:             Clear to auscultation bilaterally. No Wheezing or Rhonchi. No rales. Chest wall:      No tenderness  No Accessory muscle use. Heart:              Regular  rhythm,  No  murmur   No edema  Abdomen:        Soft, non-tender. Not distended. Bowel sounds normal  Extremities:     No cyanosis. No clubbing,                            Skin turgor normal, Capillary refill normal  Skin:                Not pale. Not Jaundiced  No rashes   Psych:             Not anxious or agitated. Neurologic:      Alert, moves all extremities, answers questions appropriately and responds to commands     MRI BRAIN WO CONT   Final Result   1.   Acute lacunar infarct of the right thalamus. 2.  Chronic right thalamic lacunar infarct in moderate chronic small vessel   ischemic changes.            Recent Results (from the past 24 hour(s))   COVID-19 RAPID TEST    Collection Time: 07/01/21  2:45 PM   Result Value Ref Range    Specimen source Nasopharyngeal      COVID-19 rapid test Not Detected Not Detected            HOSPITAL COURSE:    Patient is a 80y.o. year old male signal past medical history of hypertension hypothyroidism BPH hyperlipidemia history of stroke in the past came to emergency room because of left-sided weakness according to the patient son as patient is not a very good historian he has some left-sided weakness and speech slurred for last 2 days and yesterday have difficulty in ambulating came to emergency room seen by the ER physician and recommended patient to be admitted for further work-up initial CT scan of the head was negative      History of present illness precipitating physical at the time of admission as the patient had admitted because of possible stroke work-up including the MRI which shows acute lacunar infarct of the right thalamus    Echo done shows ejection fraction about 50 to 55%    Carotid Doppler done shows moderate 50 to 79% stenosis of left internal carotid artery    Patient is alert awake slightly confused hard of hearing resting the bed seen by the physical therapy occupational therapy recommend inpatient rehab    Speech therapy evaluation initiated not completed yet    Medication reconciliation done time of discharge patient 35 minutes  Discussed with the neurology for discharge      Call patient's son left message to call back    50% time spent counseling and coordination of care    Recommend to repeat carotid Dopplers in 3 to 6 months    Delay in discharge due to placement      Signed:   Jakob Vail MD  7/2/2021  9:31 AM

## 2021-07-02 NOTE — PROGRESS NOTES
OCCUPATIONAL THERAPY TREATMENT  Patient: Pauline Balderrama Sr. (80 y.o. male)  Date: 7/2/2021  Diagnosis: Weakness [R53.1]  Generalized weakness [R53.1] <principal problem not specified>       Precautions:    Chart, occupational therapy assessment, plan of care, and goals were reviewed. ASSESSMENT  Patient continues with skilled OT services and is progressing towards goals. Upon [facility] arrival, pt semi supine in bed and agreeable to tx session. Pt completed bed mobility with SBA-Disha. Pt completed sit>stand using RW with CGA and ambulated to bathroom. Pt completed toilet transfer with CGA/Disha, pt requiring cueing to stay within RW due to pt attempting to ambulate outside of RW. Pt completed bladder hygiene with SBA and ambulated to sink with CGA. Pt completed oral hygiene, face washing, and hair brushing with CGA. Pt returned to bed and required assistance with scooting to [facility] while in supine. Pt left semi supine in bed with call bell and phone within reach. Will continue to follow pt throughout remainder of stay and progress towards OT goals. Recommending IRF at discharge when medically appropriate. Current Level of Function Impacting Discharge (ADLs): CGA/Disha transfers, CGA grooming standing at sink    Other factors to consider for discharge: lives alone, DME, time since onset, severity of deficits          PLAN :  Patient continues to benefit from skilled intervention to address the above impairments. Continue treatment per established plan of care. to address goals.     Recommend next OT session: self care training, functional mobility training, therapeutic exercise, balance training, therapeutic activities, cognitive retraining, endurance activities, neuromuscular re-education, patient education, and family training/education    Recommendation for discharge: (in order for the patient to meet his/her long term goals)  Therapy 3 hours per day 5-7 days per week    This discharge recommendation:  Has been made in collaboration with the attending provider and/or case management    IF patient discharges home will need the following DME: TBD       SUBJECTIVE:   Patient stated I need to sit on the toilet.     OBJECTIVE DATA SUMMARY:   Cognitive/Behavioral Status:  Neurologic State: Alert  Orientation Level: Oriented to person  Cognition: Follows commands    Functional Mobility and Transfers for ADLs:  Bed Mobility:  Rolling: Stand-by assistance  Supine to Sit: Minimum assistance  Sit to Supine: Stand-by assistance  Scooting: Stand-by assistance    Transfers:  Sit to Stand: Contact guard assistance  Functional Transfers  Toilet Transfer : Contact guard assistance;Minimum assistance    Balance:  Sitting: Impaired; Without support  Sitting - Static: Fair (occasional)  Sitting - Dynamic: Fair (occasional)  Standing: Impaired; With support  Standing - Static: Constant support; Fair  Standing - Dynamic : Constant support; Fair    ADL Intervention:    Grooming  Washing Face: Contact guard assistance  Brushing Teeth: Contact guard assistance  Brushing/Combing Hair: Contact guard assistance    Toileting  Bladder Hygiene: Stand-by assistance    Pain:  0/10    Activity Tolerance:   Fair and requires rest breaks  Please refer to the flowsheet for vital signs taken during this treatment. After treatment patient left in no apparent distress:   Supine in bed, Call bell within reach, Bed / chair alarm activated, and Side rails x 3    COMMUNICATION/COLLABORATION:   The patients plan of care was discussed with: Physical therapy assistant and Registered nurse.      IRMA Zazueta  Time Calculation: 28 mins    Problem: Self Care Deficits Care Plan (Adult)  Goal: *Acute Goals and Plan of Care (Insert Text)  Description: Pt will be Mod I sup <> sit in prep for EOB ADLs  Pt will be Mod I grooming standing sink side LRAD  Pt will be Mod I LE dressing sitting EOB/long sit  Pt will be Mod I sit <>  prep for toileting LRAD  Pt will be Mod I toileting/toilet transfer/cloth mgmt LRAD  Pt will be SPV following UE HEP in prep for self care tasks   Outcome: Progressing Towards Goal

## 2021-07-02 NOTE — PROGRESS NOTES
PHYSICAL THERAPY TREATMENT  Patient: Kim Shaffer SrIan (80 y.o. male)  Date: 7/2/2021  Diagnosis: Weakness [R53.1]  Generalized weakness [R53.1] <principal problem not specified>       Precautions:    Chart, physical therapy assessment, plan of care and goals were reviewed. ASSESSMENT  Patient continues with skilled PT services and is progressing towards goals. Patient semi supine in bed upon arrival and agreeable to session. Completed sup>sit with min A as patient reaching for therapist hand to pull himself up. Donned new gown sitting EOB 2/2 pt having spilled syrup. Pt completed STS with min A. Ambulated increased distance with RW and CGA this session, patient slightly unsteady but no overt LOB ntoed. Patient returned to sitting EOB and completed seated therex, see further details below. Completed sit>sup with SBA. Pt left semi supine in bed with call bell in reach and needs met, bed alarm on. Recommending d/c to Snf once medically appropriate. .     Current Level of Function Impacting Discharge (mobility/balance): assistance required for sup>sit and ambulation    Other factors to consider for discharge: PLOF, time since onset, family assistance, safety awareness         PLAN :  Patient continues to benefit from skilled intervention to address the above impairments. Continue treatment per established plan of care. to address goals. Recommendation for discharge: (in order for the patient to meet his/her long term goals)  Therapy up to 5 days/week in SNF setting    This discharge recommendation:  Has been made in collaboration with the attending provider and/or case management    IF patient discharges home will need the following DME: to be determined (TBD)       SUBJECTIVE:   Patient stated I think I'm getting better.     OBJECTIVE DATA SUMMARY:   Critical Behavior:  Neurologic State: Alert  Orientation Level: Oriented to person  Cognition: Follows commands     Functional Mobility Training:  Bed Mobility:  Rolling: Stand-by assistance  Supine to Sit: Minimum assistance  Sit to Supine: Stand-by assistance  Scooting: Stand-by assistance    Transfers:  Sit to Stand: Minimum assistance  Stand to Sit: Minimum assistance  Bed to Chair: Contact guard assistance    Balance:  Sitting: Impaired; Without support  Sitting - Static: Fair (occasional)  Sitting - Dynamic: Fair (occasional)  Standing: Impaired; With support  Standing - Static: Constant support; Fair  Standing - Dynamic : Constant support; Fair    Ambulation/Gait Training:  Distance (ft): 150 Feet (ft)  Assistive Device: Gait belt;Walker, rolling  Ambulation - Level of Assistance: Contact guard assistance  Base of Support: Widened    Therapeutic Exercises:   1 x 20 AP  1 x 20 LAQ  1 x 20 Marches    Pain Ratin/10    Activity Tolerance:   Fair and requires rest breaks  Please refer to the flowsheet for vital signs taken during this treatment. After treatment patient left in no apparent distress:   Supine in bed, Call bell within reach, Bed / chair alarm activated, and Side rails x 3    COMMUNICATION/COLLABORATION:   The patients plan of care was discussed with: Registered nurse. Problem: Mobility Impaired (Adult and Pediatric)  Goal: *Acute Goals and Plan of Care (Insert Text)  Description: Pt will be I with LE HEP in 7 days. Pt will perform bed mobility with mod I in 7 days. Pt will perform transfers with mod I in 7 days. Pt will amb  feet with LRAD safely with mod I in 7 days. .   Pt will demonstrate improvement in standing dynamic balance from min A to SBA in 7 days.    Outcome: Progressing Towards Goal       Thiago Trimble PTA   Time Calculation: 26 mins

## 2021-07-02 NOTE — PROGRESS NOTES
NEURO PROGRESS NOTE        SUBJECTIVE:   Stroke, right thalamic lacunar infarct  Left-sided weakness  Dementia      EXAM:  Somnolent but arousable  Confused and confabulates secondary to dementia. Also perseverates and verbiagerates  Mild left-sided weakness stable    ASSESSMENT/PLAN:  For discharge and follow-up with her physician.     ALLERGIES:    Allergies   Allergen Reactions    Pcn [Penicillins] Swelling       MEDS:      Current Facility-Administered Medications:     donepeziL (ARICEPT) tablet 5 mg, 5 mg, Oral, QHS, Tanwi IV, Pop Ch MD, 5 mg at 07/01/21 2129    levothyroxine (SYNTHROID) tablet 125 mcg, 125 mcg, Oral, ACB, Meño Suh MD, 125 mcg at 07/02/21 0902    multivitamin (ONE A DAY) tablet 1 Tablet, 1 Tablet, Oral, DAILY, Meño Suh MD, 1 Tablet at 07/02/21 1005    icosapent ethyL (VASCEPA) capsule 1 Capsule, 1 Capsule, Oral, BID, Meño Suh MD, 1 Capsule at 07/02/21 0900    pravastatin (PRAVACHOL) tablet 40 mg, 40 mg, Oral, QHS, Meño Suh MD, 40 mg at 07/01/21 2129    rOPINIRole (REQUIP) tablet 0.25 mg, 0.25 mg, Oral, QHS, Meño Suh MD, 0.25 mg at 07/01/21 2129    tamsulosin (FLOMAX) capsule 0.4 mg, 0.4 mg, Oral, DAILY, Meño Suh MD, 0.4 mg at 07/02/21 1005    0.9% sodium chloride infusion, 75 mL/hr, IntraVENous, CONTINUOUS, Meño Suh MD, Last Rate: 75 mL/hr at 07/01/21 1207, 75 mL/hr at 07/01/21 1207    acetaminophen (TYLENOL) tablet 650 mg, 650 mg, Oral, Q6H PRN, 650 mg at 06/29/21 2216 **OR** acetaminophen (TYLENOL) suppository 650 mg, 650 mg, Rectal, Q6H PRN, Sheila Suh MD    polyethylene glycol (MIRALAX) packet 17 g, 17 g, Oral, DAILY PRN, Sheila Suh MD    ondansetron (ZOFRAN ODT) tablet 4 mg, 4 mg, Oral, Q8H PRN **OR** ondansetron (ZOFRAN) injection 4 mg, 4 mg, IntraVENous, Q6H PRN, Meño Suh MD    enoxaparin (LOVENOX) injection 40 mg, 40 mg, SubCUTAneous, DAILY, Meño Suh MD, 40 mg at 07/02/21 1002   levoFLOXacin (LEVAQUIN) 500 mg in D5W IVPB, 500 mg, IntraVENous, Q48H, Meño Suh MD, Last Rate: 100 mL/hr at 07/01/21 1206, 500 mg at 07/01/21 1206    aspirin chewable tablet 81 mg, 81 mg, Oral, DAILY, Meño Suh MD, 81 mg at 07/02/21 1005    LABS:  Recent Results (from the past 24 hour(s))   COVID-19 RAPID TEST    Collection Time: 07/01/21  2:45 PM   Result Value Ref Range    Specimen source Nasopharyngeal      COVID-19 rapid test Not Detected Not Detected         Visit Vitals  /71 (BP 1 Location: Right arm)   Pulse 94   Temp 97.8 °F (36.6 °C)   Resp 19   Ht 6' (1.829 m)   Wt 97.5 kg (215 lb)   SpO2 98%   BMI 29.16 kg/m²       Imaging:  MRI BRAIN WO CONT   Final Result   1. Acute lacunar infarct of the right thalamus. 2.  Chronic right thalamic lacunar infarct in moderate chronic small vessel   ischemic changes.

## 2021-07-03 PROCEDURE — 74011250637 HC RX REV CODE- 250/637: Performed by: PSYCHIATRY & NEUROLOGY

## 2021-07-03 PROCEDURE — G0378 HOSPITAL OBSERVATION PER HR: HCPCS

## 2021-07-03 PROCEDURE — 74011250636 HC RX REV CODE- 250/636: Performed by: FAMILY MEDICINE

## 2021-07-03 PROCEDURE — 74011250637 HC RX REV CODE- 250/637: Performed by: FAMILY MEDICINE

## 2021-07-03 PROCEDURE — 65270000029 HC RM PRIVATE

## 2021-07-03 PROCEDURE — 97530 THERAPEUTIC ACTIVITIES: CPT

## 2021-07-03 RX ADMIN — MULTIVITAMIN TABLET 1 TABLET: TABLET at 09:42

## 2021-07-03 RX ADMIN — ENOXAPARIN SODIUM 40 MG: 40 INJECTION SUBCUTANEOUS at 09:42

## 2021-07-03 RX ADMIN — ICOSAPENT ETHYL 1 CAPSULE: 1000 CAPSULE ORAL at 09:00

## 2021-07-03 RX ADMIN — PRAVASTATIN SODIUM 40 MG: 40 TABLET ORAL at 21:46

## 2021-07-03 RX ADMIN — ASPIRIN 81 MG: 81 TABLET, CHEWABLE ORAL at 09:42

## 2021-07-03 RX ADMIN — ROPINIROLE HYDROCHLORIDE 0.25 MG: 0.25 TABLET, FILM COATED ORAL at 21:46

## 2021-07-03 RX ADMIN — LEVOTHYROXINE SODIUM 125 MCG: 0.03 TABLET ORAL at 09:42

## 2021-07-03 RX ADMIN — ACETAMINOPHEN 650 MG: 325 TABLET ORAL at 19:21

## 2021-07-03 RX ADMIN — LEVOFLOXACIN 500 MG: 5 INJECTION, SOLUTION INTRAVENOUS at 15:05

## 2021-07-03 RX ADMIN — DONEPEZIL HYDROCHLORIDE 5 MG: 5 TABLET, FILM COATED ORAL at 21:46

## 2021-07-03 RX ADMIN — TAMSULOSIN HYDROCHLORIDE 0.4 MG: 0.4 CAPSULE ORAL at 09:42

## 2021-07-03 NOTE — ROUTINE PROCESS
[facility]SIDE_VERBALshift change report given to Ed Bach RN (oncoming nurse) by Elder Resendez RN (offgoing nurse).  Report included the following information SBAR

## 2021-07-03 NOTE — PROGRESS NOTES
Problem: Mobility Impaired (Adult and Pediatric)  Goal: *Acute Goals and Plan of Care (Insert Text)  Description: Pt will be I with LE HEP in 7 days. Pt will perform bed mobility with mod I in 7 days. Pt will perform transfers with mod I in 7 days. Pt will amb  feet with LRAD safely with mod I in 7 days. .   Pt will demonstrate improvement in standing dynamic balance from min A to SBA in 7 days. Outcome: Progressing Towards Goal   PHYSICAL THERAPY TREATMENT  Patient: Ochoa Suárez Sr. (80 y.o. male)  Date: 7/3/2021  Diagnosis: Weakness [R53.1]  Generalized weakness [R53.1] <principal problem not specified>       Precautions:    Chart, physical therapy assessment, plan of care and goals were reviewed. ASSESSMENT  Patient continues with skilled PT services and is progressing towards goals. Performed bed mobility with SBA, transfers with CGA. Amb in room, CGA for flat surfaces, Min A for uneven surfaces. Pt with difficulty with obstacle avoidance. Participated with seated therex, visual cues required. Pt demos fast pace with therex, and difficulty with smooth controlled movements. PLAN :  Patient continues to benefit from skilled intervention to address the above impairments. Continue treatment per established plan of care. to address goals. Recommendation for discharge: (in order for the patient to meet his/her long term goals)  Therapy 3 hours per day 5-7 days per week       SUBJECTIVE:   Patient stated I feel slow.     OBJECTIVE DATA SUMMARY:   Critical Behavior:  Neurologic State: Alert  Orientation Level: Oriented X4  Cognition: Appropriate for age attention/concentration     Functional Mobility Training:  Bed Mobility:  Rolling: Modified independent  Supine to Sit: Stand-by assistance  Sit to Supine: Stand-by assistance  Scooting: Stand-by assistance        Transfers:  Sit to Stand: Contact guard assistance  Stand to Sit: Contact guard assistance        Bed to Chair: Contact guard assistance              Interventions: Safety awareness training;Verbal cues     Balance:  Sitting: Intact  Sitting - Static: Good (unsupported)  Sitting - Dynamic: Good (unsupported)  Standing: Intact; With support  Standing - Static: Good  Standing - Dynamic : Fair  Ambulation/Gait Training:  Distance (ft): 40 Feet (ft)  Assistive Device: Gait belt;Walker, rolling  Ambulation - Level of Assistance: Contact guard assistance;Minimal assistance                 Base of Support: Widened                Therapeutic Exercises:   Heel/toe raises x20  LAQ x20  Seated march x20  Hip abd/add x20  Pain Ratin/10    Activity Tolerance:   Good      After treatment patient left in no apparent distress:   Supine in bed, Call bell within reach, Bed / chair alarm activated, and Side rails x 3  Pt wanted to remain in recliner, however no chair alarm pads were available. After discussion with nsg, agreed to place pt back in bed at end of tx with bed alarm for safety. COMMUNICATION/COLLABORATION:   The patients plan of care was discussed with: Registered nurse.      Juan Agustin   Time Calculation: 48 mins

## 2021-07-03 NOTE — DISCHARGE SUMMARY
Discharge Summary       PATIENT ID: Shawn Flores Sr. MRN: [id number]   YOB: 1929    DATE OF ADMISSION: 6/28/2021  6:56 PM    DATE OF DISCHARGE:   PRIMARY CARE PROVIDER: Baldo Heck MD     ATTENDING PHYSICIAN: Marsha Suh  DISCHARGING PROVIDER: Marsha Suh      CONSULTATIONS: IP CONSULT TO NEUROLOGY    PROCEDURES/SURGERIES: * No surgery found *    ADMITTING DIAGNOSES:    Patient Active Problem List    Diagnosis Date Noted    Generalized weakness 06/29/2021    Weakness 06/28/2021    Fall 05/23/2021    Ataxia 04/20/2021    Abnormal gait 04/19/2021    Altered mental status 04/19/2021    Dehydration 04/19/2021    Bradycardia 11/30/2020    Acute CVA (cerebrovascular accident) ([facility] 11/15/2020    Giant cell arteritis with polymyalgia rheumatica ([facility] 08/14/2018    Long term (current) use of systemic steroids 08/14/2018    Long-term use of immunosuppressant medication 08/14/2018    PMR (polymyalgia rheumatica) ([facility] 08/14/2018    Chronic headaches 07/20/2018       DISCHARGE DIAGNOSES / PLAN:      Acute lacunar infarct of the right thalamus  Hypertension  Hypothyroidism  Hyperlipidemia  BPH  Restless leg syndrome  Dehydration  Possible UTI        DISCHARGE MEDICATIONS:  Current Discharge Medication List      START taking these medications    Details   aspirin 81 mg chewable tablet Take 1 Tablet by mouth daily. Qty: 30 Tablet, Refills: 0  Start date: 7/2/2021      donepeziL (ARICEPT) 5 mg tablet Take 1 Tablet by mouth nightly. Qty: 30 Tablet, Refills: 0  Start date: 7/1/2021      clopidogreL (Plavix) 75 mg tab Take 1 Tablet by mouth daily. Qty: 30 Tablet, Refills: 0  Start date: 7/1/2021         CONTINUE these medications which have NOT CHANGED    Details   levothyroxine (SYNTHROID) 125 mcg tablet Take 1 Tab by mouth Daily (before breakfast).   Qty: 30 Tab, Refills: 0      omega 3-dha-epa-fish oil (Fish Oil) 100-160-1,000 mg cap Fish Oil   2 daily      rOPINIRole (REQUIP) 0.25 mg tablet Take 0.25 mg by mouth nightly. tamsulosin (FLOMAX) 0.4 mg capsule Take 0.4 mg by mouth daily. pravastatin (PRAVACHOL) 40 mg tablet Take 40 mg by mouth nightly. multivitamin (ONE A DAY) tablet Take 1 Tab by mouth daily. STOP taking these medications       amLODIPine (NORVASC) 5 mg tablet Comments:   Reason for Stopping:         lisinopriL (PRINIVIL, ZESTRIL) 10 mg tablet Comments:   Reason for Stopping:                 NOTIFY YOUR PHYSICIAN FOR ANY OF THE FOLLOWING:   Fever over 101 degrees for 24 hours. Chest pain, shortness of breath, fever, chills, nausea, vomiting, diarrhea, change in mentation, falling, weakness, bleeding. Severe pain or pain not relieved by medications. Or, any other signs or symptoms that you may have questions about. DISPOSITION:  x  Home With:   OT  PT  HH  RN       Long term SNF/Inpatient Rehab    Independent/assisted living    Hospice    Other:       PATIENT CONDITION AT DISCHARGE: Stable      PHYSICAL EXAMINATION AT DISCHARGE:  General:          Alert, cooperative, no distress, appears stated age. HEENT:           Atraumatic, anicteric sclerae, pink conjunctivae                          No oral ulcers, mucosa moist, throat clear, dentition fair  Neck:               Supple, symmetrical  Lungs:             Clear to auscultation bilaterally. No Wheezing or Rhonchi. No rales. Chest wall:      No tenderness  No Accessory muscle use. Heart:              Regular  rhythm,  No  murmur   No edema  Abdomen:        Soft, non-tender. Not distended. Bowel sounds normal  Extremities:     No cyanosis. No clubbing,                            Skin turgor normal, Capillary refill normal  Skin:                Not pale. Not Jaundiced  No rashes   Psych:             Not anxious or agitated. Neurologic:      Alert, moves all extremities, answers questions appropriately and responds to commands     MRI BRAIN WO CONT   Final Result   1.   Acute lacunar infarct of the right thalamus. 2.  Chronic right thalamic lacunar infarct in moderate chronic small vessel   ischemic changes. No results found for this or any previous visit (from the past 24 hour(s)).        HOSPITAL COURSE:    Patient is a 80y.o. year old male signal past medical history of hypertension hypothyroidism BPH hyperlipidemia history of stroke in the past came to emergency room because of left-sided weakness according to the patient son as patient is not a very good historian he has some left-sided weakness and speech slurred for last 2 days and yesterday have difficulty in ambulating came to emergency room seen by the ER physician and recommended patient to be admitted for further work-up initial CT scan of the head was negative      History of present illness precipitating physical at the time of admission as the patient had admitted because of possible stroke work-up including the MRI which shows acute lacunar infarct of the right thalamus    Echo done shows ejection fraction about 50 to 55%    Carotid Doppler done shows moderate 50 to 79% stenosis of left internal carotid artery    Patient is alert awake slightly confused hard of hearing resting the bed seen by the physical therapy occupational therapy recommend inpatient rehab    Speech therapy evaluation initiated not completed yet    Medication reconciliation done time of discharge patient 35 minutes  Discussed with the neurology for discharge        50% time spent counseling and coordination of care    Recommend to repeat carotid Dopplers in 3 to 6 months    Delay in discharge due to placement    This morning patient is alert awake sitting on the chair with physical therapy  No complaints waiting to be discharged for  rehab      Signed:   Wilda Nagel MD  7/3/2021  9:31 AM

## 2021-07-03 NOTE — PROCEDURES
[facility]  EEG    Name:  Bianka Mehta  MR#:  [id number]  :  1929  ACCOUNT #:  [de-identified]  DATE OF SERVICE:  2021    DIAGNOSIS:  Mental status changes. DESCRIPTION:  Recording is done digitally on computer. Electrodes are placed in a 10-20 international system. Initial recording is equipment calibration followed by biocalibration. Initial montages are bipolar montage. TECHNIQUE:  Tracing started with the patient drowsy. As the recording continues, the patient is hooked up to an EKG machine that shows a heart rate of 90. Drowsiness persists throughout. He is exposed to photic stimulation without any abnormality. Hyperventilation is not done. IMPRESSION:  This is an EEG showing the patient drowsy.       Tavo Tavares MD      [id number]  D:  2021 14:45  T:  2021 21:20  JOB #:  [id number]

## 2021-07-04 PROCEDURE — 74011250636 HC RX REV CODE- 250/636: Performed by: FAMILY MEDICINE

## 2021-07-04 PROCEDURE — G0378 HOSPITAL OBSERVATION PER HR: HCPCS

## 2021-07-04 PROCEDURE — 74011250637 HC RX REV CODE- 250/637: Performed by: FAMILY MEDICINE

## 2021-07-04 PROCEDURE — 97530 THERAPEUTIC ACTIVITIES: CPT

## 2021-07-04 PROCEDURE — 65270000029 HC RM PRIVATE

## 2021-07-04 PROCEDURE — 74011250637 HC RX REV CODE- 250/637: Performed by: PSYCHIATRY & NEUROLOGY

## 2021-07-04 RX ADMIN — ACETAMINOPHEN 650 MG: 325 TABLET ORAL at 00:33

## 2021-07-04 RX ADMIN — ENOXAPARIN SODIUM 40 MG: 40 INJECTION SUBCUTANEOUS at 08:40

## 2021-07-04 RX ADMIN — ASPIRIN 81 MG: 81 TABLET, CHEWABLE ORAL at 08:40

## 2021-07-04 RX ADMIN — MULTIVITAMIN TABLET 1 TABLET: TABLET at 08:40

## 2021-07-04 RX ADMIN — ROPINIROLE HYDROCHLORIDE 0.25 MG: 0.25 TABLET, FILM COATED ORAL at 23:46

## 2021-07-04 RX ADMIN — TAMSULOSIN HYDROCHLORIDE 0.4 MG: 0.4 CAPSULE ORAL at 08:40

## 2021-07-04 RX ADMIN — DONEPEZIL HYDROCHLORIDE 5 MG: 5 TABLET, FILM COATED ORAL at 23:47

## 2021-07-04 RX ADMIN — ICOSAPENT ETHYL 1 CAPSULE: 1000 CAPSULE ORAL at 09:00

## 2021-07-04 RX ADMIN — LEVOTHYROXINE SODIUM 125 MCG: 0.03 TABLET ORAL at 06:59

## 2021-07-04 RX ADMIN — ICOSAPENT ETHYL 1 CAPSULE: 1000 CAPSULE ORAL at 21:00

## 2021-07-04 RX ADMIN — PRAVASTATIN SODIUM 40 MG: 40 TABLET ORAL at 23:46

## 2021-07-04 NOTE — PROGRESS NOTES
DC order noted. Chart reviewed. Pt is waiting insurance authorization for care at [facility].     CM to update as available    Delay added

## 2021-07-04 NOTE — PROGRESS NOTES
Bedside shift change report given to KAILEE Beasley (oncoming nurse) by Pj Hernandez (offgoing nurse). Report included the following information SBAR.

## 2021-07-04 NOTE — PROGRESS NOTES
Problem: Pressure Injury - Risk of  Goal: *Prevention of pressure injury  Description: Document Wali Scale and appropriate interventions in the flowsheet. Outcome: Progressing Towards Goal  Note: Pressure Injury Interventions:  Sensory Interventions: Assess changes in LOC    Moisture Interventions: Absorbent underpads, Apply protective barrier, creams and emollients    Activity Interventions: PT/OT evaluation    Mobility Interventions: PT/OT evaluation    Nutrition Interventions: Document food/fluid/supplement intake    Friction and Shear Interventions: Apply protective barrier, creams and emollients, Minimize layers                Problem: Falls - Risk of  Goal: *Absence of Falls  Description: Document Dex Fall Risk and appropriate interventions in the flowsheet.   Outcome: Progressing Towards Goal  Note: Fall Risk Interventions:  Mobility Interventions: Bed/chair exit alarm, Patient to call before getting OOB    Mentation Interventions: Bed/chair exit alarm    Medication Interventions: Bed/chair exit alarm, Patient to call before getting OOB, Teach patient to arise slowly    Elimination Interventions: Call light in reach, Bed/chair exit alarm    History of Falls Interventions: Bed/chair exit alarm         Problem: Patient Education: Go to Patient Education Activity  Goal: Patient/Family Education  Outcome: Progressing Towards Goal

## 2021-07-04 NOTE — DISCHARGE SUMMARY
Discharge Summary       PATIENT ID: Zafar Butler Sr. MRN: [id number]   YOB: 1929    DATE OF ADMISSION: 6/28/2021  6:56 PM    DATE OF DISCHARGE:   PRIMARY CARE PROVIDER: Emily Jones MD     ATTENDING PHYSICIAN: Teresa Suh  DISCHARGING PROVIDER: Teresa Suh      CONSULTATIONS: IP CONSULT TO NEUROLOGY    PROCEDURES/SURGERIES: * No surgery found *    ADMITTING DIAGNOSES:    Patient Active Problem List    Diagnosis Date Noted    Generalized weakness 06/29/2021    Weakness 06/28/2021    Fall 05/23/2021    Ataxia 04/20/2021    Abnormal gait 04/19/2021    Altered mental status 04/19/2021    Dehydration 04/19/2021    Bradycardia 11/30/2020    Acute CVA (cerebrovascular accident) [facility] 11/15/2020    Giant cell arteritis with polymyalgia rheumatica ([facility] 08/14/2018    Long term (current) use of systemic steroids 08/14/2018    Long-term use of immunosuppressant medication 08/14/2018    [facility] 08/14/2018    Chronic headaches 07/20/2018       DISCHARGE DIAGNOSES / PLAN:      Acute lacunar infarct of the right thalamus  Hypertension  Hypothyroidism  Hyperlipidemia  BPH  Restless leg syndrome  Dehydration  Possible UTI        DISCHARGE MEDICATIONS:  Current Discharge Medication List      START taking these medications    Details   aspirin 81 mg chewable tablet Take 1 Tablet by mouth daily. Qty: 30 Tablet, Refills: 0  Start date: 7/2/2021      donepeziL (ARICEPT) 5 mg tablet Take 1 Tablet by mouth nightly. Qty: 30 Tablet, Refills: 0  Start date: 7/1/2021      clopidogreL (Plavix) 75 mg tab Take 1 Tablet by mouth daily. Qty: 30 Tablet, Refills: 0  Start date: 7/1/2021         CONTINUE these medications which have NOT CHANGED    Details   levothyroxine (SYNTHROID) 125 mcg tablet Take 1 Tab by mouth Daily (before breakfast).   Qty: 30 Tab, Refills: 0      omega 3-dha-epa-fish oil (Fish Oil) 100-160-1,000 mg cap Fish Oil   2 daily      rOPINIRole (REQUIP) 0.25 mg tablet Take 0.25 mg by mouth nightly. tamsulosin (FLOMAX) 0.4 mg capsule Take 0.4 mg by mouth daily. pravastatin (PRAVACHOL) 40 mg tablet Take 40 mg by mouth nightly. multivitamin (ONE A DAY) tablet Take 1 Tab by mouth daily. STOP taking these medications       amLODIPine (NORVASC) 5 mg tablet Comments:   Reason for Stopping:         lisinopriL (PRINIVIL, ZESTRIL) 10 mg tablet Comments:   Reason for Stopping:                 NOTIFY YOUR PHYSICIAN FOR ANY OF THE FOLLOWING:   Fever over 101 degrees for 24 hours. Chest pain, shortness of breath, fever, chills, nausea, vomiting, diarrhea, change in mentation, falling, weakness, bleeding. Severe pain or pain not relieved by medications. Or, any other signs or symptoms that you may have questions about. DISPOSITION:  x  Home With:   OT  PT  HH  RN       Long term SNF/Inpatient Rehab    Independent/assisted living    Hospice    Other:       PATIENT CONDITION AT DISCHARGE: Stable      PHYSICAL EXAMINATION AT DISCHARGE:  General:          Alert, cooperative, no distress, appears stated age. HEENT:           Atraumatic, anicteric sclerae, pink conjunctivae                          No oral ulcers, mucosa moist, throat clear, dentition fair  Neck:               Supple, symmetrical  Lungs:             Clear to auscultation bilaterally. No Wheezing or Rhonchi. No rales. Chest wall:      No tenderness  No Accessory muscle use. Heart:              Regular  rhythm,  No  murmur   No edema  Abdomen:        Soft, non-tender. Not distended. Bowel sounds normal  Extremities:     No cyanosis. No clubbing,                            Skin turgor normal, Capillary refill normal  Skin:                Not pale. Not Jaundiced  No rashes   Psych:             Not anxious or agitated. Neurologic:      Alert, moves all extremities, answers questions appropriately and responds to commands     MRI BRAIN WO CONT   Final Result   1.   Acute lacunar infarct of the right thalamus. 2.  Chronic right thalamic lacunar infarct in moderate chronic small vessel   ischemic changes. No results found for this or any previous visit (from the past 24 hour(s)).        HOSPITAL COURSE:    Patient is a 80y.o. year old male signal past medical history of hypertension hypothyroidism BPH hyperlipidemia history of stroke in the past came to emergency room because of left-sided weakness according to the patient son as patient is not a very good historian he has some left-sided weakness and speech slurred for last 2 days and yesterday have difficulty in ambulating came to emergency room seen by the ER physician and recommended patient to be admitted for further work-up initial CT scan of the head was negative      History of present illness precipitating physical at the time of admission as the patient had admitted because of possible stroke work-up including the MRI which shows acute lacunar infarct of the right thalamus    Echo done shows ejection fraction about 50 to 55%    Carotid Doppler done shows moderate 50 to 79% stenosis of left internal carotid artery    Patient is alert awake slightly confused hard of hearing resting the bed seen by the physical therapy occupational therapy recommend inpatient rehab    Speech therapy evaluation initiated not completed yet    Medication reconciliation done time of discharge patient 35 minutes  Discussed with the neurology for discharge        50% time spent counseling and coordination of care    Recommend to repeat carotid Dopplers in 3 to 6 months    Delay in discharge due to placement    This morning patient is alert awake sitting on the chair with physical therapy  No complaints waiting to be discharged for  rehab      Signed:   Yazan Dewey MD  7/4/2021  9:31 AM

## 2021-07-04 NOTE — PROGRESS NOTES
OCCUPATIONAL THERAPY TREATMENT  Patient: Jannice Hammans . (80 y.o. male)  Date: 7/4/2021  Diagnosis: Weakness [R53.1]  Generalized weakness [R53.1] <principal problem not specified>       Precautions:    Chart, occupational therapy assessment, plan of care, and goals were reviewed. ASSESSMENT  Patient continues with skilled OT services and is progressing towards goals. Upon SOLIS arrival, pt sleeping in bed but easily aroused. Pt completed supine>sit, rolling, and scooting with Disha. Pt completed sit>stand from EOB with CGA and ambulated to bathroom using RW. Pt completed toilet transfer with CGA and bladder hygiene with SBA. Pt ambulated to sink and completed standing hair brushing, oral hygiene, and face washing with CGA. Pt ambulated back to EOB and completed seated UE therex (see chart below). Pt returned to supine with SBA. Pt left semi supine in bed with call bell and phone within reach. Pt tolerated session well today. Will continue to follow pt throughout remainder of stay and progress towards OT goals. Recommending IRF at discharge when medically appropriate. Current Level of Function Impacting Discharge (ADLs): SBA-Disha bed mobility, CGA grooming at sink    Other factors to consider for discharge: lives alone, DME, time since onset, severity of deficits          PLAN :  Patient continues to benefit from skilled intervention to address the above impairments. Continue treatment per established plan of care. to address goals.     Recommend next OT session: self care training, functional mobility training, therapeutic exercise, balance training, therapeutic activities, cognitive retraining, endurance activities, neuromuscular re-education, patient education, and family training/education    Recommendation for discharge: (in order for the patient to meet his/her long term goals)  Therapy 3 hours per day 5-7 days per week    This discharge recommendation:  Has been made in collaboration with the attending provider and/or case management    IF patient discharges home will need the following DME: TBD       SUBJECTIVE:   Patient stated it would be nice to get up and clean up.     OBJECTIVE DATA SUMMARY:   Cognitive/Behavioral Status:  Neurologic State: Alert;Confused  Orientation Level: Oriented X4  Cognition: Follows commands    Functional Mobility and Transfers for ADLs:  Bed Mobility:  Rolling: Minimum assistance  Supine to Sit: Minimum assistance  Sit to Supine: Stand-by assistance  Scooting: Minimum assistance    Transfers:  Sit to Stand: Contact guard assistance  Functional Transfers  Toilet Transfer : Contact guard assistance    Balance:  Sitting: Impaired; Without support  Sitting - Static: Fair (occasional)  Sitting - Dynamic: Fair (occasional)  Standing: Impaired; With support  Standing - Static: Constant support; Fair  Standing - Dynamic : Constant support; Fair    ADL Intervention:    Grooming  Position Performed: Standing  Washing Face: Contact guard assistance  Washing Hands: Contact guard assistance  Brushing Teeth: Contact guard assistance  Brushing/Combing Hair: Contact guard assistance    Toileting  Bladder Hygiene: Stand-by assistance    Therapeutic Exercises:   Exercise Sets Reps AROM AAROM PROM Self PROM Comments   Shoulder flex/ext 1 10 [x] [] [] []    Elbow flex/ext 1 10 [x] [] [] []    Wrist flex/ext 1 10 [x] [] [] []        Pain:  0/10    Activity Tolerance:   Fair and requires rest breaks  Please refer to the flowsheet for vital signs taken during this treatment. After treatment patient left in no apparent distress:   Supine in bed and Call bell within reach    COMMUNICATION/COLLABORATION:   The patients plan of care was discussed with: Registered nurse.      IRMA Zazueta  Time Calculation: 31 mins    Problem: Self Care Deficits Care Plan (Adult)  Goal: *Acute Goals and Plan of Care (Insert Text)  Description: Pt will be Mod I sup <> sit in prep for EOB ADLs  Pt will be Mod I grooming standing sink side LRAD  Pt will be Mod I LE dressing sitting EOB/long sit  Pt will be Mod I sit <>  prep for toileting LRAD  Pt will be Mod I toileting/toilet transfer/cloth mgmt LRAD  Pt will be SPV following UE HEP in prep for self care tasks   Outcome: Progressing Towards Goal

## 2021-07-05 VITALS
HEART RATE: 67 BPM | WEIGHT: 215 LBS | SYSTOLIC BLOOD PRESSURE: 165 MMHG | BODY MASS INDEX: 29.12 KG/M2 | RESPIRATION RATE: 15 BRPM | OXYGEN SATURATION: 95 % | TEMPERATURE: 97.6 F | HEIGHT: 72 IN | DIASTOLIC BLOOD PRESSURE: 72 MMHG

## 2021-07-05 LAB
BACTERIA SPEC CULT: NORMAL
SPECIAL REQUESTS,SREQ: NORMAL

## 2021-07-05 PROCEDURE — 74011250637 HC RX REV CODE- 250/637: Performed by: FAMILY MEDICINE

## 2021-07-05 PROCEDURE — 92507 TX SP LANG VOICE COMM INDIV: CPT

## 2021-07-05 PROCEDURE — G0378 HOSPITAL OBSERVATION PER HR: HCPCS

## 2021-07-05 PROCEDURE — 74011250636 HC RX REV CODE- 250/636: Performed by: FAMILY MEDICINE

## 2021-07-05 RX ADMIN — ASPIRIN 81 MG: 81 TABLET, CHEWABLE ORAL at 08:47

## 2021-07-05 RX ADMIN — ENOXAPARIN SODIUM 40 MG: 40 INJECTION SUBCUTANEOUS at 08:47

## 2021-07-05 RX ADMIN — MULTIVITAMIN TABLET 1 TABLET: TABLET at 08:47

## 2021-07-05 RX ADMIN — TAMSULOSIN HYDROCHLORIDE 0.4 MG: 0.4 CAPSULE ORAL at 08:47

## 2021-07-05 RX ADMIN — POLYETHYLENE GLYCOL 3350 17 G: 17 POWDER, FOR SOLUTION ORAL at 12:44

## 2021-07-05 RX ADMIN — ACETAMINOPHEN 650 MG: 325 TABLET ORAL at 12:44

## 2021-07-05 RX ADMIN — ICOSAPENT ETHYL 1 CAPSULE: 1000 CAPSULE ORAL at 09:00

## 2021-07-05 RX ADMIN — LEVOTHYROXINE SODIUM 125 MCG: 0.03 TABLET ORAL at 05:44

## 2021-07-05 NOTE — PROGRESS NOTES
SPEECH LANGUAGE PATHOLOGY SPEECH/LANGUAGE TREATMENT  Patient: Juan Carlos Pulido Sr. (80 y.o. male)  Date: 7/5/2021  Diagnosis: Weakness [R53.1]  Generalized weakness [R53.1] <principal problem not specified>       Precautions: Aspiration      ASSESSMENT:  Patient seen for follow up. Nsg reports patient alert, oriented, responding to simple questions, and able to maintain reciprocal conversation. Per nsg, patient tolerating regular diet and thin liquids with no difficulty. Patient reports not difficulty tolerating regular diet and thin liquids. Upon entering room, patient sleeping soundly but aroused with verbal cues. Oriented x4 this date. Patient able to provide hx, aware of environment, recalls conversation with MD, and aware of awaiting insurance authorization for rehab. Patient able to recall street name of his home and states that his son recently moved across the street from him. Patient [place], which occasionally impacted reciprocal conversation; when repeated louder, patient able to participate, however. Speech intelligibility informally estimated as 95%. Completed orientation questions with 100% accuracy (independently utilized board in room for date), named common objects with 100% accuracy, responsive naming tasks with 60% accuracy given repetitions, and responded to personal/general [facility] questions with 80% accuracy. PLAN:  Recommendations and Planned Interventions:  Recommend continue with regular/thin diet, STRICT aspiration precautions. ST to continue to follow for ST tx. Patient continues to benefit from skilled intervention to address the above impairments. Continue treatment per established plan of care. Discharge Recommendations: To Be Determined     SUBJECTIVE:   Patient reports \"I had a stroke November 20th\" and asked \"Did they work it out with insurance? I was supposed to go to rehab 2 days ago. \"     OBJECTIVE:   Cognitive and Communication Status:  Neurologic State: Alert, Eyes open spontaneously  Orientation Level: Oriented X4  Cognition: Appropriate for age attention/concentration, Follows commands, Memory loss             Pain:  Pain Scale 1: Numeric (0 - 10)  Pain Intensity 1: 0       After treatment:   Patient left in no apparent distress in bed, Call bell within reach and Nursing notified    COMMUNICATION/EDUCATION:   Patient was educated regarding his cognitive-linguistic deficits and dysarthria. He demonstrated Good understanding as evidenced by engagement. The patient's plan of care including recommendations and planned interventions were discussed with: Registered nurse. Patient/family agree to work toward stated goals and plan of care. Thank you for this referral.  Kavitha Faust M.S., CCC-SLP      Problem: Dysphagia (Adult)  Goal: *Acute Goals and Plan of Care (Insert Text)  Description: Speech Therapy Goals  Initiated 6/29/2021  -Patient will tolerate regular diet with thin liquids without signs/symptoms of aspiration given no cues within 7 day(s). [ ] Not met  [ ]  MET   [ ] Progressing  [ ] Nabil Angeles  -Patient will participate in complete speech/language assessment as indicated. [ ] Not met  [ ]  MET   [ ] Progressing  [ ] Nabil Angeles  -Patient will demonstrate understanding of swallow safety precautions and aspiration precautions, diet recs with no cues within 7 day(s). [ ] Not met  [ ]  MET   [ ] Progressing  [ ] Discontinue      Outcome: Progressing Towards Goal     Problem: Communication Impaired (Adult)  Goal: *Acute Goals and Plan of Care (Insert Text)  Description: Speech Therapy Goals  Initiated 6/30/2021  -Patient will follow 2-step commands in 10 out of 10 opportunities when provided min cues. [ ] Not met  [ ]  MET   [ ] Progressing  [ ] Nabil Angeles  -Patient will respond to orientation questions with 100% accuracy when provided min cues.          [ ] Not met  [ ]  MET   [ ] Janna Felix  [ ] Nabil Angeles  Patient will complete phrase completion tasks in 8 out of 10 opportunities when provided min-mod support         [ ] Not met  [ ]  MET   [ ] Progressing  [ ] Discontinue  Patient will name common objects in 5 out of 10 opportunities. [ ] Not met  [ ]  MET   [ ] Crissy Love  [ ] Discontinue  Patient will complete responsive naming task in 8 out of 10 opportunities when provided min-mod support. [ ] Not met  [ ]  MET   [ ] Crissy Love  [ ] Discontinue  Patient will appropriately respond to personal and general wh questions in 8 out of 10 opportunities when provided min-mod support        [ ] Not met  [ ]  MET   [ ] Progressing  [ ] Discontinue  Patient will utilize compensatory strategies to increase intelligibility at the sentence level in 8 out of 10 opportunities. [ ] Not met  [ ]  MET   [ ] Crissy Love  [ ] Discontinue  Patient will utilize compensatory strategies to increase intelligibility at the conversational level during a 3-5 minute conversation with clinician.          [ ] Not met  [ ]  MET   [ ] Progressing  [ ] Discontinue  Outcome: Progressing Towards Goal

## 2021-07-05 NOTE — PROGRESS NOTES
Received oncoming report from [place], [facility]. SBAR protocol followed. Patient assessed, no distress at this time. Bed alarm on, bed low.  Will continue to assess

## 2021-07-05 NOTE — DISCHARGE SUMMARY
Discharge Summary       PATIENT ID: Jannice Hammans Sr. MRN: [id number]   YOB: 1929    DATE OF ADMISSION: 6/28/2021  6:56 PM    DATE OF DISCHARGE:   PRIMARY CARE PROVIDER: Nohemy Santoyo MD     ATTENDING PHYSICIAN: Marina Suh  DISCHARGING PROVIDER: Marina Suh      CONSULTATIONS: IP CONSULT TO NEUROLOGY    PROCEDURES/SURGERIES: * No surgery found *    ADMITTING DIAGNOSES:    Patient Active Problem List    Diagnosis Date Noted    Generalized weakness 06/29/2021    Weakness 06/28/2021    Fall 05/23/2021    Ataxia 04/20/2021    Abnormal gait 04/19/2021    Altered mental status 04/19/2021    Dehydration 04/19/2021    Bradycardia 11/30/2020    Acute CVA (cerebrovascular accident) ([facility] 11/15/2020    Giant cell arteritis with polymyalgia rheumatica ([facility] 08/14/2018    Long term (current) use of systemic steroids 08/14/2018    Long-term use of immunosuppressant medication 08/14/2018    PMR (polymyalgia rheumatica) ([facility] 08/14/2018    Chronic headaches 07/20/2018       DISCHARGE DIAGNOSES / PLAN:      Acute lacunar infarct of the right thalamus  Hypertension  Hypothyroidism  Hyperlipidemia  BPH  Restless leg syndrome  Dehydration  Possible UTI        DISCHARGE MEDICATIONS:  Current Discharge Medication List      START taking these medications    Details   aspirin 81 mg chewable tablet Take 1 Tablet by mouth daily. Qty: 30 Tablet, Refills: 0  Start date: 7/2/2021      donepeziL (ARICEPT) 5 mg tablet Take 1 Tablet by mouth nightly. Qty: 30 Tablet, Refills: 0  Start date: 7/1/2021      clopidogreL (Plavix) 75 mg tab Take 1 Tablet by mouth daily. Qty: 30 Tablet, Refills: 0  Start date: 7/1/2021         CONTINUE these medications which have NOT CHANGED    Details   levothyroxine (SYNTHROID) 125 mcg tablet Take 1 Tab by mouth Daily (before breakfast).   Qty: 30 Tab, Refills: 0      omega 3-dha-epa-fish oil (Fish Oil) 100-160-1,000 mg cap Fish Oil   2 daily      rOPINIRole (REQUIP) 0.25 mg tablet Take 0.25 mg by mouth nightly. tamsulosin (FLOMAX) 0.4 mg capsule Take 0.4 mg by mouth daily. pravastatin (PRAVACHOL) 40 mg tablet Take 40 mg by mouth nightly. multivitamin (ONE A DAY) tablet Take 1 Tab by mouth daily. STOP taking these medications       amLODIPine (NORVASC) 5 mg tablet Comments:   Reason for Stopping:         lisinopriL (PRINIVIL, ZESTRIL) 10 mg tablet Comments:   Reason for Stopping:                 NOTIFY YOUR PHYSICIAN FOR ANY OF THE FOLLOWING:   Fever over 101 degrees for 24 hours. Chest pain, shortness of breath, fever, chills, nausea, vomiting, diarrhea, change in mentation, falling, weakness, bleeding. Severe pain or pain not relieved by medications. Or, any other signs or symptoms that you may have questions about. DISPOSITION:  x  Home With:   OT  PT  HH  RN       Long term SNF/Inpatient Rehab    Independent/assisted living    Hospice    Other:       PATIENT CONDITION AT DISCHARGE: Stable      PHYSICAL EXAMINATION AT DISCHARGE:  General:          Alert, cooperative, no distress, appears stated age. HEENT:           Atraumatic, anicteric sclerae, pink conjunctivae                          No oral ulcers, mucosa moist, throat clear, dentition fair  Neck:               Supple, symmetrical  Lungs:             Clear to auscultation bilaterally. No Wheezing or Rhonchi. No rales. Chest wall:      No tenderness  No Accessory muscle use. Heart:              Regular  rhythm,  No  murmur   No edema  Abdomen:        Soft, non-tender. Not distended. Bowel sounds normal  Extremities:     No cyanosis. No clubbing,                            Skin turgor normal, Capillary refill normal  Skin:                Not pale. Not Jaundiced  No rashes   Psych:             Not anxious or agitated. Neurologic:      Alert, moves all extremities, answers questions appropriately and responds to commands     MRI BRAIN WO CONT   Final Result   1.   Acute lacunar infarct of the right thalamus. 2.  Chronic right thalamic lacunar infarct in moderate chronic small vessel   ischemic changes. No results found for this or any previous visit (from the past 24 hour(s)).        HOSPITAL COURSE:    Patient is a 80y.o. year old male signal past medical history of hypertension hypothyroidism BPH hyperlipidemia history of stroke in the past came to emergency room because of left-sided weakness according to the patient son as patient is not a very good historian he has some left-sided weakness and speech slurred for last 2 days and yesterday have difficulty in ambulating came to emergency room seen by the ER physician and recommended patient to be admitted for further work-up initial CT scan of the head was negative      History of present illness precipitating physical at the time of admission as the patient had admitted because of possible stroke work-up including the MRI which shows acute lacunar infarct of the right thalamus    Echo done shows ejection fraction about 50 to 55%    Carotid Doppler done shows moderate 50 to 79% stenosis of left internal carotid artery    Patient is alert awake slightly confused hard of hearing resting the bed seen by the physical therapy occupational therapy recommend inpatient rehab    Speech therapy evaluation initiated not completed yet    Medication reconciliation done time of discharge patient 35 minutes  Discussed with the neurology for discharge        50% time spent counseling and coordination of care    Recommend to repeat carotid Dopplers in 3 to 6 months    Delay in discharge due to placement    T  Today patient resting in the bed alert awake no distress no complaints    Signed:   Reza Torres MD  7/5/2021  9:31 AM

## 2021-07-05 NOTE — PROGRESS NOTES
Patient has received insurance auth to go to [place], room R2, report can be called to [phone]. CM contacted patient's son, Heather Vernon [phone], to notify of dc, he is agreeable at this time. He requested to be contacted when transport time is known. CM notified nurse.

## 2021-07-05 NOTE — PROGRESS NOTES
Four eye skin assessment:   Patient assessed, bruising noted to bilat arms in different stages of healing. Left knee has yellow colored bruise noted. No other skin abnormalities noted.

## 2021-07-05 NOTE — PROGRESS NOTES
Attempted to call son to notify him of fathers transfer to [place]. No answer, no message left. Called rehab facility to notify Marek Salmeron of patient's transfer. All personal belongings sent with patient Two bags. Mask on.

## 2021-07-05 NOTE — DISCHARGE INSTRUCTIONS
Discharge Instructions       PATIENT ID: Edwin Salazar Sr. MRN: [id number]   YOB: 1929    DATE OF ADMISSION: 6/28/2021  6:56 PM    DATE OF DISCHARGE: 7/1/2021    PRIMARY CARE PROVIDER: Surinder Kessler MD     ATTENDING PHYSICIAN: Luiz Perdomo MD  DISCHARGING PROVIDER: Michael Edouard MD    To contact this individual call [phone] and ask the  to page. If unavailable ask to be transferred the Adult Hospitalist Department. DISCHARGE DIAGNOSES cva    CONSULTATIONS: IP CONSULT TO NEUROLOGY    PROCEDURES/SURGERIES: * No surgery found *    PENDING TEST RESULTS:   At the time of discharge the following test results are still pending: Speech evaluation    FOLLOW UP APPOINTMENTS:   Follow-up Information     Follow up With Specialties Details Why Contact Rafa Sinclair MD Family Medicine In 1 week  [address] [place] [address]  [phone]             ADDITIONAL CARE RECOMMENDATIONS: n    DIET: Cardiac Diet  Oral Nutritional Supplements: {RDSUPPLEMENTLIST:20094} {RDSUPPFREQUENCY:20080}     ACTIVITY: Activity as tolerated    WOUND CARE: n    EQUIPMENT needed: n      DISCHARGE MEDICATIONS:   See Medication Reconciliation Form    · It is important that you take the medication exactly as they are prescribed. · Keep your medication in the bottles provided by the pharmacist and keep a list of the medication names, dosages, and times to be taken in your wallet. · Do not take other medications without consulting your doctor. NOTIFY YOUR PHYSICIAN FOR ANY OF THE FOLLOWING:   Fever over 101 degrees for 24 hours. Chest pain, shortness of breath, fever, chills, nausea, vomiting, diarrhea, change in mentation, falling, weakness, bleeding. Severe pain or pain not relieved by medications. Or, any other signs or symptoms that you may have questions about.       DISPOSITION:    Home With:   OT  PT  HH  RN       SNF/Inpatient Rehab/LTAC    Independent/assisted living Hospice    Other:         PROBLEM LIST Updated:  Yes y       Signed:   Mariam Camarillo MD  7/1/2021  9:30 AM

## 2021-07-05 NOTE — PROGRESS NOTES
Called report to Ankita Garcia RN at [facility] [place] [address] [place] [address] and updated patient's son.

## 2021-07-06 LAB
BACTERIA SPEC CULT: NORMAL
SPECIAL REQUESTS,SREQ: NORMAL

## 2021-12-16 DIAGNOSIS — I63.9 ACUTE CVA (CEREBROVASCULAR ACCIDENT) (HCC): Primary | ICD-10-CM

## 2021-12-16 DIAGNOSIS — I65.29 STENOSIS OF CAROTID ARTERY, UNSPECIFIED LATERALITY: ICD-10-CM

## 2021-12-22 ENCOUNTER — HOSPITAL ENCOUNTER (OUTPATIENT)
Dept: VASCULAR SURGERY | Age: 86
Discharge: HOME | End: 2021-12-22
Attending: SURGERY
Payer: MEDICARE

## 2021-12-22 DIAGNOSIS — I65.29 STENOSIS OF CAROTID ARTERY, UNSPECIFIED LATERALITY: ICD-10-CM

## 2021-12-22 DIAGNOSIS — I63.9 ACUTE CVA (CEREBROVASCULAR ACCIDENT) (HCC): ICD-10-CM

## 2021-12-22 LAB
LEFT CCA DIST DIAS: 13 CM/S
LEFT CCA DIST SYS: 63 CM/S
LEFT CCA PROX DIAS: 9 CM/S
LEFT CCA PROX SYS: 127 CM/S
LEFT ECA SYS: 467 CM/S
LEFT ICA DIST DIAS: 12 CM/S
LEFT ICA DIST SYS: 73 CM/S
LEFT ICA MID DIAS: 24 CM/S
LEFT ICA MID SYS: 145 CM/S
LEFT ICA PROX DIAS: 18 CM/S
LEFT ICA PROX SYS: 90 CM/S
LEFT ICA/CCA SYS: 2.32
LEFT VERTEBRAL SYS: 67 CM/S
RIGHT CCA DIST DIAS: 1 CM/S
RIGHT CCA DIST SYS: 94 CM/S
RIGHT CCA PROX DIAS: 0 CM/S
RIGHT CCA PROX SYS: 106 CM/S
RIGHT ECA SYS: 71 CM/S
RIGHT ICA DIST DIAS: 11 CM/S
RIGHT ICA DIST SYS: 56 CM/S
RIGHT ICA MID DIAS: 9 CM/S
RIGHT ICA MID SYS: 48 CM/S
RIGHT ICA PROX DIAS: 9 CM/S
RIGHT ICA PROX SYS: 75 CM/S
RIGHT ICA/CCA SYS: 0.8
RIGHT VERTEBRAL SYS: 74 CM/S
VAS LEFT SUBCLAVIAN MID PSV: 135 CM/S
VAS RIGHT SUBCLAVIAN MID PSV: 125 CM/S

## 2021-12-22 PROCEDURE — 93880 EXTRACRANIAL BILAT STUDY: CPT

## 2022-02-04 ENCOUNTER — OFFICE VISIT (OUTPATIENT)
Dept: SURGERY | Age: 87
End: 2022-02-04
Payer: MEDICARE

## 2022-02-04 DIAGNOSIS — I77.9 CAROTID ARTERY DISEASE, UNSPECIFIED LATERALITY, UNSPECIFIED TYPE (HCC): Primary | ICD-10-CM

## 2022-02-04 PROCEDURE — G8432 DEP SCR NOT DOC, RNG: HCPCS | Performed by: SURGERY

## 2022-02-04 PROCEDURE — G8419 CALC BMI OUT NRM PARAM NOF/U: HCPCS | Performed by: SURGERY

## 2022-02-04 PROCEDURE — 99213 OFFICE O/P EST LOW 20 MIN: CPT | Performed by: SURGERY

## 2022-02-04 PROCEDURE — G8536 NO DOC ELDER MAL SCRN: HCPCS | Performed by: SURGERY

## 2022-02-04 PROCEDURE — 1101F PT FALLS ASSESS-DOCD LE1/YR: CPT | Performed by: SURGERY

## 2022-02-04 PROCEDURE — G8427 DOCREV CUR MEDS BY ELIG CLIN: HCPCS | Performed by: SURGERY

## 2022-02-04 RX ORDER — POLYETHYLENE GLYCOL 3350 17 G/17G
17 POWDER, FOR SOLUTION ORAL DAILY
COMMUNITY

## 2022-02-06 PROBLEM — I77.9 CAROTID ARTERY DISEASE (HCC): Status: ACTIVE | Noted: 2022-02-06

## 2022-02-07 NOTE — PROGRESS NOTES
Problem: Self Care Deficits Care Plan (Adult)  Goal: *Acute Goals and Plan of Care (Insert Text)  Description: FUNCTIONAL STATUS PRIOR TO ADMISSION: Patient was independent and active without use of DME.    HOME SUPPORT: The patient lived alone with one son in the area. Occupational Therapy Goals  Initiated 11/16/2020   1. Patient will perform standing grooming with modified independence within 7 day(s). 2.  Patient will perform UB and LB bathing with modified independence within 7 day(s). 3.  Patient will perform lower body dressing with modified independence within 7 day(s). 4.  Patient will perform toilet transfers with modified independence within 7 day(s). 5.  Patient will perform all aspects of toileting with modified independence within 7 day(s). 6.  Patient will participate in upper extremity therapeutic exercise/activities with modified independence for 5 minutes within 7 day(s). 7.  Patient will utilize energy conservation techniques during functional activities with verbal cues within 7 day(s). 8.  Patient will improve their Fugl Gore score by 5 points in prep for ADLs within 7 days. Outcome: Progressing Towards Goal     OCCUPATIONAL THERAPY TREATMENT  Patient: Mercy Cox  (80 y.o. male)  Date: 11/19/2020  Diagnosis: Acute CVA (cerebrovascular accident) [facility]) [I63.9]   Acute CVA (cerebrovascular accident) [facility])       Precautions:    Chart, occupational therapy assessment, plan of care, and goals were reviewed. ASSESSMENT  Patient continues with skilled OT services and is progressing towards goals, however, remains limited by generalized weakness and decreased balance, impacting pt's safety and independence with ADLs. Pt received in chair and agreeable to work with therapy. Pt completed LB dressing task (CGA). Participated in therapeutic activity for functional mobility with RW and functional reach successfully gathering 7/7 items from low and high.  Moderate verbal cues required for safe RW placement during activity. Pt demonstrated 3/3 sit<>stand with no cues for safe hand placement during transfer with RW. Pt left in chair at end of session with lunch tray. Continue to recommend IPR at discharge as pt is functioning below baseline and lives alone. Current Level of Function Impacting Discharge (ADLs): CGA-Min A for transfers and functional mobility with RW, Supervision for grooming, UB dressing/bathing, CGA-Min A LB dressing/bathing     Other factors to consider for discharge: Lives alone          PLAN :  Patient continues to benefit from skilled intervention to address the above impairments. Continue treatment per established plan of care. to address goals. Recommend with staff: OOB to commode with Min Ax1 and RW     Recommend next OT session: Bathroom mobility with RW management     Recommendation for discharge: (in order for the patient to meet his/her long term goals)  Therapy 3 hours per day 5-7 days per week    This discharge recommendation:  Has been made in collaboration with the attending provider and/or case management    IF patient discharges home will need the following DME: patient owns DME required for discharge       SUBJECTIVE:   Patient stated I want to keep moving.     OBJECTIVE DATA SUMMARY:   Cognitive/Behavioral Status:  Neurologic State: Alert  Orientation Level: Oriented X4  Cognition: Follows commands; Appropriate for age attention/concentration             Functional Mobility and Transfers for ADLs:  Bed Mobility:       Transfers:  Sit to Stand: Contact guard assistance          Balance:  Sitting: Intact; Without support  Standing: Impaired; With support  Standing - Static: Good  Standing - Dynamic : Fair    ADL Intervention:                           Lower Body Dressing Assistance  Dressing Assistance: Contact guard assistance  Underpants: Contact guard assistance  Position Performed: Seated in chair;Standing              Therapeutic Exercises: Functional reach - low/high with RW. Pt retrieved 7/7 items from around room. Pain:  None reported     Activity Tolerance:   Good    After treatment patient left in no apparent distress:   Sitting in chair, Call bell within reach, and Bed / chair alarm activated    COMMUNICATION/COLLABORATION:   The patients plan of care was discussed with: Physical therapist, Registered nurse, and Case management. Mitzi Hatfield  Time Calculation: 17 mins    Regarding student involvement in patient care:  A student participated in this treatment session. Per CMS Medicare statements and AOTA guidelines I certify that the following was true:  1. I was present and directly observed the entire session. 2. I made all skilled judgments and clinical decisions regarding care. 3. I am the practitioner responsible for assessment, treatment, and documentation. Cpt Code: 14311 Cpt Code (63137, [address]38 [place] [address]): 38252

## 2022-02-07 NOTE — PROGRESS NOTES
VASCULAR FOLLOW UP      Subjective:   CHIEF COMPLAINTS:  Carotid artery stenosis  PRESENTATION OF ILLNESS:  Georgette Tiwari. is a 80 y.o. very pleasant man is accompanied by patient's son follow-up carotid artery stenosis. Patient was evaluated by 6-month ago. Patient also had a repeat follow-up carotid duplex ultrasound. Since then patient denies any stroke. But patient has gradually declined due to his age. Patient is quite pleasant today upcoming birthday in 2 weeks. According to son patient did not have any acute vision problem, denies any speech problem or mobility. Past Medical History:   Diagnosis Date    Arthritis     CAD (coronary artery disease)     Hard of hearing     Headache     Hypertension     PMR (polymyalgia rheumatica) (HCC)     Skin cancer     Sleep apnea     no longer uses cpap      Past Surgical History:   Procedure Laterality Date    HX CAROTID STENT      HX CHOLECYSTECTOMY  1998    HX HERNIA REPAIR  1980    [place] CARDIAC SURG PROCEDURE UNLIST      cardiac stent    VASCULAR SURGERY PROCEDURE UNLIST      carotid endartectomy     Family History   Problem Relation Age of Onset   [de-identified] Arthritis-rheumatoid Mother     Kidney Disease Mother     Cancer Father         Stomach    Gout Son       Social History     Tobacco Use    Smoking status: Never Smoker    Smokeless tobacco: Never Used   Substance Use Topics    Alcohol use: No       Prior to Admission medications    Medication Sig Start Date End Date Taking? Authorizing Provider   polyethylene glycol (Gavilax) 17 gram/dose powder Take 17 g by mouth daily. Yes Provider, Historical   aspirin 81 mg chewable tablet Take 1 Tablet by mouth daily. 7/2/21  Yes Vicki Campos MD   donepeziL (ARICEPT) 5 mg tablet Take 1 Tablet by mouth nightly. 7/1/21  Yes Augustus Suh MD   clopidogreL (Plavix) 75 mg tab Take 1 Tablet by mouth daily.  7/1/21  Yes Augustus Suh MD   levothyroxine (SYNTHROID) 125 mcg tablet Take 1 Tab by mouth Daily (before breakfast). 4/23/21  Yes Isabel Tierney MD   omega 4-ixc-via-fish oil (Fish Oil) 100-160-1,000 mg cap Fish Oil   2 daily   Yes Other, MD Elio   rOPINIRole (REQUIP) 0.25 mg tablet Take 0.25 mg by mouth nightly. Yes Provider, Historical   tamsulosin (FLOMAX) 0.4 mg capsule Take 0.4 mg by mouth daily. Yes Provider, Historical   pravastatin (PRAVACHOL) 40 mg tablet Take 40 mg by mouth nightly. Yes Provider, Historical   multivitamin (ONE A DAY) tablet Take 1 Tab by mouth daily. Yes Provider, Historical     Allergies   Allergen Reactions    Pcn [Penicillins] Swelling        Review of Systems:  I reviewed the rest of organ systems personally and they were negative signed by Dr. Shyanne Da Silva    Objective: There were no vitals taken for this visit. VITAL SIGNS REVIEWED. Physical Exam:  Patient is well-nourished pleasant in conversation is appropriate. Head and neck examination atraumatic, normocephalic. Gaze appropriate. Conversation appropriate. Neck examination shows supple. No mass. No obvious carotid bruit. Chest examination shows lungs are clear bilaterally well-expanded, no crackles or wheezes. Cardiovascular system regular rate, no obvious murmur. Skin warm to touch  and moist, no skin lesions. Abdomen is soft ,not tender or distended bowel sounds present. No palpable mass. Neurological examinations, no focal neuro deficits moving all 4 extremities. Cranial nerves intact. Sensation is intact as well. Hematologic: No obvious bruise or swelling or obvious lymphadenopathy. Psychosocial: Appropriate. Has good effect. Musculoskeletal system: No muscle wasting, appropriate movements upper and lower extremity. Data Review:   No visits with results within 1 Month(s) from this visit.    Latest known visit with results is:   Hospital Outpatient Visit on 12/22/2021   Component Date Value Ref Range Status    Left subclavian mid PSV 12/22/2021 135.0  cm/s Final    Right subclavian mid PSV 12/22/2021 125.0  cm/s Final    Right CCA prox sys 12/22/2021 106.0  cm/s Final    Right CCA prox beach 12/22/2021 0.0  cm/s Final    Right cca dist sys 12/22/2021 94.0  cm/s Final    Right CCA dist beach 12/22/2021 1.0  cm/s Final    Right ICA prox sys 12/22/2021 75.0  cm/s Final    Right ICA prox beach 12/22/2021 9.0  cm/s Final    Right ICA mid sys 12/22/2021 48.0  cm/s Final    Right ICA mid beach 12/22/2021 9.0  cm/s Final    Right ICA dist sys 12/22/2021 56.0  cm/s Final    Right ICA dist beach 12/22/2021 11.0  cm/s Final    Right eca sys 12/22/2021 71.0  cm/s Final    Right vertebral sys 12/22/2021 74.0  cm/s Final    Right ICA/CCA sys 12/22/2021 0.8   Final    Left CCA prox sys 12/22/2021 127.0  cm/s Final    Left CCA prox beach 12/22/2021 9.0  cm/s Final    Left CCA dist sys 12/22/2021 63.0  cm/s Final    Left CCA dist beach 12/22/2021 13.0  cm/s Final    Left ICA prox sys 12/22/2021 90.0  cm/s Final    Left ICA prox beach 12/22/2021 18.0  cm/s Final    Left ICA mid sys 12/22/2021 145.0  cm/s Final    Left ICA mid beach 12/22/2021 24.0  cm/s Final    Left ICA dist sys 12/22/2021 73.0  cm/s Final    Left ICA dist beach 12/22/2021 12.0  cm/s Final    Left ECA sys 12/22/2021 467.0  cm/s Final    Left vertebral sys 12/22/2021 67.0  cm/s Final    Left ICA/CCA sys 12/22/2021 2.32   Final        Assessment:     Problem List Items Addressed This Visit        Circulatory    Carotid artery disease ([facility] - Primary              Plan:     I reviewed the carotid duplex ultrasound results from December 2021 looks like a left proximal ICA is slightly gotten worse. I recommend repeat carotid duplex ultrasound in 6 months in 2022 and follow-up the results. Patient's son did express that the area has gotten worse most likely abdomen is draining into no intervention or possible less invasive procedures such as stent.   We will continue modify patient risk factor for extracranial peripheral vascular disease. And I will see him back my office 6-month.         Emilia Diaz MD

## 2022-03-18 PROBLEM — E86.0 DEHYDRATION: Status: ACTIVE | Noted: 2021-04-19

## 2022-03-18 PROBLEM — I77.9 CAROTID ARTERY DISEASE (HCC): Status: ACTIVE | Noted: 2022-02-06
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
## 2022-03-19 PROBLEM — M31.5 GIANT CELL ARTERITIS WITH POLYMYALGIA RHEUMATICA (HCC): Status: ACTIVE | Noted: 2018-08-14

## 2022-03-19 PROBLEM — R27.0 ATAXIA: Status: ACTIVE | Noted: 2021-04-20

## 2022-03-19 PROBLEM — R53.1 WEAKNESS: Status: ACTIVE | Noted: 2021-06-28

## 2022-03-19 PROBLEM — G89.29 CHRONIC HEADACHES: Status: ACTIVE | Noted: 2018-07-20

## 2022-03-19 PROBLEM — Z79.60 LONG-TERM USE OF IMMUNOSUPPRESSANT MEDICATION: Status: ACTIVE | Noted: 2018-08-14

## 2022-03-19 PROBLEM — R41.82 ALTERED MENTAL STATUS: Status: ACTIVE | Noted: 2021-04-19

## 2022-03-19 PROBLEM — Z79.52 LONG TERM (CURRENT) USE OF SYSTEMIC STEROIDS: Status: ACTIVE | Noted: 2018-08-14

## 2022-03-19 PROBLEM — R26.9 ABNORMAL GAIT: Status: ACTIVE | Noted: 2021-04-19

## 2022-03-19 PROBLEM — M35.3 PMR (POLYMYALGIA RHEUMATICA) (HCC): Status: ACTIVE | Noted: 2018-08-14

## 2022-03-19 PROBLEM — I63.9 ACUTE CVA (CEREBROVASCULAR ACCIDENT) (HCC): Status: ACTIVE | Noted: 2020-11-15

## 2022-03-19 PROBLEM — R51.9 CHRONIC HEADACHES: Status: ACTIVE | Noted: 2018-07-20

## 2022-03-19 PROBLEM — R53.1 GENERALIZED WEAKNESS: Status: ACTIVE | Noted: 2021-06-29

## 2022-03-20 PROBLEM — R00.1 BRADYCARDIA: Status: ACTIVE | Noted: 2020-11-30

## 2022-08-19 ENCOUNTER — OFFICE VISIT (OUTPATIENT)
Dept: SURGERY | Age: 87
End: 2022-08-19
Payer: MEDICARE

## 2022-08-19 VITALS
TEMPERATURE: 97.7 F | OXYGEN SATURATION: 96 % | WEIGHT: 191 LBS | SYSTOLIC BLOOD PRESSURE: 140 MMHG | BODY MASS INDEX: 25.87 KG/M2 | RESPIRATION RATE: 20 BRPM | HEIGHT: 72 IN | HEART RATE: 62 BPM | DIASTOLIC BLOOD PRESSURE: 60 MMHG

## 2022-08-19 DIAGNOSIS — I77.9 CAROTID ARTERY DISEASE, UNSPECIFIED LATERALITY, UNSPECIFIED TYPE (HCC): Primary | ICD-10-CM

## 2022-08-19 PROCEDURE — G8427 DOCREV CUR MEDS BY ELIG CLIN: HCPCS | Performed by: SURGERY

## 2022-08-19 PROCEDURE — G8417 CALC BMI ABV UP PARAM F/U: HCPCS | Performed by: SURGERY

## 2022-08-19 PROCEDURE — G8536 NO DOC ELDER MAL SCRN: HCPCS | Performed by: SURGERY

## 2022-08-19 PROCEDURE — 99213 OFFICE O/P EST LOW 20 MIN: CPT | Performed by: SURGERY

## 2022-08-19 PROCEDURE — 1123F ACP DISCUSS/DSCN MKR DOCD: CPT | Performed by: SURGERY

## 2022-08-19 PROCEDURE — 1101F PT FALLS ASSESS-DOCD LE1/YR: CPT | Performed by: SURGERY

## 2022-08-19 PROCEDURE — G8432 DEP SCR NOT DOC, RNG: HCPCS | Performed by: SURGERY

## 2022-08-19 RX ORDER — MELATONIN 5 MG
CAPSULE ORAL
COMMUNITY

## 2022-08-19 RX ORDER — TRAZODONE HYDROCHLORIDE 100 MG/1
100 TABLET ORAL
COMMUNITY

## 2022-08-19 RX ORDER — TRAMADOL HYDROCHLORIDE 50 MG/1
50 TABLET ORAL
COMMUNITY

## 2022-08-19 RX ORDER — DEXTROMETHORPHAN HYDROBROMIDE, GUAIFENESIN 5; 100 MG/5ML; MG/5ML
650 LIQUID ORAL
COMMUNITY

## 2022-08-19 NOTE — PROGRESS NOTES
Chief Complaint   Patient presents with    Follow-up    Visit Vitals  BP (!) 140/60 (BP 1 Location: Left upper arm, BP Patient Position: Sitting, BP Cuff Size: Large adult)   Pulse 62   Temp 97.7 °F (36.5 °C) (Temporal)   Resp 20   Ht 6' (1.829 m)   Wt 191 lb (86.6 kg)   SpO2 96%   BMI 25.90 kg/m²    1. Have you been to the ER, urgent care clinic since your last visit? Hospitalized since your last visit? No    2. Have you seen or consulted any other health care providers outside of the [address] [place] [address] since your last visit? Include any pap smears or colon screening.  No

## 2022-08-22 NOTE — PROGRESS NOTES
VASCULAR FOLLOW UP      Subjective:   CHIEF COMPLAINTS:  Carotid artery stenosis  PRESENTATION OF ILLNESS:  Hari Matta. is a 80 y.o. very pleasant man is in 6-month follow-up today after carotid duplex ultrasound. Last year left side system shows 50 to 79% stenosis. Patient has a repeat test done this month which shows essentially no changes. 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 Patient is accompanied by his son today. Past Medical History:   Diagnosis Date    Arthritis     CAD (coronary artery disease)     Hard of hearing     Headache     Hypertension     PMR (polymyalgia rheumatica) (HCC)     Skin cancer     Sleep apnea     no longer uses cpap      Past Surgical History:   Procedure Laterality Date    HX CAROTID STENT      HX CHOLECYSTECTOMY  1998    HX HERNIA REPAIR  1980    [place] CARDIAC SURG PROCEDURE UNLIST      cardiac stent    VASCULAR SURGERY PROCEDURE UNLIST      carotid endartectomy     Family History   Problem Relation Age of Onset    Arthritis-rheumatoid Mother     Kidney Disease Mother     Cancer Father         Stomach    Gout Son       Social History     Tobacco Use    Smoking status: Never    Smokeless tobacco: Never   Substance Use Topics    Alcohol use: No       Prior to Admission medications    Medication Sig Start Date End Date Taking? Authorizing Provider   acetaminophen (Tylenol Arthritis Pain) 650 mg TbER Take 650 mg by mouth every twelve (12) hours as needed for Pain. Yes Provider, Historical   melatonin 5 mg cap capsule Take  by mouth nightly. Yes Provider, Historical   traZODone (DESYREL) 100 mg tablet Take 100 mg by mouth nightly. Yes Provider, Historical   traMADoL (ULTRAM) 50 mg tablet Take 50 mg by mouth every eight (8) hours as needed for Pain. Yes Provider, Historical   polyethylene glycol (MIRALAX) 17 gram/dose powder Take 17 g by mouth daily. Yes Provider, Historical   aspirin 81 mg chewable tablet Take 1 Tablet by mouth daily.  7/2/21  Yes Olga Seymour MD   donepeziL (ARICEPT) 5 mg tablet Take 1 Tablet by mouth nightly. 7/1/21  Yes Queenie Suh MD   levothyroxine (SYNTHROID) 125 mcg tablet Take 1 Tab by mouth Daily (before breakfast). 4/23/21  Yes Nika Valenzuela MD   omega 5-yhu-tyk-fish oil (Fish Oil) 100-160-1,000 mg cap Fish Oil   2 daily   Yes Other, MD Elio   rOPINIRole (REQUIP) 0.25 mg tablet Take 0.25 mg by mouth nightly. Yes Provider, Historical   tamsulosin (FLOMAX) 0.4 mg capsule Take 0.4 mg by mouth daily. Yes Provider, Historical   pravastatin (PRAVACHOL) 40 mg tablet Take 40 mg by mouth nightly. Yes Provider, Historical   multivitamin (ONE A DAY) tablet Take 1 Tab by mouth daily. Yes Provider, Historical   clopidogreL (Plavix) 75 mg tab Take 1 Tablet by mouth daily. Patient not taking: Reported on 8/19/2022 7/1/21   Queenie Suh MD     Allergies   Allergen Reactions    Pcn [Penicillins] Swelling        Review of Systems:  I reviewed the rest of organ systems personally and they were negative signed by Dr. Maria Alejandra Medina    Objective:   Visit Vitals  BP (!) 140/60 (BP 1 Location: Left upper arm, BP Patient Position: Sitting, BP Cuff Size: Large adult)   Pulse 62   Temp 97.7 °F (36.5 °C) (Temporal)   Resp 20   Ht 6' (1.829 m)   Wt 191 lb (86.6 kg)   SpO2 96%   BMI 25.90 kg/m²     VITAL SIGNS REVIEWED. Physical Exam:  Patient is well-nourished pleasant in conversation is appropriate. Head and neck examination atraumatic, normocephalic. Gaze appropriate. Conversation appropriate. Neck examination shows supple. No mass. No obvious carotid bruit. Chest examination shows lungs are clear bilaterally well-expanded, no crackles or wheezes. Cardiovascular system regular rate, no obvious murmur. Skin warm to touch  and moist, no skin lesions. Abdomen is soft ,not tender or distended bowel sounds present. No palpable mass. Neurological examinations, no focal neuro deficits moving all 4 extremities.   Cranial nerves intact. Sensation is intact as well. Hematologic: No obvious bruise or swelling or obvious lymphadenopathy. Psychosocial: Appropriate. Has good effect. Musculoskeletal system: No muscle wasting, appropriate movements upper and lower extremity. Vascular examination: No carotid bruit. Data Review:   No visits with results within 1 Month(s) from this visit.    Latest known visit with results is:   Hospital Outpatient Visit on 12/22/2021   Component Date Value Ref Range Status    Left subclavian mid PSV 12/22/2021 135.0  cm/s Final    Right subclavian mid PSV 12/22/2021 125.0  cm/s Final    Right CCA prox sys 12/22/2021 106.0  cm/s Final    Right CCA prox beach 12/22/2021 0.0  cm/s Final    Right cca dist sys 12/22/2021 94.0  cm/s Final    Right CCA dist beach 12/22/2021 1.0  cm/s Final    Right ICA prox sys 12/22/2021 75.0  cm/s Final    Right ICA prox beach 12/22/2021 9.0  cm/s Final    Right ICA mid sys 12/22/2021 48.0  cm/s Final    Right ICA mid beach 12/22/2021 9.0  cm/s Final    Right ICA dist sys 12/22/2021 56.0  cm/s Final    Right ICA dist beach 12/22/2021 11.0  cm/s Final    Right eca sys 12/22/2021 71.0  cm/s Final    Right vertebral sys 12/22/2021 74.0  cm/s Final    Right ICA/CCA sys 12/22/2021 0.8   Final    Left CCA prox sys 12/22/2021 127.0  cm/s Final    Left CCA prox beach 12/22/2021 9.0  cm/s Final    Left CCA dist sys 12/22/2021 63.0  cm/s Final    Left CCA dist beach 12/22/2021 13.0  cm/s Final    Left ICA prox sys 12/22/2021 90.0  cm/s Final    Left ICA prox beach 12/22/2021 18.0  cm/s Final    Left ICA mid sys 12/22/2021 145.0  cm/s Final    Left ICA mid beach 12/22/2021 24.0  cm/s Final    Left ICA dist sys 12/22/2021 73.0  cm/s Final    Left ICA dist beach 12/22/2021 12.0  cm/s Final    Left ECA sys 12/22/2021 467.0  cm/s Final    Left vertebral sys 12/22/2021 67.0  cm/s Final    Left ICA/CCA sys 12/22/2021 2.32   Final        Assessment:     Problem List Items Addressed This Visit Circulatory    Carotid artery disease ([facility] [address] - Primary           Plan:     Patient's family was discussed about carotid duplex ultrasound report. Patient currently have no focal neurodeficits. Patient's left side carotid system stable plaque noted. I did recommend repeat carotid duplex ultrasound 8-month follow-up.   I will schedule for this test and the patient and family will come visit me after the test.        Marva Trejo MD

## 2022-08-31 NOTE — TELEPHONE ENCOUNTER
Caller would like to discuss an/a test results for patient. Writer advised caller of callback within 24-72 hours.    Patient Name: Alka Awan  Caller Name: Natalie (nurse)  Name of Facility: [facility] Response: N/A  Callback Number: [phone]  Best Availability: any  Can A Detailed Message Be left? yes  Fax Number: na  Additional Info: Natalie with [facility] called in with FYI A1C today was 6.7 and to call patient back  Did you confirm the message with the caller?: yes    Thank you,  Johanne Meek   Returned call to [employer], and left message to call if anything needs to be done to with draw Appeal as stated in previous message.

## 2023-01-01 NOTE — PROGRESS NOTES
PHYSICAL THERAPY EVALUATION  Patient: Madhuri Cancino Sr. (80 y.o. male)  Date: 5/24/2021  Primary Diagnosis: Dehydration [E86.0]  Fall [W19. XXXA]       Precautions: Fall risk, CVA with R side involvement        ASSESSMENT  Based on the objective data described below, the patient presents with decreased safety awareness, strength, gait and difficulty with bed mobility. Pt lives alone, and stated he has difficulty with ADL's. Pt is a strong candidate for home health due to functional deficits impeding his ability to ambulate independently with AD, dress, grocery shop, and navigate community. Pt states his son assists him with ADL's daily due to his inability to complete them independently. Current Level of Function Impacting Discharge (mobility/balance): Decreased safety awareness, fall risk    Other factors to consider for discharge: Pt lives alone. Son,  Gisella Montez, checks on him daily       Patient will benefit from skilled therapy intervention to address the above noted impairments. PLAN :  Recommendations and Planned Interventions: bed mobility training, transfer training, gait training, therapeutic exercises, neuromuscular re-education, modalities, patient and family training/education and therapeutic activities      Frequency/Duration: Patient will be followed by physical therapy:  1-2 times daily, up to 5 days a week, to address goals. Recommendation for discharge: (in order for the patient to meet his/her long term goals)  Physical therapy at least 2 days/week in the home AND ensure assist and/or supervision for safety with dressing, cooking, and grocery shopping    This discharge recommendation:  Has been made in collaboration with the attending provider and/or case management    IF patient discharges home will need the following DME: patient owns DME required for discharge         SUBJECTIVE:   Patient stated I fell because I don't want to trouble nobody.     OBJECTIVE DATA SUMMARY:   HISTORY: Past Medical History:   Diagnosis Date    Arthritis     CAD (coronary artery disease)     Hard of hearing     Headache     Hypertension     PMR (polymyalgia rheumatica) (HCC)     Skin cancer     Sleep apnea     no longer uses cpap     Past Surgical History:   Procedure Laterality Date    HX CAROTID STENT      HX CHOLECYSTECTOMY  1998    HX HERNIA REPAIR  1980    [place] CARDIAC SURG PROCEDURE UNLIST      cardiac stent    VASCULAR SURGERY PROCEDURE UNLIST      carotid endartectomy       Personal factors and/or comorbidities impacting plan of care: CVA    Home Situation  Home Environment: Private residence  # Steps to Enter: 2 (Patient states, he is not entirely sure)  Rails to [employer]:  (Pt does not know if he has a handrail or not)  One/Two Story Residence: One story  Living Alone: Yes  Support Systems: Friends \ neighbors (A friend helps everyday named Bane)  Patient Expects to be Discharged to[de-identified] Private residence  Current DME Used/Available at Home: [employer]    EXAMINATION/PRESENTATION/DECISION MAKING:   Critical Behavior:  Neurologic State: Alert, Confused  Orientation Level: Appropriate for age  Cognition: Impaired decision making, Impulsive, Memory loss, Poor safety awareness  Safety/Judgement: Decreased awareness of need for assistance, Decreased awareness of need for safety, Fall prevention  Hearing:   Auditory  Auditory Impairment: Hard of hearing, bilateral  Range Of Motion:  AROM: Generally decreased, functional                       Strength:    Strength: Generally decreased, functional                                                     Coordination:  Coordination: Generally decreased, functional       Functional Mobility:  Bed Mobility:  Rolling: Stand-by assistance  Supine to Sit: Stand-by assistance  Sit to Supine: Stand-by assistance  Scooting: Stand-by assistance  Transfers:  Sit to Stand: Stand-by assistance  Stand to Sit: Stand-by assistance  Stand Pivot Transfers: Stand-by assistance                    Balance:   Sitting: Intact  Standing: Intact  Ambulation/Gait Training:  Distance (ft): 16 Feet (ft)  Assistive Device: Walker, rolling  Ambulation - Level of Assistance: Contact guard assistance     Gait Description (WDL): Exceptions to WDL           Base of Support: Widened     Speed/Shayla: Accelerated                        Stairs:   Did not assess or train stair negotiation, will do during treatment             Functional Measure:     Physical Therapy Evaluation Charge Determination   History Examination Presentation Decision-Making   LOW Complexity : Zero comorbidities / personal factors that will impact the outcome / POC LOW Complexity : 1-2 Standardized tests and measures addressing body structure, function, activity limitation and / or participation in recreation  LOW Complexity : Stable, uncomplicated  LOW      Based on the above components, the patient evaluation is determined to be of the following complexity level: LOW     Pain Ratin/10    Activity Tolerance:   Good    After treatment patient left in no apparent distress:   Call bell within reach and short sitting in bed with bed alarm on    COMMUNICATION/EDUCATION:   The patients plan of care was discussed with: Registered nurse, Physician and CNA. Fall prevention education was provided and the patient/caregiver indicated understanding. Thank you for this referral.  Stephanie Kendrick, PT, DPT   Time Calculation: 17 mins      Problem: Mobility Impaired (Adult and Pediatric)  Goal: *Acute Goals and Plan of Care (Insert Text)  2021 1533 by Neftali Schwartz PT  Outcome: Not Met  Note: FUNCTIONAL STATUS PRIOR TO ADMISSION: Patient was modified independent using a rollator for functional mobility. HOME SUPPORT PRIOR TO ADMISSION: The patient lived alone with son to provide assistance. Physical Therapy Goals  Initiated 2021  1.   Patient will move from supine to sit and sit to supine  in bed with independence within 7 day(s). 2.  Patient will transfer from bed to chair and chair to bed with independence using the least restrictive device within 7 day(s). 3.  Patient will perform sit to stand with independence within 7 day(s). 4.  Patient will ambulate with independence for 250 feet with the least restrictive device within 7 day(s).    5.  Patient will ascend/descend 5 stairs with 1 sided handrail with modified independence within 7 day(s).    5/24/2021 0942 by Jong Snow PT  Outcome: Progressing Towards Goal Paola Cardozo  (SHANE)  2023 12:20:56

## 2023-05-03 DIAGNOSIS — I77.9 CAROTID ARTERY DISEASE, UNSPECIFIED LATERALITY, UNSPECIFIED TYPE (HCC): Primary | ICD-10-CM

## 2023-05-19 RX ORDER — SENNOSIDES 8.6 MG
650 CAPSULE ORAL
COMMUNITY

## 2023-05-19 RX ORDER — ROPINIROLE 0.25 MG/1
0.25 TABLET, FILM COATED ORAL
COMMUNITY

## 2023-05-19 RX ORDER — POLYETHYLENE GLYCOL 3350 17 G/17G
17 POWDER, FOR SOLUTION ORAL DAILY
COMMUNITY

## 2023-05-19 RX ORDER — ASPIRIN 81 MG/1
81 TABLET, CHEWABLE ORAL DAILY
COMMUNITY
Start: 2021-07-02

## 2023-05-19 RX ORDER — LEVOTHYROXINE SODIUM 0.12 MG/1
125 TABLET ORAL
COMMUNITY
Start: 2021-04-23

## 2023-05-19 RX ORDER — TAMSULOSIN HYDROCHLORIDE 0.4 MG/1
0.4 CAPSULE ORAL DAILY
COMMUNITY

## 2023-05-19 RX ORDER — CLOPIDOGREL BISULFATE 75 MG/1
75 TABLET ORAL DAILY
Status: ON HOLD | COMMUNITY
Start: 2021-07-01 | End: 2023-06-09 | Stop reason: HOSPADM

## 2023-05-19 RX ORDER — TRAZODONE HYDROCHLORIDE 100 MG/1
100 TABLET ORAL NIGHTLY
COMMUNITY

## 2023-05-19 RX ORDER — PRAVASTATIN SODIUM 40 MG
40 TABLET ORAL NIGHTLY
COMMUNITY

## 2023-05-19 RX ORDER — TRAMADOL HYDROCHLORIDE 50 MG/1
50 TABLET ORAL EVERY 8 HOURS PRN
Status: ON HOLD | COMMUNITY
End: 2023-06-09 | Stop reason: HOSPADM

## 2023-05-19 RX ORDER — DONEPEZIL HYDROCHLORIDE 5 MG/1
1 TABLET, FILM COATED ORAL NIGHTLY
COMMUNITY
Start: 2021-07-01

## 2023-05-26 ENCOUNTER — OFFICE VISIT (OUTPATIENT)
Age: 88
End: 2023-05-26
Payer: MEDICARE

## 2023-05-26 VITALS
OXYGEN SATURATION: 95 % | RESPIRATION RATE: 18 BRPM | DIASTOLIC BLOOD PRESSURE: 78 MMHG | TEMPERATURE: 97.9 F | HEART RATE: 63 BPM | WEIGHT: 202.2 LBS | SYSTOLIC BLOOD PRESSURE: 138 MMHG | BODY MASS INDEX: 27.39 KG/M2 | HEIGHT: 72 IN

## 2023-05-26 DIAGNOSIS — I65.22 STENOSIS OF LEFT CAROTID ARTERY: Primary | ICD-10-CM

## 2023-05-26 PROCEDURE — 1123F ACP DISCUSS/DSCN MKR DOCD: CPT | Performed by: SURGERY

## 2023-05-26 PROCEDURE — 99213 OFFICE O/P EST LOW 20 MIN: CPT | Performed by: SURGERY

## 2023-05-26 RX ORDER — CHLORAL HYDRATE 500 MG
CAPSULE ORAL DAILY
COMMUNITY

## 2023-05-26 RX ORDER — WHEAT DEXTRIN 3 G/3.8 G
4 POWDER (GRAM) ORAL AS NEEDED
COMMUNITY

## 2023-05-26 RX ORDER — NYSTATIN 100000 [USP'U]/G
POWDER TOPICAL 4 TIMES DAILY
COMMUNITY

## 2023-05-26 ASSESSMENT — PATIENT HEALTH QUESTIONNAIRE - PHQ9
1. LITTLE INTEREST OR PLEASURE IN DOING THINGS: 0
SUM OF ALL RESPONSES TO PHQ QUESTIONS 1-9: 0
SUM OF ALL RESPONSES TO PHQ QUESTIONS 1-9: 0
SUM OF ALL RESPONSES TO PHQ9 QUESTIONS 1 & 2: 0
SUM OF ALL RESPONSES TO PHQ QUESTIONS 1-9: 0
SUM OF ALL RESPONSES TO PHQ QUESTIONS 1-9: 0
2. FEELING DOWN, DEPRESSED OR HOPELESS: 0

## 2023-05-26 NOTE — PROGRESS NOTES
VASCULAR FOLLOW UP      Subjective:   CHIEF COMPLAINTS:  Carotid stenosis  PRESENTATION OF ILLNESS:  Sri Ortega. is a 80 y.o. very pleasant gentleman is here today follow-up 1 year. Patient is accompanied by his son. Patient's dementia has gotten worse. Patient however does not appear to have any symptoms. Patient has not any speech problem. Denies any acute vision loss such as amaurosis fugax denies any weakness or numbness of arms or legs. Patient appears to be thinner today. Past Medical History:   Diagnosis Date    Arthritis     CAD (coronary artery disease)     Hard of hearing     Headache     Hypertension     PMR (polymyalgia rheumatica) (HCC)     Skin cancer     Sleep apnea     no longer uses cpap      Past Surgical History:   Procedure Laterality Date    CAROTID STENT PLACEMENT      CHOLECYSTECTOMY  1998    HERNIA REPAIR  1980    [place] UNLISTED PROCEDURE CARDIAC SURGERY      cardiac stent    VASCULAR SURGERY      carotid endartectomy     Family History   Problem Relation Age of Onset    Kidney Disease Mother     Gout Son     Cancer Father         Stomach    Rheum Arthritis Mother       Social History     Tobacco Use    Smoking status: Never    Smokeless tobacco: Never   Substance Use Topics    Alcohol use: No       Prior to Admission medications    Medication Sig Start Date End Date Taking? Authorizing Provider   diclofenac sodium (VOLTAREN) 1 % GEL Apply topically 2 times daily   Yes Historical Provider, MD   Omega-3 Fatty Acids (FISH OIL) 1000 MG capsule Take by mouth daily   Yes Historical Provider, MD   nystatin 692067 UNIT/GM powder Apply topically 4 times daily Apply topically 4 times daily.    Yes Historical Provider, MD   Multiple Vitamin (MULTIVITAMIN ADULT PO) Take 1 tablet by mouth   Yes Historical Provider, MD   Wheat Dextrin (BENEFIBER) POWD Take 4 g by mouth as needed   Yes Historical Provider, MD   Polyethyl Glycol-Propyl Glycol (SYSTANE ULTRA OP) Apply to eye   Yes

## 2023-05-26 NOTE — PROGRESS NOTES
Identified pt with two pt identifiers (name and ). Reviewed chart in preparation for visit and have obtained necessary documentation. Scott Enamorado is a 80 y.o. male  Chief Complaint   Patient presents with    Follow-up     9 mth followup stenosis     /78 (Site: Right Upper Arm, Position: Sitting, Cuff Size: Large Adult)   Pulse 63   Temp 97.9 °F (36.6 °C) (Temporal)   Resp 18   Ht 6' (1.829 m)   Wt 202 lb 3.2 oz (91.7 kg)   SpO2 95%   BMI 27.42 kg/m²     1. Have you been to the ER, urgent care clinic since your last visit? Hospitalized since your last visit? No    2. Have you seen or consulted any other health care providers outside of the [address] [place] [address] since your last visit? Include any pap smears or colon screening.  no

## 2023-05-30 ENCOUNTER — HOSPITAL ENCOUNTER (OUTPATIENT)
Facility: HOSPITAL | Age: 88
Discharge: HOME | End: 2023-06-01
Attending: SURGERY
Payer: MEDICARE

## 2023-05-30 DIAGNOSIS — I77.9 CAROTID ARTERY DISEASE, UNSPECIFIED LATERALITY, UNSPECIFIED TYPE (HCC): ICD-10-CM

## 2023-05-30 PROCEDURE — 93880 EXTRACRANIAL BILAT STUDY: CPT | Performed by: SURGERY

## 2023-05-30 PROCEDURE — 93880 EXTRACRANIAL BILAT STUDY: CPT

## 2023-06-01 LAB
VAS LEFT ARM BP: 167 MMHG
VAS LEFT CCA DIST EDV: 9.7 CM/S
VAS LEFT CCA DIST PSV: 47.3 CM/S
VAS LEFT CCA PROX EDV: 8.4 CM/S
VAS LEFT CCA PROX PSV: 68 CM/S
VAS LEFT ECA EDV: 0 CM/S
VAS LEFT ECA PSV: 315 CM/S
VAS LEFT ICA DIST EDV: 15.1 CM/S
VAS LEFT ICA DIST PSV: 96.2 CM/S
VAS LEFT ICA PROX EDV: 22 CM/S
VAS LEFT ICA PROX PSV: 194 CM/S
VAS LEFT ICA/CCA PSV: 4.1
VAS LEFT SUBCLAVIAN PROX EDV: 0 CM/S
VAS LEFT SUBCLAVIAN PROX PSV: 113 CM/S
VAS LEFT VERTEBRAL EDV: 0 CM/S
VAS LEFT VERTEBRAL PSV: 31 CM/S
VAS RIGHT ARM BP: 144 MMHG
VAS RIGHT CCA DIST EDV: 0 CM/S
VAS RIGHT CCA DIST PSV: 102 CM/S
VAS RIGHT CCA PROX EDV: 0 CM/S
VAS RIGHT CCA PROX PSV: 105 CM/S
VAS RIGHT ECA EDV: 0 CM/S
VAS RIGHT ECA PSV: 74.1 CM/S
VAS RIGHT ICA DIST EDV: 0 CM/S
VAS RIGHT ICA DIST PSV: 54.8 CM/S
VAS RIGHT ICA PROX EDV: 5.9 CM/S
VAS RIGHT ICA PROX PSV: 51.6 CM/S
VAS RIGHT ICA/CCA PSV: 0.51
VAS RIGHT SUBCLAVIAN PROX EDV: 0 CM/S
VAS RIGHT SUBCLAVIAN PROX PSV: 125 CM/S
VAS RIGHT VERTEBRAL EDV: 0 CM/S
VAS RIGHT VERTEBRAL PSV: 26.9 CM/S

## 2023-06-01 PROCEDURE — 93880 EXTRACRANIAL BILAT STUDY: CPT | Performed by: SURGERY

## 2023-06-07 ENCOUNTER — HOSPITAL ENCOUNTER (INPATIENT)
Facility: HOSPITAL | Age: 88
LOS: 2 days | Discharge: OTHER INSTITUTION | End: 2023-06-09
Attending: EMERGENCY MEDICINE | Admitting: INTERNAL MEDICINE
Payer: MEDICARE

## 2023-06-07 ENCOUNTER — APPOINTMENT (OUTPATIENT)
Facility: HOSPITAL | Age: 88
End: 2023-06-07
Payer: MEDICARE

## 2023-06-07 DIAGNOSIS — R07.89 ATYPICAL CHEST PAIN: ICD-10-CM

## 2023-06-07 DIAGNOSIS — N30.00 ACUTE CYSTITIS WITHOUT HEMATURIA: Primary | ICD-10-CM

## 2023-06-07 PROBLEM — N39.0 UTI (URINARY TRACT INFECTION): Status: ACTIVE | Noted: 2023-06-07

## 2023-06-07 LAB
ALBUMIN SERPL-MCNC: 2.9 G/DL (ref 3.5–5)
ALBUMIN/GLOB SERPL: 0.6 (ref 1.1–2.2)
ALP SERPL-CCNC: 67 U/L (ref 45–117)
ALT SERPL-CCNC: 8 U/L (ref 12–78)
ANION GAP SERPL CALC-SCNC: 8 MMOL/L (ref 5–15)
APPEARANCE UR: CLEAR
AST SERPL W P-5'-P-CCNC: 11 U/L (ref 15–37)
BACTERIA URNS QL MICRO: ABNORMAL /HPF
BASOPHILS # BLD: 0.1 K/UL (ref 0–0.1)
BASOPHILS NFR BLD: 0 % (ref 0–1)
BILIRUB SERPL-MCNC: 0.5 MG/DL (ref 0.2–1)
BILIRUB UR QL: NEGATIVE
BUN SERPL-MCNC: 17 MG/DL (ref 6–20)
BUN/CREAT SERPL: 16 (ref 12–20)
CA-I BLD-MCNC: 8.9 MG/DL (ref 8.5–10.1)
CHLORIDE SERPL-SCNC: 102 MMOL/L (ref 97–108)
CO2 SERPL-SCNC: 26 MMOL/L (ref 21–32)
COLOR UR: ABNORMAL
CREAT SERPL-MCNC: 1.07 MG/DL (ref 0.7–1.3)
DIFFERENTIAL METHOD BLD: ABNORMAL
EOSINOPHIL # BLD: 0.1 K/UL (ref 0–0.4)
EOSINOPHIL NFR BLD: 1 % (ref 0–7)
ERYTHROCYTE [DISTWIDTH] IN BLOOD BY AUTOMATED COUNT: 15.5 % (ref 11.5–14.5)
GLOBULIN SER CALC-MCNC: 4.5 G/DL (ref 2–4)
GLUCOSE SERPL-MCNC: 110 MG/DL (ref 65–100)
GLUCOSE UR STRIP.AUTO-MCNC: NEGATIVE MG/DL
HCT VFR BLD AUTO: 34.5 % (ref 36.6–50.3)
HGB BLD-MCNC: 11.1 G/DL (ref 12.1–17)
HGB UR QL STRIP: ABNORMAL
IMM GRANULOCYTES # BLD AUTO: 0.1 K/UL (ref 0–0.04)
IMM GRANULOCYTES NFR BLD AUTO: 1 % (ref 0–0.5)
KETONES UR QL STRIP.AUTO: NEGATIVE MG/DL
LACTATE SERPL-SCNC: 0.8 MMOL/L (ref 0.4–2)
LEUKOCYTE ESTERASE UR QL STRIP.AUTO: ABNORMAL
LYMPHOCYTES # BLD: 1.2 K/UL (ref 0.8–3.5)
LYMPHOCYTES NFR BLD: 7 % (ref 12–49)
MAGNESIUM SERPL-MCNC: 1.8 MG/DL (ref 1.6–2.4)
MCH RBC QN AUTO: 27.3 PG (ref 26–34)
MCHC RBC AUTO-ENTMCNC: 32.2 G/DL (ref 30–36.5)
MCV RBC AUTO: 85 FL (ref 80–99)
MONOCYTES # BLD: 1.4 K/UL (ref 0–1)
MONOCYTES NFR BLD: 8 % (ref 5–13)
NEUTS SEG # BLD: 14.2 K/UL (ref 1.8–8)
NEUTS SEG NFR BLD: 83 % (ref 32–75)
NITRITE UR QL STRIP.AUTO: POSITIVE
NRBC # BLD: 0 K/UL (ref 0–0.01)
NRBC BLD-RTO: 0 PER 100 WBC
PH UR STRIP: 5.5 (ref 5–8)
PLATELET # BLD AUTO: 330 K/UL (ref 150–400)
PMV BLD AUTO: 9.2 FL (ref 8.9–12.9)
POTASSIUM SERPL-SCNC: 3.8 MMOL/L (ref 3.5–5.1)
PROT SERPL-MCNC: 7.4 G/DL (ref 6.4–8.2)
PROT UR STRIP-MCNC: 30 MG/DL
RBC # BLD AUTO: 4.06 M/UL (ref 4.1–5.7)
RBC #/AREA URNS HPF: ABNORMAL /HPF (ref 0–3)
SODIUM SERPL-SCNC: 136 MMOL/L (ref 136–145)
SP GR UR REFRACTOMETRY: 1.02 (ref 1–1.03)
TROPONIN I SERPL HS-MCNC: 10 NG/L (ref 0–76)
TROPONIN I SERPL HS-MCNC: 9 NG/L (ref 0–76)
UROBILINOGEN UR QL STRIP.AUTO: 0.2 EU/DL (ref 0.2–1)
WBC # BLD AUTO: 17.1 K/UL (ref 4.1–11.1)
WBC URNS QL MICRO: ABNORMAL /HPF (ref 0–5)

## 2023-06-07 PROCEDURE — 1100000000 HC RM PRIVATE

## 2023-06-07 PROCEDURE — 87150 DNA/RNA AMPLIFIED PROBE: CPT

## 2023-06-07 PROCEDURE — 6360000002 HC RX W HCPCS: Performed by: EMERGENCY MEDICINE

## 2023-06-07 PROCEDURE — 6360000002 HC RX W HCPCS: Performed by: INTERNAL MEDICINE

## 2023-06-07 PROCEDURE — 36415 COLL VENOUS BLD VENIPUNCTURE: CPT

## 2023-06-07 PROCEDURE — 6370000000 HC RX 637 (ALT 250 FOR IP): Performed by: INTERNAL MEDICINE

## 2023-06-07 PROCEDURE — 84484 ASSAY OF TROPONIN QUANT: CPT

## 2023-06-07 PROCEDURE — 85025 COMPLETE CBC W/AUTO DIFF WBC: CPT

## 2023-06-07 PROCEDURE — 96365 THER/PROPH/DIAG IV INF INIT: CPT

## 2023-06-07 PROCEDURE — 87040 BLOOD CULTURE FOR BACTERIA: CPT

## 2023-06-07 PROCEDURE — 2580000003 HC RX 258: Performed by: INTERNAL MEDICINE

## 2023-06-07 PROCEDURE — 83735 ASSAY OF MAGNESIUM: CPT

## 2023-06-07 PROCEDURE — 99285 EMERGENCY DEPT VISIT HI MDM: CPT

## 2023-06-07 PROCEDURE — 94761 N-INVAS EAR/PLS OXIMETRY MLT: CPT

## 2023-06-07 PROCEDURE — 93005 ELECTROCARDIOGRAM TRACING: CPT | Performed by: EMERGENCY MEDICINE

## 2023-06-07 PROCEDURE — 71045 X-RAY EXAM CHEST 1 VIEW: CPT

## 2023-06-07 PROCEDURE — 81001 URINALYSIS AUTO W/SCOPE: CPT

## 2023-06-07 PROCEDURE — 80053 COMPREHEN METABOLIC PANEL: CPT

## 2023-06-07 PROCEDURE — 83605 ASSAY OF LACTIC ACID: CPT

## 2023-06-07 RX ORDER — ASPIRIN 81 MG/1
81 TABLET, CHEWABLE ORAL DAILY
Status: DISCONTINUED | OUTPATIENT
Start: 2023-06-07 | End: 2023-06-09 | Stop reason: HOSPADM

## 2023-06-07 RX ORDER — SODIUM CHLORIDE 9 MG/ML
INJECTION, SOLUTION INTRAVENOUS CONTINUOUS
Status: DISCONTINUED | OUTPATIENT
Start: 2023-06-07 | End: 2023-06-08

## 2023-06-07 RX ORDER — SODIUM CHLORIDE 0.9 % (FLUSH) 0.9 %
5-40 SYRINGE (ML) INJECTION PRN
Status: DISCONTINUED | OUTPATIENT
Start: 2023-06-07 | End: 2023-06-09 | Stop reason: HOSPADM

## 2023-06-07 RX ORDER — PRAVASTATIN SODIUM 40 MG
40 TABLET ORAL NIGHTLY
Status: DISCONTINUED | OUTPATIENT
Start: 2023-06-07 | End: 2023-06-09 | Stop reason: HOSPADM

## 2023-06-07 RX ORDER — TAMSULOSIN HYDROCHLORIDE 0.4 MG/1
0.4 CAPSULE ORAL DAILY
Status: DISCONTINUED | OUTPATIENT
Start: 2023-06-07 | End: 2023-06-09 | Stop reason: HOSPADM

## 2023-06-07 RX ORDER — LEVOFLOXACIN 500 MG/1
500 TABLET, FILM COATED ORAL EVERY OTHER DAY
Status: DISCONTINUED | OUTPATIENT
Start: 2023-06-07 | End: 2023-06-07

## 2023-06-07 RX ORDER — LEVOFLOXACIN 5 MG/ML
375 INJECTION, SOLUTION INTRAVENOUS EVERY 24 HOURS
Status: DISCONTINUED | OUTPATIENT
Start: 2023-06-07 | End: 2023-06-07

## 2023-06-07 RX ORDER — LEVOFLOXACIN 5 MG/ML
750 INJECTION, SOLUTION INTRAVENOUS EVERY 24 HOURS
Status: DISCONTINUED | OUTPATIENT
Start: 2023-06-07 | End: 2023-06-07 | Stop reason: SDUPTHER

## 2023-06-07 RX ORDER — ACETAMINOPHEN 650 MG/1
650 SUPPOSITORY RECTAL EVERY 6 HOURS PRN
Status: DISCONTINUED | OUTPATIENT
Start: 2023-06-07 | End: 2023-06-09 | Stop reason: HOSPADM

## 2023-06-07 RX ORDER — LEVOFLOXACIN 500 MG/1
500 TABLET, FILM COATED ORAL EVERY OTHER DAY
Status: DISCONTINUED | OUTPATIENT
Start: 2023-06-09 | End: 2023-06-09

## 2023-06-07 RX ORDER — DONEPEZIL HYDROCHLORIDE 5 MG/1
5 TABLET, FILM COATED ORAL NIGHTLY
Status: DISCONTINUED | OUTPATIENT
Start: 2023-06-07 | End: 2023-06-09 | Stop reason: HOSPADM

## 2023-06-07 RX ORDER — TRAMADOL HYDROCHLORIDE 50 MG/1
50 TABLET ORAL EVERY 8 HOURS PRN
Status: DISCONTINUED | OUTPATIENT
Start: 2023-06-07 | End: 2023-06-09 | Stop reason: HOSPADM

## 2023-06-07 RX ORDER — ENOXAPARIN SODIUM 100 MG/ML
40 INJECTION SUBCUTANEOUS DAILY
Status: DISCONTINUED | OUTPATIENT
Start: 2023-06-07 | End: 2023-06-09 | Stop reason: HOSPADM

## 2023-06-07 RX ORDER — TRAZODONE HYDROCHLORIDE 100 MG/1
100 TABLET ORAL NIGHTLY
Status: DISCONTINUED | OUTPATIENT
Start: 2023-06-07 | End: 2023-06-09 | Stop reason: HOSPADM

## 2023-06-07 RX ORDER — POLYETHYLENE GLYCOL 3350 17 G/17G
17 POWDER, FOR SOLUTION ORAL DAILY PRN
Status: DISCONTINUED | OUTPATIENT
Start: 2023-06-07 | End: 2023-06-09 | Stop reason: HOSPADM

## 2023-06-07 RX ORDER — SODIUM CHLORIDE 9 MG/ML
INJECTION, SOLUTION INTRAVENOUS PRN
Status: DISCONTINUED | OUTPATIENT
Start: 2023-06-07 | End: 2023-06-09 | Stop reason: HOSPADM

## 2023-06-07 RX ORDER — LEVOTHYROXINE SODIUM 0.12 MG/1
125 TABLET ORAL
Status: DISCONTINUED | OUTPATIENT
Start: 2023-06-08 | End: 2023-06-09 | Stop reason: HOSPADM

## 2023-06-07 RX ORDER — ONDANSETRON 2 MG/ML
4 INJECTION INTRAMUSCULAR; INTRAVENOUS EVERY 6 HOURS PRN
Status: DISCONTINUED | OUTPATIENT
Start: 2023-06-07 | End: 2023-06-09 | Stop reason: HOSPADM

## 2023-06-07 RX ORDER — SODIUM CHLORIDE 0.9 % (FLUSH) 0.9 %
5-40 SYRINGE (ML) INJECTION EVERY 12 HOURS SCHEDULED
Status: DISCONTINUED | OUTPATIENT
Start: 2023-06-07 | End: 2023-06-09 | Stop reason: HOSPADM

## 2023-06-07 RX ORDER — ACETAMINOPHEN 325 MG/1
650 TABLET ORAL EVERY 6 HOURS PRN
Status: DISCONTINUED | OUTPATIENT
Start: 2023-06-07 | End: 2023-06-09 | Stop reason: HOSPADM

## 2023-06-07 RX ORDER — ONDANSETRON 4 MG/1
4 TABLET, ORALLY DISINTEGRATING ORAL EVERY 8 HOURS PRN
Status: DISCONTINUED | OUTPATIENT
Start: 2023-06-07 | End: 2023-06-09 | Stop reason: HOSPADM

## 2023-06-07 RX ADMIN — LEVOFLOXACIN 750 MG: 5 INJECTION, SOLUTION INTRAVENOUS at 10:49

## 2023-06-07 RX ADMIN — ONDANSETRON 4 MG: 2 INJECTION INTRAMUSCULAR; INTRAVENOUS at 23:52

## 2023-06-07 RX ADMIN — SODIUM CHLORIDE: 9 INJECTION, SOLUTION INTRAVENOUS at 13:38

## 2023-06-07 RX ADMIN — ENOXAPARIN SODIUM 40 MG: 100 INJECTION SUBCUTANEOUS at 13:37

## 2023-06-07 RX ADMIN — SODIUM CHLORIDE, PRESERVATIVE FREE 10 ML: 5 INJECTION INTRAVENOUS at 13:37

## 2023-06-07 RX ADMIN — SODIUM CHLORIDE, PRESERVATIVE FREE 10 ML: 5 INJECTION INTRAVENOUS at 21:02

## 2023-06-07 RX ADMIN — TAMSULOSIN HYDROCHLORIDE 0.4 MG: 0.4 CAPSULE ORAL at 21:01

## 2023-06-07 RX ADMIN — TRAZODONE HYDROCHLORIDE 100 MG: 100 TABLET ORAL at 21:01

## 2023-06-07 RX ADMIN — PRAVASTATIN SODIUM 40 MG: 40 TABLET ORAL at 21:01

## 2023-06-07 RX ADMIN — ASPIRIN 81 MG 81 MG: 81 TABLET ORAL at 21:00

## 2023-06-07 ASSESSMENT — HEART SCORE: ECG: 1

## 2023-06-07 ASSESSMENT — PAIN - FUNCTIONAL ASSESSMENT: PAIN_FUNCTIONAL_ASSESSMENT: NONE - DENIES PAIN

## 2023-06-07 ASSESSMENT — LIFESTYLE VARIABLES
HOW MANY STANDARD DRINKS CONTAINING ALCOHOL DO YOU HAVE ON A TYPICAL DAY: PATIENT DOES NOT DRINK
HOW OFTEN DO YOU HAVE A DRINK CONTAINING ALCOHOL: NEVER

## 2023-06-07 NOTE — ED PROVIDER NOTES
Substance Use Topics    Alcohol use: No    Drug use: Never       Allergies: Allergies   Allergen Reactions    Penicillins Swelling       PCP: Agata Aleman MD    Current Meds:   Current Facility-Administered Medications   Medication Dose Route Frequency Provider Last Rate Last Admin    levoFLOXacin (LEVAQUIN) 750 MG/150ML infusion 750 mg  750 mg IntraVENous Q24H Florina Wadsworth  mL/hr at 06/07/23 1049 750 mg at 06/07/23 1049     Current Outpatient Medications   Medication Sig Dispense Refill    diclofenac sodium (VOLTAREN) 1 % GEL Apply topically 2 times daily      Omega-3 Fatty Acids (FISH OIL) 1000 MG capsule Take by mouth daily      nystatin [id number] UNIT/GM powder Apply topically 4 times daily Apply topically 4 times daily. Multiple Vitamin (MULTIVITAMIN ADULT PO) Take 1 tablet by mouth      Wheat Dextrin (BENEFIBER) POWD Take 4 g by mouth as needed      Polyethyl Glycol-Propyl Glycol (SYSTANE ULTRA OP) Apply to eye      acetaminophen (TYLENOL) 650 MG extended release tablet Take 1 tablet by mouth      aspirin 81 MG chewable tablet Take 1 tablet by mouth daily      clopidogrel (PLAVIX) 75 MG tablet Take 1 tablet by mouth daily (Patient not taking: Reported on 5/26/2023)      donepezil (ARICEPT) 5 MG tablet Take 1 tablet by mouth nightly      levothyroxine (SYNTHROID) 125 MCG tablet Take 1 tablet by mouth every morning (before breakfast)      Melatonin 5 MG CAPS Take by mouth      polyethylene glycol (GLYCOLAX) 17 GM/SCOOP powder Take 17 g by mouth daily      pravastatin (PRAVACHOL) 40 MG tablet Take 1 tablet by mouth nightly      rOPINIRole (REQUIP) 0.25 MG tablet Take 1 tablet by mouth      tamsulosin (FLOMAX) 0.4 MG capsule Take 1 capsule by mouth daily      traMADol (ULTRAM) 50 MG tablet Take 1 tablet by mouth every 8 hours as needed.       traZODone (DESYREL) 100 MG tablet Take 1 tablet by mouth nightly         Social Determinants of Health:   Social Determinants of Health     Tobacco

## 2023-06-07 NOTE — ED NOTES
Attempted to call report to ACU and nurse taking report in isolation room. Will return call for report when out of room.      Kaleb Zhong RN  06/07/23 2428

## 2023-06-07 NOTE — H&P
History & Physical    Primary Care Provider: Esther Thompson MD  Source of Information: Patient chart    History of Presenting Illness:   Anna Ardon is a 80 y.o. male with history of coronary artery disease, hard of hearing and hypertension presenting from the [facility] for evaluation of chest pain. The history is very difficult to obtain because patient is hard of hearing. Twelve-lead EKG shows nonspecific ST and T wave changes and first 2 set of troponins unremarkable. Urinalysis positive for nitrites, leukocyte esterase and greater than 50 WBCs per high-power field. Cultures obtained and patient empirically started on IV Levaquin. Will be admitted for further treatment       Review of Systems:  Review of systems not obtained due to patient factors. Past Medical History:   Diagnosis Date    Arthritis     CAD (coronary artery disease)     Hard of hearing     Headache     Hypertension     PMR (polymyalgia rheumatica) (HCC)     Skin cancer     Sleep apnea     no longer uses cpap      Past Surgical History:   Procedure Laterality Date    CAROTID STENT PLACEMENT      CHOLECYSTECTOMY  1998    HERNIA REPAIR  1980    [place] UNLISTED PROCEDURE CARDIAC SURGERY      cardiac stent    VASCULAR SURGERY      carotid endartectomy     Prior to Admission medications    Medication Sig Start Date End Date Taking? Authorizing Provider   diclofenac sodium (VOLTAREN) 1 % GEL Apply topically 2 times daily    Historical Provider, MD   Omega-3 Fatty Acids (FISH OIL) 1000 MG capsule Take by mouth daily    Historical Provider, MD   nystatin [id number] UNIT/GM powder Apply topically 4 times daily Apply topically 4 times daily.     Historical Provider, MD   Multiple Vitamin (MULTIVITAMIN ADULT PO) Take 1 tablet by mouth    Historical Provider, MD   Wheat Dextrin (BENEFIBER) POWD Take 4 g by mouth as needed    Historical Provider, MD   Polyethyl Glycol-Propyl Glycol (SYSTANE ULTRA OP) Apply

## 2023-06-07 NOTE — ED TRIAGE NOTES
Pt sent from [facility] for c/o chest pain and hypotension this morning. Pt reports he no longer has chest pain, however, his left shoulder hurts.

## 2023-06-07 NOTE — ED NOTES
Urine specimen obtained and sent to lab. Clothes removed and hospital gown put on.      Shelly Hawthorne RN  06/07/23 7353

## 2023-06-08 ENCOUNTER — APPOINTMENT (OUTPATIENT)
Facility: HOSPITAL | Age: 88
End: 2023-06-08
Payer: MEDICARE

## 2023-06-08 LAB
ACCESSION NUMBER, LLC1M: ABNORMAL
ACINETOBACTER CALCOAC BAUMANNII COMPLEX BY PCR: NOT DETECTED
ANION GAP SERPL CALC-SCNC: 5 MMOL/L (ref 5–15)
BACTEROIDES FRAGILIS BY PCR: NOT DETECTED
BASOPHILS # BLD: 0 K/UL (ref 0–0.2)
BASOPHILS NFR BLD: 0 % (ref 0–2.5)
BIOFIRE TEST COMMENT: ABNORMAL
BUN SERPL-MCNC: 21 MG/DL (ref 6–20)
BUN/CREAT SERPL: 20 (ref 12–20)
CA-I BLD-MCNC: 8.5 MG/DL (ref 8.5–10.1)
CANDIDA ALBICANS BY PCR: NOT DETECTED
CANDIDA AURIS BY PCR: NOT DETECTED
CANDIDA GLABRATA: NOT DETECTED
CANDIDA KRUSEI BY PCR: NOT DETECTED
CANDIDA PARAPSILOSIS BY PCR: NOT DETECTED
CANDIDA TROPICALIS BY PCR: NOT DETECTED
CHLORIDE SERPL-SCNC: 99 MMOL/L (ref 97–108)
CO2 SERPL-SCNC: 30 MMOL/L (ref 21–32)
CREAT SERPL-MCNC: 1.03 MG/DL (ref 0.7–1.3)
CRYPTOCOCCUS NEOFORMANS/GATTII BY PCR: NOT DETECTED
EKG ATRIAL RATE: 74 BPM
EKG DIAGNOSIS: NORMAL
EKG P AXIS: 74 DEGREES
EKG P-R INTERVAL: 331 MS
EKG Q-T INTERVAL: 408 MS
EKG QRS DURATION: 93 MS
EKG QTC CALCULATION (BAZETT): 453 MS
EKG R AXIS: -30 DEGREES
EKG T AXIS: 94 DEGREES
EKG VENTRICULAR RATE: 74 BPM
ENTEROBACTER CLOACAE COMPLEX BY PCR: NOT DETECTED
ENTEROBACTERALES BY PCR: NOT DETECTED
ENTEROCOCCUS FAECALIS BY PCR: NOT DETECTED
ENTEROCOCCUS FAECIUM BY PCR: NOT DETECTED
EOSINOPHIL # BLD: 0 K/UL (ref 0–0.7)
EOSINOPHIL NFR BLD: 0 % (ref 0.9–2.9)
ERYTHROCYTE [DISTWIDTH] IN BLOOD BY AUTOMATED COUNT: 16 % (ref 11.5–14.5)
ESCHERICHIA COLI: NOT DETECTED
GLUCOSE SERPL-MCNC: 144 MG/DL (ref 65–100)
HAEMOPHILUS INFLUENZAE BY PCR: NOT DETECTED
HCT VFR BLD AUTO: 34.5 % (ref 41–53)
HGB BLD-MCNC: 11.2 G/DL (ref 13.5–17.5)
KLEBSIELLA AEROGENES BY PCR: NOT DETECTED
KLEBSIELLA OXYTOCA BY PCR: NOT DETECTED
KLEBSIELLA PNEUMONIAE GROUP BY PCR: NOT DETECTED
LISTERIA MONOCYTOGENES BY PCR: NOT DETECTED
LYMPHOCYTES # BLD: 1.8 K/UL (ref 1–4.8)
LYMPHOCYTES NFR BLD: 9 % (ref 20.5–51.1)
MCH RBC QN AUTO: 27.3 PG (ref 31–34)
MCHC RBC AUTO-ENTMCNC: 32.6 G/DL (ref 31–36)
MCV RBC AUTO: 83.7 FL (ref 80–100)
MONOCYTES # BLD: 1.3 K/UL (ref 0.2–2.4)
MONOCYTES NFR BLD: 6 % (ref 1.7–9.3)
NEISSERIA MENINGITIDIS BY PCR: NOT DETECTED
NEUTS SEG # BLD: 17.3 K/UL (ref 1.8–7.7)
NEUTS SEG NFR BLD: 85 % (ref 42–75)
NRBC # BLD: 0.01 K/UL
NRBC BLD-RTO: 0 PER 100 WBC
PLATELET # BLD AUTO: 360 K/UL (ref 150–400)
PMV BLD AUTO: 7.6 FL (ref 6.5–11.5)
POTASSIUM SERPL-SCNC: 4 MMOL/L (ref 3.5–5.1)
PROTEUS BY PCR: NOT DETECTED
PSEUDOMONAS AERUGINOSA, PSAEP: NOT DETECTED
RBC # BLD AUTO: 4.12 M/UL (ref 4.5–5.9)
RESISTANT GENE TARGETS: ABNORMAL
SALMONELLA SPECIES BY PCR: NOT DETECTED
SERRATIA MARCESCENS BY PCR: NOT DETECTED
SODIUM SERPL-SCNC: 134 MMOL/L (ref 136–145)
STAPHYLOCOCCUS AUREUS: NOT DETECTED
STAPHYLOCOCCUS EPIDERMIDIS BY PCR: NOT DETECTED
STAPHYLOCOCCUS LUGDUNENSIS BY PCR: NOT DETECTED
STAPHYLOCOCCUS: DETECTED
STENOTROPHOMONAS MALTOPHILIA BY PCR: NOT DETECTED
STREPTOCOCCUS AGALACTIAE (GROUP B): NOT DETECTED
STREPTOCOCCUS PNEUMONIAE , SPNP: NOT DETECTED
STREPTOCOCCUS PYOGENES (GROUP A), SPYOP: NOT DETECTED
STREPTOCOCCUS: NOT DETECTED
WBC # BLD AUTO: 20.4 K/UL (ref 4.4–11.3)

## 2023-06-08 PROCEDURE — 36415 COLL VENOUS BLD VENIPUNCTURE: CPT

## 2023-06-08 PROCEDURE — 1100000000 HC RM PRIVATE

## 2023-06-08 PROCEDURE — 87040 BLOOD CULTURE FOR BACTERIA: CPT

## 2023-06-08 PROCEDURE — 2580000003 HC RX 258: Performed by: INTERNAL MEDICINE

## 2023-06-08 PROCEDURE — 6370000000 HC RX 637 (ALT 250 FOR IP): Performed by: INTERNAL MEDICINE

## 2023-06-08 PROCEDURE — 74018 RADEX ABDOMEN 1 VIEW: CPT

## 2023-06-08 PROCEDURE — 74176 CT ABD & PELVIS W/O CONTRAST: CPT

## 2023-06-08 PROCEDURE — 85025 COMPLETE CBC W/AUTO DIFF WBC: CPT

## 2023-06-08 PROCEDURE — 80048 BASIC METABOLIC PNL TOTAL CA: CPT

## 2023-06-08 PROCEDURE — 6360000002 HC RX W HCPCS: Performed by: INTERNAL MEDICINE

## 2023-06-08 RX ORDER — DEXTROSE AND SODIUM CHLORIDE 5; .9 G/100ML; G/100ML
INJECTION, SOLUTION INTRAVENOUS CONTINUOUS
Status: DISCONTINUED | OUTPATIENT
Start: 2023-06-08 | End: 2023-06-09

## 2023-06-08 RX ADMIN — MEROPENEM 500 MG: 500 INJECTION INTRAVENOUS at 20:22

## 2023-06-08 RX ADMIN — ENOXAPARIN SODIUM 40 MG: 100 INJECTION SUBCUTANEOUS at 11:47

## 2023-06-08 RX ADMIN — DONEPEZIL HYDROCHLORIDE 5 MG: 5 TABLET, FILM COATED ORAL at 21:25

## 2023-06-08 RX ADMIN — DEXTROSE AND SODIUM CHLORIDE: 5; 900 INJECTION, SOLUTION INTRAVENOUS at 13:19

## 2023-06-08 RX ADMIN — MEROPENEM 500 MG: 500 INJECTION INTRAVENOUS at 08:29

## 2023-06-08 RX ADMIN — TRAZODONE HYDROCHLORIDE 100 MG: 100 TABLET ORAL at 21:25

## 2023-06-08 RX ADMIN — SODIUM CHLORIDE, PRESERVATIVE FREE 10 ML: 5 INJECTION INTRAVENOUS at 09:22

## 2023-06-08 RX ADMIN — PRAVASTATIN SODIUM 40 MG: 40 TABLET ORAL at 21:25

## 2023-06-08 RX ADMIN — SODIUM CHLORIDE, PRESERVATIVE FREE 10 ML: 5 INJECTION INTRAVENOUS at 21:26

## 2023-06-08 RX ADMIN — SODIUM CHLORIDE: 9 INJECTION, SOLUTION INTRAVENOUS at 02:16

## 2023-06-09 VITALS
OXYGEN SATURATION: 93 % | HEART RATE: 64 BPM | SYSTOLIC BLOOD PRESSURE: 130 MMHG | WEIGHT: 196.3 LBS | RESPIRATION RATE: 18 BRPM | DIASTOLIC BLOOD PRESSURE: 64 MMHG | HEIGHT: 70 IN | TEMPERATURE: 97.1 F | BODY MASS INDEX: 28.1 KG/M2

## 2023-06-09 LAB
ANION GAP SERPL CALC-SCNC: 3 MMOL/L (ref 5–15)
BACTERIA SPEC CULT: ABNORMAL
BACTERIA SPEC CULT: ABNORMAL
BASOPHILS # BLD: 0 K/UL (ref 0–0.2)
BASOPHILS NFR BLD: 0 % (ref 0–2.5)
BUN SERPL-MCNC: 18 MG/DL (ref 6–20)
BUN/CREAT SERPL: 20 (ref 12–20)
CA-I BLD-MCNC: 8.1 MG/DL (ref 8.5–10.1)
CHLORIDE SERPL-SCNC: 103 MMOL/L (ref 97–108)
CO2 SERPL-SCNC: 30 MMOL/L (ref 21–32)
CREAT SERPL-MCNC: 0.91 MG/DL (ref 0.7–1.3)
EOSINOPHIL # BLD: 0.1 K/UL (ref 0–0.7)
EOSINOPHIL NFR BLD: 1 % (ref 0.9–2.9)
ERYTHROCYTE [DISTWIDTH] IN BLOOD BY AUTOMATED COUNT: 16.5 % (ref 11.5–14.5)
GLUCOSE SERPL-MCNC: 152 MG/DL (ref 65–100)
HCT VFR BLD AUTO: 32.2 % (ref 41–53)
HGB BLD-MCNC: 10.7 G/DL (ref 13.5–17.5)
LYMPHOCYTES # BLD: 1 K/UL (ref 1–4.8)
LYMPHOCYTES NFR BLD: 8 % (ref 20.5–51.1)
Lab: ABNORMAL
MCH RBC QN AUTO: 27.8 PG (ref 31–34)
MCHC RBC AUTO-ENTMCNC: 33.1 G/DL (ref 31–36)
MCV RBC AUTO: 84 FL (ref 80–100)
MONOCYTES # BLD: 1 K/UL (ref 0.2–2.4)
MONOCYTES NFR BLD: 8 % (ref 1.7–9.3)
NEUTS SEG # BLD: 11.2 K/UL (ref 1.8–7.7)
NEUTS SEG NFR BLD: 83 % (ref 42–75)
NRBC # BLD: 0 K/UL
NRBC BLD-RTO: 0 PER 100 WBC
PLATELET # BLD AUTO: 351 K/UL (ref 150–400)
PMV BLD AUTO: 7.5 FL (ref 6.5–11.5)
POTASSIUM SERPL-SCNC: 3.9 MMOL/L (ref 3.5–5.1)
RBC # BLD AUTO: 3.84 M/UL (ref 4.5–5.9)
SODIUM SERPL-SCNC: 136 MMOL/L (ref 136–145)
WBC # BLD AUTO: 13.4 K/UL (ref 4.4–11.3)

## 2023-06-09 PROCEDURE — 2580000003 HC RX 258: Performed by: INTERNAL MEDICINE

## 2023-06-09 PROCEDURE — 85025 COMPLETE CBC W/AUTO DIFF WBC: CPT

## 2023-06-09 PROCEDURE — 6360000002 HC RX W HCPCS: Performed by: INTERNAL MEDICINE

## 2023-06-09 PROCEDURE — 80048 BASIC METABOLIC PNL TOTAL CA: CPT

## 2023-06-09 PROCEDURE — 36415 COLL VENOUS BLD VENIPUNCTURE: CPT

## 2023-06-09 PROCEDURE — 6370000000 HC RX 637 (ALT 250 FOR IP): Performed by: INTERNAL MEDICINE

## 2023-06-09 PROCEDURE — 97161 PT EVAL LOW COMPLEX 20 MIN: CPT

## 2023-06-09 RX ORDER — LEVOFLOXACIN 500 MG/1
500 TABLET, FILM COATED ORAL DAILY
Status: DISCONTINUED | OUTPATIENT
Start: 2023-06-09 | End: 2023-06-09

## 2023-06-09 RX ORDER — LEVOFLOXACIN 500 MG/1
500 TABLET, FILM COATED ORAL DAILY
Qty: 7 TABLET | Refills: 0 | Status: SHIPPED | OUTPATIENT
Start: 2023-06-10 | End: 2023-06-17

## 2023-06-09 RX ORDER — AMLODIPINE BESYLATE 10 MG/1
10 TABLET ORAL DAILY
Status: DISCONTINUED | OUTPATIENT
Start: 2023-06-09 | End: 2023-06-09 | Stop reason: HOSPADM

## 2023-06-09 RX ORDER — AMLODIPINE BESYLATE 10 MG/1
10 TABLET ORAL DAILY
Qty: 30 TABLET | Refills: 0 | Status: SHIPPED | OUTPATIENT
Start: 2023-06-10 | End: 2023-07-10

## 2023-06-09 RX ORDER — DOCUSATE SODIUM 100 MG/1
100 CAPSULE, LIQUID FILLED ORAL 2 TIMES DAILY PRN
Status: DISCONTINUED | OUTPATIENT
Start: 2023-06-09 | End: 2023-06-09 | Stop reason: HOSPADM

## 2023-06-09 RX ORDER — DOXYCYCLINE HYCLATE 100 MG
100 TABLET ORAL 2 TIMES DAILY
Qty: 14 TABLET | Refills: 0 | Status: SHIPPED | OUTPATIENT
Start: 2023-06-09 | End: 2023-06-16

## 2023-06-09 RX ORDER — ONDANSETRON 4 MG/1
4 TABLET, ORALLY DISINTEGRATING ORAL 3 TIMES DAILY PRN
Qty: 21 TABLET | Refills: 0 | Status: SHIPPED | OUTPATIENT
Start: 2023-06-09

## 2023-06-09 RX ORDER — HYDRALAZINE HYDROCHLORIDE 20 MG/ML
10 INJECTION INTRAMUSCULAR; INTRAVENOUS EVERY 4 HOURS PRN
Status: DISCONTINUED | OUTPATIENT
Start: 2023-06-09 | End: 2023-06-09 | Stop reason: HOSPADM

## 2023-06-09 RX ADMIN — DEXTROSE AND SODIUM CHLORIDE: 5; 900 INJECTION, SOLUTION INTRAVENOUS at 02:54

## 2023-06-09 RX ADMIN — TAMSULOSIN HYDROCHLORIDE 0.4 MG: 0.4 CAPSULE ORAL at 09:17

## 2023-06-09 RX ADMIN — SODIUM CHLORIDE, PRESERVATIVE FREE 10 ML: 5 INJECTION INTRAVENOUS at 09:17

## 2023-06-09 RX ADMIN — ASPIRIN 81 MG 81 MG: 81 TABLET ORAL at 09:17

## 2023-06-09 RX ADMIN — AMLODIPINE BESYLATE 10 MG: 10 TABLET ORAL at 09:17

## 2023-06-09 RX ADMIN — ONDANSETRON 4 MG: 4 TABLET, ORALLY DISINTEGRATING ORAL at 12:34

## 2023-06-09 RX ADMIN — MEROPENEM 500 MG: 500 INJECTION INTRAVENOUS at 09:45

## 2023-06-09 RX ADMIN — LEVOFLOXACIN 500 MG: 500 TABLET, FILM COATED ORAL at 09:17

## 2023-06-09 RX ADMIN — LEVOTHYROXINE SODIUM 125 MCG: 0.12 TABLET ORAL at 07:40

## 2023-06-09 RX ADMIN — LEVOFLOXACIN 500 MG: 500 TABLET, FILM COATED ORAL at 09:40

## 2023-06-09 NOTE — DISCHARGE SUMMARY
UNIT/GM powder  Commonly known as: MYCOSTATIN     polyethylene glycol 17 GM/SCOOP powder  Commonly known as: GLYCOLAX     pravastatin 40 MG tablet  Commonly known as: PRAVACHOL     rOPINIRole 0.25 MG tablet  Commonly known as: REQUIP     tamsulosin 0.4 MG capsule  Commonly known as: FLOMAX     traZODone 100 MG tablet  Commonly known as: DESYREL            STOP taking these medications      clopidogrel 75 MG tablet  Commonly known as: PLAVIX     SYSTANE ULTRA OP     traMADol 50 MG tablet  Commonly known as: ULTRAM               Where to Get Your Medications        These medications were sent to [address] [facility], [address]      Phone: [phone]   amLODIPine 10 MG tablet  levoFLOXacin 500 MG tablet           Follow up Care:    1. Grace Conde MD in 1-2 weeks. Please call to set up an appointment shortly after discharge. Diet:  regular diet    Disposition:  [facility]. Advanced Directive:   FULL    DNR x     Discharge Exam:  BP (!) 167/84   Pulse 80   Temp 97.5 °F (36.4 °C)   Resp 18   Ht 1.778 m (5' 10\")   Wt 89 kg (196 lb 4.8 oz)   SpO2 94%   BMI 28.17 kg/m²         Temp (24hrs), Av.7 °F (36.5 °C), Min:97.3 °F (36.3 °C), Max:98.1 °F (36.7 °C)     0701 -  1900  In: -   Out: 200 [Urine:200]   No intake/output data recorded. General:  Alert, cooperative, no distress, appears stated age. Lungs:   Clear to auscultation bilaterally. Chest wall:  No tenderness or deformity. Heart:  Regular rate and rhythm, S1, S2 normal, no murmur, click, rub or gallop. Abdomen:   Soft, non-tender. Bowel sounds normal. No masses,  No organomegaly. Extremities: Extremities normal, atraumatic, no cyanosis or edema. Pulses: 2+ and symmetric all extremities. Skin: Skin color, texture, turgor normal. No rashes or lesions   Neurologic: CNII-XII intact.  No gross sensory or motor deficits

## 2023-06-09 NOTE — RT PROTOCOL NOTE
At 1234 Patient complained of nausea while attempting to get dressed for discharge. Abd. Soft and non-tender. Zofran 4mg given. No emesis. MD aware. D/C'd Levaquin and ordered doxycycline. Shona Brown contacted to follow up on change in antibiotics. After taking a nap patient woke up, sat up, burped and stated he felt better and was ready to leave. Discharge papers reviewed with Elias almanza. Report called to DAVION Mack at the [facility]. Patient discharged via wheelchair, accompanied by archie.

## 2023-06-09 NOTE — THERAPY EVALUATION
apparent distress in bed, Call bell within reach, Bed/ chair alarm activated, and Side rails x3    COMMUNICATION/EDUCATION:   Patient Education  Education Given To: Patient  Education Provided: Role of Therapy  Education Method: Verbal  Barriers to Learning: Hearing  Education Outcome: Continued education needed    Thank you for this referral.  Silver Warren, PT,   Minutes: 20      Physical Therapy Evaluation Charge Determination   History Examination Presentation Decision-Making   LOW Complexity : Zero comorbidities / personal factors that will impact the outcome / POC LOW Complexity : 1-2 Standardized tests and measures addressing body structure, function, activity limitation and / or participation in recreation  LOW Complexity : Stable, uncomplicated  LOW    Based on the above components, the patient evaluation is determined to be of the following complexity level: Low

## 2023-06-09 NOTE — CARE COORDINATION
Patient is being discharged back to the [facility]. Order sent to [facility]. Primary nurse will call report and son will transport patient.

## 2023-06-09 NOTE — PROGRESS NOTES
#16 [place] sump tube inserted in right nostril, with small amount brownish return, kub ordered to check placement
1730: Dr Kieran Tsai called and CT read to him. Orders given to remove NGT. 1735: NGT removed. 1750: Patient tolerating PO intake.
@ 0120 vomited approximately 100cc dark brownish liquid. Perfect served Dr. Gilbert Roberts.
@ 0240 KU results perfect served to Dr. Roverto Garcia ordered  ng to juan pablo
Critical lab result of + blood culture gm+ cocci 1 of 2 bottles.   Carlos served Dr. Gilbert Roberts,  ordered repeat blood culture
Dr. Dawit Heart ordered KUB to rule out ileus
Had been sleeping. Easily aroused. In bed watching television. Cooperative.
KUB has been completed
Lying in bed sleeping.   Occasionally non productive cough
Noted brownish vomitus on gown and floor when entering room to check vital signs. States I ate too much ice cream I got sick,  gown and linen changed.   Prn Zofran 4mg IV given
PIETRO performed @ 4881
Shift assessment reviewed.
Shift assessment reviewed.
Son is at bedside.
Hemoglobin 11.2 (L) 13.5 - 17.5 g/dL    Hematocrit 34.5 (L) 41 - 53 %    MCV 83.7 80 - 100 FL    MCH 27.3 (L) 31 - 34 PG    MCHC 32.6 31.0 - 36.0 g/dL    RDW 16.0 (H) 11.5 - 14.5 %    Platelets 749 561 - 022 K/uL    MPV 7.6 6.5 - 11.5 FL    Nucleated RBCs 0.0  WBC    nRBC 0.01 K/uL    Neutrophils % 85 (H) 42 - 75 %    Lymphocytes % 9 (L) 20.5 - 51.1 %    Monocytes % 6 1.7 - 9.3 %    Eosinophils % 0 (L) 0.9 - 2.9 %    Basophils % 0 0.0 - 2.5 %    Neutrophils Absolute 17.3 (H) 1.8 - 7.7 K/UL    Lymphocytes Absolute 1.8 1.0 - 4.8 K/UL    Monocytes Absolute 1.3 0.2 - 2.4 K/UL    Eosinophils Absolute 0.0 0.0 - 0.7 K/UL    Basophils Absolute 0.0 0.0 - 0.2 K/UL           Assessment/     Urinary tract infection    -Continue IV Levaquin and add IV meropenem    -Follow-up final cultures and sensitivities    Probable ileus versus bowel obstruction    -Obtain CT abdomen pelvis without contrast to rule out    -Continue NG to low intermittent suction    -Continue empiric IV antibiotics and keep patient n.p.o. Probable aspiration    -Add IV meropenem     Essential hypertension    -Fairly stable     Chronic medical problems  Hypothyroidism, hyperlipidemia, hard of hearing    -Fairly stable        Plan:  Continue supportive care  Monitor daily electrolytes  We will update son once CTs abdomen pelvis results    155pm: Updated son at bedside about overall clinicals      Care Plan discussed with: Nurse    Total time spent with patient: 30 minutes.     Paula Yuen MD

## 2023-06-09 NOTE — CARE COORDINATION
06/09/23 1500   Service Assessment   Patient Orientation Self   Cognition Alert  (confused)   History Provided By Medical Record   Primary Caregiver Other (Comment)  (assisted living- [facility])   Accompanied By/Relationship Helen Daotramaine Jacobconcetta; Other (Comment)  (assisted living staff)   [facility] is: Named in [facility]   PCP Verified by CM Yes   Prior Functional Level Assistance with the following:;Bathing;Dressing; Toileting;Cooking;Housework; Shopping;Mobility   Current Functional Level Mobility; Shopping;Housework;Cooking; Toileting;Dressing; Bathing;Assistance with the following:   Can patient return to prior living arrangement Yes   Ability to make needs known: Poor   Family able to assist with home care needs: Yes   Would you like for me to discuss the discharge plan with any other family members/significant others, and if so, who?  Yes  (son)   Financial Resources Medicare Community Resources Assisted Living   Social/Functional History   Lives With Other (comment)   Type of Home Assisted living   [address] Help From [facility] Staff)   ADL Assistance Needs assistance   Bath Moderate assistance   Dressing Moderate assistance   Grooming Moderate assistance   Feeding Independent   Toileting Needs assistance   [address] Needs assistance   Homemaking Responsibilities No   Ambulation Assistance Needs assistance   Transfer Assistance Needs assistance   Active  No   Patient's  Info son   Mode of Transportation Car   Occupation Retired   Discharge Planning   Type of Residence Assisted living   [address] Medications No   Type of [address] PT   Patient expects to be discharged to: Assisted living   [address] [facility] Discharge   Transition of [address] ([address] [facility]

## 2023-06-11 LAB
BACTERIA SPEC CULT: NORMAL
BACTERIA SPEC CULT: NORMAL
Lab: NORMAL
Lab: NORMAL

## 2023-07-07 PROBLEM — N39.0 UTI (URINARY TRACT INFECTION): Status: RESOLVED | Noted: 2023-06-07 | Resolved: 2023-07-07

## 2023-09-02 ENCOUNTER — APPOINTMENT (OUTPATIENT)
Facility: HOSPITAL | Age: 88
End: 2023-09-02
Payer: MEDICARE

## 2023-09-02 ENCOUNTER — HOSPITAL ENCOUNTER (EMERGENCY)
Facility: HOSPITAL | Age: 88
Discharge: ANOTHER HOSPITAL | End: 2023-09-03
Attending: EMERGENCY MEDICINE
Payer: MEDICARE

## 2023-09-02 DIAGNOSIS — N39.0 URINARY TRACT INFECTION WITH HEMATURIA, SITE UNSPECIFIED: ICD-10-CM

## 2023-09-02 DIAGNOSIS — R10.31 RIGHT LOWER QUADRANT ABDOMINAL PAIN: Primary | ICD-10-CM

## 2023-09-02 DIAGNOSIS — R31.9 URINARY TRACT INFECTION WITH HEMATURIA, SITE UNSPECIFIED: ICD-10-CM

## 2023-09-02 LAB
ALBUMIN SERPL-MCNC: 3 G/DL (ref 3.5–5)
ALBUMIN/GLOB SERPL: 0.7 (ref 1.1–2.2)
ALP SERPL-CCNC: 62 U/L (ref 45–117)
ALT SERPL-CCNC: 7 U/L (ref 12–78)
ANION GAP SERPL CALC-SCNC: 8 MMOL/L (ref 5–15)
APPEARANCE UR: CLEAR
AST SERPL W P-5'-P-CCNC: 12 U/L (ref 15–37)
BACTERIA URNS QL MICRO: ABNORMAL /HPF
BASOPHILS # BLD: 0 K/UL (ref 0–0.1)
BASOPHILS NFR BLD: 0 % (ref 0–1)
BILIRUB SERPL-MCNC: 0.3 MG/DL (ref 0.2–1)
BILIRUB UR QL: NEGATIVE
BUN SERPL-MCNC: 15 MG/DL (ref 6–20)
BUN/CREAT SERPL: 16 (ref 12–20)
CA-I BLD-MCNC: 8.7 MG/DL (ref 8.5–10.1)
CHLORIDE SERPL-SCNC: 101 MMOL/L (ref 97–108)
CO2 SERPL-SCNC: 30 MMOL/L (ref 21–32)
COLOR UR: ABNORMAL
CREAT SERPL-MCNC: 0.96 MG/DL (ref 0.7–1.3)
DIFFERENTIAL METHOD BLD: ABNORMAL
EOSINOPHIL # BLD: 0.1 K/UL (ref 0–0.4)
EOSINOPHIL NFR BLD: 1 % (ref 0–7)
ERYTHROCYTE [DISTWIDTH] IN BLOOD BY AUTOMATED COUNT: 15.1 % (ref 11.5–14.5)
GLOBULIN SER CALC-MCNC: 4.3 G/DL (ref 2–4)
GLUCOSE SERPL-MCNC: 127 MG/DL (ref 65–100)
GLUCOSE UR STRIP.AUTO-MCNC: NEGATIVE MG/DL
HCT VFR BLD AUTO: 33.6 % (ref 36.6–50.3)
HGB BLD-MCNC: 10.6 G/DL (ref 12.1–17)
HGB UR QL STRIP: ABNORMAL
IMM GRANULOCYTES # BLD AUTO: 0.1 K/UL (ref 0–0.04)
IMM GRANULOCYTES NFR BLD AUTO: 1 % (ref 0–0.5)
KETONES UR QL STRIP.AUTO: ABNORMAL MG/DL
LEUKOCYTE ESTERASE UR QL STRIP.AUTO: ABNORMAL
LIPASE SERPL-CCNC: 46 U/L (ref 73–393)
LYMPHOCYTES # BLD: 1.7 K/UL (ref 0.8–3.5)
LYMPHOCYTES NFR BLD: 15 % (ref 12–49)
MCH RBC QN AUTO: 26.7 PG (ref 26–34)
MCHC RBC AUTO-ENTMCNC: 31.5 G/DL (ref 30–36.5)
MCV RBC AUTO: 84.6 FL (ref 80–99)
MONOCYTES # BLD: 1 K/UL (ref 0–1)
MONOCYTES NFR BLD: 9 % (ref 5–13)
NEUTS SEG # BLD: 8.3 K/UL (ref 1.8–8)
NEUTS SEG NFR BLD: 74 % (ref 32–75)
NITRITE UR QL STRIP.AUTO: NEGATIVE
NRBC # BLD: 0 K/UL (ref 0–0.01)
NRBC BLD-RTO: 0 PER 100 WBC
PH UR STRIP: 5.5 (ref 5–8)
PLATELET # BLD AUTO: 305 K/UL (ref 150–400)
PMV BLD AUTO: 10.1 FL (ref 8.9–12.9)
POTASSIUM SERPL-SCNC: 3.2 MMOL/L (ref 3.5–5.1)
PROT SERPL-MCNC: 7.3 G/DL (ref 6.4–8.2)
PROT UR STRIP-MCNC: 30 MG/DL
RBC # BLD AUTO: 3.97 M/UL (ref 4.1–5.7)
RBC #/AREA URNS HPF: ABNORMAL /HPF (ref 0–3)
SODIUM SERPL-SCNC: 139 MMOL/L (ref 136–145)
SP GR UR REFRACTOMETRY: >1.03 (ref 1–1.03)
UROBILINOGEN UR QL STRIP.AUTO: 0.2 EU/DL (ref 0.2–1)
WBC # BLD AUTO: 11.2 K/UL (ref 4.1–11.1)
WBC URNS QL MICRO: ABNORMAL /HPF (ref 0–5)

## 2023-09-02 PROCEDURE — 99285 EMERGENCY DEPT VISIT HI MDM: CPT

## 2023-09-02 PROCEDURE — 83690 ASSAY OF LIPASE: CPT

## 2023-09-02 PROCEDURE — 96375 TX/PRO/DX INJ NEW DRUG ADDON: CPT

## 2023-09-02 PROCEDURE — 74176 CT ABD & PELVIS W/O CONTRAST: CPT

## 2023-09-02 PROCEDURE — 85025 COMPLETE CBC W/AUTO DIFF WBC: CPT

## 2023-09-02 PROCEDURE — 6360000002 HC RX W HCPCS: Performed by: EMERGENCY MEDICINE

## 2023-09-02 PROCEDURE — 96365 THER/PROPH/DIAG IV INF INIT: CPT

## 2023-09-02 PROCEDURE — 81001 URINALYSIS AUTO W/SCOPE: CPT

## 2023-09-02 PROCEDURE — 80053 COMPREHEN METABOLIC PANEL: CPT

## 2023-09-02 RX ORDER — CALCIUM CARBONATE 500 MG/1
2 TABLET, CHEWABLE ORAL DAILY
Status: ON HOLD | COMMUNITY

## 2023-09-02 RX ORDER — DONEPEZIL HYDROCHLORIDE 10 MG/1
TABLET, FILM COATED ORAL
Status: ON HOLD | COMMUNITY
Start: 2023-08-01

## 2023-09-02 RX ORDER — FENTANYL CITRATE 50 UG/ML
50 INJECTION, SOLUTION INTRAMUSCULAR; INTRAVENOUS
Status: COMPLETED | OUTPATIENT
Start: 2023-09-02 | End: 2023-09-02

## 2023-09-02 RX ORDER — LEVOFLOXACIN 5 MG/ML
500 INJECTION, SOLUTION INTRAVENOUS
Status: COMPLETED | OUTPATIENT
Start: 2023-09-02 | End: 2023-09-03

## 2023-09-02 RX ORDER — PANTOPRAZOLE SODIUM 40 MG/1
TABLET, DELAYED RELEASE ORAL
Status: ON HOLD | COMMUNITY
Start: 2023-08-01

## 2023-09-02 RX ORDER — ONDANSETRON 2 MG/ML
4 INJECTION INTRAMUSCULAR; INTRAVENOUS EVERY 6 HOURS PRN
Status: DISCONTINUED | OUTPATIENT
Start: 2023-09-02 | End: 2023-09-03 | Stop reason: HOSPADM

## 2023-09-02 RX ADMIN — FENTANYL CITRATE 50 MCG: 50 INJECTION, SOLUTION INTRAMUSCULAR; INTRAVENOUS at 23:25

## 2023-09-02 RX ADMIN — ONDANSETRON 4 MG: 2 INJECTION INTRAMUSCULAR; INTRAVENOUS at 23:25

## 2023-09-02 RX ADMIN — LEVOFLOXACIN 500 MG: 5 INJECTION, SOLUTION INTRAVENOUS at 23:37

## 2023-09-02 ASSESSMENT — ENCOUNTER SYMPTOMS
VOMITING: 1
ABDOMINAL PAIN: 1
ALLERGIC/IMMUNOLOGIC NEGATIVE: 1
EYES NEGATIVE: 1
RESPIRATORY NEGATIVE: 1

## 2023-09-03 ENCOUNTER — HOSPITAL ENCOUNTER (INPATIENT)
Facility: HOSPITAL | Age: 88
LOS: 15 days | Discharge: NURSING, REHAB OR LONG-TERM CARE | DRG: 329 | End: 2023-09-18
Attending: EMERGENCY MEDICINE | Admitting: HOSPITALIST
Payer: MEDICARE

## 2023-09-03 VITALS
BODY MASS INDEX: 28.61 KG/M2 | HEIGHT: 72 IN | OXYGEN SATURATION: 83 % | SYSTOLIC BLOOD PRESSURE: 126 MMHG | DIASTOLIC BLOOD PRESSURE: 63 MMHG | RESPIRATION RATE: 20 BRPM | WEIGHT: 211.2 LBS | TEMPERATURE: 98.2 F | HEART RATE: 57 BPM

## 2023-09-03 DIAGNOSIS — K63.1 LARGE BOWEL PERFORATION (HCC): ICD-10-CM

## 2023-09-03 DIAGNOSIS — K92.2 GASTROINTESTINAL HEMORRHAGE, UNSPECIFIED GASTROINTESTINAL HEMORRHAGE TYPE: ICD-10-CM

## 2023-09-03 DIAGNOSIS — M79.601 RIGHT ARM PAIN: ICD-10-CM

## 2023-09-03 DIAGNOSIS — I77.9 CAROTID ARTERY DISEASE, UNSPECIFIED LATERALITY, UNSPECIFIED TYPE (HCC): ICD-10-CM

## 2023-09-03 DIAGNOSIS — K63.89 COLONIC MASS: Primary | ICD-10-CM

## 2023-09-03 PROBLEM — R10.31 RIGHT LOWER QUADRANT ABDOMINAL PAIN: Status: ACTIVE | Noted: 2023-09-03

## 2023-09-03 LAB
ALBUMIN SERPL-MCNC: 2.9 G/DL (ref 3.5–5)
ALBUMIN/GLOB SERPL: 0.7 (ref 1.1–2.2)
ALP SERPL-CCNC: 63 U/L (ref 45–117)
ALT SERPL-CCNC: 12 U/L (ref 12–78)
ANION GAP SERPL CALC-SCNC: 5 MMOL/L (ref 5–15)
APPEARANCE UR: CLEAR
AST SERPL W P-5'-P-CCNC: 8 U/L (ref 15–37)
BACTERIA URNS QL MICRO: NEGATIVE /HPF
BASOPHILS # BLD: 0 K/UL (ref 0–0.1)
BASOPHILS NFR BLD: 0 % (ref 0–1)
BILIRUB SERPL-MCNC: 0.3 MG/DL (ref 0.2–1)
BILIRUB UR QL: NEGATIVE
BUN SERPL-MCNC: 15 MG/DL (ref 6–20)
BUN/CREAT SERPL: 18 (ref 12–20)
CA-I BLD-MCNC: 8.4 MG/DL (ref 8.5–10.1)
CHLORIDE SERPL-SCNC: 104 MMOL/L (ref 97–108)
CO2 SERPL-SCNC: 29 MMOL/L (ref 21–32)
COLOR UR: ABNORMAL
CREAT SERPL-MCNC: 0.85 MG/DL (ref 0.7–1.3)
DIFFERENTIAL METHOD BLD: ABNORMAL
EOSINOPHIL # BLD: 0.1 K/UL (ref 0–0.4)
EOSINOPHIL NFR BLD: 1 % (ref 0–7)
EPITH CASTS URNS QL MICRO: ABNORMAL /LPF
ERYTHROCYTE [DISTWIDTH] IN BLOOD BY AUTOMATED COUNT: 15.1 % (ref 11.5–14.5)
GLOBULIN SER CALC-MCNC: 4 G/DL (ref 2–4)
GLUCOSE SERPL-MCNC: 128 MG/DL (ref 65–100)
GLUCOSE UR STRIP.AUTO-MCNC: NEGATIVE MG/DL
HCT VFR BLD AUTO: 34.1 % (ref 36.6–50.3)
HGB BLD-MCNC: 10.8 G/DL (ref 12.1–17)
HGB UR QL STRIP: ABNORMAL
IMM GRANULOCYTES # BLD AUTO: 0.1 K/UL (ref 0–0.04)
IMM GRANULOCYTES NFR BLD AUTO: 1 % (ref 0–0.5)
KETONES UR QL STRIP.AUTO: NEGATIVE MG/DL
LEUKOCYTE ESTERASE UR QL STRIP.AUTO: ABNORMAL
LYMPHOCYTES # BLD: 1.8 K/UL (ref 0.8–3.5)
LYMPHOCYTES NFR BLD: 19 % (ref 12–49)
MCH RBC QN AUTO: 26.8 PG (ref 26–34)
MCHC RBC AUTO-ENTMCNC: 31.7 G/DL (ref 30–36.5)
MCV RBC AUTO: 84.6 FL (ref 80–99)
MONOCYTES # BLD: 0.8 K/UL (ref 0–1)
MONOCYTES NFR BLD: 8 % (ref 5–13)
MUCOUS THREADS URNS QL MICRO: ABNORMAL /LPF
NEUTS SEG # BLD: 7 K/UL (ref 1.8–8)
NEUTS SEG NFR BLD: 71 % (ref 32–75)
NITRITE UR QL STRIP.AUTO: NEGATIVE
NRBC # BLD: 0 K/UL (ref 0–0.01)
NRBC BLD-RTO: 0 PER 100 WBC
PH UR STRIP: 5 (ref 5–8)
PLATELET # BLD AUTO: 354 K/UL (ref 150–400)
PMV BLD AUTO: 9.6 FL (ref 8.9–12.9)
POTASSIUM SERPL-SCNC: 3.4 MMOL/L (ref 3.5–5.1)
PROT SERPL-MCNC: 6.9 G/DL (ref 6.4–8.2)
PROT UR STRIP-MCNC: 100 MG/DL
RBC # BLD AUTO: 4.03 M/UL (ref 4.1–5.7)
RBC #/AREA URNS HPF: ABNORMAL /HPF (ref 0–5)
SODIUM SERPL-SCNC: 138 MMOL/L (ref 136–145)
SP GR UR REFRACTOMETRY: 1.02 (ref 1–1.03)
TROPONIN I SERPL HS-MCNC: 14 NG/L (ref 0–76)
URINE CULTURE IF INDICATED: ABNORMAL
UROBILINOGEN UR QL STRIP.AUTO: 0.1 EU/DL (ref 0.1–1)
WBC # BLD AUTO: 9.8 K/UL (ref 4.1–11.1)
WBC URNS QL MICRO: ABNORMAL /HPF (ref 0–4)

## 2023-09-03 PROCEDURE — 2500000003 HC RX 250 WO HCPCS: Performed by: STUDENT IN AN ORGANIZED HEALTH CARE EDUCATION/TRAINING PROGRAM

## 2023-09-03 PROCEDURE — 99285 EMERGENCY DEPT VISIT HI MDM: CPT

## 2023-09-03 PROCEDURE — 2700000000 HC OXYGEN THERAPY PER DAY

## 2023-09-03 PROCEDURE — 99222 1ST HOSP IP/OBS MODERATE 55: CPT | Performed by: SURGERY

## 2023-09-03 PROCEDURE — 80053 COMPREHEN METABOLIC PANEL: CPT

## 2023-09-03 PROCEDURE — 0DTE0ZZ RESECTION OF LARGE INTESTINE, OPEN APPROACH: ICD-10-PCS | Performed by: SURGERY

## 2023-09-03 PROCEDURE — 87324 CLOSTRIDIUM AG IA: CPT

## 2023-09-03 PROCEDURE — 85025 COMPLETE CBC W/AUTO DIFF WBC: CPT

## 2023-09-03 PROCEDURE — 6370000000 HC RX 637 (ALT 250 FOR IP): Performed by: STUDENT IN AN ORGANIZED HEALTH CARE EDUCATION/TRAINING PROGRAM

## 2023-09-03 PROCEDURE — 82378 CARCINOEMBRYONIC ANTIGEN: CPT

## 2023-09-03 PROCEDURE — 94761 N-INVAS EAR/PLS OXIMETRY MLT: CPT

## 2023-09-03 PROCEDURE — 6370000000 HC RX 637 (ALT 250 FOR IP): Performed by: HOSPITALIST

## 2023-09-03 PROCEDURE — 81001 URINALYSIS AUTO W/SCOPE: CPT

## 2023-09-03 PROCEDURE — 2580000003 HC RX 258: Performed by: HOSPITALIST

## 2023-09-03 PROCEDURE — 1100000000 HC RM PRIVATE

## 2023-09-03 PROCEDURE — 87449 NOS EACH ORGANISM AG IA: CPT

## 2023-09-03 PROCEDURE — 0D1B0Z4 BYPASS ILEUM TO CUTANEOUS, OPEN APPROACH: ICD-10-PCS | Performed by: SURGERY

## 2023-09-03 PROCEDURE — 87086 URINE CULTURE/COLONY COUNT: CPT

## 2023-09-03 PROCEDURE — 84484 ASSAY OF TROPONIN QUANT: CPT

## 2023-09-03 PROCEDURE — 36415 COLL VENOUS BLD VENIPUNCTURE: CPT

## 2023-09-03 PROCEDURE — 0DBN8ZX EXCISION OF SIGMOID COLON, VIA NATURAL OR ARTIFICIAL OPENING ENDOSCOPIC, DIAGNOSTIC: ICD-10-PCS | Performed by: SURGERY

## 2023-09-03 RX ORDER — MULTIVITAMIN WITH IRON
1 TABLET ORAL DAILY
Status: DISCONTINUED | OUTPATIENT
Start: 2023-09-04 | End: 2023-09-18 | Stop reason: HOSPADM

## 2023-09-03 RX ORDER — LEVOTHYROXINE SODIUM 0.1 MG/1
100 TABLET ORAL DAILY
Status: DISCONTINUED | OUTPATIENT
Start: 2023-09-03 | End: 2023-09-18 | Stop reason: HOSPADM

## 2023-09-03 RX ORDER — ONDANSETRON 2 MG/ML
4 INJECTION INTRAMUSCULAR; INTRAVENOUS EVERY 6 HOURS PRN
Status: DISCONTINUED | OUTPATIENT
Start: 2023-09-03 | End: 2023-09-06 | Stop reason: SDUPTHER

## 2023-09-03 RX ORDER — DONEPEZIL HYDROCHLORIDE 5 MG/1
10 TABLET, FILM COATED ORAL NIGHTLY
Status: DISCONTINUED | OUTPATIENT
Start: 2023-09-03 | End: 2023-09-18 | Stop reason: HOSPADM

## 2023-09-03 RX ORDER — SODIUM CHLORIDE 0.9 % (FLUSH) 0.9 %
5-40 SYRINGE (ML) INJECTION PRN
Status: DISCONTINUED | OUTPATIENT
Start: 2023-09-03 | End: 2023-09-06 | Stop reason: SDUPTHER

## 2023-09-03 RX ORDER — AMLODIPINE BESYLATE 5 MG/1
10 TABLET ORAL DAILY
Status: DISCONTINUED | OUTPATIENT
Start: 2023-09-03 | End: 2023-09-18 | Stop reason: HOSPADM

## 2023-09-03 RX ORDER — TAMSULOSIN HYDROCHLORIDE 0.4 MG/1
0.4 CAPSULE ORAL DAILY
Status: DISCONTINUED | OUTPATIENT
Start: 2023-09-03 | End: 2023-09-18 | Stop reason: HOSPADM

## 2023-09-03 RX ORDER — MORPHINE SULFATE 2 MG/ML
2 INJECTION, SOLUTION INTRAMUSCULAR; INTRAVENOUS EVERY 4 HOURS PRN
Status: DISCONTINUED | OUTPATIENT
Start: 2023-09-03 | End: 2023-09-06

## 2023-09-03 RX ORDER — PRAVASTATIN SODIUM 40 MG
40 TABLET ORAL NIGHTLY
Status: DISCONTINUED | OUTPATIENT
Start: 2023-09-03 | End: 2023-09-18 | Stop reason: HOSPADM

## 2023-09-03 RX ORDER — ASPIRIN 81 MG/1
81 TABLET, CHEWABLE ORAL DAILY
Status: DISCONTINUED | OUTPATIENT
Start: 2023-09-03 | End: 2023-09-18 | Stop reason: HOSPADM

## 2023-09-03 RX ORDER — SODIUM CHLORIDE 9 MG/ML
INJECTION, SOLUTION INTRAVENOUS PRN
Status: DISCONTINUED | OUTPATIENT
Start: 2023-09-03 | End: 2023-09-06 | Stop reason: SDUPTHER

## 2023-09-03 RX ORDER — PAROXETINE HYDROCHLORIDE 20 MG/1
10 TABLET, FILM COATED ORAL DAILY
Status: DISCONTINUED | OUTPATIENT
Start: 2023-09-03 | End: 2023-09-18 | Stop reason: HOSPADM

## 2023-09-03 RX ORDER — PANTOPRAZOLE SODIUM 40 MG/1
40 TABLET, DELAYED RELEASE ORAL
Status: DISCONTINUED | OUTPATIENT
Start: 2023-09-03 | End: 2023-09-18 | Stop reason: HOSPADM

## 2023-09-03 RX ORDER — ROPINIROLE 0.25 MG/1
0.25 TABLET, FILM COATED ORAL NIGHTLY
Status: DISCONTINUED | OUTPATIENT
Start: 2023-09-03 | End: 2023-09-18 | Stop reason: HOSPADM

## 2023-09-03 RX ORDER — LEVOTHYROXINE SODIUM 0.1 MG/1
100 TABLET ORAL DAILY
COMMUNITY
Start: 2023-08-01

## 2023-09-03 RX ORDER — SODIUM CHLORIDE 0.9 % (FLUSH) 0.9 %
5-40 SYRINGE (ML) INJECTION EVERY 12 HOURS SCHEDULED
Status: DISCONTINUED | OUTPATIENT
Start: 2023-09-03 | End: 2023-09-06 | Stop reason: SDUPTHER

## 2023-09-03 RX ORDER — ACETAMINOPHEN 650 MG/1
650 SUPPOSITORY RECTAL EVERY 6 HOURS PRN
Status: DISCONTINUED | OUTPATIENT
Start: 2023-09-03 | End: 2023-09-18 | Stop reason: HOSPADM

## 2023-09-03 RX ORDER — DEXTROSE MONOHYDRATE, SODIUM CHLORIDE, AND POTASSIUM CHLORIDE 50; .745; 4.5 G/1000ML; G/1000ML; G/1000ML
INJECTION, SOLUTION INTRAVENOUS CONTINUOUS
Status: DISCONTINUED | OUTPATIENT
Start: 2023-09-03 | End: 2023-09-06

## 2023-09-03 RX ORDER — PAROXETINE 10 MG/1
10 TABLET, FILM COATED ORAL DAILY
COMMUNITY
Start: 2023-08-07

## 2023-09-03 RX ORDER — ACETAMINOPHEN 325 MG/1
650 TABLET ORAL EVERY 6 HOURS PRN
Status: DISCONTINUED | OUTPATIENT
Start: 2023-09-03 | End: 2023-09-18 | Stop reason: HOSPADM

## 2023-09-03 RX ORDER — CHOLECALCIFEROL (VITAMIN D3) 125 MCG
5 CAPSULE ORAL
Status: DISCONTINUED | OUTPATIENT
Start: 2023-09-03 | End: 2023-09-18 | Stop reason: HOSPADM

## 2023-09-03 RX ORDER — M-VIT,TX,IRON,MINS/CALC/FOLIC 27MG-0.4MG
1 TABLET ORAL DAILY
Status: DISCONTINUED | OUTPATIENT
Start: 2023-09-03 | End: 2023-09-03

## 2023-09-03 RX ORDER — ONDANSETRON 4 MG/1
4 TABLET, ORALLY DISINTEGRATING ORAL EVERY 8 HOURS PRN
Status: DISCONTINUED | OUTPATIENT
Start: 2023-09-03 | End: 2023-09-06 | Stop reason: SDUPTHER

## 2023-09-03 RX ADMIN — PAROXETINE 10 MG: 10 TABLET, FILM COATED ORAL at 09:29

## 2023-09-03 RX ADMIN — ASPIRIN 81 MG CHEWABLE TABLET 81 MG: 81 TABLET CHEWABLE at 16:50

## 2023-09-03 RX ADMIN — DONEPEZIL HYDROCHLORIDE 10 MG: 5 TABLET, FILM COATED ORAL at 21:41

## 2023-09-03 RX ADMIN — POTASSIUM CHLORIDE, DEXTROSE MONOHYDRATE AND SODIUM CHLORIDE: 75; 5; 450 INJECTION, SOLUTION INTRAVENOUS at 15:12

## 2023-09-03 RX ADMIN — ROPINIROLE HYDROCHLORIDE 0.25 MG: 0.25 TABLET, FILM COATED ORAL at 21:20

## 2023-09-03 RX ADMIN — LEVOTHYROXINE SODIUM 100 MCG: 0.03 TABLET ORAL at 09:29

## 2023-09-03 RX ADMIN — TAMSULOSIN HYDROCHLORIDE 0.4 MG: 0.4 CAPSULE ORAL at 09:29

## 2023-09-03 RX ADMIN — PANTOPRAZOLE SODIUM 40 MG: 40 TABLET, DELAYED RELEASE ORAL at 09:28

## 2023-09-03 RX ADMIN — AMLODIPINE BESYLATE 10 MG: 5 TABLET ORAL at 16:50

## 2023-09-03 RX ADMIN — SODIUM CHLORIDE, PRESERVATIVE FREE 10 ML: 5 INJECTION INTRAVENOUS at 09:29

## 2023-09-03 RX ADMIN — SODIUM CHLORIDE, PRESERVATIVE FREE 10 ML: 5 INJECTION INTRAVENOUS at 21:41

## 2023-09-03 RX ADMIN — PRAVASTATIN SODIUM 40 MG: 40 TABLET ORAL at 21:20

## 2023-09-03 RX ADMIN — Medication 5 MG: at 21:41

## 2023-09-03 ASSESSMENT — PAIN DESCRIPTION - LOCATION
LOCATION: ABDOMEN
LOCATION: ABDOMEN

## 2023-09-03 ASSESSMENT — LIFESTYLE VARIABLES
HOW OFTEN DO YOU HAVE A DRINK CONTAINING ALCOHOL: NEVER
HOW MANY STANDARD DRINKS CONTAINING ALCOHOL DO YOU HAVE ON A TYPICAL DAY: PATIENT DOES NOT DRINK

## 2023-09-03 ASSESSMENT — PAIN SCALES - GENERAL
PAINLEVEL_OUTOF10: 4
PAINLEVEL_OUTOF10: 0
PAINLEVEL_OUTOF10: 6

## 2023-09-03 ASSESSMENT — PAIN - FUNCTIONAL ASSESSMENT: PAIN_FUNCTIONAL_ASSESSMENT: 0-10

## 2023-09-03 NOTE — H&P
Hospitalist History & Physical Notes. [address] [facility] Name : Gloria Mena MRN number : [id number]     YOB: 1929     Subjective :   Chief Complaint : Right lower quadrant abdominal pain, found with a colonic mass versus obstruction    Source of information : Patient is very hard of hearing and difficult to get information. Reviewed previous records and referring facility emergency room physician notes. Discussed with our emergency room physician. History of present illness:   Gloria Mena is  80 y.o. male with below mentioned past medical history adjustment of a long-term facility is brought to the emergency room at the referring facility as he was complaining showing his right lower quadrant area suggestive of pain. Even here he was keep showing it, seems to be in discomfort but not in any acute distress. He did not have any fever or chills. No respiratory distress is noted. Work-up at the referring facility emergency room suggestive of masslike wall thickening of the sigmoid colon with a transition point at the junction of descending colon and sigmoid colon, concerning for malignancy versus chronic diverticulosis. Noted to have dilatation of the right hemicolon. On previous admission he did have a gastric distention including small bowel and large bowel was treated with an NG tube.     Past Medical History:   Diagnosis Date    Arthritis     CAD (coronary artery disease)     Hard of hearing     Headache     Hypertension     PMR (polymyalgia rheumatica) (HCC)     Skin cancer     Sleep apnea     no longer uses cpap     Past Surgical History:   Procedure Laterality Date    CAROTID STENT PLACEMENT      CHOLECYSTECTOMY  1998    HERNIA REPAIR  1980    [place] UNLISTED PROCEDURE CARDIAC SURGERY      cardiac stent    VASCULAR SURGERY      carotid endartectomy     Family History   Problem Relation Age of Onset    Kidney Disease Mother     Gout

## 2023-09-03 NOTE — PROCEDURES
Vascular Access Team Consult Note: received consult for PIV replacement from nursing overnight. Ultrasound-guided (USG) PIV placement: see Epic Avatar and EHR flowsheet date for additional vascular access device and procedural information. 20 g 1.75 inch PIV placed with ultrasound-guidance x 1 attempt in right anterior [id number] medial non-cephalic vein. Brisk blood return noted, labs drawn after waste, flushed easily with 10 mL NS via pulsatile flush method. Needle free connector changed and flushed easily with additional 10 mL NS via pulsatile flush method. Dressed with sterile skin barrier prep wipe and transparent Tegaderm dressing. Needle-free connector and alcohol swab end caps utilized. Client tolerated well, no patient complaints. Client continues to benefit from ultrasound-guided PIV placement due to limited suitable veins for USG cannulation, vein depth, and small vein diameters. Primary care nurse, Kolby Rivera, notified. Discussed client's scratching of the bed/bed pan and pulling behaviors; encouraged wrapping loosely with rolled gauze, no Coban, to disguise and decrease client's pulling risks. Please re-enter IP PICC Team Consult in [facility] for any further vascular access needs.      Yoel Edmondson RN

## 2023-09-03 NOTE — CARE COORDINATION
09/03/23 1350   Service Assessment   Patient Orientation Unable to Assess   Cognition Long Term Memory Deficit   History Provided By Child/Family   Primary Caregiver Other (Comment)  ([facility])   [address] is: Legal Next of [address] [facility]   PCP Verified by CM Yes   Last Visit to PCP Within last 3 months   Prior Functional Level Assistance with the following:;Bathing;Dressing; Toileting   Current Functional Level Assistance with the following:;Bathing;Dressing; Toileting   Can patient return to prior living arrangement Unknown at present   Ability to make needs known: Poor   Family able to assist with home care needs: Yes   Would you like for me to discuss the discharge plan with any other family members/significant others, and if so, who? Yes   Financial Resources None   Community Resources None   Social/Functional History   Lives With Other (comment)  [facility]   Discharge Planning   Patient expects to be discharged to: [address] [place] [address] Discharge   Mode of Transport at Discharge BLS   Confirm Follow Up Transport Other (see comment)  (BLS)   Condition of Participation: Discharge Planning   The Plan for Transition of Care is related to the following treatment goals: Silverio Degroot, archie   The Patient and/or Patient Representative was provided with a Choice of Provider? Patient Representative   The Patient and/Or Patient Representative agree with the Discharge Plan? Yes   Freedom of Choice list was provided with basic dialogue that supports the patient's individualized plan of care/goals, treatment preferences, and shares the quality data associated with the providers? Yes     CM called Pt son, he stated that the address on the face sheet is wrong, Pt son stated that Pt has [facility] years.     Pt son stated that [facility] is coming in but he could not remember the name of them, Pt has a walker and wheelchair, Pt

## 2023-09-03 NOTE — ED TRIAGE NOTES
Pt arrives via EMS from [facility] with RLQ abdominal pain that started this evening.

## 2023-09-03 NOTE — ED NOTES
Pt being transported to [facility] via [place] at this time.      Lake Correia RN  09/03/23 5473

## 2023-09-03 NOTE — ED NOTES
Pt put in gown and man wick applied.  Pt resting comfortably in bed with call bell in reach      William Cruz, BORIS  09/03/23 8765

## 2023-09-04 LAB
ALBUMIN SERPL-MCNC: 2.8 G/DL (ref 3.5–5)
ALBUMIN/GLOB SERPL: 0.7 (ref 1.1–2.2)
ALP SERPL-CCNC: 53 U/L (ref 45–117)
ALT SERPL-CCNC: 11 U/L (ref 12–78)
ANION GAP SERPL CALC-SCNC: 4 MMOL/L (ref 5–15)
AST SERPL W P-5'-P-CCNC: 11 U/L (ref 15–37)
BASOPHILS # BLD: 0 K/UL (ref 0–0.1)
BASOPHILS NFR BLD: 0 % (ref 0–1)
BILIRUB SERPL-MCNC: 0.3 MG/DL (ref 0.2–1)
BUN SERPL-MCNC: 10 MG/DL (ref 6–20)
BUN/CREAT SERPL: 13 (ref 12–20)
C DIFF GDH STL QL: NEGATIVE
C DIFF TOX A+B STL QL IA: NEGATIVE
C DIFF TOXIN INTERPRETATION: NORMAL
CA-I BLD-MCNC: 8.3 MG/DL (ref 8.5–10.1)
CEA SERPL-MCNC: 1.6 NG/ML
CHLORIDE SERPL-SCNC: 104 MMOL/L (ref 97–108)
CO2 SERPL-SCNC: 31 MMOL/L (ref 21–32)
CREAT SERPL-MCNC: 0.77 MG/DL (ref 0.7–1.3)
DIFFERENTIAL METHOD BLD: ABNORMAL
EOSINOPHIL # BLD: 0.2 K/UL (ref 0–0.4)
EOSINOPHIL NFR BLD: 2 % (ref 0–7)
ERYTHROCYTE [DISTWIDTH] IN BLOOD BY AUTOMATED COUNT: 14.9 % (ref 11.5–14.5)
GLOBULIN SER CALC-MCNC: 3.9 G/DL (ref 2–4)
GLUCOSE SERPL-MCNC: 100 MG/DL (ref 65–100)
HCT VFR BLD AUTO: 33.3 % (ref 36.6–50.3)
HGB BLD-MCNC: 10.5 G/DL (ref 12.1–17)
IMM GRANULOCYTES # BLD AUTO: 0 K/UL (ref 0–0.04)
IMM GRANULOCYTES NFR BLD AUTO: 0 % (ref 0–0.5)
LYMPHOCYTES # BLD: 1.8 K/UL (ref 0.8–3.5)
LYMPHOCYTES NFR BLD: 18 % (ref 12–49)
MCH RBC QN AUTO: 26.6 PG (ref 26–34)
MCHC RBC AUTO-ENTMCNC: 31.5 G/DL (ref 30–36.5)
MCV RBC AUTO: 84.5 FL (ref 80–99)
MONOCYTES # BLD: 1 K/UL (ref 0–1)
MONOCYTES NFR BLD: 10 % (ref 5–13)
NEUTS SEG # BLD: 6.8 K/UL (ref 1.8–8)
NEUTS SEG NFR BLD: 70 % (ref 32–75)
NRBC # BLD: 0 K/UL (ref 0–0.01)
NRBC BLD-RTO: 0 PER 100 WBC
PLATELET # BLD AUTO: 354 K/UL (ref 150–400)
PMV BLD AUTO: 9.3 FL (ref 8.9–12.9)
POTASSIUM SERPL-SCNC: 3.1 MMOL/L (ref 3.5–5.1)
PROT SERPL-MCNC: 6.7 G/DL (ref 6.4–8.2)
RBC # BLD AUTO: 3.94 M/UL (ref 4.1–5.7)
SODIUM SERPL-SCNC: 139 MMOL/L (ref 136–145)
WBC # BLD AUTO: 9.8 K/UL (ref 4.1–11.1)

## 2023-09-04 PROCEDURE — 85025 COMPLETE CBC W/AUTO DIFF WBC: CPT

## 2023-09-04 PROCEDURE — 2500000003 HC RX 250 WO HCPCS: Performed by: STUDENT IN AN ORGANIZED HEALTH CARE EDUCATION/TRAINING PROGRAM

## 2023-09-04 PROCEDURE — 1100000000 HC RM PRIVATE

## 2023-09-04 PROCEDURE — 2580000003 HC RX 258: Performed by: HOSPITALIST

## 2023-09-04 PROCEDURE — 2700000000 HC OXYGEN THERAPY PER DAY

## 2023-09-04 PROCEDURE — 80053 COMPREHEN METABOLIC PANEL: CPT

## 2023-09-04 PROCEDURE — 6370000000 HC RX 637 (ALT 250 FOR IP): Performed by: INTERNAL MEDICINE

## 2023-09-04 PROCEDURE — 94761 N-INVAS EAR/PLS OXIMETRY MLT: CPT

## 2023-09-04 PROCEDURE — 6370000000 HC RX 637 (ALT 250 FOR IP): Performed by: HOSPITALIST

## 2023-09-04 PROCEDURE — 6370000000 HC RX 637 (ALT 250 FOR IP): Performed by: STUDENT IN AN ORGANIZED HEALTH CARE EDUCATION/TRAINING PROGRAM

## 2023-09-04 PROCEDURE — 99232 SBSQ HOSP IP/OBS MODERATE 35: CPT | Performed by: SURGERY

## 2023-09-04 PROCEDURE — 36415 COLL VENOUS BLD VENIPUNCTURE: CPT

## 2023-09-04 RX ORDER — ENEMA 19; 7 G/133ML; G/133ML
1 ENEMA RECTAL ONCE
Status: COMPLETED | OUTPATIENT
Start: 2023-09-05 | End: 2023-09-05

## 2023-09-04 RX ORDER — ENEMA 19; 7 G/133ML; G/133ML
1 ENEMA RECTAL ONCE
Status: COMPLETED | OUTPATIENT
Start: 2023-09-04 | End: 2023-09-04

## 2023-09-04 RX ORDER — 0.9 % SODIUM CHLORIDE 0.9 %
250 INTRAVENOUS SOLUTION INTRAVENOUS ONCE
Status: COMPLETED | OUTPATIENT
Start: 2023-09-04 | End: 2023-09-04

## 2023-09-04 RX ADMIN — POTASSIUM CHLORIDE, DEXTROSE MONOHYDRATE AND SODIUM CHLORIDE: 75; 5; 450 INJECTION, SOLUTION INTRAVENOUS at 11:07

## 2023-09-04 RX ADMIN — Medication 5 MG: at 20:55

## 2023-09-04 RX ADMIN — SODIUM PHOSPHATE 1 ENEMA: 7; 19 ENEMA RECTAL at 13:43

## 2023-09-04 RX ADMIN — LEVOTHYROXINE SODIUM 100 MCG: 0.03 TABLET ORAL at 09:02

## 2023-09-04 RX ADMIN — ASPIRIN 81 MG CHEWABLE TABLET 81 MG: 81 TABLET CHEWABLE at 09:02

## 2023-09-04 RX ADMIN — ROPINIROLE HYDROCHLORIDE 0.25 MG: 0.25 TABLET, FILM COATED ORAL at 20:55

## 2023-09-04 RX ADMIN — DONEPEZIL HYDROCHLORIDE 10 MG: 5 TABLET, FILM COATED ORAL at 20:55

## 2023-09-04 RX ADMIN — PRAVASTATIN SODIUM 40 MG: 40 TABLET ORAL at 20:55

## 2023-09-04 RX ADMIN — TAMSULOSIN HYDROCHLORIDE 0.4 MG: 0.4 CAPSULE ORAL at 09:02

## 2023-09-04 RX ADMIN — THERA TABS 1 TABLET: TAB at 09:02

## 2023-09-04 RX ADMIN — SODIUM CHLORIDE 250 ML: 9 INJECTION, SOLUTION INTRAVENOUS at 05:54

## 2023-09-04 RX ADMIN — AMLODIPINE BESYLATE 10 MG: 5 TABLET ORAL at 09:02

## 2023-09-04 RX ADMIN — SODIUM CHLORIDE, PRESERVATIVE FREE 10 ML: 5 INJECTION INTRAVENOUS at 09:04

## 2023-09-04 RX ADMIN — PAROXETINE 10 MG: 10 TABLET, FILM COATED ORAL at 09:03

## 2023-09-04 RX ADMIN — PANTOPRAZOLE SODIUM 40 MG: 40 TABLET, DELAYED RELEASE ORAL at 06:58

## 2023-09-04 ASSESSMENT — PAIN SCALES - GENERAL: PAINLEVEL_OUTOF10: 0

## 2023-09-05 ENCOUNTER — APPOINTMENT (OUTPATIENT)
Facility: HOSPITAL | Age: 88
DRG: 329 | End: 2023-09-05
Payer: MEDICARE

## 2023-09-05 ENCOUNTER — ANESTHESIA (OUTPATIENT)
Facility: HOSPITAL | Age: 88
End: 2023-09-05
Payer: MEDICARE

## 2023-09-05 ENCOUNTER — ANESTHESIA EVENT (OUTPATIENT)
Facility: HOSPITAL | Age: 88
End: 2023-09-05
Payer: MEDICARE

## 2023-09-05 PROBLEM — K63.1 LARGE BOWEL PERFORATION (HCC): Status: ACTIVE | Noted: 2023-09-05

## 2023-09-05 LAB
ALBUMIN SERPL-MCNC: 2.8 G/DL (ref 3.5–5)
ALBUMIN/GLOB SERPL: 0.7 (ref 1.1–2.2)
ALP SERPL-CCNC: 56 U/L (ref 45–117)
ALT SERPL-CCNC: 11 U/L (ref 12–78)
ANION GAP SERPL CALC-SCNC: 7 MMOL/L (ref 5–15)
AST SERPL W P-5'-P-CCNC: 17 U/L (ref 15–37)
BACTERIA SPEC CULT: NORMAL
BASOPHILS # BLD: 0.1 K/UL (ref 0–0.1)
BASOPHILS NFR BLD: 1 % (ref 0–1)
BILIRUB SERPL-MCNC: 0.4 MG/DL (ref 0.2–1)
BUN SERPL-MCNC: 11 MG/DL (ref 6–20)
BUN/CREAT SERPL: 15 (ref 12–20)
CA-I BLD-MCNC: 8.6 MG/DL (ref 8.5–10.1)
CHLORIDE SERPL-SCNC: 104 MMOL/L (ref 97–108)
CO2 SERPL-SCNC: 26 MMOL/L (ref 21–32)
CREAT SERPL-MCNC: 0.74 MG/DL (ref 0.7–1.3)
DIFFERENTIAL METHOD BLD: ABNORMAL
EOSINOPHIL # BLD: 0.1 K/UL (ref 0–0.4)
EOSINOPHIL NFR BLD: 1 % (ref 0–7)
ERYTHROCYTE [DISTWIDTH] IN BLOOD BY AUTOMATED COUNT: 15.2 % (ref 11.5–14.5)
GLOBULIN SER CALC-MCNC: 4.1 G/DL (ref 2–4)
GLUCOSE SERPL-MCNC: 90 MG/DL (ref 65–100)
HCT VFR BLD AUTO: 37.4 % (ref 36.6–50.3)
HGB BLD-MCNC: 11.6 G/DL (ref 12.1–17)
IMM GRANULOCYTES # BLD AUTO: 0.1 K/UL (ref 0–0.04)
IMM GRANULOCYTES NFR BLD AUTO: 1 % (ref 0–0.5)
LYMPHOCYTES # BLD: 1.7 K/UL (ref 0.8–3.5)
LYMPHOCYTES NFR BLD: 21 % (ref 12–49)
Lab: NORMAL
MCH RBC QN AUTO: 27 PG (ref 26–34)
MCHC RBC AUTO-ENTMCNC: 31 G/DL (ref 30–36.5)
MCV RBC AUTO: 87 FL (ref 80–99)
MONOCYTES # BLD: 0.7 K/UL (ref 0–1)
MONOCYTES NFR BLD: 9 % (ref 5–13)
NEUTS SEG # BLD: 5.7 K/UL (ref 1.8–8)
NEUTS SEG NFR BLD: 67 % (ref 32–75)
NRBC # BLD: 0 K/UL (ref 0–0.01)
NRBC BLD-RTO: 0 PER 100 WBC
PLATELET # BLD AUTO: 282 K/UL (ref 150–400)
POTASSIUM SERPL-SCNC: 3.1 MMOL/L (ref 3.5–5.1)
PROT SERPL-MCNC: 6.9 G/DL (ref 6.4–8.2)
RBC # BLD AUTO: 4.3 M/UL (ref 4.1–5.7)
SODIUM SERPL-SCNC: 137 MMOL/L (ref 136–145)
WBC # BLD AUTO: 8.3 K/UL (ref 4.1–11.1)

## 2023-09-05 PROCEDURE — 3700000001 HC ADD 15 MINUTES (ANESTHESIA): Performed by: INTERNAL MEDICINE

## 2023-09-05 PROCEDURE — 6370000000 HC RX 637 (ALT 250 FOR IP): Performed by: HOSPITALIST

## 2023-09-05 PROCEDURE — 6360000002 HC RX W HCPCS: Performed by: INTERNAL MEDICINE

## 2023-09-05 PROCEDURE — 3600007512: Performed by: INTERNAL MEDICINE

## 2023-09-05 PROCEDURE — 74177 CT ABD & PELVIS W/CONTRAST: CPT

## 2023-09-05 PROCEDURE — 2580000003 HC RX 258

## 2023-09-05 PROCEDURE — 3600007502: Performed by: INTERNAL MEDICINE

## 2023-09-05 PROCEDURE — 6360000004 HC RX CONTRAST MEDICATION: Performed by: SURGERY

## 2023-09-05 PROCEDURE — 80053 COMPREHEN METABOLIC PANEL: CPT

## 2023-09-05 PROCEDURE — 6360000002 HC RX W HCPCS

## 2023-09-05 PROCEDURE — 2709999900 HC NON-CHARGEABLE SUPPLY: Performed by: INTERNAL MEDICINE

## 2023-09-05 PROCEDURE — 99232 SBSQ HOSP IP/OBS MODERATE 35: CPT | Performed by: SURGERY

## 2023-09-05 PROCEDURE — 71045 X-RAY EXAM CHEST 1 VIEW: CPT

## 2023-09-05 PROCEDURE — 6370000000 HC RX 637 (ALT 250 FOR IP): Performed by: INTERNAL MEDICINE

## 2023-09-05 PROCEDURE — 7100000010 HC PHASE II RECOVERY - FIRST 15 MIN: Performed by: INTERNAL MEDICINE

## 2023-09-05 PROCEDURE — 7100000011 HC PHASE II RECOVERY - ADDTL 15 MIN: Performed by: INTERNAL MEDICINE

## 2023-09-05 PROCEDURE — 6370000000 HC RX 637 (ALT 250 FOR IP): Performed by: STUDENT IN AN ORGANIZED HEALTH CARE EDUCATION/TRAINING PROGRAM

## 2023-09-05 PROCEDURE — 2580000003 HC RX 258: Performed by: HOSPITALIST

## 2023-09-05 PROCEDURE — 36415 COLL VENOUS BLD VENIPUNCTURE: CPT

## 2023-09-05 PROCEDURE — 85025 COMPLETE CBC W/AUTO DIFF WBC: CPT

## 2023-09-05 PROCEDURE — 1100000000 HC RM PRIVATE

## 2023-09-05 PROCEDURE — 3700000000 HC ANESTHESIA ATTENDED CARE: Performed by: INTERNAL MEDICINE

## 2023-09-05 PROCEDURE — 88305 TISSUE EXAM BY PATHOLOGIST: CPT

## 2023-09-05 RX ORDER — SODIUM CHLORIDE, SODIUM LACTATE, POTASSIUM CHLORIDE, CALCIUM CHLORIDE 600; 310; 30; 20 MG/100ML; MG/100ML; MG/100ML; MG/100ML
INJECTION, SOLUTION INTRAVENOUS CONTINUOUS PRN
Status: DISCONTINUED | OUTPATIENT
Start: 2023-09-05 | End: 2023-09-05 | Stop reason: SDUPTHER

## 2023-09-05 RX ORDER — POTASSIUM CHLORIDE 7.45 MG/ML
10 INJECTION INTRAVENOUS
Status: DISCONTINUED | OUTPATIENT
Start: 2023-09-05 | End: 2023-09-05

## 2023-09-05 RX ORDER — METRONIDAZOLE 500 MG/100ML
500 INJECTION, SOLUTION INTRAVENOUS EVERY 8 HOURS
Status: DISCONTINUED | OUTPATIENT
Start: 2023-09-05 | End: 2023-09-08

## 2023-09-05 RX ORDER — CIPROFLOXACIN 2 MG/ML
400 INJECTION, SOLUTION INTRAVENOUS EVERY 12 HOURS
Status: DISCONTINUED | OUTPATIENT
Start: 2023-09-05 | End: 2023-09-06

## 2023-09-05 RX ADMIN — LEVOTHYROXINE SODIUM 100 MCG: 0.03 TABLET ORAL at 10:03

## 2023-09-05 RX ADMIN — PROPOFOL 10 MG: 10 INJECTION, EMULSION INTRAVENOUS at 13:26

## 2023-09-05 RX ADMIN — THERA TABS 1 TABLET: TAB at 10:03

## 2023-09-05 RX ADMIN — SODIUM CHLORIDE, POTASSIUM CHLORIDE, SODIUM LACTATE AND CALCIUM CHLORIDE: 600; 310; 30; 20 INJECTION, SOLUTION INTRAVENOUS at 13:22

## 2023-09-05 RX ADMIN — TAMSULOSIN HYDROCHLORIDE 0.4 MG: 0.4 CAPSULE ORAL at 10:03

## 2023-09-05 RX ADMIN — PANTOPRAZOLE SODIUM 40 MG: 40 TABLET, DELAYED RELEASE ORAL at 05:48

## 2023-09-05 RX ADMIN — PROPOFOL 20 MG: 10 INJECTION, EMULSION INTRAVENOUS at 13:24

## 2023-09-05 RX ADMIN — PROPOFOL 10 MG: 10 INJECTION, EMULSION INTRAVENOUS at 13:23

## 2023-09-05 RX ADMIN — PROPOFOL 20 MG: 10 INJECTION, EMULSION INTRAVENOUS at 13:40

## 2023-09-05 RX ADMIN — PROPOFOL 10 MG: 10 INJECTION, EMULSION INTRAVENOUS at 13:33

## 2023-09-05 RX ADMIN — PROPOFOL 10 MG: 10 INJECTION, EMULSION INTRAVENOUS at 13:30

## 2023-09-05 RX ADMIN — IOPAMIDOL 100 ML: 755 INJECTION, SOLUTION INTRAVENOUS at 21:59

## 2023-09-05 RX ADMIN — PROPOFOL 10 MG: 10 INJECTION, EMULSION INTRAVENOUS at 13:29

## 2023-09-05 RX ADMIN — PROPOFOL 10 MG: 10 INJECTION, EMULSION INTRAVENOUS at 13:28

## 2023-09-05 RX ADMIN — METRONIDAZOLE 500 MG: 500 INJECTION, SOLUTION INTRAVENOUS at 16:50

## 2023-09-05 RX ADMIN — SODIUM CHLORIDE, PRESERVATIVE FREE 10 ML: 5 INJECTION INTRAVENOUS at 10:04

## 2023-09-05 RX ADMIN — ACETAMINOPHEN 650 MG: 325 TABLET ORAL at 10:03

## 2023-09-05 RX ADMIN — PROPOFOL 40 MG: 10 INJECTION, EMULSION INTRAVENOUS at 13:22

## 2023-09-05 RX ADMIN — ASPIRIN 81 MG CHEWABLE TABLET 81 MG: 81 TABLET CHEWABLE at 10:03

## 2023-09-05 RX ADMIN — PROPOFOL 20 MG: 10 INJECTION, EMULSION INTRAVENOUS at 13:25

## 2023-09-05 RX ADMIN — PROPOFOL 10 MG: 10 INJECTION, EMULSION INTRAVENOUS at 13:31

## 2023-09-05 RX ADMIN — PROPOFOL 5 MG: 10 INJECTION, EMULSION INTRAVENOUS at 13:35

## 2023-09-05 RX ADMIN — PROPOFOL 20 MG: 10 INJECTION, EMULSION INTRAVENOUS at 13:38

## 2023-09-05 RX ADMIN — PAROXETINE 10 MG: 10 TABLET, FILM COATED ORAL at 10:03

## 2023-09-05 RX ADMIN — SODIUM PHOSPHATE 1 ENEMA: 7; 19 ENEMA RECTAL at 05:48

## 2023-09-05 RX ADMIN — AMLODIPINE BESYLATE 10 MG: 5 TABLET ORAL at 10:03

## 2023-09-05 RX ADMIN — PROPOFOL 5 MG: 10 INJECTION, EMULSION INTRAVENOUS at 13:37

## 2023-09-05 RX ADMIN — PROPOFOL 10 MG: 10 INJECTION, EMULSION INTRAVENOUS at 13:41

## 2023-09-05 NOTE — PROCEDURES
Vascular Access Team Consult Note: received consult for PIV replacement after nurse reports patient self removed IV overnight. Ultrasound-guided (USG) PIV placement: see Epic Avatar and EHR flowsheet date for additional vascular access device and procedural information. 20 g 1.75 inch PIV placed with ultrasound-guidance x 1 attempt in right basilic vein. Brisk blood return noted, flushed easily with 10 mL NS. Dressed with large, 3M dressing and tape. Needle-free connector and alcohol swab end caps utilized. Client tolerated well, no patient complaints. Client continues to benefit from ultrasound-guided PIV placement due to limited suitable veins for USG cannulation, vein depth, and small vein diameters. Primary care nurse, Crescencio Chong, notified. Please re-enter IP PICC Team Consult in [facility] for any further vascular access needs.      Nelli Neff RN

## 2023-09-05 NOTE — ANESTHESIA POSTPROCEDURE EVALUATION
Department of Anesthesiology  Postprocedure Note    Patient: Devika Bonds Sr.   MRN: [id number]  YOB: 1929  Date of evaluation: 9/5/2023      Procedure Summary     Date: 09/05/23 Room / Location: [facility] 03 / [facility]    Anesthesia Start: 8292 Anesthesia Stop: 1344    Procedures:       COLONOSCOPY DIAGNOSTIC (Lower GI Region)      COLONOSCOPY WITH BIOPSY Diagnosis:       Gastrointestinal hemorrhage, unspecified gastrointestinal hemorrhage type      (Gastrointestinal hemorrhage, unspecified gastrointestinal hemorrhage type [K92.2])    Surgeons: Sukhwinder Elliott MD Responsible Provider: Eli Cee MD    Anesthesia Type: MAC, TIVA ASA Status: 3          Anesthesia Type: MAC, TIVA    Shelly Phase I:      Shelly Phase II:        Anesthesia Post Evaluation    Patient location during evaluation: bedside  Patient participation: complete - patient participated  Level of consciousness: sleepy but conscious  Pain score: 0  Airway patency: patent  Nausea & Vomiting: no vomiting and no nausea  Complications: no  Cardiovascular status: hemodynamically stable  Respiratory status: nasal cannula  Hydration status: stable  Pain management: adequate

## 2023-09-05 NOTE — CARE COORDINATION
Chart reviewed, DCP appears to be for patient to go to SNF, [facility] has accepted. Patient will require insurance auth prior to d/c, CM notified attending of need for PT/OT orders. CM continues to follow and monitor for needs.

## 2023-09-06 ENCOUNTER — ANESTHESIA (OUTPATIENT)
Facility: HOSPITAL | Age: 88
End: 2023-09-06
Payer: MEDICARE

## 2023-09-06 ENCOUNTER — ANESTHESIA EVENT (OUTPATIENT)
Facility: HOSPITAL | Age: 88
End: 2023-09-06
Payer: MEDICARE

## 2023-09-06 ENCOUNTER — APPOINTMENT (OUTPATIENT)
Facility: HOSPITAL | Age: 88
DRG: 329 | End: 2023-09-06
Attending: INTERNAL MEDICINE
Payer: MEDICARE

## 2023-09-06 LAB
ABO + RH BLD: NORMAL
ARTERIAL PATENCY WRIST A: YES
BASE EXCESS BLDA CALC-SCNC: 1.9 MMOL/L (ref 0–3)
BDY SITE: ABNORMAL
BLOOD GROUP ANTIBODIES SERPL: NEGATIVE
BODY TEMPERATURE: 99
COHGB MFR BLD: 2 % (ref 1–2)
ECHO AO ASC DIAM: 3.2 CM
ECHO AO ASCENDING AORTA INDEX: 1.47 CM/M2
ECHO AO ROOT DIAM: 3.2 CM
ECHO AO ROOT INDEX: 1.47 CM/M2
ECHO AR MAX VEL PISA: 3.3 M/S
ECHO AV AREA PEAK VELOCITY: 2 CM2
ECHO AV AREA VTI: 2 CM2
ECHO AV AREA/BSA PEAK VELOCITY: 0.9 CM2/M2
ECHO AV AREA/BSA VTI: 0.9 CM2/M2
ECHO AV CUSP MM: 2.1 CM
ECHO AV MEAN GRADIENT: 6 MMHG
ECHO AV MEAN VELOCITY: 1.2 M/S
ECHO AV PEAK GRADIENT: 12 MMHG
ECHO AV PEAK VELOCITY: 1.7 M/S
ECHO AV REGURGITANT PHT: 573 MS
ECHO AV VELOCITY RATIO: 0.65
ECHO AV VTI: 42.8 CM
ECHO BSA: 2.19 M2
ECHO EST RA PRESSURE: 5 MMHG
ECHO LA AREA 2C: 27.5 CM2
ECHO LA AREA 4C: 17 CM2
ECHO LA DIAMETER INDEX: 1.29 CM/M2
ECHO LA DIAMETER: 2.8 CM
ECHO LA MAJOR AXIS: 4.8 CM
ECHO LA MINOR AXIS: 7.3 CM
ECHO LA TO AORTIC ROOT RATIO: 0.88
ECHO LA VOL 2C: 86 ML (ref 18–58)
ECHO LA VOL 4C: 48 ML (ref 18–58)
ECHO LA VOLUME INDEX A2C: 40 ML/M2 (ref 16–34)
ECHO LA VOLUME INDEX A4C: 22 ML/M2 (ref 16–34)
ECHO LV E' LATERAL VELOCITY: 9 CM/S
ECHO LV E' SEPTAL VELOCITY: 6 CM/S
ECHO LV EDV A2C: 99 ML
ECHO LV EDV A4C: 98 ML
ECHO LV EDV INDEX A4C: 45 ML/M2
ECHO LV EDV NDEX A2C: 46 ML/M2
ECHO LV EJECTION FRACTION A2C: 65 %
ECHO LV EJECTION FRACTION A4C: 65 %
ECHO LV EJECTION FRACTION BIPLANE: 65 % (ref 55–100)
ECHO LV ESV A2C: 34 ML
ECHO LV ESV A4C: 34 ML
ECHO LV ESV INDEX A2C: 16 ML/M2
ECHO LV ESV INDEX A4C: 16 ML/M2
ECHO LV FRACTIONAL SHORTENING: 24 % (ref 28–44)
ECHO LV INTERNAL DIMENSION DIASTOLE INDEX: 1.89 CM/M2
ECHO LV INTERNAL DIMENSION DIASTOLIC MMODE: 6 CM (ref 4.2–5.9)
ECHO LV INTERNAL DIMENSION DIASTOLIC: 4.1 CM (ref 4.2–5.9)
ECHO LV INTERNAL DIMENSION SYSTOLIC INDEX: 1.43 CM/M2
ECHO LV INTERNAL DIMENSION SYSTOLIC MMODE: 3.9 CM
ECHO LV INTERNAL DIMENSION SYSTOLIC: 3.1 CM
ECHO LV IVSD: 1.4 CM (ref 0.6–1)
ECHO LV MASS 2D: 204.9 G (ref 88–224)
ECHO LV MASS INDEX 2D: 94.4 G/M2 (ref 49–115)
ECHO LV POSTERIOR WALL DIASTOLIC MMODE: 1.1 CM (ref 0.6–1)
ECHO LV POSTERIOR WALL DIASTOLIC: 1.3 CM (ref 0.6–1)
ECHO LV RELATIVE WALL THICKNESS RATIO: 0.63
ECHO LVOT AREA: 3.1 CM2
ECHO LVOT AV VTI INDEX: 0.64
ECHO LVOT DIAM: 2 CM
ECHO LVOT MEAN GRADIENT: 2 MMHG
ECHO LVOT PEAK GRADIENT: 5 MMHG
ECHO LVOT PEAK VELOCITY: 1.1 M/S
ECHO LVOT STROKE VOLUME INDEX: 39.9 ML/M2
ECHO LVOT SV: 86.7 ML
ECHO LVOT VTI: 27.6 CM
ECHO MV A VELOCITY: 1.06 M/S
ECHO MV E DECELERATION TIME (DT): 259 MS
ECHO MV E VELOCITY: 1.07 M/S
ECHO MV E/A RATIO: 1.01
ECHO MV E/E' LATERAL: 11.89
ECHO MV E/E' RATIO (AVERAGED): 14.86
ECHO MV E/E' SEPTAL: 17.83
ECHO MV REGURGITANT PEAK GRADIENT: 74 MMHG
ECHO MV REGURGITANT PEAK VELOCITY: 4.3 M/S
ECHO PV AREA CONTINUITY EQ VELOCITY: 2.6 CM2
ECHO PV MAX VELOCITY: 1 M/S
ECHO PV PEAK GRADIENT: 4 MMHG
ECHO QP:QS RATIO: 0.83 NO UNITS
ECHO RA AREA 4C: 21.9 CM2
ECHO RA END SYSTOLIC VOLUME APICAL 4 CHAMBER INDEX BSA: 24 ML/M2
ECHO RA VOLUME: 53 ML
ECHO RIGHT VENTRICULAR SYSTOLIC PRESSURE (RVSP): 30 MMHG
ECHO RV BASAL DIMENSION: 3.3 CM
ECHO RV LONGITUDINAL DIMENSION: 7.9 CM
ECHO RV MID DIMENSION: 2.9 CM
ECHO RV TAPSE: 2.2 CM (ref 1.7–?)
ECHO RVOT AREA: 3.1 CM2
ECHO RVOT DIAMETER: 2 CM
ECHO RVOT MEAN GRADIENT: 1 MMHG
ECHO RVOT PEAK GRADIENT: 3 MMHG
ECHO RVOT PEAK VELOCITY: 0.9 M/S
ECHO RVOT STROKE VOLUME: 71.6 ML
ECHO RVOT VTI: 22.8 CM
ECHO TV REGURGITANT MAX VELOCITY: 2.5 M/S
ECHO TV REGURGITANT PEAK GRADIENT: 25 MMHG
FIO2 ON VENT: 40 %
GAS FLOW.O2 SETTING OXYMISER: 12
HCO3 BLDA-SCNC: 26 MMOL/L (ref 22–26)
METHGB MFR BLD: 0.3 % (ref 0–1.4)
OXYHGB MFR BLD: 96.5 % (ref 95–99)
PCO2 BLDA: 41 MMHG (ref 35–45)
PEEP RESPIRATORY: 5
PERFORMED BY:: ABNORMAL
PH BLDA: 7.43 (ref 7.35–7.45)
PO2 BLDA: 136 MMHG (ref 80–100)
SAO2 % BLD: 99 % (ref 95–99)
SAO2% DEVICE SAO2% SENSOR NAME: ABNORMAL
SPECIMEN EXP DATE BLD: NORMAL
SPECIMEN SITE: ABNORMAL
VT SETTING VENT: 500

## 2023-09-06 PROCEDURE — 3700000000 HC ANESTHESIA ATTENDED CARE: Performed by: SURGERY

## 2023-09-06 PROCEDURE — 6370000000 HC RX 637 (ALT 250 FOR IP): Performed by: HOSPITALIST

## 2023-09-06 PROCEDURE — 6360000002 HC RX W HCPCS: Performed by: SURGERY

## 2023-09-06 PROCEDURE — 86850 RBC ANTIBODY SCREEN: CPT

## 2023-09-06 PROCEDURE — 2580000003 HC RX 258: Performed by: INTERNAL MEDICINE

## 2023-09-06 PROCEDURE — 6360000002 HC RX W HCPCS: Performed by: INTERNAL MEDICINE

## 2023-09-06 PROCEDURE — C8929 TTE W OR WO FOL WCON,DOPPLER: HCPCS

## 2023-09-06 PROCEDURE — 3600000014 HC SURGERY LEVEL 4 ADDTL 15MIN: Performed by: SURGERY

## 2023-09-06 PROCEDURE — 2500000003 HC RX 250 WO HCPCS: Performed by: NURSE ANESTHETIST, CERTIFIED REGISTERED

## 2023-09-06 PROCEDURE — 2700000000 HC OXYGEN THERAPY PER DAY

## 2023-09-06 PROCEDURE — 3700000001 HC ADD 15 MINUTES (ANESTHESIA): Performed by: SURGERY

## 2023-09-06 PROCEDURE — 87040 BLOOD CULTURE FOR BACTERIA: CPT

## 2023-09-06 PROCEDURE — 87086 URINE CULTURE/COLONY COUNT: CPT

## 2023-09-06 PROCEDURE — 6370000000 HC RX 637 (ALT 250 FOR IP): Performed by: SURGERY

## 2023-09-06 PROCEDURE — 88307 TISSUE EXAM BY PATHOLOGIST: CPT

## 2023-09-06 PROCEDURE — 2580000003 HC RX 258: Performed by: NURSE ANESTHETIST, CERTIFIED REGISTERED

## 2023-09-06 PROCEDURE — 2709999900 HC NON-CHARGEABLE SUPPLY: Performed by: SURGERY

## 2023-09-06 PROCEDURE — 87070 CULTURE OTHR SPECIMN AEROBIC: CPT

## 2023-09-06 PROCEDURE — 6360000002 HC RX W HCPCS: Performed by: NURSE ANESTHETIST, CERTIFIED REGISTERED

## 2023-09-06 PROCEDURE — 2000000000 HC ICU R&B

## 2023-09-06 PROCEDURE — 2580000003 HC RX 258: Performed by: SURGERY

## 2023-09-06 PROCEDURE — 82803 BLOOD GASES ANY COMBINATION: CPT

## 2023-09-06 PROCEDURE — 0BH17EZ INSERTION OF ENDOTRACHEAL AIRWAY INTO TRACHEA, VIA NATURAL OR ARTIFICIAL OPENING: ICD-10-PCS | Performed by: ANESTHESIOLOGY

## 2023-09-06 PROCEDURE — 86901 BLOOD TYPING SEROLOGIC RH(D): CPT

## 2023-09-06 PROCEDURE — 36600 WITHDRAWAL OF ARTERIAL BLOOD: CPT

## 2023-09-06 PROCEDURE — 3600000004 HC SURGERY LEVEL 4 BASE: Performed by: SURGERY

## 2023-09-06 PROCEDURE — 94002 VENT MGMT INPAT INIT DAY: CPT

## 2023-09-06 PROCEDURE — 2720000010 HC SURG SUPPLY STERILE: Performed by: SURGERY

## 2023-09-06 PROCEDURE — 5A1945Z RESPIRATORY VENTILATION, 24-96 CONSECUTIVE HOURS: ICD-10-PCS | Performed by: ANESTHESIOLOGY

## 2023-09-06 PROCEDURE — 2500000003 HC RX 250 WO HCPCS: Performed by: SURGERY

## 2023-09-06 PROCEDURE — 86900 BLOOD TYPING SEROLOGIC ABO: CPT

## 2023-09-06 PROCEDURE — 87205 SMEAR GRAM STAIN: CPT

## 2023-09-06 RX ORDER — DEXAMETHASONE SODIUM PHOSPHATE 4 MG/ML
INJECTION, SOLUTION INTRA-ARTICULAR; INTRALESIONAL; INTRAMUSCULAR; INTRAVENOUS; SOFT TISSUE PRN
Status: DISCONTINUED | OUTPATIENT
Start: 2023-09-06 | End: 2023-09-06 | Stop reason: SDUPTHER

## 2023-09-06 RX ORDER — ONDANSETRON 2 MG/ML
4 INJECTION INTRAMUSCULAR; INTRAVENOUS EVERY 6 HOURS PRN
Status: DISCONTINUED | OUTPATIENT
Start: 2023-09-06 | End: 2023-09-18 | Stop reason: HOSPADM

## 2023-09-06 RX ORDER — SODIUM CHLORIDE, SODIUM LACTATE, POTASSIUM CHLORIDE, CALCIUM CHLORIDE 600; 310; 30; 20 MG/100ML; MG/100ML; MG/100ML; MG/100ML
INJECTION, SOLUTION INTRAVENOUS CONTINUOUS PRN
Status: DISCONTINUED | OUTPATIENT
Start: 2023-09-06 | End: 2023-09-06 | Stop reason: SDUPTHER

## 2023-09-06 RX ORDER — SODIUM CHLORIDE 9 MG/ML
INJECTION, SOLUTION INTRAVENOUS PRN
Status: DISCONTINUED | OUTPATIENT
Start: 2023-09-06 | End: 2023-09-18 | Stop reason: HOSPADM

## 2023-09-06 RX ORDER — PROPOFOL 10 MG/ML
5-50 INJECTION, EMULSION INTRAVENOUS CONTINUOUS
Status: DISCONTINUED | OUTPATIENT
Start: 2023-09-06 | End: 2023-09-09

## 2023-09-06 RX ORDER — ONDANSETRON 4 MG/1
4 TABLET, ORALLY DISINTEGRATING ORAL EVERY 8 HOURS PRN
Status: DISCONTINUED | OUTPATIENT
Start: 2023-09-06 | End: 2023-09-18 | Stop reason: HOSPADM

## 2023-09-06 RX ORDER — ROCURONIUM BROMIDE 10 MG/ML
INJECTION, SOLUTION INTRAVENOUS PRN
Status: DISCONTINUED | OUTPATIENT
Start: 2023-09-06 | End: 2023-09-06 | Stop reason: SDUPTHER

## 2023-09-06 RX ORDER — LIDOCAINE HYDROCHLORIDE 20 MG/ML
INJECTION, SOLUTION EPIDURAL; INFILTRATION; INTRACAUDAL; PERINEURAL PRN
Status: DISCONTINUED | OUTPATIENT
Start: 2023-09-06 | End: 2023-09-06 | Stop reason: SDUPTHER

## 2023-09-06 RX ORDER — POTASSIUM CHLORIDE 7.45 MG/ML
10 INJECTION INTRAVENOUS
Status: COMPLETED | OUTPATIENT
Start: 2023-09-06 | End: 2023-09-06

## 2023-09-06 RX ORDER — FENTANYL CITRATE-0.9 % NACL/PF 10 MCG/ML
25-200 PLASTIC BAG, INJECTION (ML) INTRAVENOUS CONTINUOUS
Status: DISCONTINUED | OUTPATIENT
Start: 2023-09-06 | End: 2023-09-09

## 2023-09-06 RX ORDER — SODIUM CHLORIDE 0.9 % (FLUSH) 0.9 %
5-40 SYRINGE (ML) INJECTION PRN
Status: DISCONTINUED | OUTPATIENT
Start: 2023-09-06 | End: 2023-09-18 | Stop reason: HOSPADM

## 2023-09-06 RX ORDER — SODIUM CHLORIDE 0.9 % (FLUSH) 0.9 %
5-40 SYRINGE (ML) INJECTION EVERY 12 HOURS SCHEDULED
Status: DISCONTINUED | OUTPATIENT
Start: 2023-09-06 | End: 2023-09-18 | Stop reason: HOSPADM

## 2023-09-06 RX ORDER — SODIUM CHLORIDE, SODIUM LACTATE, POTASSIUM CHLORIDE, CALCIUM CHLORIDE 600; 310; 30; 20 MG/100ML; MG/100ML; MG/100ML; MG/100ML
INJECTION, SOLUTION INTRAVENOUS CONTINUOUS
Status: DISCONTINUED | OUTPATIENT
Start: 2023-09-06 | End: 2023-09-10

## 2023-09-06 RX ORDER — GLYCOPYRROLATE 0.2 MG/ML
INJECTION INTRAMUSCULAR; INTRAVENOUS PRN
Status: DISCONTINUED | OUTPATIENT
Start: 2023-09-06 | End: 2023-09-06 | Stop reason: SDUPTHER

## 2023-09-06 RX ORDER — FENTANYL CITRATE 50 UG/ML
INJECTION, SOLUTION INTRAMUSCULAR; INTRAVENOUS PRN
Status: DISCONTINUED | OUTPATIENT
Start: 2023-09-06 | End: 2023-09-06 | Stop reason: SDUPTHER

## 2023-09-06 RX ORDER — ENOXAPARIN SODIUM 100 MG/ML
40 INJECTION SUBCUTANEOUS DAILY
Status: DISCONTINUED | OUTPATIENT
Start: 2023-09-07 | End: 2023-09-18 | Stop reason: HOSPADM

## 2023-09-06 RX ORDER — PROPOFOL 10 MG/ML
5-50 INJECTION, EMULSION INTRAVENOUS ONCE
Status: DISCONTINUED | OUTPATIENT
Start: 2023-09-06 | End: 2023-09-06

## 2023-09-06 RX ADMIN — PRAVASTATIN SODIUM 40 MG: 40 TABLET ORAL at 22:04

## 2023-09-06 RX ADMIN — SODIUM CHLORIDE, POTASSIUM CHLORIDE, SODIUM LACTATE AND CALCIUM CHLORIDE: 600; 310; 30; 20 INJECTION, SOLUTION INTRAVENOUS at 05:38

## 2023-09-06 RX ADMIN — LIDOCAINE HYDROCHLORIDE 100 MG: 20 INJECTION, SOLUTION EPIDURAL; INFILTRATION; INTRACAUDAL; PERINEURAL at 01:12

## 2023-09-06 RX ADMIN — TAMSULOSIN HYDROCHLORIDE 0.4 MG: 0.4 CAPSULE ORAL at 10:51

## 2023-09-06 RX ADMIN — SODIUM CHLORIDE, POTASSIUM CHLORIDE, SODIUM LACTATE AND CALCIUM CHLORIDE: 600; 310; 30; 20 INJECTION, SOLUTION INTRAVENOUS at 00:59

## 2023-09-06 RX ADMIN — CIPROFLOXACIN 400 MG: 2 INJECTION, SOLUTION INTRAVENOUS at 00:59

## 2023-09-06 RX ADMIN — ROCURONIUM BROMIDE 100 MG: 10 INJECTION, SOLUTION INTRAVENOUS at 01:12

## 2023-09-06 RX ADMIN — DONEPEZIL HYDROCHLORIDE 10 MG: 5 TABLET, FILM COATED ORAL at 21:25

## 2023-09-06 RX ADMIN — Medication 50 MCG/HR: at 21:25

## 2023-09-06 RX ADMIN — SODIUM CHLORIDE, POTASSIUM CHLORIDE, SODIUM LACTATE AND CALCIUM CHLORIDE: 600; 310; 30; 20 INJECTION, SOLUTION INTRAVENOUS at 03:52

## 2023-09-06 RX ADMIN — CIPROFLOXACIN 400 MG: 2 INJECTION, SOLUTION INTRAVENOUS at 00:01

## 2023-09-06 RX ADMIN — METRONIDAZOLE 500 MG: 500 INJECTION, SOLUTION INTRAVENOUS at 00:02

## 2023-09-06 RX ADMIN — ROCURONIUM BROMIDE 30 MG: 10 INJECTION, SOLUTION INTRAVENOUS at 03:59

## 2023-09-06 RX ADMIN — METRONIDAZOLE 500 MG: 500 INJECTION, SOLUTION INTRAVENOUS at 16:02

## 2023-09-06 RX ADMIN — PAROXETINE 10 MG: 10 TABLET, FILM COATED ORAL at 10:51

## 2023-09-06 RX ADMIN — MEROPENEM 1000 MG: 1 INJECTION, POWDER, FOR SOLUTION INTRAVENOUS at 18:18

## 2023-09-06 RX ADMIN — THERA TABS 1 TABLET: TAB at 10:51

## 2023-09-06 RX ADMIN — SODIUM CHLORIDE, PRESERVATIVE FREE 10 ML: 5 INJECTION INTRAVENOUS at 21:25

## 2023-09-06 RX ADMIN — ROPINIROLE HYDROCHLORIDE 0.25 MG: 0.25 TABLET, FILM COATED ORAL at 21:25

## 2023-09-06 RX ADMIN — MEROPENEM 1000 MG: 1 INJECTION, POWDER, FOR SOLUTION INTRAVENOUS at 13:06

## 2023-09-06 RX ADMIN — FENTANYL CITRATE 50 MCG: 50 INJECTION, SOLUTION INTRAMUSCULAR; INTRAVENOUS at 03:58

## 2023-09-06 RX ADMIN — FENTANYL CITRATE 50 MCG: 50 INJECTION, SOLUTION INTRAMUSCULAR; INTRAVENOUS at 04:41

## 2023-09-06 RX ADMIN — METRONIDAZOLE 500 MG: 500 INJECTION, SOLUTION INTRAVENOUS at 10:29

## 2023-09-06 RX ADMIN — POTASSIUM CHLORIDE 10 MEQ: 7.45 INJECTION INTRAVENOUS at 10:41

## 2023-09-06 RX ADMIN — PROPOFOL 30 MCG/KG/MIN: 10 INJECTION, EMULSION INTRAVENOUS at 11:01

## 2023-09-06 RX ADMIN — PHENYLEPHRINE HYDROCHLORIDE 100 MCG: 10 INJECTION INTRAVENOUS at 02:48

## 2023-09-06 RX ADMIN — FENTANYL CITRATE 100 MCG: 50 INJECTION, SOLUTION INTRAMUSCULAR; INTRAVENOUS at 01:12

## 2023-09-06 RX ADMIN — POTASSIUM CHLORIDE 10 MEQ: 7.45 INJECTION INTRAVENOUS at 12:57

## 2023-09-06 RX ADMIN — Medication 50 MCG/HR: at 05:44

## 2023-09-06 RX ADMIN — LEVOTHYROXINE SODIUM 100 MCG: 0.03 TABLET ORAL at 10:51

## 2023-09-06 RX ADMIN — ONDANSETRON 4 MG: 4 TABLET, ORALLY DISINTEGRATING ORAL at 02:09

## 2023-09-06 RX ADMIN — Medication 5 MG: at 21:25

## 2023-09-06 RX ADMIN — ASPIRIN 81 MG CHEWABLE TABLET 81 MG: 81 TABLET CHEWABLE at 10:51

## 2023-09-06 RX ADMIN — PROPOFOL 20 MCG/KG/MIN: 10 INJECTION, EMULSION INTRAVENOUS at 05:37

## 2023-09-06 RX ADMIN — DEXAMETHASONE SODIUM PHOSPHATE 4 MG: 4 INJECTION, SOLUTION INTRA-ARTICULAR; INTRALESIONAL; INTRAMUSCULAR; INTRAVENOUS; SOFT TISSUE at 02:09

## 2023-09-06 RX ADMIN — POTASSIUM CHLORIDE 10 MEQ: 7.45 INJECTION INTRAVENOUS at 11:51

## 2023-09-06 RX ADMIN — SODIUM CHLORIDE, POTASSIUM CHLORIDE, SODIUM LACTATE AND CALCIUM CHLORIDE: 600; 310; 30; 20 INJECTION, SOLUTION INTRAVENOUS at 10:25

## 2023-09-06 RX ADMIN — PROPOFOL 150 MG: 10 INJECTION, EMULSION INTRAVENOUS at 01:12

## 2023-09-06 RX ADMIN — PROPOFOL 25 MCG/KG/MIN: 10 INJECTION, EMULSION INTRAVENOUS at 18:18

## 2023-09-06 RX ADMIN — GLYCOPYRROLATE 0.1 MG: 0.2 INJECTION, SOLUTION INTRAMUSCULAR; INTRAVENOUS at 02:15

## 2023-09-06 ASSESSMENT — PULMONARY FUNCTION TESTS
PIF_VALUE: 17
PIF_VALUE: 18
PIF_VALUE: 24

## 2023-09-06 NOTE — ANESTHESIA PRE PROCEDURE
Department of Anesthesiology  Preprocedure Note       Name:  Linda Ponce.   Age:  80 y.o.  :  1929                                          MRN:  [id number]         Date:  2023      Surgeon: Dinh Carrasco):  Davis Reyes DO    Procedure: Procedure(s):  EXPLORATORY LAPAROTOMY, POSSIBLE BOWEL RESECTION, POSSIBLE COLOSTOMY    Medications prior to admission:   Prior to Admission medications    Medication Sig Start Date End Date Taking?  Authorizing Provider   levothyroxine (SYNTHROID) 100 MCG tablet Take 1 tablet by mouth daily 23   Historical Provider, MD   PARoxetine (PAXIL) 10 MG tablet Take 1 tablet by mouth daily 23   Historical Provider, MD   donepezil (ARICEPT) 10 MG tablet  23   Historical Provider, MD   pantoprazole (PROTONIX) 40 MG tablet  23   Historical Provider, MD   calcium carbonate (TUMS) 500 MG chewable tablet Take 2 tablets by mouth daily [address]    Historical Provider, MD   amLODIPine (NORVASC) 10 MG tablet Take 1 tablet by mouth daily 6/10/23 7/10/23  Miguel Jay MD   ondansetron (ZOFRAN-ODT) 4 MG disintegrating tablet Take 1 tablet by mouth 3 times daily as needed for Nausea or Vomiting 23   Miguel Jay MD   diclofenac sodium (VOLTAREN) 1 % GEL Apply topically 2 times daily    Historical Provider, MD   Multiple Vitamin (MULTIVITAMIN ADULT PO) Take 1 tablet by mouth    Historical Provider, MD   aspirin 81 MG chewable tablet Take 1 tablet by mouth daily 21   Ar Automatic Reconciliation   Melatonin 5 MG CAPS Take by mouth    Ar Automatic Reconciliation   polyethylene glycol (GLYCOLAX) 17 GM/SCOOP powder Take 17 g by mouth daily    Ar Automatic Reconciliation   pravastatin (PRAVACHOL) 40 MG tablet Take 1 tablet by mouth nightly    Ar Automatic Reconciliation   rOPINIRole (REQUIP) 0.25 MG tablet Take 1 tablet by mouth    Ar Automatic Reconciliation   tamsulosin (FLOMAX) 0.4 MG capsule Take 1 capsule by mouth daily    Ar Automatic

## 2023-09-06 NOTE — BRIEF OP NOTE
Brief Postoperative Note      Patient: Stanley Arredondo Sr.   YOB: 1929  MRN: [id number]    Date of Procedure: 9/6/2023    Pre-Op Diagnosis Codes:     * Large bowel perforation (HCC) [K63.1]    Post-Op Diagnosis:  Cecal perforation, complicated diverticulitis possible mass left colon       Procedure(s):  EXPLORATORY LAPAROTOMY, TOTAL COLECTOMY, END ILEOSTOMY    Surgeon(s):  Ember Reyes DO    Assistant:  * No surgical staff found *    Anesthesia: General    Estimated Blood Loss (mL): 249    Complications: None    Specimens:   ID Type Source Tests Collected by Time Destination   1 : URINE CULTURE Urine Urine, indwelling catheter CULTURE, URINE Ember Reyes DO 9/6/2023 0120    A : COLON AND TERMINAL ILIUM Tissue Colon SURGICAL PATHOLOGY Daisha Reyes DO 9/6/2023 0335        Implants:  * No implants in log *      Drains:   Closed/Suction Drain LLQ Bulb (Active)       NG/OG/NJ/NE Tube Nasogastric Right nostril (Active)       Urinary Catheter 09/06/23 Oliva-Temperature (Active)       Findings: Extensive serosal tears and evidence of mucosal ischemia throughout the cecum and the right colon, thickening of the transverse colon with extensive diverticulosis, multiple areas of complicated inflammation of the descending and sigmoid colon causing tortuosity of the sigmoid colon, adherence to the sidewall and likely mass effect partial obstruction      Electronically signed by Daisha Reyes DO on 9/6/2023 at 4:40 AM

## 2023-09-06 NOTE — PROCEDURES
Vascular Access Team Consult Note: received consult for PIV replacement and ABG draw with ultrasound guidance per primary care nurse, Jean Claude Jacob. Ultrasound-guided (USG) PIV placement: see Epic Avatar and EHR flowsheet date for additional vascular access device and procedural information. 22 g 1.75 inch PIV placed with ultrasound-guidance x 1 attempt in left anterior mid-forearm cephalic vein. Brisk blood return noted, flushed easily with 10 mL NS. Dressed with sterile skin barrier prep wipe and transparent Tegaderm dressing. Needle-free connector and alcohol swab end caps utilized. 18 g 1.18 inch [employer] PIV placed with ultrasound-guidance x 1 attempt in left AC basilic branch vein. Brisk blood return noted, flushed easily with 10 mL NS. Dressed with sterile skin barrier prep wipe and transparent Tegaderm dressing. Needle-free connector and alcohol swab end caps utilized. ABG drawn x 1 attempt with ultrasound-guidance in right radial artery per procedures. Hemostasis achieved, covered with sterile gauze and transparent Tegaderm dressing. No hematoma, eccyhmosis, or swelling noted. No peripheral vascular changes noted. Client tolerated well, no patient complaints. Client continues to benefit from ultrasound-guided PIV placement due to limited suitable veins for USG cannulation, vein depth, and small vein diameters. Primary care nurse, Jean Claude Jacob, notified. Please re-enter IP PICC Team Consult in [facility] for any further vascular access needs.      Aubrie Barnes RN

## 2023-09-06 NOTE — OP NOTE
were applied. The patient remained intubated and was taken to the intensive care unit postoperatively. He did remain hemodynamically stable throughout the surgery.     Electronically signed by Ramiro Reyes DO on 9/6/2023 at 4:46 AM

## 2023-09-07 ENCOUNTER — APPOINTMENT (OUTPATIENT)
Facility: HOSPITAL | Age: 88
DRG: 329 | End: 2023-09-07
Payer: MEDICARE

## 2023-09-07 ENCOUNTER — APPOINTMENT (OUTPATIENT)
Facility: HOSPITAL | Age: 88
DRG: 329 | End: 2023-09-07
Attending: INTERNAL MEDICINE
Payer: MEDICARE

## 2023-09-07 LAB
ALBUMIN SERPL-MCNC: 2 G/DL (ref 3.5–5)
ALBUMIN/GLOB SERPL: 0.6 (ref 1.1–2.2)
ALP SERPL-CCNC: 44 U/L (ref 45–117)
ALT SERPL-CCNC: 12 U/L (ref 12–78)
ANION GAP SERPL CALC-SCNC: 6 MMOL/L (ref 5–15)
ARTERIAL PATENCY WRIST A: YES
ARTERIAL PATENCY WRIST A: YES
AST SERPL W P-5'-P-CCNC: 14 U/L (ref 15–37)
BACTERIA SPEC CULT: NORMAL
BASE EXCESS BLDA CALC-SCNC: 3.3 MMOL/L (ref 0–3)
BASE EXCESS BLDA CALC-SCNC: 9.7 MMOL/L (ref 0–3)
BASOPHILS # BLD: 0 K/UL (ref 0–0.1)
BASOPHILS NFR BLD: 0 % (ref 0–1)
BDY SITE: ABNORMAL
BDY SITE: ABNORMAL
BILIRUB SERPL-MCNC: 0.3 MG/DL (ref 0.2–1)
BODY TEMPERATURE: 98.6
BODY TEMPERATURE: 98.6
BUN SERPL-MCNC: 18 MG/DL (ref 6–20)
BUN/CREAT SERPL: 22 (ref 12–20)
CA-I BLD-MCNC: 7.7 MG/DL (ref 8.5–10.1)
CHLORIDE SERPL-SCNC: 106 MMOL/L (ref 97–108)
CO2 SERPL-SCNC: 27 MMOL/L (ref 21–32)
COHGB MFR BLD: 0.4 % (ref 1–2)
COHGB MFR BLD: 0.8 % (ref 1–2)
CREAT SERPL-MCNC: 0.82 MG/DL (ref 0.7–1.3)
DIFFERENTIAL METHOD BLD: ABNORMAL
EOSINOPHIL # BLD: 0.1 K/UL (ref 0–0.4)
EOSINOPHIL NFR BLD: 1 % (ref 0–7)
ERYTHROCYTE [DISTWIDTH] IN BLOOD BY AUTOMATED COUNT: 15.6 % (ref 11.5–14.5)
FIO2 ON VENT: 35 %
FIO2 ON VENT: 50 %
GAS FLOW.O2 O2 DELIVERY SYS: 50 L/MIN
GAS FLOW.O2 SETTING OXYMISER: 12
GLOBULIN SER CALC-MCNC: 3.4 G/DL (ref 2–4)
GLUCOSE SERPL-MCNC: 111 MG/DL (ref 65–100)
HCO3 BLDA-SCNC: 27 MMOL/L (ref 22–26)
HCO3 BLDA-SCNC: 33 MMOL/L (ref 22–26)
HCT VFR BLD AUTO: 27.9 % (ref 36.6–50.3)
HGB BLD-MCNC: 9.3 G/DL (ref 12.1–17)
IMM GRANULOCYTES # BLD AUTO: 0.1 K/UL (ref 0–0.04)
IMM GRANULOCYTES NFR BLD AUTO: 1 % (ref 0–0.5)
LYMPHOCYTES # BLD: 1.5 K/UL (ref 0.8–3.5)
LYMPHOCYTES NFR BLD: 10 % (ref 12–49)
Lab: NORMAL
MAGNESIUM SERPL-MCNC: 1.7 MG/DL (ref 1.6–2.4)
MCH RBC QN AUTO: 27.4 PG (ref 26–34)
MCHC RBC AUTO-ENTMCNC: 33.3 G/DL (ref 30–36.5)
MCV RBC AUTO: 82.1 FL (ref 80–99)
METHGB MFR BLD: 0.3 % (ref 0–1.4)
METHGB MFR BLD: 0.3 % (ref 0–1.4)
MONOCYTES # BLD: 1.5 K/UL (ref 0–1)
MONOCYTES NFR BLD: 10 % (ref 5–13)
NEUTS SEG # BLD: 11.4 K/UL (ref 1.8–8)
NEUTS SEG NFR BLD: 78 % (ref 32–75)
NRBC # BLD: 0 K/UL (ref 0–0.01)
NRBC BLD-RTO: 0 PER 100 WBC
OXYHGB MFR BLD: 90.7 % (ref 95–99)
OXYHGB MFR BLD: 96.8 % (ref 95–99)
PCO2 BLDA: 36 MMHG (ref 35–45)
PCO2 BLDA: 40 MMHG (ref 35–45)
PEEP RESPIRATORY: 5
PERFORMED BY:: ABNORMAL
PERFORMED BY:: ABNORMAL
PH BLDA: 7.49 (ref 7.35–7.45)
PH BLDA: 7.54 (ref 7.35–7.45)
PLATELET # BLD AUTO: 347 K/UL (ref 150–400)
PMV BLD AUTO: 10 FL (ref 8.9–12.9)
PO2 BLDA: 109 MMHG (ref 80–100)
PO2 BLDA: 58 MMHG (ref 80–100)
POTASSIUM SERPL-SCNC: 3.2 MMOL/L (ref 3.5–5.1)
PROT SERPL-MCNC: 5.4 G/DL (ref 6.4–8.2)
RBC # BLD AUTO: 3.4 M/UL (ref 4.1–5.7)
SAO2 % BLD: 91 % (ref 95–99)
SAO2 % BLD: 98 % (ref 95–99)
SAO2% DEVICE SAO2% SENSOR NAME: ABNORMAL
SAO2% DEVICE SAO2% SENSOR NAME: ABNORMAL
SODIUM SERPL-SCNC: 139 MMOL/L (ref 136–145)
SPECIMEN SITE: ABNORMAL
SPECIMEN SITE: ABNORMAL
VENTILATION MODE VENT: ABNORMAL
VT SETTING VENT: 500
WBC # BLD AUTO: 14.6 K/UL (ref 4.1–11.1)

## 2023-09-07 PROCEDURE — 94003 VENT MGMT INPAT SUBQ DAY: CPT

## 2023-09-07 PROCEDURE — 6370000000 HC RX 637 (ALT 250 FOR IP): Performed by: SURGERY

## 2023-09-07 PROCEDURE — 6360000002 HC RX W HCPCS: Performed by: SURGERY

## 2023-09-07 PROCEDURE — 36556 INSERT NON-TUNNEL CV CATH: CPT

## 2023-09-07 PROCEDURE — 36600 WITHDRAWAL OF ARTERIAL BLOOD: CPT

## 2023-09-07 PROCEDURE — 2700000000 HC OXYGEN THERAPY PER DAY

## 2023-09-07 PROCEDURE — 80053 COMPREHEN METABOLIC PANEL: CPT

## 2023-09-07 PROCEDURE — 76942 ECHO GUIDE FOR BIOPSY: CPT

## 2023-09-07 PROCEDURE — 2580000003 HC RX 258: Performed by: INTERNAL MEDICINE

## 2023-09-07 PROCEDURE — 83735 ASSAY OF MAGNESIUM: CPT

## 2023-09-07 PROCEDURE — 6360000002 HC RX W HCPCS: Performed by: INTERNAL MEDICINE

## 2023-09-07 PROCEDURE — 71045 X-RAY EXAM CHEST 1 VIEW: CPT

## 2023-09-07 PROCEDURE — 2000000000 HC ICU R&B

## 2023-09-07 PROCEDURE — 82803 BLOOD GASES ANY COMBINATION: CPT

## 2023-09-07 PROCEDURE — 85025 COMPLETE CBC W/AUTO DIFF WBC: CPT

## 2023-09-07 PROCEDURE — 94761 N-INVAS EAR/PLS OXIMETRY MLT: CPT

## 2023-09-07 PROCEDURE — 2580000003 HC RX 258: Performed by: SURGERY

## 2023-09-07 PROCEDURE — 36415 COLL VENOUS BLD VENIPUNCTURE: CPT

## 2023-09-07 PROCEDURE — 2500000003 HC RX 250 WO HCPCS: Performed by: SURGERY

## 2023-09-07 RX ORDER — POTASSIUM CHLORIDE 7.45 MG/ML
10 INJECTION INTRAVENOUS
Status: COMPLETED | OUTPATIENT
Start: 2023-09-07 | End: 2023-09-07

## 2023-09-07 RX ADMIN — PROPOFOL 30 MCG/KG/MIN: 10 INJECTION, EMULSION INTRAVENOUS at 21:34

## 2023-09-07 RX ADMIN — LEVOTHYROXINE SODIUM 100 MCG: 0.03 TABLET ORAL at 09:00

## 2023-09-07 RX ADMIN — PANTOPRAZOLE SODIUM 40 MG: 40 TABLET, DELAYED RELEASE ORAL at 06:54

## 2023-09-07 RX ADMIN — PROPOFOL 25 MCG/KG/MIN: 10 INJECTION, EMULSION INTRAVENOUS at 00:50

## 2023-09-07 RX ADMIN — SODIUM CHLORIDE, PRESERVATIVE FREE 10 ML: 5 INJECTION INTRAVENOUS at 21:31

## 2023-09-07 RX ADMIN — ROPINIROLE HYDROCHLORIDE 0.25 MG: 0.25 TABLET, FILM COATED ORAL at 21:30

## 2023-09-07 RX ADMIN — Medication 50 MCG/HR: at 22:58

## 2023-09-07 RX ADMIN — PAROXETINE 10 MG: 10 TABLET, FILM COATED ORAL at 09:01

## 2023-09-07 RX ADMIN — AMLODIPINE BESYLATE 10 MG: 5 TABLET ORAL at 09:00

## 2023-09-07 RX ADMIN — ASPIRIN 81 MG CHEWABLE TABLET 81 MG: 81 TABLET CHEWABLE at 09:00

## 2023-09-07 RX ADMIN — THERA TABS 1 TABLET: TAB at 09:01

## 2023-09-07 RX ADMIN — MEROPENEM 1000 MG: 1 INJECTION, POWDER, FOR SOLUTION INTRAVENOUS at 02:38

## 2023-09-07 RX ADMIN — ENOXAPARIN SODIUM 40 MG: 100 INJECTION SUBCUTANEOUS at 08:57

## 2023-09-07 RX ADMIN — MEROPENEM 1000 MG: 1 INJECTION, POWDER, FOR SOLUTION INTRAVENOUS at 18:26

## 2023-09-07 RX ADMIN — PRAVASTATIN SODIUM 40 MG: 40 TABLET ORAL at 21:34

## 2023-09-07 RX ADMIN — Medication 5 MG: at 21:30

## 2023-09-07 RX ADMIN — POTASSIUM CHLORIDE 10 MEQ: 7.45 INJECTION INTRAVENOUS at 20:25

## 2023-09-07 RX ADMIN — SODIUM CHLORIDE, POTASSIUM CHLORIDE, SODIUM LACTATE AND CALCIUM CHLORIDE: 600; 310; 30; 20 INJECTION, SOLUTION INTRAVENOUS at 17:00

## 2023-09-07 RX ADMIN — METRONIDAZOLE 500 MG: 500 INJECTION, SOLUTION INTRAVENOUS at 00:45

## 2023-09-07 RX ADMIN — POTASSIUM CHLORIDE 10 MEQ: 7.45 INJECTION INTRAVENOUS at 16:50

## 2023-09-07 RX ADMIN — PROPOFOL 25 MCG/KG/MIN: 10 INJECTION, EMULSION INTRAVENOUS at 01:26

## 2023-09-07 RX ADMIN — POTASSIUM CHLORIDE 10 MEQ: 7.45 INJECTION INTRAVENOUS at 18:25

## 2023-09-07 RX ADMIN — MEROPENEM 1000 MG: 1 INJECTION, POWDER, FOR SOLUTION INTRAVENOUS at 10:19

## 2023-09-07 RX ADMIN — POTASSIUM CHLORIDE 10 MEQ: 7.45 INJECTION INTRAVENOUS at 19:25

## 2023-09-07 RX ADMIN — PROPOFOL 25 MCG/KG/MIN: 10 INJECTION, EMULSION INTRAVENOUS at 14:10

## 2023-09-07 RX ADMIN — POTASSIUM CHLORIDE 10 MEQ: 7.45 INJECTION INTRAVENOUS at 16:42

## 2023-09-07 RX ADMIN — TAMSULOSIN HYDROCHLORIDE 0.4 MG: 0.4 CAPSULE ORAL at 09:01

## 2023-09-07 RX ADMIN — PROPOFOL 25 MCG/KG/MIN: 10 INJECTION, EMULSION INTRAVENOUS at 09:11

## 2023-09-07 RX ADMIN — DONEPEZIL HYDROCHLORIDE 10 MG: 5 TABLET, FILM COATED ORAL at 21:30

## 2023-09-07 RX ADMIN — METRONIDAZOLE 500 MG: 500 INJECTION, SOLUTION INTRAVENOUS at 17:07

## 2023-09-07 RX ADMIN — METRONIDAZOLE 500 MG: 500 INJECTION, SOLUTION INTRAVENOUS at 08:56

## 2023-09-07 ASSESSMENT — PULMONARY FUNCTION TESTS
PIF_VALUE: 16
PIF_VALUE: 20
PIF_VALUE: 19
PIF_VALUE: 16
PIF_VALUE: 30
PIF_VALUE: 23
PIF_VALUE: 18
PIF_VALUE: 19

## 2023-09-07 NOTE — RT PROTOCOL NOTE
Respiratory Care Note:    ETT advanced post suctioning to 24 cm at teeth. BBS equal, SPO2 98%. Tolerated well.  ETT secured with commercial tube kirkpatrick

## 2023-09-08 ENCOUNTER — APPOINTMENT (OUTPATIENT)
Facility: HOSPITAL | Age: 88
DRG: 329 | End: 2023-09-08
Payer: MEDICARE

## 2023-09-08 LAB
ALBUMIN SERPL-MCNC: 1.8 G/DL (ref 3.5–5)
ALBUMIN/GLOB SERPL: 0.5 (ref 1.1–2.2)
ALP SERPL-CCNC: 42 U/L (ref 45–117)
ALT SERPL-CCNC: 12 U/L (ref 12–78)
ANION GAP SERPL CALC-SCNC: 6 MMOL/L (ref 5–15)
ARTERIAL PATENCY WRIST A: ABNORMAL
AST SERPL W P-5'-P-CCNC: 15 U/L (ref 15–37)
BACTERIA SPEC CULT: NORMAL
BASE EXCESS BLDA CALC-SCNC: 1.8 MMOL/L (ref 0–3)
BASOPHILS # BLD: 0 K/UL (ref 0–0.1)
BASOPHILS NFR BLD: 0 % (ref 0–1)
BDY SITE: ABNORMAL
BILIRUB SERPL-MCNC: 0.3 MG/DL (ref 0.2–1)
BODY TEMPERATURE: 99.2
BUN SERPL-MCNC: 14 MG/DL (ref 6–20)
BUN/CREAT SERPL: 19 (ref 12–20)
CA-I BLD-MCNC: 7.5 MG/DL (ref 8.5–10.1)
CHLORIDE SERPL-SCNC: 106 MMOL/L (ref 97–108)
CO2 SERPL-SCNC: 28 MMOL/L (ref 21–32)
COHGB MFR BLD: 1.6 % (ref 1–2)
CREAT SERPL-MCNC: 0.75 MG/DL (ref 0.7–1.3)
DIFFERENTIAL METHOD BLD: ABNORMAL
EOSINOPHIL # BLD: 0.5 K/UL (ref 0–0.4)
EOSINOPHIL NFR BLD: 4 % (ref 0–7)
ERYTHROCYTE [DISTWIDTH] IN BLOOD BY AUTOMATED COUNT: 15.7 % (ref 11.5–14.5)
FIO2 ON VENT: 35 %
GAS FLOW.O2 SETTING OXYMISER: 12
GLOBULIN SER CALC-MCNC: 3.6 G/DL (ref 2–4)
GLUCOSE SERPL-MCNC: 94 MG/DL (ref 65–100)
GRAM STN SPEC: NORMAL
HCO3 BLDA-SCNC: 26 MMOL/L (ref 22–26)
HCT VFR BLD AUTO: 28.7 % (ref 36.6–50.3)
HGB BLD-MCNC: 9.2 G/DL (ref 12.1–17)
IMM GRANULOCYTES # BLD AUTO: 0.1 K/UL (ref 0–0.04)
IMM GRANULOCYTES NFR BLD AUTO: 1 % (ref 0–0.5)
LYMPHOCYTES # BLD: 1.9 K/UL (ref 0.8–3.5)
LYMPHOCYTES NFR BLD: 13 % (ref 12–49)
Lab: NORMAL
MCH RBC QN AUTO: 27.1 PG (ref 26–34)
MCHC RBC AUTO-ENTMCNC: 32.1 G/DL (ref 30–36.5)
MCV RBC AUTO: 84.7 FL (ref 80–99)
METHGB MFR BLD: 0.3 % (ref 0–1.4)
MONOCYTES # BLD: 1.4 K/UL (ref 0–1)
MONOCYTES NFR BLD: 9 % (ref 5–13)
NEUTS SEG # BLD: 10.8 K/UL (ref 1.8–8)
NEUTS SEG NFR BLD: 73 % (ref 32–75)
NRBC # BLD: 0 K/UL (ref 0–0.01)
NRBC BLD-RTO: 0 PER 100 WBC
OXYHGB MFR BLD: 96.3 % (ref 95–99)
PCO2 BLDA: 41 MMHG (ref 35–45)
PEEP RESPIRATORY: 5
PERFORMED BY:: ABNORMAL
PH BLDA: 7.43 (ref 7.35–7.45)
PLATELET # BLD AUTO: 329 K/UL (ref 150–400)
PMV BLD AUTO: 10 FL (ref 8.9–12.9)
PO2 BLDA: 113 MMHG (ref 80–100)
POTASSIUM SERPL-SCNC: 3.8 MMOL/L (ref 3.5–5.1)
PROT SERPL-MCNC: 5.4 G/DL (ref 6.4–8.2)
RBC # BLD AUTO: 3.39 M/UL (ref 4.1–5.7)
SAO2 % BLD: 98 % (ref 95–99)
SAO2% DEVICE SAO2% SENSOR NAME: ABNORMAL
SODIUM SERPL-SCNC: 140 MMOL/L (ref 136–145)
SPECIMEN SITE: ABNORMAL
VENTILATION MODE VENT: ABNORMAL
VT SETTING VENT: 500
WBC # BLD AUTO: 14.7 K/UL (ref 4.1–11.1)

## 2023-09-08 PROCEDURE — 6360000002 HC RX W HCPCS: Performed by: INTERNAL MEDICINE

## 2023-09-08 PROCEDURE — 6370000000 HC RX 637 (ALT 250 FOR IP): Performed by: SURGERY

## 2023-09-08 PROCEDURE — 82803 BLOOD GASES ANY COMBINATION: CPT

## 2023-09-08 PROCEDURE — 6360000002 HC RX W HCPCS: Performed by: SURGERY

## 2023-09-08 PROCEDURE — 2580000003 HC RX 258: Performed by: INTERNAL MEDICINE

## 2023-09-08 PROCEDURE — 2700000000 HC OXYGEN THERAPY PER DAY

## 2023-09-08 PROCEDURE — 2580000003 HC RX 258: Performed by: SURGERY

## 2023-09-08 PROCEDURE — 2000000000 HC ICU R&B

## 2023-09-08 PROCEDURE — 94003 VENT MGMT INPAT SUBQ DAY: CPT

## 2023-09-08 PROCEDURE — 85025 COMPLETE CBC W/AUTO DIFF WBC: CPT

## 2023-09-08 PROCEDURE — 36415 COLL VENOUS BLD VENIPUNCTURE: CPT

## 2023-09-08 PROCEDURE — 36600 WITHDRAWAL OF ARTERIAL BLOOD: CPT

## 2023-09-08 PROCEDURE — 71045 X-RAY EXAM CHEST 1 VIEW: CPT

## 2023-09-08 PROCEDURE — 80053 COMPREHEN METABOLIC PANEL: CPT

## 2023-09-08 RX ORDER — MORPHINE SULFATE 2 MG/ML
1 INJECTION, SOLUTION INTRAMUSCULAR; INTRAVENOUS EVERY 6 HOURS PRN
Status: DISPENSED | OUTPATIENT
Start: 2023-09-08 | End: 2023-09-10

## 2023-09-08 RX ADMIN — SODIUM CHLORIDE, POTASSIUM CHLORIDE, SODIUM LACTATE AND CALCIUM CHLORIDE: 600; 310; 30; 20 INJECTION, SOLUTION INTRAVENOUS at 14:14

## 2023-09-08 RX ADMIN — MEROPENEM 1000 MG: 1 INJECTION, POWDER, FOR SOLUTION INTRAVENOUS at 02:38

## 2023-09-08 RX ADMIN — ROPINIROLE HYDROCHLORIDE 0.25 MG: 0.25 TABLET, FILM COATED ORAL at 21:13

## 2023-09-08 RX ADMIN — MEROPENEM 1000 MG: 1 INJECTION, POWDER, FOR SOLUTION INTRAVENOUS at 09:30

## 2023-09-08 RX ADMIN — AMLODIPINE BESYLATE 10 MG: 5 TABLET ORAL at 08:38

## 2023-09-08 RX ADMIN — METRONIDAZOLE 500 MG: 500 INJECTION, SOLUTION INTRAVENOUS at 07:35

## 2023-09-08 RX ADMIN — METRONIDAZOLE 500 MG: 500 INJECTION, SOLUTION INTRAVENOUS at 00:26

## 2023-09-08 RX ADMIN — SODIUM CHLORIDE, PRESERVATIVE FREE 10 ML: 5 INJECTION INTRAVENOUS at 21:14

## 2023-09-08 RX ADMIN — SODIUM CHLORIDE, PRESERVATIVE FREE 10 ML: 5 INJECTION INTRAVENOUS at 08:39

## 2023-09-08 RX ADMIN — PRAVASTATIN SODIUM 40 MG: 40 TABLET ORAL at 21:13

## 2023-09-08 RX ADMIN — PAROXETINE 10 MG: 10 TABLET, FILM COATED ORAL at 08:38

## 2023-09-08 RX ADMIN — THERA TABS 1 TABLET: TAB at 08:38

## 2023-09-08 RX ADMIN — DONEPEZIL HYDROCHLORIDE 10 MG: 5 TABLET, FILM COATED ORAL at 21:13

## 2023-09-08 RX ADMIN — TAMSULOSIN HYDROCHLORIDE 0.4 MG: 0.4 CAPSULE ORAL at 08:37

## 2023-09-08 RX ADMIN — ASPIRIN 81 MG CHEWABLE TABLET 81 MG: 81 TABLET CHEWABLE at 08:38

## 2023-09-08 RX ADMIN — MORPHINE SULFATE 1 MG: 2 INJECTION, SOLUTION INTRAMUSCULAR; INTRAVENOUS at 15:02

## 2023-09-08 RX ADMIN — SODIUM CHLORIDE, POTASSIUM CHLORIDE, SODIUM LACTATE AND CALCIUM CHLORIDE: 600; 310; 30; 20 INJECTION, SOLUTION INTRAVENOUS at 22:43

## 2023-09-08 RX ADMIN — PROPOFOL 30 MCG/KG/MIN: 10 INJECTION, EMULSION INTRAVENOUS at 02:59

## 2023-09-08 RX ADMIN — MEROPENEM 1000 MG: 1 INJECTION, POWDER, FOR SOLUTION INTRAVENOUS at 17:25

## 2023-09-08 RX ADMIN — Medication 5 MG: at 21:13

## 2023-09-08 RX ADMIN — PANTOPRAZOLE SODIUM 40 MG: 40 TABLET, DELAYED RELEASE ORAL at 06:23

## 2023-09-08 RX ADMIN — ENOXAPARIN SODIUM 40 MG: 100 INJECTION SUBCUTANEOUS at 08:37

## 2023-09-08 RX ADMIN — LEVOTHYROXINE SODIUM 100 MCG: 0.03 TABLET ORAL at 08:38

## 2023-09-08 ASSESSMENT — PULMONARY FUNCTION TESTS
PIF_VALUE: 16
PIF_VALUE: 19

## 2023-09-08 ASSESSMENT — PAIN DESCRIPTION - LOCATION: LOCATION: ABDOMEN

## 2023-09-08 ASSESSMENT — PAIN SCALES - GENERAL
PAINLEVEL_OUTOF10: 0
PAINLEVEL_OUTOF10: 7

## 2023-09-08 NOTE — CARE COORDINATION
Remains vented and sedated. Wean as appropriate. Recent clinicals sent to [facility].        YEHUDA Peterson

## 2023-09-09 ENCOUNTER — APPOINTMENT (OUTPATIENT)
Facility: HOSPITAL | Age: 88
DRG: 329 | End: 2023-09-09
Payer: MEDICARE

## 2023-09-09 LAB
ANION GAP SERPL CALC-SCNC: 7 MMOL/L (ref 5–15)
ARTERIAL PATENCY WRIST A: YES
BASE EXCESS BLDA CALC-SCNC: 1.7 MMOL/L (ref 0–3)
BASOPHILS # BLD: 0.1 K/UL (ref 0–0.1)
BASOPHILS NFR BLD: 0 % (ref 0–1)
BDY SITE: ABNORMAL
BODY TEMPERATURE: 98
BUN SERPL-MCNC: 8 MG/DL (ref 6–20)
BUN/CREAT SERPL: 15 (ref 12–20)
CA-I BLD-MCNC: 1.13 MMOL/L (ref 1.13–1.32)
CA-I BLD-MCNC: 7.8 MG/DL (ref 8.5–10.1)
CHLORIDE SERPL-SCNC: 104 MMOL/L (ref 97–108)
CO2 SERPL-SCNC: 28 MMOL/L (ref 21–32)
COHGB MFR BLD: 1 % (ref 1–2)
CREAT SERPL-MCNC: 0.54 MG/DL (ref 0.7–1.3)
DIFFERENTIAL METHOD BLD: ABNORMAL
EOSINOPHIL # BLD: 0.3 K/UL (ref 0–0.4)
EOSINOPHIL NFR BLD: 2 % (ref 0–7)
ERYTHROCYTE [DISTWIDTH] IN BLOOD BY AUTOMATED COUNT: 15.3 % (ref 11.5–14.5)
FIO2 ON VENT: 36 %
GLUCOSE BLD STRIP.AUTO-MCNC: 105 MG/DL (ref 65–100)
GLUCOSE BLD STRIP.AUTO-MCNC: 109 MG/DL (ref 65–100)
GLUCOSE SERPL-MCNC: 105 MG/DL (ref 65–100)
HCO3 BLDA-SCNC: 25 MMOL/L (ref 22–26)
HCT VFR BLD AUTO: 30.4 % (ref 36.6–50.3)
HGB BLD-MCNC: 9.6 G/DL (ref 12.1–17)
IMM GRANULOCYTES # BLD AUTO: 0.1 K/UL (ref 0–0.04)
IMM GRANULOCYTES NFR BLD AUTO: 1 % (ref 0–0.5)
LYMPHOCYTES # BLD: 1.3 K/UL (ref 0.8–3.5)
LYMPHOCYTES NFR BLD: 9 % (ref 12–49)
MCH RBC QN AUTO: 26.4 PG (ref 26–34)
MCHC RBC AUTO-ENTMCNC: 31.6 G/DL (ref 30–36.5)
MCV RBC AUTO: 83.7 FL (ref 80–99)
METHGB MFR BLD: 0.3 % (ref 0–1.4)
MONOCYTES # BLD: 1.7 K/UL (ref 0–1)
MONOCYTES NFR BLD: 11 % (ref 5–13)
NEUTS SEG # BLD: 11.5 K/UL (ref 1.8–8)
NEUTS SEG NFR BLD: 77 % (ref 32–75)
NRBC # BLD: 0 K/UL (ref 0–0.01)
NRBC BLD-RTO: 0 PER 100 WBC
OXYHGB MFR BLD: 96.3 % (ref 95–99)
PCO2 BLDA: 35 MMHG (ref 35–45)
PERFORMED BY:: ABNORMAL
PH BLDA: 7.48 (ref 7.35–7.45)
PLATELET # BLD AUTO: 376 K/UL (ref 150–400)
PMV BLD AUTO: 9.8 FL (ref 8.9–12.9)
PO2 BLDA: 97 MMHG (ref 80–100)
POTASSIUM SERPL-SCNC: 3.4 MMOL/L (ref 3.5–5.1)
RBC # BLD AUTO: 3.63 M/UL (ref 4.1–5.7)
SAO2 % BLD: 98 % (ref 95–99)
SAO2% DEVICE SAO2% SENSOR NAME: ABNORMAL
SODIUM SERPL-SCNC: 139 MMOL/L (ref 136–145)
SPECIMEN SITE: ABNORMAL
WBC # BLD AUTO: 14.9 K/UL (ref 4.1–11.1)

## 2023-09-09 PROCEDURE — 36600 WITHDRAWAL OF ARTERIAL BLOOD: CPT

## 2023-09-09 PROCEDURE — 6370000000 HC RX 637 (ALT 250 FOR IP): Performed by: INTERNAL MEDICINE

## 2023-09-09 PROCEDURE — 71045 X-RAY EXAM CHEST 1 VIEW: CPT

## 2023-09-09 PROCEDURE — 92610 EVALUATE SWALLOWING FUNCTION: CPT

## 2023-09-09 PROCEDURE — 6360000002 HC RX W HCPCS: Performed by: SURGERY

## 2023-09-09 PROCEDURE — 85025 COMPLETE CBC W/AUTO DIFF WBC: CPT

## 2023-09-09 PROCEDURE — 2580000003 HC RX 258: Performed by: INTERNAL MEDICINE

## 2023-09-09 PROCEDURE — 2000000000 HC ICU R&B

## 2023-09-09 PROCEDURE — 94761 N-INVAS EAR/PLS OXIMETRY MLT: CPT

## 2023-09-09 PROCEDURE — 6370000000 HC RX 637 (ALT 250 FOR IP): Performed by: SURGERY

## 2023-09-09 PROCEDURE — 73120 X-RAY EXAM OF HAND: CPT

## 2023-09-09 PROCEDURE — 2580000003 HC RX 258: Performed by: SURGERY

## 2023-09-09 PROCEDURE — 82962 GLUCOSE BLOOD TEST: CPT

## 2023-09-09 PROCEDURE — 36415 COLL VENOUS BLD VENIPUNCTURE: CPT

## 2023-09-09 PROCEDURE — 82803 BLOOD GASES ANY COMBINATION: CPT

## 2023-09-09 PROCEDURE — 80048 BASIC METABOLIC PNL TOTAL CA: CPT

## 2023-09-09 PROCEDURE — 2700000000 HC OXYGEN THERAPY PER DAY

## 2023-09-09 PROCEDURE — 82330 ASSAY OF CALCIUM: CPT

## 2023-09-09 PROCEDURE — 73090 X-RAY EXAM OF FOREARM: CPT

## 2023-09-09 PROCEDURE — 2500000003 HC RX 250 WO HCPCS: Performed by: INTERNAL MEDICINE

## 2023-09-09 RX ORDER — MAGNESIUM SULFATE HEPTAHYDRATE 40 MG/ML
2000 INJECTION, SOLUTION INTRAVENOUS ONCE
Status: COMPLETED | OUTPATIENT
Start: 2023-09-09 | End: 2023-09-09

## 2023-09-09 RX ADMIN — THERA TABS 1 TABLET: TAB at 08:52

## 2023-09-09 RX ADMIN — Medication 5 MG: at 21:14

## 2023-09-09 RX ADMIN — SODIUM CHLORIDE, PRESERVATIVE FREE 10 ML: 5 INJECTION INTRAVENOUS at 21:14

## 2023-09-09 RX ADMIN — PRAVASTATIN SODIUM 40 MG: 40 TABLET ORAL at 21:14

## 2023-09-09 RX ADMIN — LEVOTHYROXINE SODIUM 100 MCG: 0.03 TABLET ORAL at 08:52

## 2023-09-09 RX ADMIN — AMLODIPINE BESYLATE 10 MG: 5 TABLET ORAL at 08:52

## 2023-09-09 RX ADMIN — POTASSIUM BICARBONATE 40 MEQ: 782 TABLET, EFFERVESCENT ORAL at 10:25

## 2023-09-09 RX ADMIN — SODIUM CHLORIDE, POTASSIUM CHLORIDE, SODIUM LACTATE AND CALCIUM CHLORIDE: 600; 310; 30; 20 INJECTION, SOLUTION INTRAVENOUS at 06:40

## 2023-09-09 RX ADMIN — SODIUM CHLORIDE, POTASSIUM CHLORIDE, SODIUM LACTATE AND CALCIUM CHLORIDE: 600; 310; 30; 20 INJECTION, SOLUTION INTRAVENOUS at 21:45

## 2023-09-09 RX ADMIN — MAGNESIUM SULFATE HEPTAHYDRATE 2000 MG: 40 INJECTION, SOLUTION INTRAVENOUS at 10:25

## 2023-09-09 RX ADMIN — ENOXAPARIN SODIUM 40 MG: 100 INJECTION SUBCUTANEOUS at 08:53

## 2023-09-09 RX ADMIN — MEROPENEM 1000 MG: 1 INJECTION, POWDER, FOR SOLUTION INTRAVENOUS at 18:52

## 2023-09-09 RX ADMIN — DONEPEZIL HYDROCHLORIDE 10 MG: 5 TABLET, FILM COATED ORAL at 21:15

## 2023-09-09 RX ADMIN — SODIUM CHLORIDE, PRESERVATIVE FREE 10 ML: 5 INJECTION INTRAVENOUS at 08:53

## 2023-09-09 RX ADMIN — MEROPENEM 1000 MG: 1 INJECTION, POWDER, FOR SOLUTION INTRAVENOUS at 09:22

## 2023-09-09 RX ADMIN — TAMSULOSIN HYDROCHLORIDE 0.4 MG: 0.4 CAPSULE ORAL at 08:52

## 2023-09-09 RX ADMIN — ASPIRIN 81 MG CHEWABLE TABLET 81 MG: 81 TABLET CHEWABLE at 08:52

## 2023-09-09 RX ADMIN — PAROXETINE 10 MG: 10 TABLET, FILM COATED ORAL at 08:52

## 2023-09-09 RX ADMIN — PANTOPRAZOLE SODIUM 40 MG: 40 TABLET, DELAYED RELEASE ORAL at 06:40

## 2023-09-09 RX ADMIN — MEROPENEM 1000 MG: 1 INJECTION, POWDER, FOR SOLUTION INTRAVENOUS at 04:01

## 2023-09-09 RX ADMIN — ROPINIROLE HYDROCHLORIDE 0.25 MG: 0.25 TABLET, FILM COATED ORAL at 21:14

## 2023-09-09 ASSESSMENT — PAIN SCALES - GENERAL
PAINLEVEL_OUTOF10: 0

## 2023-09-09 NOTE — CONSULTS
CARDIOLOGY CONSULTATION    REASON FOR CONSULT: Hx CAD    REQUESTING PROVIDER: Dr. Collette Gibbons:  abdominal pain    HISTORY OF PRESENT ILLNESS:  Slyvia Salvador. is a 80y.o. year-old male with past medical history significant for PAF, CAD with PCI, HTN, HLD, mild TOMMIE who was evaluated today due to cardiac history. He presented to the hospital with abdominal pain. Sigmoid colon mass found at referring ED concerning for possible malignancy. He had colonoscopy and cecal perforation s/p ex lap. Currently intubated and sedated. Some bradycardia noted which is worse with higher doses of propofol. Echo here with normal EF. Stress in 2020 was normal.    Records from hospital admission course thus far reviewed. Telemetry reviewed. .    INPATIENT MEDICATIONS:  Home medications reviewed.     Current Facility-Administered Medications:     potassium chloride 10 mEq/100 mL IVPB (Peripheral Line), 10 mEq, IntraVENous, Q1H, Ember V Amy, DO    sodium chloride flush 0.9 % injection 5-40 mL, 5-40 mL, IntraVENous, 2 times per day, Ember V Amy, DO, 10 mL at 09/06/23 2125    sodium chloride flush 0.9 % injection 5-40 mL, 5-40 mL, IntraVENous, PRN, Ember V Amy, DO    0.9 % sodium chloride infusion, , IntraVENous, PRN, Ember V Amy, DO    ondansetron (ZOFRAN-ODT) disintegrating tablet 4 mg, 4 mg, Oral, Q8H PRN **OR** ondansetron (ZOFRAN) injection 4 mg, 4 mg, IntraVENous, Q6H PRN, Ember V Amy, DO    lactated ringers IV soln infusion, , IntraVENous, Continuous, Ember V Amy, DO, Last Rate: 125 mL/hr at 09/06/23 1025, New Bag at 09/06/23 1025    enoxaparin (LOVENOX) injection 40 mg, 40 mg, SubCUTAneous, Daily, Ember V Amy, DO, 40 mg at 09/07/23 0857    fentaNYL (SUBLIMAZE) 1,000 mcg in sodium chloride 0.9% 100 mL infusion,  mcg/hr, IntraVENous, Continuous, Ember V Amy, DO, Last Rate: 2.5 mL/hr at 09/07/23 0910, 25 mcg/hr at 09/07/23 0910    propofol infusion, 5-50
Nutrition Note    RD consulted for TF order and management. RD f/u on pt yesterday while he was being extubated, appears he is obtunded and therefore inappropriate for PO initiation. Per Dr. Karri Blackmon, appropriate to start clear/full liquids. NGT remains in place, recs below:    Nutrition Recommendations/Plan:   Initiate PO diet as medically able after SLP eval for texture recs.      Initiate TF via NGT of Osmolite 1.2  at 20ml/hr; as tolerated, advance q6hrs to goal of 80ml/hr  Flush 140ml water q4hrs  Provide 2 pkts of prosource 2x/d; 4 pkts total (240 kcals, 60g pro)  Providing 2544 kcals (100%), 167g pro (100%), and 2414 ml (100%)        Electronically signed by Mirna Vila RD on 9/9/23 at 1:17 PM EDT    Contact: Ext [phone], or via [employer]
Negative mg/dL    Bilirubin Urine Negative Negative      Blood, Urine Large (A) Negative      Urobilinogen, Urine 0.2 0.2 - 1.0 EU/dL    Nitrite, Urine Negative Negative      Leukocyte Esterase, Urine Small (A) Negative     Urinalysis, Micro    Collection Time: 09/02/23 10:43 PM   Result Value Ref Range    WBC, UA 5-10 0 - 5 /hpf    RBC, UA  0 - 3 /hpf    BACTERIA, URINE 2+ (A) Negative /hpf   Troponin    Collection Time: 09/03/23  3:20 AM   Result Value Ref Range    Troponin, High Sensitivity 14 0 - 76 ng/L   CBC with Auto Differential    Collection Time: 09/03/23  3:20 AM   Result Value Ref Range    WBC 9.8 4.1 - 11.1 K/uL    RBC 4.03 (L) 4.10 - 5.70 M/uL    Hemoglobin 10.8 (L) 12.1 - 17.0 g/dL    Hematocrit 34.1 (L) 36.6 - 50.3 %    MCV 84.6 80.0 - 99.0 FL    MCH 26.8 26.0 - 34.0 PG    MCHC 31.7 30.0 - 36.5 g/dL    RDW 15.1 (H) 11.5 - 14.5 %    Platelets 123 245 - 424 K/uL    MPV 9.6 8.9 - 12.9 FL    Nucleated RBCs 0.0 0.0  WBC    nRBC 0.00 0.00 - 0.01 K/uL    Neutrophils % 71 32 - 75 %    Lymphocytes % 19 12 - 49 %    Monocytes % 8 5 - 13 %    Eosinophils % 1 0 - 7 %    Basophils % 0 0 - 1 %    Immature Granulocytes 1 (H) 0 - 0.5 %    Neutrophils Absolute 7.0 1.8 - 8.0 K/UL    Lymphocytes Absolute 1.8 0.8 - 3.5 K/UL    Monocytes Absolute 0.8 0.0 - 1.0 K/UL    Eosinophils Absolute 0.1 0.0 - 0.4 K/UL    Basophils Absolute 0.0 0.0 - 0.1 K/UL    Absolute Immature Granulocyte 0.1 (H) 0.00 - 0.04 K/UL    Differential Type AUTOMATED     CMP    Collection Time: 09/03/23  3:20 AM   Result Value Ref Range    Sodium 138 136 - 145 mmol/L    Potassium 3.4 (L) 3.5 - 5.1 mmol/L    Chloride 104 97 - 108 mmol/L    CO2 29 21 - 32 mmol/L    Anion Gap 5 5 - 15 mmol/L    Glucose 128 (H) 65 - 100 mg/dL    BUN 15 6 - 20 mg/dL    Creatinine 0.85 0.70 - 1.30 mg/dL    Bun/Cre Ratio 18 12 - 20      Est, Glom Filt Rate >60 >60 ml/min/1.73m2    Calcium 8.4 (L) 8.5 - 10.1 mg/dL    Total Bilirubin 0.3 0.2 - 1.0 mg/dL    AST 8 (L)
while reading the report due to this  inherent and potential for grammatical mistakes. There is physician  works as a inpatient consult gastroenterologist only, any result not available at time of inpatient discharge and/or clinical follow-up should be managed by the primary care physician or patient's primary gastroenterologist.  It is responsibility of hospitalitis/admitting physician to forward the discharge summary to patient's primary care physician and the primary gastroenterologist regarding further follow-up plan after patient be discharged. note to patient:  The 24 st century Cures Act make the medical notes like this available to patient in the interest of transparency, however please be advised this is a medical document. It is intended  as peer to peer communication. It is written in the medical language and may contain abbreviation or verbiage that are unfamiliar. It may appear blunt or direct. Medical documents are intended to carry relevant information, facts as evident. And the clinic opinions of the practitioner. This note maybe transcribed  using a voice dictation system, voice-recognition errors may occur,. This should not be taken to alter the contents or meaning for this note.       Cc Referring Physician

## 2023-09-10 LAB
ALBUMIN SERPL-MCNC: 1.8 G/DL (ref 3.5–5)
ANION GAP SERPL CALC-SCNC: 2 MMOL/L (ref 5–15)
BASOPHILS # BLD: 0.1 K/UL (ref 0–0.1)
BASOPHILS NFR BLD: 0 % (ref 0–1)
BNP SERPL-MCNC: 1070 PG/ML
BUN SERPL-MCNC: 15 MG/DL (ref 6–20)
BUN/CREAT SERPL: 25 (ref 12–20)
CA-I BLD-MCNC: 7.8 MG/DL (ref 8.5–10.1)
CHLORIDE SERPL-SCNC: 104 MMOL/L (ref 97–108)
CO2 SERPL-SCNC: 30 MMOL/L (ref 21–32)
CREAT SERPL-MCNC: 0.6 MG/DL (ref 0.7–1.3)
DIFFERENTIAL METHOD BLD: ABNORMAL
EOSINOPHIL # BLD: 0.5 K/UL (ref 0–0.4)
EOSINOPHIL NFR BLD: 3 % (ref 0–7)
ERYTHROCYTE [DISTWIDTH] IN BLOOD BY AUTOMATED COUNT: 14.9 % (ref 11.5–14.5)
GLUCOSE SERPL-MCNC: 149 MG/DL (ref 65–100)
HCT VFR BLD AUTO: 30 % (ref 36.6–50.3)
HGB BLD-MCNC: 9.7 G/DL (ref 12.1–17)
IMM GRANULOCYTES # BLD AUTO: 0.1 K/UL (ref 0–0.04)
IMM GRANULOCYTES NFR BLD AUTO: 1 % (ref 0–0.5)
LYMPHOCYTES # BLD: 1.1 K/UL (ref 0.8–3.5)
LYMPHOCYTES NFR BLD: 7 % (ref 12–49)
MAGNESIUM SERPL-MCNC: 2.1 MG/DL (ref 1.6–2.4)
MCH RBC QN AUTO: 26.6 PG (ref 26–34)
MCHC RBC AUTO-ENTMCNC: 32.3 G/DL (ref 30–36.5)
MCV RBC AUTO: 82.4 FL (ref 80–99)
MONOCYTES # BLD: 1.7 K/UL (ref 0–1)
MONOCYTES NFR BLD: 11 % (ref 5–13)
NEUTS SEG # BLD: 11.6 K/UL (ref 1.8–8)
NEUTS SEG NFR BLD: 78 % (ref 32–75)
NRBC # BLD: 0 K/UL (ref 0–0.01)
NRBC BLD-RTO: 0 PER 100 WBC
PHOSPHATE SERPL-MCNC: 1.4 MG/DL (ref 2.6–4.7)
PLATELET # BLD AUTO: 418 K/UL (ref 150–400)
PMV BLD AUTO: 9.5 FL (ref 8.9–12.9)
POTASSIUM SERPL-SCNC: 3.4 MMOL/L (ref 3.5–5.1)
RBC # BLD AUTO: 3.64 M/UL (ref 4.1–5.7)
SODIUM SERPL-SCNC: 136 MMOL/L (ref 136–145)
WBC # BLD AUTO: 15.1 K/UL (ref 4.1–11.1)

## 2023-09-10 PROCEDURE — 6370000000 HC RX 637 (ALT 250 FOR IP): Performed by: INTERNAL MEDICINE

## 2023-09-10 PROCEDURE — 6360000002 HC RX W HCPCS: Performed by: SURGERY

## 2023-09-10 PROCEDURE — 6370000000 HC RX 637 (ALT 250 FOR IP): Performed by: SURGERY

## 2023-09-10 PROCEDURE — 83880 ASSAY OF NATRIURETIC PEPTIDE: CPT

## 2023-09-10 PROCEDURE — 83735 ASSAY OF MAGNESIUM: CPT

## 2023-09-10 PROCEDURE — 2580000003 HC RX 258: Performed by: SURGERY

## 2023-09-10 PROCEDURE — 80069 RENAL FUNCTION PANEL: CPT

## 2023-09-10 PROCEDURE — 2000000000 HC ICU R&B

## 2023-09-10 PROCEDURE — 85025 COMPLETE CBC W/AUTO DIFF WBC: CPT

## 2023-09-10 PROCEDURE — 36415 COLL VENOUS BLD VENIPUNCTURE: CPT

## 2023-09-10 PROCEDURE — 92526 ORAL FUNCTION THERAPY: CPT

## 2023-09-10 PROCEDURE — 6360000002 HC RX W HCPCS: Performed by: INTERNAL MEDICINE

## 2023-09-10 PROCEDURE — 2700000000 HC OXYGEN THERAPY PER DAY

## 2023-09-10 RX ORDER — MORPHINE SULFATE 2 MG/ML
1 INJECTION, SOLUTION INTRAMUSCULAR; INTRAVENOUS EVERY 6 HOURS PRN
Status: DISPENSED | OUTPATIENT
Start: 2023-09-10 | End: 2023-09-12

## 2023-09-10 RX ADMIN — AMLODIPINE BESYLATE 10 MG: 5 TABLET ORAL at 09:35

## 2023-09-10 RX ADMIN — MEROPENEM 1000 MG: 1 INJECTION, POWDER, FOR SOLUTION INTRAVENOUS at 18:06

## 2023-09-10 RX ADMIN — ASPIRIN 81 MG CHEWABLE TABLET 81 MG: 81 TABLET CHEWABLE at 09:35

## 2023-09-10 RX ADMIN — MEROPENEM 1000 MG: 1 INJECTION, POWDER, FOR SOLUTION INTRAVENOUS at 10:14

## 2023-09-10 RX ADMIN — MORPHINE SULFATE 1 MG: 2 INJECTION, SOLUTION INTRAMUSCULAR; INTRAVENOUS at 21:56

## 2023-09-10 RX ADMIN — MEROPENEM 1000 MG: 1 INJECTION, POWDER, FOR SOLUTION INTRAVENOUS at 02:34

## 2023-09-10 RX ADMIN — PAROXETINE 10 MG: 10 TABLET, FILM COATED ORAL at 09:36

## 2023-09-10 RX ADMIN — SODIUM CHLORIDE, PRESERVATIVE FREE 10 ML: 5 INJECTION INTRAVENOUS at 22:05

## 2023-09-10 RX ADMIN — POTASSIUM & SODIUM PHOSPHATES POWDER PACK 280-160-250 MG 500 MG: 280-160-250 PACK at 15:03

## 2023-09-10 RX ADMIN — PRAVASTATIN SODIUM 40 MG: 40 TABLET ORAL at 22:05

## 2023-09-10 RX ADMIN — ACETAMINOPHEN 650 MG: 325 TABLET ORAL at 21:55

## 2023-09-10 RX ADMIN — DONEPEZIL HYDROCHLORIDE 10 MG: 5 TABLET, FILM COATED ORAL at 21:55

## 2023-09-10 RX ADMIN — ENOXAPARIN SODIUM 40 MG: 100 INJECTION SUBCUTANEOUS at 09:36

## 2023-09-10 RX ADMIN — POTASSIUM & SODIUM PHOSPHATES POWDER PACK 280-160-250 MG 500 MG: 280-160-250 PACK at 09:43

## 2023-09-10 RX ADMIN — ACETAMINOPHEN 650 MG: 325 TABLET ORAL at 12:56

## 2023-09-10 RX ADMIN — THERA TABS 1 TABLET: TAB at 09:36

## 2023-09-10 RX ADMIN — TAMSULOSIN HYDROCHLORIDE 0.4 MG: 0.4 CAPSULE ORAL at 09:36

## 2023-09-10 RX ADMIN — ROPINIROLE HYDROCHLORIDE 0.25 MG: 0.25 TABLET, FILM COATED ORAL at 21:55

## 2023-09-10 RX ADMIN — PANTOPRAZOLE SODIUM 40 MG: 40 TABLET, DELAYED RELEASE ORAL at 06:47

## 2023-09-10 RX ADMIN — POTASSIUM & SODIUM PHOSPHATES POWDER PACK 280-160-250 MG 500 MG: 280-160-250 PACK at 21:55

## 2023-09-10 RX ADMIN — SODIUM CHLORIDE, PRESERVATIVE FREE 10 ML: 5 INJECTION INTRAVENOUS at 09:36

## 2023-09-10 RX ADMIN — Medication 5 MG: at 22:05

## 2023-09-10 RX ADMIN — POTASSIUM BICARBONATE 40 MEQ: 782 TABLET, EFFERVESCENT ORAL at 09:44

## 2023-09-10 RX ADMIN — LEVOTHYROXINE SODIUM 100 MCG: 0.03 TABLET ORAL at 09:35

## 2023-09-10 ASSESSMENT — PAIN SCALES - WONG BAKER: WONGBAKER_NUMERICALRESPONSE: 0

## 2023-09-10 ASSESSMENT — PAIN DESCRIPTION - LOCATION: LOCATION: ABDOMEN

## 2023-09-10 ASSESSMENT — PAIN SCALES - GENERAL
PAINLEVEL_OUTOF10: 0
PAINLEVEL_OUTOF10: 3

## 2023-09-11 ENCOUNTER — APPOINTMENT (OUTPATIENT)
Facility: HOSPITAL | Age: 88
DRG: 329 | End: 2023-09-11
Payer: MEDICARE

## 2023-09-11 ENCOUNTER — HOSPITAL ENCOUNTER (INPATIENT)
Facility: HOSPITAL | Age: 88
Discharge: HOME | DRG: 329 | End: 2023-09-13
Attending: SURGERY
Payer: MEDICARE

## 2023-09-11 LAB
ALBUMIN SERPL-MCNC: 1.7 G/DL (ref 3.5–5)
ANION GAP SERPL CALC-SCNC: 2 MMOL/L (ref 5–15)
BASOPHILS # BLD: 0.1 K/UL (ref 0–0.1)
BASOPHILS NFR BLD: 1 % (ref 0–1)
BUN SERPL-MCNC: 13 MG/DL (ref 6–20)
BUN/CREAT SERPL: 25 (ref 12–20)
CA-I BLD-MCNC: 7.8 MG/DL (ref 8.5–10.1)
CHLORIDE SERPL-SCNC: 104 MMOL/L (ref 97–108)
CO2 SERPL-SCNC: 34 MMOL/L (ref 21–32)
CREAT SERPL-MCNC: 0.51 MG/DL (ref 0.7–1.3)
DIFFERENTIAL METHOD BLD: ABNORMAL
ECHO BSA: 2.19 M2
EOSINOPHIL # BLD: 0.7 K/UL (ref 0–0.4)
EOSINOPHIL NFR BLD: 5 % (ref 0–7)
ERYTHROCYTE [DISTWIDTH] IN BLOOD BY AUTOMATED COUNT: 15.1 % (ref 11.5–14.5)
GLUCOSE BLD STRIP.AUTO-MCNC: 113 MG/DL (ref 65–100)
GLUCOSE BLD STRIP.AUTO-MCNC: 115 MG/DL (ref 65–100)
GLUCOSE SERPL-MCNC: 124 MG/DL (ref 65–100)
HCT VFR BLD AUTO: 28.9 % (ref 36.6–50.3)
HGB BLD-MCNC: 9.4 G/DL (ref 12.1–17)
IMM GRANULOCYTES # BLD AUTO: 0.1 K/UL (ref 0–0.04)
IMM GRANULOCYTES NFR BLD AUTO: 1 % (ref 0–0.5)
LYMPHOCYTES # BLD: 1.2 K/UL (ref 0.8–3.5)
LYMPHOCYTES NFR BLD: 9 % (ref 12–49)
MCH RBC QN AUTO: 26.9 PG (ref 26–34)
MCHC RBC AUTO-ENTMCNC: 32.5 G/DL (ref 30–36.5)
MCV RBC AUTO: 82.8 FL (ref 80–99)
MONOCYTES # BLD: 1.4 K/UL (ref 0–1)
MONOCYTES NFR BLD: 11 % (ref 5–13)
NEUTS SEG # BLD: 9.8 K/UL (ref 1.8–8)
NEUTS SEG NFR BLD: 73 % (ref 32–75)
NRBC # BLD: 0 K/UL (ref 0–0.01)
NRBC BLD-RTO: 0 PER 100 WBC
PERFORMED BY:: ABNORMAL
PERFORMED BY:: ABNORMAL
PHOSPHATE SERPL-MCNC: 1.9 MG/DL (ref 2.6–4.7)
PLATELET # BLD AUTO: 396 K/UL (ref 150–400)
PMV BLD AUTO: 9.3 FL (ref 8.9–12.9)
POTASSIUM SERPL-SCNC: 4.3 MMOL/L (ref 3.5–5.1)
RBC # BLD AUTO: 3.49 M/UL (ref 4.1–5.7)
SODIUM SERPL-SCNC: 140 MMOL/L (ref 136–145)
WBC # BLD AUTO: 13.3 K/UL (ref 4.1–11.1)

## 2023-09-11 PROCEDURE — 85025 COMPLETE CBC W/AUTO DIFF WBC: CPT

## 2023-09-11 PROCEDURE — 36556 INSERT NON-TUNNEL CV CATH: CPT

## 2023-09-11 PROCEDURE — 2700000000 HC OXYGEN THERAPY PER DAY

## 2023-09-11 PROCEDURE — 2000000000 HC ICU R&B

## 2023-09-11 PROCEDURE — 82962 GLUCOSE BLOOD TEST: CPT

## 2023-09-11 PROCEDURE — 2580000003 HC RX 258: Performed by: SURGERY

## 2023-09-11 PROCEDURE — 6370000000 HC RX 637 (ALT 250 FOR IP): Performed by: SURGERY

## 2023-09-11 PROCEDURE — 71045 X-RAY EXAM CHEST 1 VIEW: CPT

## 2023-09-11 PROCEDURE — 93970 EXTREMITY STUDY: CPT

## 2023-09-11 PROCEDURE — 6360000002 HC RX W HCPCS: Performed by: INTERNAL MEDICINE

## 2023-09-11 PROCEDURE — 36415 COLL VENOUS BLD VENIPUNCTURE: CPT

## 2023-09-11 PROCEDURE — 6360000002 HC RX W HCPCS: Performed by: SURGERY

## 2023-09-11 PROCEDURE — 92526 ORAL FUNCTION THERAPY: CPT

## 2023-09-11 PROCEDURE — 80069 RENAL FUNCTION PANEL: CPT

## 2023-09-11 PROCEDURE — P9047 ALBUMIN (HUMAN), 25%, 50ML: HCPCS | Performed by: INTERNAL MEDICINE

## 2023-09-11 PROCEDURE — 6370000000 HC RX 637 (ALT 250 FOR IP): Performed by: INTERNAL MEDICINE

## 2023-09-11 RX ORDER — FUROSEMIDE 10 MG/ML
20 INJECTION INTRAMUSCULAR; INTRAVENOUS ONCE
Status: COMPLETED | OUTPATIENT
Start: 2023-09-11 | End: 2023-09-11

## 2023-09-11 RX ORDER — ALBUMIN (HUMAN) 12.5 G/50ML
25 SOLUTION INTRAVENOUS ONCE
Status: COMPLETED | OUTPATIENT
Start: 2023-09-11 | End: 2023-09-11

## 2023-09-11 RX ADMIN — ASPIRIN 81 MG CHEWABLE TABLET 81 MG: 81 TABLET CHEWABLE at 12:24

## 2023-09-11 RX ADMIN — MEROPENEM 1000 MG: 1 INJECTION, POWDER, FOR SOLUTION INTRAVENOUS at 19:30

## 2023-09-11 RX ADMIN — LEVOTHYROXINE SODIUM 100 MCG: 0.03 TABLET ORAL at 12:06

## 2023-09-11 RX ADMIN — ROPINIROLE HYDROCHLORIDE 0.25 MG: 0.25 TABLET, FILM COATED ORAL at 20:33

## 2023-09-11 RX ADMIN — MEROPENEM 1000 MG: 1 INJECTION, POWDER, FOR SOLUTION INTRAVENOUS at 02:37

## 2023-09-11 RX ADMIN — PSYLLIUM HUSK 1 PACKET: 3.4 POWDER ORAL at 21:18

## 2023-09-11 RX ADMIN — SODIUM CHLORIDE, PRESERVATIVE FREE 10 ML: 5 INJECTION INTRAVENOUS at 12:27

## 2023-09-11 RX ADMIN — ALBUMIN (HUMAN) 25 G: 0.25 INJECTION, SOLUTION INTRAVENOUS at 12:04

## 2023-09-11 RX ADMIN — PAROXETINE 10 MG: 10 TABLET, FILM COATED ORAL at 12:27

## 2023-09-11 RX ADMIN — MEROPENEM 1000 MG: 1 INJECTION, POWDER, FOR SOLUTION INTRAVENOUS at 12:16

## 2023-09-11 RX ADMIN — PRAVASTATIN SODIUM 40 MG: 40 TABLET ORAL at 20:42

## 2023-09-11 RX ADMIN — POTASSIUM & SODIUM PHOSPHATES POWDER PACK 280-160-250 MG 500 MG: 280-160-250 PACK at 02:37

## 2023-09-11 RX ADMIN — PSYLLIUM HUSK 1 PACKET: 3.4 POWDER ORAL at 14:44

## 2023-09-11 RX ADMIN — ACETAMINOPHEN 650 MG: 325 TABLET ORAL at 18:11

## 2023-09-11 RX ADMIN — SODIUM CHLORIDE, PRESERVATIVE FREE 10 ML: 5 INJECTION INTRAVENOUS at 21:18

## 2023-09-11 RX ADMIN — ENOXAPARIN SODIUM 40 MG: 100 INJECTION SUBCUTANEOUS at 12:20

## 2023-09-11 RX ADMIN — DONEPEZIL HYDROCHLORIDE 10 MG: 5 TABLET, FILM COATED ORAL at 20:33

## 2023-09-11 RX ADMIN — ACETAMINOPHEN 650 MG: 325 TABLET ORAL at 12:24

## 2023-09-11 RX ADMIN — THERA TABS 1 TABLET: TAB at 12:27

## 2023-09-11 RX ADMIN — FUROSEMIDE 20 MG: 10 INJECTION, SOLUTION INTRAMUSCULAR; INTRAVENOUS at 14:41

## 2023-09-11 RX ADMIN — Medication 5 MG: at 20:33

## 2023-09-11 RX ADMIN — TAMSULOSIN HYDROCHLORIDE 0.4 MG: 0.4 CAPSULE ORAL at 12:24

## 2023-09-11 ASSESSMENT — PAIN DESCRIPTION - LOCATION: LOCATION: OTHER (COMMENT)

## 2023-09-11 ASSESSMENT — PAIN SCALES - PAIN ASSESSMENT IN ADVANCED DEMENTIA (PAINAD)
NEGVOCALIZATION: 0
BODYLANGUAGE: 0
TOTALSCORE: 0
FACIALEXPRESSION: 0
CONSOLABILITY: 0
BREATHING: 0

## 2023-09-11 NOTE — CARE COORDINATION
POD#5. Will need general surgery, pulmonology, and cardiology clearance. Currently on 2L NC. Wean as appropriate. [address] Patient is a resident at [facility] ([facility]).       YEHUDA Peterson

## 2023-09-12 LAB
ANION GAP SERPL CALC-SCNC: 4 MMOL/L (ref 5–15)
BACTERIA SPEC CULT: NORMAL
BACTERIA SPEC CULT: NORMAL
BASOPHILS # BLD: 0.1 K/UL (ref 0–0.1)
BASOPHILS NFR BLD: 0 % (ref 0–1)
BUN SERPL-MCNC: 17 MG/DL (ref 6–20)
BUN/CREAT SERPL: 27 (ref 12–20)
CA-I BLD-MCNC: 8.2 MG/DL (ref 8.5–10.1)
CHLORIDE SERPL-SCNC: 101 MMOL/L (ref 97–108)
CO2 SERPL-SCNC: 33 MMOL/L (ref 21–32)
CREAT SERPL-MCNC: 0.64 MG/DL (ref 0.7–1.3)
DIFFERENTIAL METHOD BLD: ABNORMAL
EOSINOPHIL # BLD: 0.9 K/UL (ref 0–0.4)
EOSINOPHIL NFR BLD: 6 % (ref 0–7)
ERYTHROCYTE [DISTWIDTH] IN BLOOD BY AUTOMATED COUNT: 14.9 % (ref 11.5–14.5)
GLUCOSE SERPL-MCNC: 158 MG/DL (ref 65–100)
HCT VFR BLD AUTO: 29.6 % (ref 36.6–50.3)
HGB BLD-MCNC: 9.5 G/DL (ref 12.1–17)
IMM GRANULOCYTES # BLD AUTO: 0.2 K/UL (ref 0–0.04)
IMM GRANULOCYTES NFR BLD AUTO: 1 % (ref 0–0.5)
LYMPHOCYTES # BLD: 1.3 K/UL (ref 0.8–3.5)
LYMPHOCYTES NFR BLD: 8 % (ref 12–49)
Lab: NORMAL
Lab: NORMAL
MCH RBC QN AUTO: 26.8 PG (ref 26–34)
MCHC RBC AUTO-ENTMCNC: 32.1 G/DL (ref 30–36.5)
MCV RBC AUTO: 83.4 FL (ref 80–99)
MONOCYTES # BLD: 1.4 K/UL (ref 0–1)
MONOCYTES NFR BLD: 9 % (ref 5–13)
NEUTS SEG # BLD: 12.1 K/UL (ref 1.8–8)
NEUTS SEG NFR BLD: 76 % (ref 32–75)
NRBC # BLD: 0 K/UL (ref 0–0.01)
NRBC BLD-RTO: 0 PER 100 WBC
PLATELET # BLD AUTO: 406 K/UL (ref 150–400)
PMV BLD AUTO: 9.4 FL (ref 8.9–12.9)
POTASSIUM SERPL-SCNC: 4.3 MMOL/L (ref 3.5–5.1)
RBC # BLD AUTO: 3.55 M/UL (ref 4.1–5.7)
SODIUM SERPL-SCNC: 138 MMOL/L (ref 136–145)
WBC # BLD AUTO: 15.9 K/UL (ref 4.1–11.1)

## 2023-09-12 PROCEDURE — 94761 N-INVAS EAR/PLS OXIMETRY MLT: CPT

## 2023-09-12 PROCEDURE — 92526 ORAL FUNCTION THERAPY: CPT

## 2023-09-12 PROCEDURE — 1100000000 HC RM PRIVATE

## 2023-09-12 PROCEDURE — 6370000000 HC RX 637 (ALT 250 FOR IP): Performed by: SURGERY

## 2023-09-12 PROCEDURE — 6360000002 HC RX W HCPCS: Performed by: SURGERY

## 2023-09-12 PROCEDURE — 2580000003 HC RX 258: Performed by: SURGERY

## 2023-09-12 PROCEDURE — 85025 COMPLETE CBC W/AUTO DIFF WBC: CPT

## 2023-09-12 PROCEDURE — 36415 COLL VENOUS BLD VENIPUNCTURE: CPT

## 2023-09-12 PROCEDURE — 80048 BASIC METABOLIC PNL TOTAL CA: CPT

## 2023-09-12 PROCEDURE — 6370000000 HC RX 637 (ALT 250 FOR IP): Performed by: INTERNAL MEDICINE

## 2023-09-12 RX ADMIN — PSYLLIUM HUSK 1 PACKET: 3.4 POWDER ORAL at 08:11

## 2023-09-12 RX ADMIN — AMLODIPINE BESYLATE 10 MG: 5 TABLET ORAL at 08:10

## 2023-09-12 RX ADMIN — PAROXETINE 10 MG: 10 TABLET, FILM COATED ORAL at 08:11

## 2023-09-12 RX ADMIN — ACETAMINOPHEN 650 MG: 325 TABLET ORAL at 05:49

## 2023-09-12 RX ADMIN — ENOXAPARIN SODIUM 40 MG: 100 INJECTION SUBCUTANEOUS at 08:10

## 2023-09-12 RX ADMIN — PANTOPRAZOLE SODIUM 40 MG: 40 TABLET, DELAYED RELEASE ORAL at 05:53

## 2023-09-12 RX ADMIN — THERA TABS 1 TABLET: TAB at 08:09

## 2023-09-12 RX ADMIN — SODIUM CHLORIDE, PRESERVATIVE FREE 10 ML: 5 INJECTION INTRAVENOUS at 08:10

## 2023-09-12 RX ADMIN — ROPINIROLE HYDROCHLORIDE 0.25 MG: 0.25 TABLET, FILM COATED ORAL at 21:14

## 2023-09-12 RX ADMIN — PRAVASTATIN SODIUM 40 MG: 40 TABLET ORAL at 22:12

## 2023-09-12 RX ADMIN — DONEPEZIL HYDROCHLORIDE 10 MG: 5 TABLET, FILM COATED ORAL at 21:14

## 2023-09-12 RX ADMIN — Medication 5 MG: at 21:15

## 2023-09-12 RX ADMIN — PSYLLIUM HUSK 1 PACKET: 3.4 POWDER ORAL at 22:12

## 2023-09-12 RX ADMIN — ACETAMINOPHEN 650 MG: 325 TABLET ORAL at 22:22

## 2023-09-12 RX ADMIN — TAMSULOSIN HYDROCHLORIDE 0.4 MG: 0.4 CAPSULE ORAL at 08:10

## 2023-09-12 RX ADMIN — LEVOTHYROXINE SODIUM 100 MCG: 0.03 TABLET ORAL at 08:09

## 2023-09-12 RX ADMIN — SODIUM CHLORIDE, PRESERVATIVE FREE 5 ML: 5 INJECTION INTRAVENOUS at 22:12

## 2023-09-12 RX ADMIN — ASPIRIN 81 MG CHEWABLE TABLET 81 MG: 81 TABLET CHEWABLE at 08:11

## 2023-09-12 ASSESSMENT — PAIN SCALES - PAIN ASSESSMENT IN ADVANCED DEMENTIA (PAINAD)
BODYLANGUAGE: 0
NEGVOCALIZATION: 0
TOTALSCORE: 0
BODYLANGUAGE: 0
BREATHING: 0
BREATHING: 0
TOTALSCORE: 0
CONSOLABILITY: 0
NEGVOCALIZATION: 0
BODYLANGUAGE: 0
BREATHING: 0
FACIALEXPRESSION: 0
NEGVOCALIZATION: 0
FACIALEXPRESSION: 0
CONSOLABILITY: 0
FACIALEXPRESSION: 0
CONSOLABILITY: 0
TOTALSCORE: 0

## 2023-09-12 ASSESSMENT — PAIN DESCRIPTION - DESCRIPTORS: DESCRIPTORS: OTHER (COMMENT)

## 2023-09-12 ASSESSMENT — PAIN DESCRIPTION - ORIENTATION: ORIENTATION: RIGHT

## 2023-09-12 ASSESSMENT — PAIN DESCRIPTION - LOCATION
LOCATION: ARM
LOCATION: OTHER (COMMENT)

## 2023-09-12 ASSESSMENT — PAIN SCALES - GENERAL: PAINLEVEL_OUTOF10: 3

## 2023-09-12 ASSESSMENT — PAIN SCALES - WONG BAKER: WONGBAKER_NUMERICALRESPONSE: 0

## 2023-09-12 NOTE — MR AVS SNAPSHOT
[address] [address], [address] [facility], [address] [facility] 
[phone] Patient: Paola Gonzalez MRN: [id number] Visit Information Date & Time Provider Department Dept. Phone Encounter #  
 7/16/2018  9:00 AM Marbin Plascencia MD [facility] [address] [phone] [id number] Your Appointments 8/14/2018  3:40 PM  
ESTABLISHED PATIENT with Hiue Hodges MD  
[address] ([address] [facility]) Appt Note: 4 week f/up  
 [address] [facility] [address]  
[phone]  
  
   
 [address] [facility] [address] [address] Upcoming Health Maintenance Date Due DTaP/Tdap/Td series (1 - Tdap) 2/18/1950 ZOSTER VACCINE AGE 60> 12/18/1988 GLAUCOMA SCREENING Q2Y 2/18/1994 Pneumococcal 65+ Low/Medium Risk (1 of 2 - PCV13) 2/18/1994 MEDICARE YEARLY EXAM 7/9/2018 Influenza Age 5 to Adult 8/1/2018 Allergies as of 7/16/2018  Review Complete On: 7/16/2018 By: Iris Cole LPN Severity Noted Reaction Type Reactions Pcn [Penicillins]  07/10/2018    Swelling Current Immunizations  Never Reviewed No immunizations on file. Not reviewed this visit You Were Diagnosed With   
  
 Codes Comments ERRONEOUS ENCOUNTER--DISREGARD    -  Primary ICD-9-CM: 25809 Vitals Smoking Status Never Smoker Preferred Pharmacy Pharmacy Name Phone [address] [facility], [address] [phone] Your Updated Medication List  
  
   
This list is accurate as of 7/16/18 10:37 AM.  Always use your most recent med list.  
  
  
  
  
 aspirin delayed-release 81 mg tablet Take  by mouth daily. ferrous sulfate 325 mg (65 mg iron) EC tablet Commonly known as:  IRON Take 325 mg by mouth two (2) times a day. fish oil-dha-epa 1,200-144-216 mg Cap Will refill patient's cardiac medications. Patient is also needing his diabetic meds and supplies re ordered, along with nicotine patches. Will route to patient's PCP to help with that.    Take  by mouth.  
  
 gabapentin 300 mg capsule Commonly known as:  NEURONTIN Take 300 mg by mouth three (3) times daily. levothyroxine 100 mcg tablet Commonly known as:  SYNTHROID Take  by mouth Daily (before breakfast). lisinopril 10 mg tablet Commonly known as:  Cody Treutlen Take  by mouth daily. metoprolol tartrate 25 mg tablet Commonly known as:  LOPRESSOR Take 12.5 mg by mouth daily. multivitamin tablet Commonly known as:  ONE A DAY Take 1 Tab by mouth daily. pravastatin 40 mg tablet Commonly known as:  PRAVACHOL Take 40 mg by mouth nightly. predniSONE 20 mg tablet Commonly known as:  Leverne Shirley Take 2 Tabs by mouth daily (with breakfast) for 40 days. rOPINIRole 0.25 mg tablet Commonly known as:  Artice Jennifer Take  by mouth three (3) times daily. tamsulosin 0.4 mg capsule Commonly known as:  FLOMAX Take 0.4 mg by mouth daily. tocilizumab 162 mg/0.9 mL Syrg Commonly known as:  ACTEMRA 162 mg by SubCUTAneous route every seven (7) days. Indications: GIANT CELL ARTERITIS Introducing \[facility]\"" & HEALTH SERVICES! [facility] introduces [employer] patient portal. Now you can access parts of your medical record, email your doctor's office, and request medication refills online. 1. In your internet browser, go to [email or web address] 2. Click on the First Time User? Click Here link in the Sign In box. You will see the New Member Sign Up page. 3. Enter your [employer] Access Code exactly as it appears below. You will not need to use this code after youve completed the sign-up process. If you do not sign up before the expiration date, you must request a new code. · [employer] Access Code: [email or web address] Expires: 10/8/2018 12:49 PM 
 
4. Enter the last four digits of your Social Security Number (xxxx) and Date of Birth (mm/dd/yyyy) as indicated and click Submit. You will be taken to the next sign-up page. 5. Create a [employer] ID. This will be your [employer] login ID and cannot be changed, so think of one that is secure and easy to remember. 6. Create a [employer] password. You can change your password at any time. 7. Enter your Password Reset Question and Answer. This can be used at a later time if you forget your password. 8. Enter your e-mail address. You will receive e-mail notification when new information is available in [address] 9. Click Sign Up. You can now view and download portions of your medical record. 10. Click the Download Summary menu link to download a portable copy of your medical information. If you have questions, please visit the Frequently Asked Questions section of the [employer] website. Remember, [employer] is NOT to be used for urgent needs. For medical emergencies, dial 911. Now available from your iPhone and Android! Please provide this summary of care documentation to your next provider. Your primary care clinician is listed as Beckie Flores. If you have any questions after today's visit, please call [phone].

## 2023-09-13 LAB
ALBUMIN SERPL-MCNC: 2.1 G/DL (ref 3.5–5)
ANION GAP SERPL CALC-SCNC: 5 MMOL/L (ref 5–15)
BNP SERPL-MCNC: 644 PG/ML
BUN SERPL-MCNC: 16 MG/DL (ref 6–20)
BUN/CREAT SERPL: 24 (ref 12–20)
CA-I BLD-MCNC: 8.6 MG/DL (ref 8.5–10.1)
CHLORIDE SERPL-SCNC: 102 MMOL/L (ref 97–108)
CO2 SERPL-SCNC: 29 MMOL/L (ref 21–32)
CREAT SERPL-MCNC: 0.67 MG/DL (ref 0.7–1.3)
GLUCOSE SERPL-MCNC: 115 MG/DL (ref 65–100)
PHOSPHATE SERPL-MCNC: 2.4 MG/DL (ref 2.6–4.7)
POTASSIUM SERPL-SCNC: 4.7 MMOL/L (ref 3.5–5.1)
SODIUM SERPL-SCNC: 136 MMOL/L (ref 136–145)

## 2023-09-13 PROCEDURE — 1100000000 HC RM PRIVATE

## 2023-09-13 PROCEDURE — 6370000000 HC RX 637 (ALT 250 FOR IP): Performed by: SURGERY

## 2023-09-13 PROCEDURE — 83880 ASSAY OF NATRIURETIC PEPTIDE: CPT

## 2023-09-13 PROCEDURE — 2580000003 HC RX 258: Performed by: SURGERY

## 2023-09-13 PROCEDURE — 80069 RENAL FUNCTION PANEL: CPT

## 2023-09-13 PROCEDURE — 36415 COLL VENOUS BLD VENIPUNCTURE: CPT

## 2023-09-13 PROCEDURE — 94761 N-INVAS EAR/PLS OXIMETRY MLT: CPT

## 2023-09-13 PROCEDURE — 6370000000 HC RX 637 (ALT 250 FOR IP): Performed by: INTERNAL MEDICINE

## 2023-09-13 PROCEDURE — 92526 ORAL FUNCTION THERAPY: CPT

## 2023-09-13 PROCEDURE — 6360000002 HC RX W HCPCS: Performed by: SURGERY

## 2023-09-13 RX ADMIN — Medication 5 MG: at 22:47

## 2023-09-13 RX ADMIN — PSYLLIUM HUSK 1 PACKET: 3.4 POWDER ORAL at 22:47

## 2023-09-13 RX ADMIN — THERA TABS 1 TABLET: TAB at 08:58

## 2023-09-13 RX ADMIN — SODIUM CHLORIDE, PRESERVATIVE FREE 5 ML: 5 INJECTION INTRAVENOUS at 22:48

## 2023-09-13 RX ADMIN — ROPINIROLE HYDROCHLORIDE 0.25 MG: 0.25 TABLET, FILM COATED ORAL at 22:46

## 2023-09-13 RX ADMIN — TAMSULOSIN HYDROCHLORIDE 0.4 MG: 0.4 CAPSULE ORAL at 08:58

## 2023-09-13 RX ADMIN — PANTOPRAZOLE SODIUM 40 MG: 40 TABLET, DELAYED RELEASE ORAL at 05:54

## 2023-09-13 RX ADMIN — PRAVASTATIN SODIUM 40 MG: 40 TABLET ORAL at 22:47

## 2023-09-13 RX ADMIN — AMLODIPINE BESYLATE 10 MG: 5 TABLET ORAL at 08:58

## 2023-09-13 RX ADMIN — ENOXAPARIN SODIUM 40 MG: 100 INJECTION SUBCUTANEOUS at 08:57

## 2023-09-13 RX ADMIN — SODIUM CHLORIDE, PRESERVATIVE FREE 10 ML: 5 INJECTION INTRAVENOUS at 08:58

## 2023-09-13 RX ADMIN — ASPIRIN 81 MG CHEWABLE TABLET 81 MG: 81 TABLET CHEWABLE at 08:58

## 2023-09-13 RX ADMIN — DONEPEZIL HYDROCHLORIDE 10 MG: 5 TABLET, FILM COATED ORAL at 22:46

## 2023-09-13 RX ADMIN — PAROXETINE 10 MG: 10 TABLET, FILM COATED ORAL at 08:58

## 2023-09-13 RX ADMIN — LEVOTHYROXINE SODIUM 100 MCG: 0.03 TABLET ORAL at 08:58

## 2023-09-13 RX ADMIN — PSYLLIUM HUSK 1 PACKET: 3.4 POWDER ORAL at 08:57

## 2023-09-13 ASSESSMENT — PAIN SCALES - PAIN ASSESSMENT IN ADVANCED DEMENTIA (PAINAD)
CONSOLABILITY: 0
TOTALSCORE: 0
FACIALEXPRESSION: 2
TOTALSCORE: 0
NEGVOCALIZATION: 0
TOTALSCORE: 0
NEGVOCALIZATION: 0
CONSOLABILITY: 0
FACIALEXPRESSION: 0
BREATHING: 0
FACIALEXPRESSION: 0
CONSOLABILITY: 0
FACIALEXPRESSION: 0
BODYLANGUAGE: 0
FACIALEXPRESSION: 0
BREATHING: 0
BREATHING: 0
BODYLANGUAGE: 0
BODYLANGUAGE: 0
NEGVOCALIZATION: 0
NEGVOCALIZATION: 0
BODYLANGUAGE: 0
NEGVOCALIZATION: 0
TOTALSCORE: 2
CONSOLABILITY: 0
TOTALSCORE: 0
BREATHING: 0
NEGVOCALIZATION: 0
BODYLANGUAGE: 0
BREATHING: 0
FACIALEXPRESSION: 0
TOTALSCORE: 0
BODYLANGUAGE: 0
BREATHING: 0

## 2023-09-13 ASSESSMENT — PAIN SCALES - GENERAL
PAINLEVEL_OUTOF10: 0

## 2023-09-13 NOTE — CARE COORDINATION
14: 50PM Patient is now eating per assigned RN and Dr. Pastor Leija. YEHUDA Navarrete        09:27AM POD#7. Remains w/hand mittens. Unable to initiate any diet thus far. Per attending note if patient doesn't start eating hospice discussion TBD. Will need general surgery, pulmonology, and cardiology clearance. Currently on 2L NC. Wean as appropriate. Recent clinicals sent to [address] Patient is a resident at [facility] ([facility]).          YEHUDA Navarrete

## 2023-09-14 ENCOUNTER — APPOINTMENT (OUTPATIENT)
Facility: HOSPITAL | Age: 88
DRG: 329 | End: 2023-09-14
Payer: MEDICARE

## 2023-09-14 PROCEDURE — 92611 MOTION FLUOROSCOPY/SWALLOW: CPT

## 2023-09-14 PROCEDURE — 6370000000 HC RX 637 (ALT 250 FOR IP): Performed by: SURGERY

## 2023-09-14 PROCEDURE — 6370000000 HC RX 637 (ALT 250 FOR IP): Performed by: INTERNAL MEDICINE

## 2023-09-14 PROCEDURE — 97530 THERAPEUTIC ACTIVITIES: CPT

## 2023-09-14 PROCEDURE — 2580000003 HC RX 258: Performed by: SURGERY

## 2023-09-14 PROCEDURE — 97161 PT EVAL LOW COMPLEX 20 MIN: CPT

## 2023-09-14 PROCEDURE — 1100000000 HC RM PRIVATE

## 2023-09-14 PROCEDURE — 2500000003 HC RX 250 WO HCPCS: Performed by: SURGERY

## 2023-09-14 PROCEDURE — 74230 X-RAY XM SWLNG FUNCJ C+: CPT

## 2023-09-14 PROCEDURE — 6360000002 HC RX W HCPCS: Performed by: SURGERY

## 2023-09-14 RX ADMIN — TAMSULOSIN HYDROCHLORIDE 0.4 MG: 0.4 CAPSULE ORAL at 08:20

## 2023-09-14 RX ADMIN — PSYLLIUM HUSK 1 PACKET: 3.4 POWDER ORAL at 21:15

## 2023-09-14 RX ADMIN — THERA TABS 1 TABLET: TAB at 08:18

## 2023-09-14 RX ADMIN — AMLODIPINE BESYLATE 10 MG: 5 TABLET ORAL at 08:19

## 2023-09-14 RX ADMIN — PANTOPRAZOLE SODIUM 40 MG: 40 TABLET, DELAYED RELEASE ORAL at 06:00

## 2023-09-14 RX ADMIN — Medication 5 MG: at 21:15

## 2023-09-14 RX ADMIN — BARIUM SULFATE 30 ML: 400 SUSPENSION ORAL at 09:30

## 2023-09-14 RX ADMIN — DONEPEZIL HYDROCHLORIDE 10 MG: 5 TABLET, FILM COATED ORAL at 21:15

## 2023-09-14 RX ADMIN — BARIUM SULFATE 80 ML: 0.81 POWDER, FOR SUSPENSION ORAL at 09:30

## 2023-09-14 RX ADMIN — ACETAMINOPHEN 650 MG: 325 TABLET ORAL at 21:15

## 2023-09-14 RX ADMIN — ACETAMINOPHEN 650 MG: 325 TABLET ORAL at 00:37

## 2023-09-14 RX ADMIN — LEVOTHYROXINE SODIUM 100 MCG: 0.03 TABLET ORAL at 08:19

## 2023-09-14 RX ADMIN — PRAVASTATIN SODIUM 40 MG: 40 TABLET ORAL at 21:15

## 2023-09-14 RX ADMIN — ASPIRIN 81 MG CHEWABLE TABLET 81 MG: 81 TABLET CHEWABLE at 08:20

## 2023-09-14 RX ADMIN — BARIUM SULFATE 30 ML: 400 PASTE ORAL at 09:30

## 2023-09-14 RX ADMIN — ENOXAPARIN SODIUM 40 MG: 100 INJECTION SUBCUTANEOUS at 08:16

## 2023-09-14 RX ADMIN — ROPINIROLE HYDROCHLORIDE 0.25 MG: 0.25 TABLET, FILM COATED ORAL at 21:15

## 2023-09-14 RX ADMIN — SODIUM CHLORIDE, PRESERVATIVE FREE 10 ML: 5 INJECTION INTRAVENOUS at 21:16

## 2023-09-14 RX ADMIN — PAROXETINE 10 MG: 10 TABLET, FILM COATED ORAL at 08:18

## 2023-09-14 RX ADMIN — SODIUM CHLORIDE, PRESERVATIVE FREE 10 ML: 5 INJECTION INTRAVENOUS at 17:42

## 2023-09-14 RX ADMIN — PSYLLIUM HUSK 1 PACKET: 3.4 POWDER ORAL at 08:16

## 2023-09-14 ASSESSMENT — PAIN SCALES - WONG BAKER
WONGBAKER_NUMERICALRESPONSE: 8
WONGBAKER_NUMERICALRESPONSE: 2

## 2023-09-14 ASSESSMENT — PAIN SCALES - GENERAL: PAINLEVEL_OUTOF10: 0

## 2023-09-14 NOTE — CARE COORDINATION
DC Plan: [facility]     Pt transferred from the ICU to [address] [place] [address]. Writer reviewed previous CM notes. Pt is from the [facility]. Family wants pt going to [facility] for short term. Cm will need PT/OT notes.

## 2023-09-15 PROCEDURE — 6370000000 HC RX 637 (ALT 250 FOR IP): Performed by: SURGERY

## 2023-09-15 PROCEDURE — 2580000003 HC RX 258: Performed by: SURGERY

## 2023-09-15 PROCEDURE — 6370000000 HC RX 637 (ALT 250 FOR IP): Performed by: INTERNAL MEDICINE

## 2023-09-15 PROCEDURE — 6360000002 HC RX W HCPCS: Performed by: SURGERY

## 2023-09-15 PROCEDURE — 1100000000 HC RM PRIVATE

## 2023-09-15 PROCEDURE — 97530 THERAPEUTIC ACTIVITIES: CPT

## 2023-09-15 PROCEDURE — 97165 OT EVAL LOW COMPLEX 30 MIN: CPT

## 2023-09-15 RX ADMIN — PANTOPRAZOLE SODIUM 40 MG: 40 TABLET, DELAYED RELEASE ORAL at 05:54

## 2023-09-15 RX ADMIN — ENOXAPARIN SODIUM 40 MG: 100 INJECTION SUBCUTANEOUS at 09:20

## 2023-09-15 RX ADMIN — PRAVASTATIN SODIUM 40 MG: 40 TABLET ORAL at 21:36

## 2023-09-15 RX ADMIN — AMLODIPINE BESYLATE 10 MG: 5 TABLET ORAL at 09:20

## 2023-09-15 RX ADMIN — Medication 5 MG: at 21:35

## 2023-09-15 RX ADMIN — ONDANSETRON 4 MG: 2 INJECTION INTRAMUSCULAR; INTRAVENOUS at 09:20

## 2023-09-15 RX ADMIN — SODIUM CHLORIDE, PRESERVATIVE FREE 10 ML: 5 INJECTION INTRAVENOUS at 09:22

## 2023-09-15 RX ADMIN — ROPINIROLE HYDROCHLORIDE 0.25 MG: 0.25 TABLET, FILM COATED ORAL at 21:35

## 2023-09-15 RX ADMIN — TAMSULOSIN HYDROCHLORIDE 0.4 MG: 0.4 CAPSULE ORAL at 09:19

## 2023-09-15 RX ADMIN — DONEPEZIL HYDROCHLORIDE 10 MG: 5 TABLET, FILM COATED ORAL at 21:35

## 2023-09-15 RX ADMIN — LEVOTHYROXINE SODIUM 100 MCG: 0.03 TABLET ORAL at 09:20

## 2023-09-15 RX ADMIN — PSYLLIUM HUSK 1 PACKET: 3.4 POWDER ORAL at 09:20

## 2023-09-15 RX ADMIN — ASPIRIN 81 MG CHEWABLE TABLET 81 MG: 81 TABLET CHEWABLE at 09:20

## 2023-09-15 RX ADMIN — PSYLLIUM HUSK 1 PACKET: 3.4 POWDER ORAL at 21:35

## 2023-09-15 RX ADMIN — THERA TABS 1 TABLET: TAB at 09:19

## 2023-09-15 RX ADMIN — PAROXETINE 10 MG: 10 TABLET, FILM COATED ORAL at 09:20

## 2023-09-15 NOTE — WOUND CARE
Nurse advised WCN that they were having difficulty with pouch adhering and having leaks. Pouch leaking this morning. Old pouch removed and skin cleansed with NS and gauze, do not use bath wipes to leidy-stomal skin as it will leave a film and pouch will not adhere well. Adhesive remover wipe used to remove stoma paste that was on skin. Leidy-stomal skin \"crusted\" by applying skin prep wipe around stoma and then dusting stoma powder to skin. Excess stoma powder wiped away with gauze and then dabbed with skin prep wipe, this was repeated x3. Liedy-stomal has some small areas of excoriation. High flow ostomy pouch cut to fit over stoma (35mm) and applied with minimal skin visible between wafer and stoma. Barrier strips applied to all sides of wafer for extra security, WCN held hand over pouching system for about a minute, no leaks noted at this time. Pouch was connected to jordan bag. WCN to continue to follow for pouching needs, patient is not appropriate for education due to confusion. No family present at this time, believe plan is for patient to return to SNF. OSTOMY Assessment     Surgery Date:__9/6/2023___     Stoma Tissue Assessment: ___Post-op ___Follow-up        Type of Diversion: _X__New___ Established ___Revision___Permanent____Temporary     __X___  Ileostomy   _____  Colostomy: ____ Ascending, ____ Transverse, _____.  Sigmoid   _____  Fredna Lamp   _____  Ileal Conduit   _____  Mucous Fistul        Appearance of Ostomy:   __ Bright Red /Moist/Viable __X_ Dark Red ___ Pink ___ Sloughing ___Necrotic____Pallor ____Red  ___Dry __X__Moist (some darkened areas noted to stoma, Dr. Reyes notified)     Stoma Size: ___35_____ (mm)       _X__Round ___ Nancye Anchors ___Irregular      Stoma Height:   ____Flush ____Retracted __X__Budded ____Edematous ____Prolapse      Stoma Location  ____ Left Side          __X__Right Side     _____Umbilicus  _____Incision Line  ____ Above Belt       ____ Below Belt     Abdominal Contours  ____ Firm
Patient sleeping soundly with nurse at bedside, ostomy pouch is in place. Patient has a regular ostomy pouch in place, advised the nurse that patient has been having a lot of output and would recommend applying high output pouch to existing wafer since this was just changed yesterday. Three high output pouches provided for nurse to apply later if needed. WCN to continue to follow for pouching needs and family education if needed, no family present at this time.
Patient vented and sedated at this time, Ileostomy pouch intact and stoma is red/moist. WCN to continue to follow for education and pouching needs.
WCN in to assess patients Ileostomy pouch, pouch is intact at this time with no leaks present. Stool is brown and soft, may be starting to get too thick to be able to continue with connecting pouch to jordan. WCN pushed stool that was in pouch into jordan. Patient is speaking incoherently, no family in room at this time. WCN to continue to follow for pouching needs.
erythema 09/05/23 1144   Margins Defined edges 09/05/23 1144   Number of days: 2            Please re-consult WCN for any changes in skin condition.     Isrrael Buckley RN, BSN  [facility] CARE [facility]   09/05/23  11:44 AM

## 2023-09-15 NOTE — CARE COORDINATION
CM reviewed Pt medicals, [facility] have accepted Pt. CM sent updated notes and asked them to start auth. 1:15    [facility] stated that they will start auth today but will not have a bed until Monday.

## 2023-09-16 PROCEDURE — 1100000000 HC RM PRIVATE

## 2023-09-16 PROCEDURE — 6370000000 HC RX 637 (ALT 250 FOR IP): Performed by: INTERNAL MEDICINE

## 2023-09-16 PROCEDURE — 6370000000 HC RX 637 (ALT 250 FOR IP): Performed by: SURGERY

## 2023-09-16 PROCEDURE — 6360000002 HC RX W HCPCS: Performed by: SURGERY

## 2023-09-16 RX ORDER — LOPERAMIDE HYDROCHLORIDE 2 MG/1
4 CAPSULE ORAL 4 TIMES DAILY
Status: ACTIVE | OUTPATIENT
Start: 2023-09-16 | End: 2023-09-17

## 2023-09-16 RX ORDER — LOPERAMIDE HYDROCHLORIDE 2 MG/1
4 CAPSULE ORAL 4 TIMES DAILY
Status: DISCONTINUED | OUTPATIENT
Start: 2023-09-16 | End: 2023-09-16

## 2023-09-16 RX ADMIN — PANTOPRAZOLE SODIUM 40 MG: 40 TABLET, DELAYED RELEASE ORAL at 05:19

## 2023-09-16 RX ADMIN — LOPERAMIDE HYDROCHLORIDE 4 MG: 2 CAPSULE ORAL at 20:54

## 2023-09-16 RX ADMIN — LOPERAMIDE HYDROCHLORIDE 4 MG: 2 CAPSULE ORAL at 14:21

## 2023-09-16 RX ADMIN — THERA TABS 1 TABLET: TAB at 08:26

## 2023-09-16 RX ADMIN — PRAVASTATIN SODIUM 40 MG: 40 TABLET ORAL at 20:54

## 2023-09-16 RX ADMIN — PSYLLIUM HUSK 1 PACKET: 3.4 POWDER ORAL at 20:53

## 2023-09-16 RX ADMIN — PSYLLIUM HUSK 1 PACKET: 3.4 POWDER ORAL at 08:23

## 2023-09-16 RX ADMIN — LOPERAMIDE HYDROCHLORIDE 4 MG: 2 CAPSULE ORAL at 18:38

## 2023-09-16 RX ADMIN — AMLODIPINE BESYLATE 10 MG: 5 TABLET ORAL at 08:25

## 2023-09-16 RX ADMIN — ENOXAPARIN SODIUM 40 MG: 100 INJECTION SUBCUTANEOUS at 08:26

## 2023-09-16 RX ADMIN — DONEPEZIL HYDROCHLORIDE 10 MG: 5 TABLET, FILM COATED ORAL at 20:54

## 2023-09-16 RX ADMIN — ROPINIROLE HYDROCHLORIDE 0.25 MG: 0.25 TABLET, FILM COATED ORAL at 20:54

## 2023-09-16 RX ADMIN — Medication 5 MG: at 20:54

## 2023-09-16 RX ADMIN — ASPIRIN 81 MG CHEWABLE TABLET 81 MG: 81 TABLET CHEWABLE at 08:26

## 2023-09-16 RX ADMIN — TAMSULOSIN HYDROCHLORIDE 0.4 MG: 0.4 CAPSULE ORAL at 08:26

## 2023-09-16 RX ADMIN — LEVOTHYROXINE SODIUM 100 MCG: 0.03 TABLET ORAL at 08:26

## 2023-09-16 RX ADMIN — PAROXETINE 10 MG: 10 TABLET, FILM COATED ORAL at 08:26

## 2023-09-16 ASSESSMENT — PAIN SCALES - GENERAL
PAINLEVEL_OUTOF10: 0
PAINLEVEL_OUTOF10: 0

## 2023-09-16 ASSESSMENT — PAIN SCALES - WONG BAKER
WONGBAKER_NUMERICALRESPONSE: 0
WONGBAKER_NUMERICALRESPONSE: 2
WONGBAKER_NUMERICALRESPONSE: 0

## 2023-09-16 ASSESSMENT — PAIN DESCRIPTION - LOCATION: LOCATION: CHEST

## 2023-09-16 ASSESSMENT — PAIN DESCRIPTION - ORIENTATION: ORIENTATION: UPPER

## 2023-09-16 ASSESSMENT — PAIN DESCRIPTION - DESCRIPTORS: DESCRIPTORS: PATIENT UNABLE TO DESCRIBE

## 2023-09-17 PROCEDURE — 6360000002 HC RX W HCPCS: Performed by: SURGERY

## 2023-09-17 PROCEDURE — 6370000000 HC RX 637 (ALT 250 FOR IP): Performed by: SURGERY

## 2023-09-17 PROCEDURE — 92526 ORAL FUNCTION THERAPY: CPT

## 2023-09-17 PROCEDURE — 6370000000 HC RX 637 (ALT 250 FOR IP): Performed by: INTERNAL MEDICINE

## 2023-09-17 PROCEDURE — 1100000000 HC RM PRIVATE

## 2023-09-17 RX ADMIN — ASPIRIN 81 MG CHEWABLE TABLET 81 MG: 81 TABLET CHEWABLE at 07:57

## 2023-09-17 RX ADMIN — LEVOTHYROXINE SODIUM 100 MCG: 0.03 TABLET ORAL at 07:57

## 2023-09-17 RX ADMIN — PSYLLIUM HUSK 1 PACKET: 3.4 POWDER ORAL at 20:52

## 2023-09-17 RX ADMIN — TAMSULOSIN HYDROCHLORIDE 0.4 MG: 0.4 CAPSULE ORAL at 07:57

## 2023-09-17 RX ADMIN — PSYLLIUM HUSK 1 PACKET: 3.4 POWDER ORAL at 07:56

## 2023-09-17 RX ADMIN — PANTOPRAZOLE SODIUM 40 MG: 40 TABLET, DELAYED RELEASE ORAL at 06:12

## 2023-09-17 RX ADMIN — PRAVASTATIN SODIUM 40 MG: 40 TABLET ORAL at 20:51

## 2023-09-17 RX ADMIN — ENOXAPARIN SODIUM 40 MG: 100 INJECTION SUBCUTANEOUS at 07:56

## 2023-09-17 RX ADMIN — Medication 5 MG: at 21:22

## 2023-09-17 RX ADMIN — AMLODIPINE BESYLATE 10 MG: 5 TABLET ORAL at 07:57

## 2023-09-17 RX ADMIN — DONEPEZIL HYDROCHLORIDE 10 MG: 5 TABLET, FILM COATED ORAL at 20:50

## 2023-09-17 RX ADMIN — PAROXETINE 10 MG: 10 TABLET, FILM COATED ORAL at 07:57

## 2023-09-17 RX ADMIN — ROPINIROLE HYDROCHLORIDE 0.25 MG: 0.25 TABLET, FILM COATED ORAL at 20:51

## 2023-09-17 RX ADMIN — THERA TABS 1 TABLET: TAB at 07:57

## 2023-09-17 ASSESSMENT — PAIN SCALES - PAIN ASSESSMENT IN ADVANCED DEMENTIA (PAINAD)
NEGVOCALIZATION: 0
BODYLANGUAGE: 0
CONSOLABILITY: 0
BREATHING: 0
FACIALEXPRESSION: 0
TOTALSCORE: 0

## 2023-09-17 ASSESSMENT — PAIN SCALES - GENERAL
PAINLEVEL_OUTOF10: 2

## 2023-09-17 ASSESSMENT — PAIN SCALES - WONG BAKER
WONGBAKER_NUMERICALRESPONSE: 2

## 2023-09-18 VITALS
HEART RATE: 56 BPM | SYSTOLIC BLOOD PRESSURE: 124 MMHG | DIASTOLIC BLOOD PRESSURE: 55 MMHG | HEIGHT: 72 IN | TEMPERATURE: 97.7 F | OXYGEN SATURATION: 100 % | BODY MASS INDEX: 27.2 KG/M2 | WEIGHT: 200.84 LBS | RESPIRATION RATE: 16 BRPM

## 2023-09-18 PROCEDURE — 6370000000 HC RX 637 (ALT 250 FOR IP): Performed by: SURGERY

## 2023-09-18 PROCEDURE — 6360000002 HC RX W HCPCS: Performed by: SURGERY

## 2023-09-18 PROCEDURE — 6370000000 HC RX 637 (ALT 250 FOR IP): Performed by: INTERNAL MEDICINE

## 2023-09-18 RX ADMIN — PANTOPRAZOLE SODIUM 40 MG: 40 TABLET, DELAYED RELEASE ORAL at 06:24

## 2023-09-18 RX ADMIN — TAMSULOSIN HYDROCHLORIDE 0.4 MG: 0.4 CAPSULE ORAL at 09:45

## 2023-09-18 RX ADMIN — ENOXAPARIN SODIUM 40 MG: 100 INJECTION SUBCUTANEOUS at 09:45

## 2023-09-18 RX ADMIN — THERA TABS 1 TABLET: TAB at 09:45

## 2023-09-18 RX ADMIN — AMLODIPINE BESYLATE 10 MG: 5 TABLET ORAL at 09:45

## 2023-09-18 RX ADMIN — LEVOTHYROXINE SODIUM 100 MCG: 0.03 TABLET ORAL at 09:45

## 2023-09-18 RX ADMIN — PAROXETINE 10 MG: 10 TABLET, FILM COATED ORAL at 09:45

## 2023-09-18 RX ADMIN — ASPIRIN 81 MG CHEWABLE TABLET 81 MG: 81 TABLET CHEWABLE at 09:45

## 2023-09-18 RX ADMIN — PSYLLIUM HUSK 1 PACKET: 3.4 POWDER ORAL at 09:46

## 2023-09-18 ASSESSMENT — PAIN SCALES - WONG BAKER: WONGBAKER_NUMERICALRESPONSE: 0

## 2023-09-18 ASSESSMENT — PAIN SCALES - GENERAL
PAINLEVEL_OUTOF10: 2
PAINLEVEL_OUTOF10: 0

## 2023-09-18 NOTE — CARE COORDINATION
Pt will dc to [facility]. Pt will need stretcher transport set up. Nurse can call report to [phone]. Pt going to room R2. Transition of Care Plan:    RUR: 12%  Prior Level of Functioning: Genoveva  Disposition: SNF  If SNF or IPR: Date FOC offered:   Date FOC received:   Accepting facility:   Date authorization started with reference number:   Date authorization received and expires: Follow up appointments: unit secretary will setup as needed   DME needed: none  Transportation at discharge: Stretcher  IM/IMM Medicare/ letter given: yes  Is patient a [place] and connected with [facility]? no   If yes, was [employer] transfer form completed and VA notified? Caregiver Contact: Carolyn Hernandez  Discharge Caregiver contacted prior to discharge?  yes  Care Conference needed? no  Barriers to discharge: none

## 2023-09-18 NOTE — DISCHARGE SUMMARY
Physician Discharge Summary     Patient ID:    Akanksha Garcia  [id number]  51 y.o.  2/18/1929    Admit date: 9/3/2023    Discharge date : 9/18/2023      Final Diagnoses:   Colonic mass [K63.89]  Right lower quadrant abdominal pain [R10.31]  Acute respiratory failure with hypoxia, requiring mechanical ventilation. 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 CT showed masslike wall thickening of the sigmoid colon concerning for malignancy versus chronic diverticular disease. Right hemicolon was dilated. He was felt to have a large bowel obstruction from sigmoid colon mass. Hospital Course:   Patient was admitted to the medical floor. He was seen by general surgery and GI. Patient underwent colonoscopy which showed multiple masslike lesions forming a partially obstructive mass in the sigmoid colon. This was felt most likely due to inflammation. Patient was also found to have large bowel perforation. He was seen by general surgery and taken to the OR for total colectomy and end ileostomy on 9/6    Pathology came back as all inflammatory changes, no malignancy     He was transferred to the ICU following surgery and maintained on a ventilator. Patient was extubated on 9/8, weaned to a nasal cannula. He remained very confused and agitated postextubation.      He has an advanced directive which indicates no artificial tube feedings and therefore NG tube was removed    He was started on a diet

## 2023-09-18 NOTE — CARE COORDINATION
7500: CM has reviewed pt chart    Medically stable for dc    DCP: [facility] auth pending; CM inquired this morning if Geroge Euler was started Friday and requested reference number. ..awaiting reply      1125: CM called pt son and informed that pt will be able to transport over to [place] today. DC order is in. Primary nurse notified.

## (undated) DEVICE — BLADE ES L6IN ELASTOMERIC COAT EXT DURABLE BEND UPTO 90DEG

## (undated) DEVICE — INTENDED FOR TISSUE SEPARATION, AND OTHER PROCEDURES THAT REQUIRE A SHARP SURGICAL BLADE TO PUNCTURE OR CUT.: Brand: BARD-PARKER ® CARBON RIB-BACK BLADES

## (undated) DEVICE — GLOVE SURG SZ 65 L12IN FNGR THK79MIL GRN LTX FREE

## (undated) DEVICE — PAD POSITIONING WNG STD KIT W/BODY STRP LF DISP

## (undated) DEVICE — SUTURE VCRL SZ 3-0 L18IN ABSRB UD PS-2 L19MM 1/2 CIR J497G

## (undated) DEVICE — REM POLYHESIVE ADULT PATIENT RETURN ELECTRODE: Brand: VALLEYLAB

## (undated) DEVICE — 1LYRTR 16FR10ML100%SIL UMS SNP: Brand: MEDLINE INDUSTRIES, INC.

## (undated) DEVICE — SOLUTION IRRIG 1000ML 0.9% SOD CHL USP POUR PLAS BTL

## (undated) DEVICE — STERILE POLYISOPRENE POWDER-FREE SURGICAL GLOVES: Brand: PROTEXIS

## (undated) DEVICE — SPONGE LAP SFT 18X18 IN X RAY DETECTABLE

## (undated) DEVICE — COVER,MAYO STAND,STERILE: Brand: MEDLINE

## (undated) DEVICE — NON-WOVEN ADHESIVE WOUND DRESSING: Brand: PRIMAPORE ADHESIVE DRESSING 30*10CM

## (undated) DEVICE — SUTURE NONABSORBABLE MONOFILAMENT 2-0 FS 18 IN ETHILON 664H

## (undated) DEVICE — SUTURE 1 36 VIO MONO PDS PDP371T

## (undated) DEVICE — DRAIN SURG W10MMXL20CM SIL FULL PERF HUBLESS FLAT RADPQ

## (undated) DEVICE — SUTURE VCRL + SZ 3-0 L27IN ABSRB VLT SH 1/2 CIR TAPR PNT VCP784D

## (undated) DEVICE — MASK ANES INF SZ 2 PREM TAIL VLV INFL PRT UNSCENTED SGL PT

## (undated) DEVICE — SUTURE VCRL + SZ 3-0 L18IN ABSRB UD SH 1/2 CIR TAPERCUT NDL VCP864D

## (undated) DEVICE — BLADE,CARBON-STEEL,15,STRL,DISPOSABLE,TB: Brand: MEDLINE

## (undated) DEVICE — GLOVE SURG SZ 65 THK91MIL LTX FREE SYN POLYISOPRENE

## (undated) DEVICE — MAJOR LAP PROCEDURE PACK: Brand: MEDLINE INDUSTRIES, INC.

## (undated) DEVICE — SEALER ENDOSCP NANO COAT OPN DIV CRV L JAW LIGASURE IMPACT

## (undated) DEVICE — WOUND RETRACTOR AND PROTECTOR: Brand: ALEXIS O WOUND PROTECTOR-RETRACTOR

## (undated) DEVICE — CLIP INT SM WIDE RED TI TRNSVRS GRV CHEVRON SHP W PRECIS

## (undated) DEVICE — HANDLE LT SNAP ON ULT DURABLE LENS FOR TRUMPF ALC DISPOSABLE

## (undated) DEVICE — ELECTRODE PT RET AD L9FT HI MOIST COND ADH HYDRGEL CORDED

## (undated) DEVICE — INSULATED BLADE ELECTRODE: Brand: EDGE

## (undated) DEVICE — STAPLER INT L75MM CUT LN L73MM STPL LN L77MM BLU B FRM 8

## (undated) DEVICE — SYR 10ML LUER LOK 1/5ML GRAD --

## (undated) DEVICE — Device

## (undated) DEVICE — COLON CLOSING PACK: Brand: MEDLINE INDUSTRIES, INC.

## (undated) DEVICE — CANNULA NSL O2 AD 7 FT END-TIDAL CARBON DIOX VENTFLO

## (undated) DEVICE — YANKAUER,BULB TIP,W/O VENT,RIGID,STERILE: Brand: MEDLINE

## (undated) DEVICE — SUTURE VCRL SZ 4-0 L27IN ABSRB UD L19MM PS-2 3/8 CIR PRIM J426H

## (undated) DEVICE — GOWN,SIRUS,NONRNF,SETINSLV,2XL,18/CS: Brand: MEDLINE

## (undated) DEVICE — (D)STRIP SKN CLSR 0.5X4IN WHT --

## (undated) DEVICE — RELOAD STPL L75MM OPN H3.8MM CLS 1.5MM WIRE DIA0.2MM REG

## (undated) DEVICE — (D)PREP SKN CHLRAPRP APPL 26ML -- CONVERT TO ITEM 371833

## (undated) DEVICE — BLADE,CARBON-STEEL,10,STRL,DISPOSABLE,TB: Brand: MEDLINE

## (undated) DEVICE — NEEDLE HYPO 22GA L1.5IN BLK S STL HUB POLYPR SHLD REG BVL

## (undated) DEVICE — KIT ENDOSCOPIC  PROC VIA

## (undated) DEVICE — PART NUMBER 108260, VTI 8 MHZ DISPOSABLE DOPPLER PROBE, STRAIGHT, BOX: Brand: VTI 8 MHZ DISPOSABLE DOPPLER PROBE, STRAIGHT, BOX

## (undated) DEVICE — SOUTHSIDE TURNOVER: Brand: MEDLINE INDUSTRIES, INC.

## (undated) DEVICE — INFECTION CONTROL KIT SYS

## (undated) DEVICE — SUT VCRL 3-0 18IN TIE MP VIO --

## (undated) DEVICE — DEVON™ KNEE AND BODY STRAP 60" X 3" (1.5 M X 7.6 CM): Brand: DEVON

## (undated) DEVICE — STAPLER SKIN H3.9MM WIRE DIA0.58MM CRWN 6.9MM 35 STPL FIX

## (undated) DEVICE — GARMENT,MEDLINE,DVT,INT,CALF,MED, GEN2: Brand: MEDLINE

## (undated) DEVICE — JELLY LUBRICATING 2 OZ SCREW-CAP MTL TUBE STRL SURGLUB

## (undated) DEVICE — APPLICATOR MEDICATED 26 CC SOLUTION HI LT ORNG CHLORAPREP

## (undated) DEVICE — SUTURE VCRL + SZ 3-0 L27IN ABSRB UD L26MM SH 1/2 CIR VCP416H

## (undated) DEVICE — LEGGINGS, PAIR, 33X51 XL, STERILE: Brand: MEDLINE

## (undated) DEVICE — STAPLER SKIN H3.9MM WIRE DIA0.58MM CRWN 6.9MM 35 STPL ROT

## (undated) DEVICE — APPLICATOR BNDG 1MM ADH PREMIERPRO EXOFIN

## (undated) DEVICE — MASK O2 MED AD 7 FT 3 IN 1 W/ STD CONN LTX

## (undated) DEVICE — PACK,EENT,TURBAN DRAPE,PK II: Brand: MEDLINE